# Patient Record
Sex: FEMALE | Race: WHITE | NOT HISPANIC OR LATINO | Employment: FULL TIME | URBAN - METROPOLITAN AREA
[De-identification: names, ages, dates, MRNs, and addresses within clinical notes are randomized per-mention and may not be internally consistent; named-entity substitution may affect disease eponyms.]

---

## 2018-09-12 ENCOUNTER — OFFICE VISIT (OUTPATIENT)
Dept: HEMATOLOGY ONCOLOGY | Facility: MEDICAL CENTER | Age: 53
End: 2018-09-12
Payer: COMMERCIAL

## 2018-09-12 ENCOUNTER — HOSPITAL ENCOUNTER (OUTPATIENT)
Facility: MEDICAL CENTER | Age: 53
End: 2018-09-12
Attending: INTERNAL MEDICINE
Payer: COMMERCIAL

## 2018-09-12 ENCOUNTER — NON-PROVIDER VISIT (OUTPATIENT)
Dept: HEMATOLOGY ONCOLOGY | Facility: MEDICAL CENTER | Age: 53
End: 2018-09-12
Payer: COMMERCIAL

## 2018-09-12 VITALS
TEMPERATURE: 98.1 F | WEIGHT: 238.98 LBS | OXYGEN SATURATION: 94 % | DIASTOLIC BLOOD PRESSURE: 80 MMHG | RESPIRATION RATE: 18 BRPM | HEART RATE: 88 BPM | SYSTOLIC BLOOD PRESSURE: 110 MMHG | BODY MASS INDEX: 46.92 KG/M2 | HEIGHT: 60 IN

## 2018-09-12 VITALS
WEIGHT: 238 LBS | HEIGHT: 60 IN | SYSTOLIC BLOOD PRESSURE: 110 MMHG | TEMPERATURE: 98.1 F | DIASTOLIC BLOOD PRESSURE: 80 MMHG | BODY MASS INDEX: 46.72 KG/M2 | HEART RATE: 88 BPM | RESPIRATION RATE: 18 BRPM | OXYGEN SATURATION: 94 %

## 2018-09-12 DIAGNOSIS — C50.912 LEFT BREAST CANCER WITH T3 TUMOR, >5 CM IN GREATEST DIMENSION (HCC): ICD-10-CM

## 2018-09-12 LAB
ALBUMIN SERPL BCP-MCNC: 4 G/DL (ref 3.2–4.9)
ALBUMIN/GLOB SERPL: 1.4 G/DL
ALP SERPL-CCNC: 74 U/L (ref 30–99)
ALT SERPL-CCNC: 16 U/L (ref 2–50)
ANION GAP SERPL CALC-SCNC: 7 MMOL/L (ref 0–11.9)
AST SERPL-CCNC: 19 U/L (ref 12–45)
BILIRUB SERPL-MCNC: 0.6 MG/DL (ref 0.1–1.5)
BUN SERPL-MCNC: 16 MG/DL (ref 8–22)
CALCIUM SERPL-MCNC: 9.7 MG/DL (ref 8.5–10.5)
CEA SERPL-MCNC: 2.2 NG/ML (ref 0–3)
CHLORIDE SERPL-SCNC: 106 MMOL/L (ref 96–112)
CO2 SERPL-SCNC: 28 MMOL/L (ref 20–33)
CREAT SERPL-MCNC: 0.68 MG/DL (ref 0.5–1.4)
GLOBULIN SER CALC-MCNC: 2.9 G/DL (ref 1.9–3.5)
GLUCOSE SERPL-MCNC: 94 MG/DL (ref 65–99)
POTASSIUM SERPL-SCNC: 4 MMOL/L (ref 3.6–5.5)
PROT SERPL-MCNC: 6.9 G/DL (ref 6–8.2)
SODIUM SERPL-SCNC: 141 MMOL/L (ref 135–145)

## 2018-09-12 PROCEDURE — 86300 IMMUNOASSAY TUMOR CA 15-3: CPT

## 2018-09-12 PROCEDURE — 80053 COMPREHEN METABOLIC PANEL: CPT

## 2018-09-12 PROCEDURE — 82378 CARCINOEMBRYONIC ANTIGEN: CPT

## 2018-09-12 PROCEDURE — 36415 COLL VENOUS BLD VENIPUNCTURE: CPT | Performed by: INTERNAL MEDICINE

## 2018-09-12 PROCEDURE — 99205 OFFICE O/P NEW HI 60 MIN: CPT | Performed by: INTERNAL MEDICINE

## 2018-09-12 ASSESSMENT — PAIN SCALES - GENERAL
PAINLEVEL: NO PAIN
PAINLEVEL: NO PAIN

## 2018-09-12 NOTE — PROGRESS NOTES
Consult Note: Oncology    Date of consultation: 9/12/2018 8:09 AM    Referring provider: Elena Grossman M.D.    Reason for consultation: Locally davanced breast Ca , ER/NM positive Her2 kamala negative.       History of presenting illness:      -Palpable left breast mass since 4/2018. Also noticed left axillary mass  -Outside mammogram/ultrasound-large irregular mass in the 6:00 position of the left breast along with a large left axillary lymph node  -Ultrasound biopsy high-grade invasive ductal carcinoma, poorly differentiated. A year. Positive and HER-2/kamala negative. Ki- 67 -60%.  She is G0, P0. No hormone replacement therapy. No significant family history of breast carcinoma that she knows of.  -She has a PET scan ordered which is pending      Past Medical History: No other documented medical history  No past medical history on file.    Past surgical history: Cholecystectomy, gastric bypass No past surgical history on file.    Allergies:  Patient has no allergy information on record.    Medications:    No current outpatient prescriptions on file.     No current facility-administered medications for this visit.        Social History:   She is a nonsmoker. No significant alcohol use except socially.  Family History: No significant family history of breast carcinoma. Her maternal and paternal grand parents had lung carcinoma, thought to be from smoking  No family history on file.    Review of Systems:  All other review of systems are negative except what was mentioned above in the HPI.    Constitutional: No fever, chills, weight loss ,malaise/fatigue.    HEENT: No new auditory or visual complaints. No sore throat and neck pain.     Respiratory:No new cough, sputum production, shortness of breath and wheezing.    Cardiovascular: No new chest pain, palpitations, orthopnea and leg swelling.    Gastrointestinal: No heartburn, nausea, vomiting ,abdominal pain, hematochezia or melena     Genitourinary: Negative for  dysuria, hematuria    Musculoskeletal: No new arthralgias or myalgias   Skin: Negative for rash and itching.    Neurological: Negative for focal weakness or headaches.    Endo/Heme/Allergies: No abnormal bleed/bruise.    Psychiatric/Behavioral: No new depression/anxiety.    Physical Exam:  Vitals:   /80   Pulse 88   Temp 36.7 °C (98.1 °F)   Resp 18   Ht 1.524 m (5')   Wt 108.4 kg (238 lb 15.7 oz)   SpO2 94%   BMI 46.67 kg/m²     General: Not in acute distress, alert and oriented x 3  HEENT: No pallor, icterus. Oropharynx clear.   Neck: Supple, no palpable masses.  Lymph nodes: No palpable cervical, supraclavicular,  inguinal lymphadenopathy.    CVS: regular rate and rhythm, no rubs or gallops  RESP: Clear to auscultate bilaterally, no wheezing or crackles.   ABD: Soft, non tender, non distended, positive bowel sounds, no palpable organomegaly  EXT: No edema or cyanosis  CNS: Alert and oriented x3, No focal deficits.  Skin- No rash  Breast- large, firm breast mass centered around the 6:00 position of the left breast. She also has bulky left axillary adenopathy  Labs:   No results for input(s): RBC, HEMOGLOBIN, HEMATOCRIT, PLATELETCT, PROTHROMBTM, APTT, INR, IRON, FERRITIN, TOTIRONBC in the last 72 hours.  No results found for: SODIUM, POTASSIUM, CHLORIDE, CO2, GLUCOSE, BUN, CREATININE, BUNCREATRAT, GLOMRATE     Assessment and Plan:    Locally davanced breast Ca , ER/VT positive Her2 kamala negative, poorly differentiated, high-grade with high Ki-67. She appears to have aggressive pathology, probably Luminal B type.. We will obtain the official report of the pathology from outside.    Long discussion today with the patient about various stages of breast carcinoma. Clinically, she appears to stage III disease. Informed her that PET scan is very important to make sure that she does not have stage IV disease in which case the goal of treatment will be more palliative with antiestrogen therapy and CDk4  inhibitor. We will assess her hormone levels to ascertain the menopausal status.    If she does not have distant metastatic disease, will be a candidate for neoadjuvant chemotherapy. Given the bulky primary breast mass and axillary adenopathy for downstaging, given her otherwise intact. Performance status, I would prefer receiving dose dense chemotherapy, we will arrange PICC line/echocardiogram in that situation.    Given her age and aggressive presentation, will also test for germline mutation, which may have bearing on the decision for bilateral mastectomy.    Return to clinic with PET scan results.Complex patient requiring complex decision making. I have extensively reviewed her prior medical records as part of establishing care with me and formulated the plan . 80% of today's 65 minute visit spent today face to face reviewing all the above and answering her questions.    She agreed and verbalized her agreement and understanding with the current plan.  I answered all questions and concerns she has at this time.              Thank you for allowing me to participate in her care.    Please note that this dictation was created using voice recognition software. I have made every reasonable attempt to correct obvious errors, but I expect that there are errors of grammar and possibly content that I did not discover before finalizing the note.      SIGNATURES:  Jitendra Linder    CC:  Noam Caldwell M.D.  Elena Grossman M.D.

## 2018-09-12 NOTE — PROGRESS NOTES
Addie Orellana is a 53 y.o. female here for a non-provider visit for: Lab Draws  on 9/12/2018 at 9:13 AM    Procedure Performed: Venipuncture     Anatomical site: Right Antecubital Area (AC)    Equipment used: 21g Butterfly     Labs drawn: CEA, CMP and ca 27.29, , AMBRY    Ordering Provider: Dr. Jitendra Pittman By: Brisa Salgado, Med Ass't    Without incident, raymundo present in office.

## 2018-09-13 LAB — CANCER AG15-3 SERPL-ACNC: 19 U/ML (ref 0–31)

## 2018-09-14 ENCOUNTER — TELEPHONE (OUTPATIENT)
Dept: HEMATOLOGY ONCOLOGY | Facility: MEDICAL CENTER | Age: 53
End: 2018-09-14

## 2018-09-14 LAB — CANCER AG27-29 SERPL-ACNC: 17.7 U/ML (ref 0–40)

## 2018-09-14 NOTE — TELEPHONE ENCOUNTER
New Oncology Patient 48 hour follow up:    Called to welcome patient to [facility] and to follow up on initial consult with Dr. Linder on 9/12/2018. Reviewed next steps in the patient’s plan of care, patient upcoming appointment on 9/18/2018 for PET Results.Patient confirmed, answered all patients questions and thanked them for their time. Provided our phone number, [phone], for patient should they have any further questions or concerns.

## 2018-09-17 ENCOUNTER — HOSPITAL ENCOUNTER (OUTPATIENT)
Dept: RADIOLOGY | Facility: MEDICAL CENTER | Age: 53
End: 2018-09-17
Attending: INTERNAL MEDICINE
Payer: COMMERCIAL

## 2018-09-17 DIAGNOSIS — C50.912 LEFT BREAST CANCER WITH T3 TUMOR, >5 CM IN GREATEST DIMENSION (HCC): ICD-10-CM

## 2018-09-17 PROCEDURE — A9552 F18 FDG: HCPCS

## 2018-09-18 ENCOUNTER — TELEPHONE (OUTPATIENT)
Dept: HEMATOLOGY ONCOLOGY | Facility: MEDICAL CENTER | Age: 53
End: 2018-09-18

## 2018-09-18 ENCOUNTER — OFFICE VISIT (OUTPATIENT)
Dept: HEMATOLOGY ONCOLOGY | Facility: MEDICAL CENTER | Age: 53
End: 2018-09-18
Payer: COMMERCIAL

## 2018-09-18 VITALS
WEIGHT: 235.56 LBS | DIASTOLIC BLOOD PRESSURE: 80 MMHG | TEMPERATURE: 97.9 F | BODY MASS INDEX: 46.25 KG/M2 | RESPIRATION RATE: 18 BRPM | HEART RATE: 90 BPM | HEIGHT: 60 IN | SYSTOLIC BLOOD PRESSURE: 122 MMHG | OXYGEN SATURATION: 97 %

## 2018-09-18 DIAGNOSIS — I51.7 CARDIOMEGALY: ICD-10-CM

## 2018-09-18 DIAGNOSIS — Z51.81 ENCOUNTER FOR MONITORING CARDIOTOXIC DRUG THERAPY: ICD-10-CM

## 2018-09-18 DIAGNOSIS — J35.8 TONSILLAR MASS: ICD-10-CM

## 2018-09-18 DIAGNOSIS — Z17.0 ER+ PR+ CARCINOMA OF BREAST, LEFT (HCC): ICD-10-CM

## 2018-09-18 DIAGNOSIS — C50.919 HER2 (HUMAN EPIDERMAL GROWTH FACTOR RECEPTOR 2) NEGATIVE CARCINOMA OF BREAST (HCC): ICD-10-CM

## 2018-09-18 DIAGNOSIS — C50.912 LEFT BREAST CANCER WITH T3 TUMOR, >5 CM IN GREATEST DIMENSION (HCC): Primary | ICD-10-CM

## 2018-09-18 DIAGNOSIS — C50.912 ER+ PR+ CARCINOMA OF BREAST, LEFT (HCC): ICD-10-CM

## 2018-09-18 DIAGNOSIS — Z79.899 ENCOUNTER FOR MONITORING CARDIOTOXIC DRUG THERAPY: ICD-10-CM

## 2018-09-18 PROCEDURE — 99215 OFFICE O/P EST HI 40 MIN: CPT | Mod: Q6 | Performed by: INTERNAL MEDICINE

## 2018-09-18 ASSESSMENT — ENCOUNTER SYMPTOMS
HEMOPTYSIS: 0
SORE THROAT: 1
DOUBLE VISION: 0
BACK PAIN: 0
ORTHOPNEA: 0
ABDOMINAL PAIN: 0
WEIGHT LOSS: 0
COUGH: 0
FEVER: 0
DIZZINESS: 0
DEPRESSION: 0
VOMITING: 0
PALPITATIONS: 0
NAUSEA: 0
WHEEZING: 0
CHILLS: 0
BLURRED VISION: 0
NERVOUS/ANXIOUS: 0
MYALGIAS: 0
HEADACHES: 0

## 2018-09-18 NOTE — TELEPHONE ENCOUNTER
New Oncology Patient 48 hour follow up:      Left voicemail for patient requesting a return call to follow up on initial oncology visit with Dr. Linder on 09/12/2018. Patient was also seen today 9/18/2018 at 8:00 for pet results.

## 2018-09-18 NOTE — PATIENT INSTRUCTIONS
1. ENT consult ASAP -- Tonsillar mass   2. ECHO ASAP   3. Blood work today -- estrogen levels   4. See Dr Linder next week with final plans

## 2018-09-18 NOTE — PROGRESS NOTES
Date of visit: 9/18/2018  7:47 AM      Chief Complaint   Patient presents with   • Follow-Up     pet results (raymundo)      HPI :     53 year old female diagnosed with locally advanced ER +, ND + , Her 2 kamala - , invasive ductal carcinoma , poorly differentiated . KI 67  == 60% .     9/14/18 PET scan : VISUALIZED BRAIN: Normal metabolic activity.  HEAD AND NECK: Asymmetric hypermetabolism in the right tongue base/lingual tonsil with a maximum SUV of 12.14. No hypermetabolic cervical nodes.  CHEST: Background lung activity shows average SUV of 1.2.  Lungs show no hypermetabolic pulmonary nodules.  A 4.5 x 4.0 hypermetabolic left breast mass has a maximum SUV of 26.16.  Large, hypermetabolic left axillary node measures 6.6 x 4.4 cm and has an SUV of 27.8. Several small hypermetabolic left retropectoral nodes have a maximum SUV of 5.19. There are no hypermetabolic hilar or mediastinal nodes.  Mild cardiomegaly.  ABDOMEN AND PELVIS: Background liver activity shows average SUV of 3.3.  There is normal, uniform metabolic activity in the liver, spleen, adrenal glands, kidneys.  There is no hypermetabolic mesenteric, retroperitoneal, iliac, or inguinal lymphadenopathy.  Nonspecific physiologic activity in the colon and intestine is noted.  Prior gastric bypass surgery with a small hiatal hernia. Nonobstructing left nephrolithiasis. Cholecystectomy. A few small colonic diverticula.  VISUALIZED MUSCULOSKELETAL SYSTEM: No hypermetabolic osseous lesions. Minimal soft tissue hypermetabolism in the left lateral gluteus musculature, likely infectious/inflammatory or posttraumatic     We reviewed in detail the scan images , and discussed  The presence of abnormality in Lt. Tonsil .            Past Medical History:    No past medical history on file.    Past surgical history:     No past surgical history on file.    Allergies:       Not on File    Medications:         No current outpatient prescriptions on file.     No current  facility-administered medications for this visit.        Social History:     Social History     Social History   • Marital status: Single     Spouse name: N/A   • Number of children: N/A   • Years of education: N/A     Occupational History   • Not on file.     Social History Main Topics   • Smoking status: Not on file   • Smokeless tobacco: Not on file   • Alcohol use Not on file   • Drug use: Unknown   • Sexual activity: Not on file     Other Topics Concern   • Not on file     Social History Narrative   • No narrative on file       Family History:    No family history on file.    Review of Systems   Constitutional: Negative for chills, fever, malaise/fatigue and weight loss.   HENT: Positive for sore throat (some discomfort lt tonsil).    Eyes: Negative for blurred vision and double vision.   Respiratory: Negative for cough, hemoptysis and wheezing.    Cardiovascular: Negative for chest pain, palpitations and orthopnea.   Gastrointestinal: Negative for abdominal pain, nausea and vomiting.   Genitourinary: Negative.    Musculoskeletal: Negative for back pain and myalgias.   Skin: Negative for itching and rash.   Neurological: Negative for dizziness and headaches.   Psychiatric/Behavioral: Negative for depression. The patient is not nervous/anxious.        Physical Exam   Constitutional: She is oriented to person, place, and time and well-developed, well-nourished, and in no distress.   HENT:   Head: Normocephalic and atraumatic.   Mouth/Throat: No oropharyngeal exudate.   Eyes: Pupils are equal, round, and reactive to light. EOM are normal. No scleral icterus.   Neck: Normal range of motion. Neck supple. No JVD present. No thyromegaly present.   Lt submandibular , paratracheal palpable mass   Cardiovascular: Normal rate and regular rhythm.    No murmur heard.  Pulmonary/Chest: Effort normal. No respiratory distress. She has no wheezes. She has no rales. Right breast exhibits no inverted nipple, no mass and no skin  change. Left breast exhibits mass (about 6 cm inferior aspect with skin involvement and mild erythema ) and skin change. Left breast exhibits no inverted nipple, no nipple discharge and no tenderness.   Abdominal: Soft. Bowel sounds are normal. She exhibits no distension. There is no tenderness. There is no rebound.   Musculoskeletal: She exhibits edema (trace LE ).   Lymphadenopathy:     She has no cervical adenopathy.     She has axillary adenopathy.        Right axillary: No lateral adenopathy present.        Left axillary: Lateral (bulky 6-7 cm x3-4 cm ) adenopathy present.        Right: No inguinal and no supraclavicular adenopathy present.        Left: No inguinal and no supraclavicular adenopathy present.   Neurological: She is alert and oriented to person, place, and time. No cranial nerve deficit.   Skin: Skin is warm and dry.   Psychiatric: Mood, memory and affect normal.       Vitals: /80   Pulse 90   Temp 36.6 °C (97.9 °F)   Resp 18   Ht 1.524 m (5')   Wt 106.8 kg (235 lb 9 oz)   SpO2 97%   BMI 46.00 kg/m²     Labs:     Hospital Outpatient Visit on 09/12/2018   Component Date Value Ref Range Status   • Sodium 09/12/2018 141  135 - 145 mmol/L Final   • Potassium 09/12/2018 4.0  3.6 - 5.5 mmol/L Final   • Chloride 09/12/2018 106  96 - 112 mmol/L Final   • Co2 09/12/2018 28  20 - 33 mmol/L Final   • Anion Gap 09/12/2018 7.0  0.0 - 11.9 Final   • Glucose 09/12/2018 94  65 - 99 mg/dL Final   • Bun 09/12/2018 16  8 - 22 mg/dL Final   • Creatinine 09/12/2018 0.68  0.50 - 1.40 mg/dL Final   • Calcium 09/12/2018 9.7  8.5 - 10.5 mg/dL Final   • AST(SGOT) 09/12/2018 19  12 - 45 U/L Final   • ALT(SGPT) 09/12/2018 16  2 - 50 U/L Final   • Alkaline Phosphatase 09/12/2018 74  30 - 99 U/L Final   • Total Bilirubin 09/12/2018 0.6  0.1 - 1.5 mg/dL Final   • Albumin 09/12/2018 4.0  3.2 - 4.9 g/dL Final   • Total Protein 09/12/2018 6.9  6.0 - 8.2 g/dL Final   • Globulin 09/12/2018 2.9  1.9 - 3.5 g/dL Final   •  A-G Ratio 2018 1.4  g/dL Final   • Ca 15-3 - Cis 2018 19  0 - 31 U/mL Final    Comment: INTERPRETIVE INFORMATION: Cancer Antigen-Breast ()  The Roche CA 15-3 electrochemiluminescent immunoassay was used.  Results obtained with different methods or kits cannot be used  interchangeably. The CA 15-3 test is used to aid in the management  of Stage II and III breast cancer patients. Serial testing for  patient CA 15-3 values should be used in conjunction with other  clinical methods for monitoring breast cancer. Patients with  confirmed breast carcinoma frequently have CA 15-3 values in the  same range as healthy individuals. Elevations may be observed in  patients with nonmalignant disease. Therefore, a CA 15-3 value,  regardless of level, should not be interpreted as absolute  evidence of the presence or absence of malignant disease.  Performed by [facility],  [address] [place] [id number] [phone]  [email or web address], Silvino Ballard MD - Lab. Director     • Ca 27.29 2018 17.7  0.0 - 40.0 U/mL Final    Comment: INTERPRETIVE INFORMATION: Cancer Antigen 27.29  The CA 27.29 assay is intended for use in monitorin) disease  progression and/or response to therapy in patients with metastatic  disease, and 2) disease recurrence in patients treated previously  for stages II or III breast carcinoma who are clinically free of  the disease. Serial testing in patients who are clinically free of  disease should be used in conjunction with other clinical methods  for early detection of cancer recurrence.  Limitations: Patients with confirmed breast carcinoma frequently  have CA 27.29 assay values in the same range as healthy  individuals.  Elevations may also be observed in patients with non  malignant disease. Results of this test must always be interpreted  in the context of morphologic and other relevant data, and should  not be used alone for a diagnosis of malignancy.  Methodology: Siemens  Advia Centaur CA 27.29 chemiluminescent  immunoassay was used. Results obtained with different assay  methods                            or kits cannot be used interchangeably.  Performed by [facility],  [address], [facility] [place] [address] [phone]  [email or web address], Silvino Ballard MD - Lab. Director     • Carcinoembryonic Antigen 09/12/2018 2.2  0.0 - 3.0 ng/mL Final    Comment: Effective September 17, 2013 the quantitative determination  of Carcinoembryonic Antigen (CEA) will now be performed at  [facility].  The Access CEA paramagnetic  particle chemiluminescent immunoassay is used.  Results  obtained with different test methods or kits cannot be used interchangeably.  Measurement of CEA has been shown to be  clinically relevant in the management of patients with  colorectal, breast, lung, prostatic, pancreatic, and ovarian  carcinomas.  Smokers may have slightly elevated levels of CEA.     • GFR If  09/12/2018 >60  >60 mL/min/1.73 m 2 Final   • GFR If Non  09/12/2018 >60  >60 mL/min/1.73 m 2 Final         Assessment and Plan:      1. Breast Cancer , invasive ductal carcinoma , poorly differentiated , KI67% --60%. , ER+, PA + ,   HER2 Jane - .  2. We discussed in detail breast cancer presentation , staging , benefit of neoadjuvant chemotherapy and biologic therapy, local treatment .   Staging as reviewed , AJCC 8 th edition -- looks like T4, N2,Mx - as tonsillar mass is under evaluation .      3. Lt tonsillar mass -- second primary versus metastatic disease,  , work up in progress.  ENT evaluation to be obtain ASAP .     4. Cardiomegaly as noted on radiologic studies . ECHO pending .   5. Treatment wise-  Non metatastic disease : Dose dense AC - T if EF OK as well , then surgery .   Then probably RT as well as on PETscan Lt. Chest wall appears involved , tomor is more than 5 cm with large mass /matted axillary nodes,   If tonsillar mass - proven metastatic breast  cancer -- CDK4/6 inhibitor + AI will be initiated .    6. Patient's mother was present at the discussion and all  Questions were answered.   7. Noted LMP about 3-4 years ago.     She agreed and verbalized  agreement and understanding with the current plan.  I answered all questions and concerns at this time   8. F/U with Dr Linder next week with final plans .      All labs and/or imaging studies mentioned in the note above were reviewed with and explained to the patient as a pertain to medical decision making.            SIGNATURES:  Brisa Charlton    CC:  Noam Caldwell M.D.  No ref. provider found

## 2018-09-19 ENCOUNTER — TELEPHONE (OUTPATIENT)
Dept: HEMATOLOGY ONCOLOGY | Facility: MEDICAL CENTER | Age: 53
End: 2018-09-19

## 2018-09-19 DIAGNOSIS — C50.912 ER+ PR+ CARCINOMA OF BREAST, LEFT (HCC): ICD-10-CM

## 2018-09-19 DIAGNOSIS — C50.919 HER2 (HUMAN EPIDERMAL GROWTH FACTOR RECEPTOR 2) NEGATIVE CARCINOMA OF BREAST (HCC): ICD-10-CM

## 2018-09-19 DIAGNOSIS — C50.912 LEFT BREAST CANCER WITH T3 TUMOR, >5 CM IN GREATEST DIMENSION (HCC): ICD-10-CM

## 2018-09-19 DIAGNOSIS — Z51.11 ENCOUNTER FOR ANTINEOPLASTIC CHEMOTHERAPY: ICD-10-CM

## 2018-09-19 DIAGNOSIS — Z17.0 ER+ PR+ CARCINOMA OF BREAST, LEFT (HCC): ICD-10-CM

## 2018-09-19 NOTE — TELEPHONE ENCOUNTER
"Patient contacted our office stating Dr. Serena Aguila's office (ear, nose, throat) specialist informs her they do not accept Medicaid FFS [place]. I contacted the office and am informed to submit a request for \"out of network\" authorization. I contacted three other ENT offices in [place] who do not accept [place] Medicaid either. I messaged the referral specialist in the patient's urgent referral as well so they can submit the auth.   "

## 2018-09-20 ENCOUNTER — OFFICE VISIT (OUTPATIENT)
Dept: HEMATOLOGY ONCOLOGY | Facility: MEDICAL CENTER | Age: 53
End: 2018-09-20
Payer: COMMERCIAL

## 2018-09-20 ENCOUNTER — HOSPITAL ENCOUNTER (OUTPATIENT)
Dept: LAB | Facility: MEDICAL CENTER | Age: 53
End: 2018-09-20
Attending: INTERNAL MEDICINE
Payer: COMMERCIAL

## 2018-09-20 VITALS
SYSTOLIC BLOOD PRESSURE: 142 MMHG | HEART RATE: 84 BPM | RESPIRATION RATE: 18 BRPM | OXYGEN SATURATION: 93 % | BODY MASS INDEX: 46.77 KG/M2 | DIASTOLIC BLOOD PRESSURE: 89 MMHG | WEIGHT: 238.21 LBS | HEIGHT: 60 IN | TEMPERATURE: 97.9 F

## 2018-09-20 DIAGNOSIS — C50.912 LEFT BREAST CANCER WITH T3 TUMOR, >5 CM IN GREATEST DIMENSION (HCC): ICD-10-CM

## 2018-09-20 DIAGNOSIS — Z17.0 ER+ PR+ CARCINOMA OF BREAST, LEFT (HCC): ICD-10-CM

## 2018-09-20 DIAGNOSIS — Z51.11 ENCOUNTER FOR ANTINEOPLASTIC CHEMOTHERAPY: ICD-10-CM

## 2018-09-20 DIAGNOSIS — C50.919 HER2 (HUMAN EPIDERMAL GROWTH FACTOR RECEPTOR 2) NEGATIVE CARCINOMA OF BREAST (HCC): ICD-10-CM

## 2018-09-20 DIAGNOSIS — C50.912 ER+ PR+ CARCINOMA OF BREAST, LEFT (HCC): ICD-10-CM

## 2018-09-20 LAB
FSH SERPL-ACNC: 21.5 MIU/ML
LH SERPL-ACNC: 10 IU/L

## 2018-09-20 PROCEDURE — 36415 COLL VENOUS BLD VENIPUNCTURE: CPT

## 2018-09-20 PROCEDURE — 82671 ASSAY OF ESTROGENS: CPT

## 2018-09-20 PROCEDURE — 83002 ASSAY OF GONADOTROPIN (LH): CPT

## 2018-09-20 PROCEDURE — 83001 ASSAY OF GONADOTROPIN (FSH): CPT

## 2018-09-20 PROCEDURE — 99215 OFFICE O/P EST HI 40 MIN: CPT | Performed by: NURSE PRACTITIONER

## 2018-09-20 RX ORDER — ONDANSETRON 4 MG/1
4 TABLET, FILM COATED ORAL EVERY 4 HOURS PRN
Qty: 30 TAB | Refills: 6 | Status: ON HOLD | OUTPATIENT
Start: 2018-09-20 | End: 2019-03-13

## 2018-09-20 RX ORDER — PROCHLORPERAZINE MALEATE 10 MG
10 TABLET ORAL EVERY 6 HOURS PRN
Qty: 30 TAB | Refills: 6 | Status: ON HOLD | OUTPATIENT
Start: 2018-09-20 | End: 2019-03-13

## 2018-09-20 ASSESSMENT — PAIN SCALES - GENERAL: PAINLEVEL: 4=SLIGHT-MODERATE PAIN

## 2018-09-24 ENCOUNTER — DOCUMENTATION (OUTPATIENT)
Dept: NUTRITION | Facility: MEDICAL CENTER | Age: 53
End: 2018-09-24

## 2018-09-24 PROBLEM — Z51.11 ENCOUNTER FOR ANTINEOPLASTIC CHEMOTHERAPY: Status: ACTIVE | Noted: 2018-09-24

## 2018-09-24 ASSESSMENT — ENCOUNTER SYMPTOMS
NAUSEA: 0
WEIGHT LOSS: 0
FEVER: 0
TINGLING: 1
DIAPHORESIS: 0
CONSTIPATION: 0
DIARRHEA: 0
VOMITING: 0

## 2018-09-24 NOTE — PROGRESS NOTES
Subjective:      Addie Orellana is a 53 y.o. female who presents with Chemo Education (chemo ED dd AC-T (raymundo))      HPI   Patient is established with Dr. 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 Dose dense AC followed by dose dense Taxol on 10/5/2018. She presents today for a pre-chemotherapy teaching appointment. Patient is unaccompanied for today's visit.         No Known Allergies      No current outpatient prescriptions on file prior to visit.     No current facility-administered medications on file prior to visit.          Review of Systems   Constitutional: Negative for diaphoresis, fever and weight loss.   Gastrointestinal: Negative for constipation, diarrhea, nausea and vomiting.   Neurological: Positive for tingling (baseline trace n/t at R hand).          Objective:     /89 (BP Location: Right arm, Patient Position: Sitting, BP Cuff Size: Adult)   Pulse 84   Temp 36.6 °C (97.9 °F) (Temporal)   Resp 18   Ht 1.524 m (5')   Wt 108 kg (238 lb 3.3 oz)   SpO2 93%   BMI 46.52 kg/m²      Physical Exam   Constitutional: She is oriented to person, place, and time. She appears well-developed.   Pulmonary/Chest: Effort normal.   Neurological: She is alert and oriented to person, place, and time.   Skin: Skin is warm and dry.   Psychiatric: She has a normal mood and affect.   Vitals reviewed.      Hospital Outpatient Visit on 09/20/2018   Component Date Value Ref Range Status   • Follicle Stimulating Hormone 09/20/2018 21.5  mIU/mL Final    Comment: FOLLICULAR STIMULATING HORMONE REFERENCE RANGE  Female               Male  >17 yrs  Follicular             3.5 - 12.5  Mid-Cycle              4.7 - 21.5  Luteal                 1.7 - 7.7  Postmenopausal         25.8 - 134.8  ------------------------------------------------------  Monty Stage I:         0.6 - 8.4           0.3 - 2.9  Monty  Stage II:        0.6 - 8.9           0.5 - 4.8  Monty Stage III:       0.5 - 8.9           1.0 - 6.4  Monty Stage IV:        0.7 - 9.3           1.0 - 8.1     • Luteinizing Hormone 09/20/2018 10.0  IU/L Final    Comment: LUTEINIZING HORMONE REFERENCE RANGES:  Female               Male  >17 yrs                                      1.7 - 8.6 IU/L  Follicular             2.4 - 12.6 IU/L  Mid-Cycle             14.0 - 95.6 IU/L  Luteal                 1.0 - 11.4 IU/L  Postmenopausal         7.7 - 58.5 IU/L  ------------------------------------------------------  Monty Stage I:         0.0 - 9.3  IU/L      0.0 - 1.0 IU/L  Monty Stage II:        0.0 - 16.0 IU/L      0.0 - 3.6 IU/L  Monty Stage III:       0.0 - 23.0 IU/L      0.2 - 6.4 IU/L  Monty Stage IV:        0.0 - 19.1 IU/L      0.9 - 8.3 IU/L           Wc-evrvm-otovi Base To Mid-thigh    Result Date: 9/17/2018 9/17/2018 12:24 PM HISTORY/REASON FOR EXAM:  Left breast cancer staging TECHNIQUE/EXAM DESCRIPTION AND NUMBER OF VIEWS: PET body imaging. Initially, 17.9 mCi F-18 FDG was administered intravenously under standardized conditions. Approximately 45 minutes after FDG administration, the patient was placed in the supine position on the PET CT table. Blood glucose level was 109 mg/dL. Low dose spiral CT imaging was performed from the skull base to the mid thighs. PET imaging was then performed from the skull base to the mid thighs. CT images, PET images, and PET/CT fused images were reviewed on a PACS 3D workstation. The limited non-contrast CT data are used primarily for attenuation correction and anatomic correlation.  Evaluation of solid organs and bowel are especially limited utilizing this technique. A low dose CT was obtained of the same area without IV contrast for attenuation correction and coregistration, not for a diagnostic scan. COMPARISON: None. FINDINGS: VISUALIZED BRAIN: Normal metabolic activity. HEAD AND NECK: Asymmetric hypermetabolism in the  right tongue base/lingual tonsil with a maximum SUV of 12.14. No hypermetabolic cervical nodes. CHEST: Background lung activity shows average SUV of 1.2. Lungs show no hypermetabolic pulmonary nodules. A 4.5 x 4.0 hypermetabolic left breast mass has a maximum SUV of 26.16. Large, hypermetabolic left axillary node measures 6.6 x 4.4 cm and has an SUV of 27.8. Several small hypermetabolic left retropectoral nodes have a maximum SUV of 5.19. There are no hypermetabolic hilar or mediastinal nodes. Mild cardiomegaly. ABDOMEN AND PELVIS: Background liver activity shows average SUV of 3.3. There is normal, uniform metabolic activity in the liver, spleen, adrenal glands, kidneys. There is no hypermetabolic mesenteric, retroperitoneal, iliac, or inguinal lymphadenopathy. Nonspecific physiologic activity in the colon and intestine is noted. Prior gastric bypass surgery with a small hiatal hernia. Nonobstructing left nephrolithiasis. Cholecystectomy. A few small colonic diverticula. VISUALIZED MUSCULOSKELETAL SYSTEM: No hypermetabolic osseous lesions. Minimal soft tissue hypermetabolism in the left lateral gluteus musculature, likely infectious/inflammatory or posttraumatic     1. Hypermetabolic left breast mass, consistent with malignancy. 2. Hypermetabolic left axillary and prepectoral lymphadenopathy, consistent with karlie metastases. 3. Asymmetric hypermetabolism in the right tongue base/right lingual tonsil. While it may represent salivary gland secretion pooling, another primary malignancy is also a consideration. Recommend direct visualization. 4. Nonobstructive left nephrolithiasis.       Assessment/Plan:     1. Er+ W[place]+ carcinoma of breast, left (HCC)  ondansetron (ZOFRAN) 4 MG Tab tablet    prochlorperazine (COMPAZINE) 10 MG Tab    REFERRAL TO ONCOLOGY NUTRITION SERVICES    REFERRAL TO ONCOLOGY NURSE NAVIGATOR   2. HER2 (human epidermal growth factor receptor 2) negative carcinoma of breast (HCC)  ondansetron (ZOFRAN)  4 MG Tab tablet    prochlorperazine (COMPAZINE) 10 MG Tab    REFERRAL TO ONCOLOGY NUTRITION SERVICES    REFERRAL TO ONCOLOGY NURSE NAVIGATOR   3. Left breast cancer with T3 tumor, >5 cm in greatest dimension (HCC)  ondansetron (ZOFRAN) 4 MG Tab tablet    prochlorperazine (COMPAZINE) 10 MG Tab    REFERRAL TO ONCOLOGY NUTRITION SERVICES    REFERRAL TO ONCOLOGY NURSE NAVIGATOR   4. 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 She will be initiating dose dense AC followed by dose dense Taxol on 10/5/18.    Patient was educated on chemotherapy including but not limited to: Infusion Policies and procedures, cycling of chemotherapy, length of treatment, alopecia, myelosuppression, infection prevention, fatigue, GI distress, use of specific nausea medications, avoidance of alcoholic beverages and supplement medications, use of sunscreen, chemotherapy precautions at home, and invasive procedures. Patient was provided with drug specific handouts, [facility] chemotherapy and you book, [facility] eating hints book, [facility] side effects sheet. Patient was given tour of Infusion Services and shown where to park and check-in.      Additional teaching includes:  1.  Patient will reduce 5000 mg daily vitamin C to 500 mg.    The following appointments, labs, and medications have been provided to the patient:  Line: Port placement scheduled 10/3  ECHO: Scheduled 9/25  Labs: cbc, cmp  OnPro: n/a  Lab Appointments: port labs, pre-chemo  Follow up appts: tox check 10/12  Chemotherapy class: completed today  Nausea medication: zofran and compazine  Pain medication: n/a  Nurse alexey: will refer to Andrei  Dietician:  Referred per policy  SW: n/a      Spent 60 minutes of continuous, non-interrupted, face-to-face patient contact in which greater than 50% of the visit was spent counseling and coordinating of  care.

## 2018-09-24 NOTE — PROGRESS NOTES
Nutrition Services: [facility] Consult Received    RD to see pt and provided full assessment on chemo start date 10/5. Will follow and make recommendations as indicated. Please Contact Oncology RD should you have further questions or concerns. Thank you. [phone]

## 2018-09-25 ENCOUNTER — HOSPITAL ENCOUNTER (OUTPATIENT)
Dept: CARDIOLOGY | Facility: MEDICAL CENTER | Age: 53
End: 2018-09-25
Attending: INTERNAL MEDICINE
Payer: COMMERCIAL

## 2018-09-25 DIAGNOSIS — Z79.899 ENCOUNTER FOR MONITORING CARDIOTOXIC DRUG THERAPY: ICD-10-CM

## 2018-09-25 DIAGNOSIS — Z51.81 ENCOUNTER FOR MONITORING CARDIOTOXIC DRUG THERAPY: ICD-10-CM

## 2018-09-25 PROCEDURE — 93325 DOPPLER ECHO COLOR FLOW MAPG: CPT

## 2018-09-26 LAB
ESTRADIOL SERPL HS-MCNC: 14.6 PG/ML
ESTROGEN SERPL CALC-MCNC: 47 PG/ML
ESTRONE SERPL-MCNC: 32.4 PG/ML

## 2018-10-03 ENCOUNTER — HOSPITAL ENCOUNTER (OUTPATIENT)
Dept: RADIOLOGY | Facility: MEDICAL CENTER | Age: 53
End: 2018-10-03
Attending: NURSE PRACTITIONER
Payer: COMMERCIAL

## 2018-10-03 DIAGNOSIS — C50.912 ER+ PR+ CARCINOMA OF BREAST, LEFT (HCC): ICD-10-CM

## 2018-10-03 DIAGNOSIS — C50.912 LEFT BREAST CANCER WITH T3 TUMOR, >5 CM IN GREATEST DIMENSION (HCC): ICD-10-CM

## 2018-10-03 DIAGNOSIS — C50.919 HER2 (HUMAN EPIDERMAL GROWTH FACTOR RECEPTOR 2) NEGATIVE CARCINOMA OF BREAST (HCC): ICD-10-CM

## 2018-10-03 DIAGNOSIS — Z17.0 ER+ PR+ CARCINOMA OF BREAST, LEFT (HCC): ICD-10-CM

## 2018-10-03 DIAGNOSIS — Z51.11 ENCOUNTER FOR ANTINEOPLASTIC CHEMOTHERAPY: ICD-10-CM

## 2018-10-03 PROCEDURE — 36569 INSJ PICC 5 YR+ W/O IMAGING: CPT

## 2018-10-03 NOTE — PROCEDURES
PICC Insertion Final 3CG    Consents confirmed, vessel patency confirmed with ultrasound. Risks and benefits of procedure explained to patient/family and education regarding central line associated bloodstream infections provided. Questions answered.     PICC placed in RUE per MD order with ultrasound guidance. 5 Fr, DOUBLE lumen PICC placed in CEPHALIC vein after 1 attempt(s). 1 cc's of 1% lidocaine injected intradermally, 21 gauge microintroducer needle and modified Seldinger technique used. 43 cm catheter inserted with good blood return. Secured at 2cm marker. Each lumen flushed without resistance with 10 mL 0.9% normal saline. PICC line secured with Biopatch and Tegaderm.    PICC placement is confirmed by nurse using 3CG technology. PICC line is appropriate for use at this time. Pt tolerated procedure WELL, NO C/O PAIN.  Patient condition relayed to unit RN or ordering physician via this post procedure note in the EMR.     [employer] PICC ref # [id number], Lot # [id number]    All guidewires removed.

## 2018-10-04 ENCOUNTER — TELEPHONE (OUTPATIENT)
Dept: ONCOLOGY | Facility: MEDICAL CENTER | Age: 53
End: 2018-10-04

## 2018-10-04 RX ORDER — 0.9 % SODIUM CHLORIDE 0.9 %
VIAL (ML) INJECTION PRN
Status: CANCELLED | OUTPATIENT
Start: 2018-10-04

## 2018-10-04 RX ORDER — 0.9 % SODIUM CHLORIDE 0.9 %
5 VIAL (ML) INJECTION PRN
Status: CANCELLED | OUTPATIENT
Start: 2018-10-04

## 2018-10-04 RX ORDER — 0.9 % SODIUM CHLORIDE 0.9 %
VIAL (ML) INJECTION PRN
Status: CANCELLED | OUTPATIENT
Start: 2018-10-05

## 2018-10-04 RX ORDER — SODIUM CHLORIDE 9 MG/ML
INJECTION, SOLUTION INTRAVENOUS CONTINUOUS
Status: CANCELLED | OUTPATIENT
Start: 2018-10-05

## 2018-10-04 RX ORDER — ONDANSETRON 2 MG/ML
4 INJECTION INTRAMUSCULAR; INTRAVENOUS
Status: CANCELLED | OUTPATIENT
Start: 2018-10-05

## 2018-10-04 RX ORDER — PROCHLORPERAZINE MALEATE 10 MG
10 TABLET ORAL EVERY 6 HOURS PRN
Status: CANCELLED | OUTPATIENT
Start: 2018-10-05

## 2018-10-04 RX ORDER — 0.9 % SODIUM CHLORIDE 0.9 %
5 VIAL (ML) INJECTION PRN
Status: CANCELLED | OUTPATIENT
Start: 2018-10-05

## 2018-10-04 RX ORDER — ONDANSETRON 8 MG/1
8 TABLET, ORALLY DISINTEGRATING ORAL
Status: CANCELLED | OUTPATIENT
Start: 2018-10-05

## 2018-10-04 NOTE — PROGRESS NOTES
RN confirmed first chemotherapy appt with pt and expected timeframe for appt. Pt is aware of the location of the infusion center. RN encouraged pt to eat breakfast prior to arrival.

## 2018-10-05 ENCOUNTER — OUTPATIENT INFUSION SERVICES (OUTPATIENT)
Dept: ONCOLOGY | Facility: MEDICAL CENTER | Age: 53
End: 2018-10-05
Attending: INTERNAL MEDICINE
Payer: COMMERCIAL

## 2018-10-05 ENCOUNTER — DOCUMENTATION (OUTPATIENT)
Dept: HEMATOLOGY ONCOLOGY | Facility: MEDICAL CENTER | Age: 53
End: 2018-10-05

## 2018-10-05 VITALS
WEIGHT: 238.32 LBS | SYSTOLIC BLOOD PRESSURE: 147 MMHG | RESPIRATION RATE: 18 BRPM | TEMPERATURE: 97.7 F | HEART RATE: 89 BPM | HEIGHT: 60 IN | BODY MASS INDEX: 46.79 KG/M2 | DIASTOLIC BLOOD PRESSURE: 97 MMHG | OXYGEN SATURATION: 96 %

## 2018-10-05 DIAGNOSIS — C50.912 LEFT BREAST CANCER WITH T3 TUMOR, >5 CM IN GREATEST DIMENSION (HCC): ICD-10-CM

## 2018-10-05 DIAGNOSIS — Z17.0 ER+ PR+ CARCINOMA OF BREAST, LEFT (HCC): ICD-10-CM

## 2018-10-05 DIAGNOSIS — C50.912 ER+ PR+ CARCINOMA OF BREAST, LEFT (HCC): ICD-10-CM

## 2018-10-05 DIAGNOSIS — C50.919 HER2 (HUMAN EPIDERMAL GROWTH FACTOR RECEPTOR 2) NEGATIVE CARCINOMA OF BREAST (HCC): ICD-10-CM

## 2018-10-05 LAB
ALBUMIN SERPL BCP-MCNC: 3.6 G/DL (ref 3.2–4.9)
ALBUMIN/GLOB SERPL: 1.1 G/DL
ALP SERPL-CCNC: 63 U/L (ref 30–99)
ALT SERPL-CCNC: 17 U/L (ref 2–50)
ANION GAP SERPL CALC-SCNC: 8 MMOL/L (ref 0–11.9)
AST SERPL-CCNC: 20 U/L (ref 12–45)
BASOPHILS # BLD AUTO: 0.3 % (ref 0–1.8)
BASOPHILS # BLD: 0.02 K/UL (ref 0–0.12)
BILIRUB SERPL-MCNC: 0.5 MG/DL (ref 0.1–1.5)
BUN SERPL-MCNC: 16 MG/DL (ref 8–22)
CALCIUM SERPL-MCNC: 9.3 MG/DL (ref 8.5–10.5)
CHLORIDE SERPL-SCNC: 108 MMOL/L (ref 96–112)
CO2 SERPL-SCNC: 26 MMOL/L (ref 20–33)
CREAT SERPL-MCNC: 0.63 MG/DL (ref 0.5–1.4)
EOSINOPHIL # BLD AUTO: 0.08 K/UL (ref 0–0.51)
EOSINOPHIL NFR BLD: 1.3 % (ref 0–6.9)
ERYTHROCYTE [DISTWIDTH] IN BLOOD BY AUTOMATED COUNT: 44 FL (ref 35.9–50)
GLOBULIN SER CALC-MCNC: 3.3 G/DL (ref 1.9–3.5)
GLUCOSE SERPL-MCNC: 97 MG/DL (ref 65–99)
HCT VFR BLD AUTO: 44.5 % (ref 37–47)
HGB BLD-MCNC: 14.7 G/DL (ref 12–16)
IMM GRANULOCYTES # BLD AUTO: 0.02 K/UL (ref 0–0.11)
IMM GRANULOCYTES NFR BLD AUTO: 0.3 % (ref 0–0.9)
LYMPHOCYTES # BLD AUTO: 1.07 K/UL (ref 1–4.8)
LYMPHOCYTES NFR BLD: 17.3 % (ref 22–41)
MCH RBC QN AUTO: 29.6 PG (ref 27–33)
MCHC RBC AUTO-ENTMCNC: 33 G/DL (ref 33.6–35)
MCV RBC AUTO: 89.7 FL (ref 81.4–97.8)
MONOCYTES # BLD AUTO: 0.66 K/UL (ref 0–0.85)
MONOCYTES NFR BLD AUTO: 10.7 % (ref 0–13.4)
NEUTROPHILS # BLD AUTO: 4.33 K/UL (ref 2–7.15)
NEUTROPHILS NFR BLD: 70.1 % (ref 44–72)
NRBC # BLD AUTO: 0 K/UL
NRBC BLD-RTO: 0 /100 WBC
PLATELET # BLD AUTO: 125 K/UL (ref 164–446)
PMV BLD AUTO: 9.7 FL (ref 9–12.9)
POTASSIUM SERPL-SCNC: 3.6 MMOL/L (ref 3.6–5.5)
PROT SERPL-MCNC: 6.9 G/DL (ref 6–8.2)
RBC # BLD AUTO: 4.96 M/UL (ref 4.2–5.4)
SODIUM SERPL-SCNC: 142 MMOL/L (ref 135–145)
WBC # BLD AUTO: 6.2 K/UL (ref 4.8–10.8)

## 2018-10-05 PROCEDURE — 96417 CHEMO IV INFUS EACH ADDL SEQ: CPT

## 2018-10-05 PROCEDURE — 96375 TX/PRO/DX INJ NEW DRUG ADDON: CPT

## 2018-10-05 PROCEDURE — 96377 APPLICATON ON-BODY INJECTOR: CPT | Mod: XU

## 2018-10-05 PROCEDURE — 85025 COMPLETE CBC W/AUTO DIFF WBC: CPT

## 2018-10-05 PROCEDURE — 80053 COMPREHEN METABOLIC PANEL: CPT

## 2018-10-05 PROCEDURE — 700105 HCHG RX REV CODE 258: Performed by: NURSE PRACTITIONER

## 2018-10-05 PROCEDURE — 96413 CHEMO IV INFUSION 1 HR: CPT

## 2018-10-05 PROCEDURE — 700111 HCHG RX REV CODE 636 W/ 250 OVERRIDE (IP): Performed by: NURSE PRACTITIONER

## 2018-10-05 PROCEDURE — 700105 HCHG RX REV CODE 258

## 2018-10-05 PROCEDURE — 700111 HCHG RX REV CODE 636 W/ 250 OVERRIDE (IP): Mod: JW

## 2018-10-05 PROCEDURE — 96367 TX/PROPH/DG ADDL SEQ IV INF: CPT

## 2018-10-05 RX ORDER — M-VIT,TX,IRON,MINS/CALC/FOLIC 27MG-0.4MG
1 TABLET ORAL EVERY EVENING
COMMUNITY
End: 2023-04-07

## 2018-10-05 RX ADMIN — SODIUM CHLORIDE 150 MG: 9 INJECTION, SOLUTION INTRAVENOUS at 13:04

## 2018-10-05 RX ADMIN — DOXORUBICIN HYDROCHLORIDE 129 MG: 2 INJECTION, SOLUTION INTRAVENOUS at 13:37

## 2018-10-05 RX ADMIN — CYCLOPHOSPHAMIDE 1290 MG: 2 INJECTION, POWDER, FOR SOLUTION INTRAVENOUS; ORAL at 14:26

## 2018-10-05 RX ADMIN — DEXAMETHASONE SODIUM PHOSPHATE 12 MG: 4 INJECTION, SOLUTION INTRAMUSCULAR; INTRAVENOUS at 12:21

## 2018-10-05 RX ADMIN — ONDANSETRON HYDROCHLORIDE 16 MG: 2 SOLUTION INTRAMUSCULAR; INTRAVENOUS at 12:44

## 2018-10-05 ASSESSMENT — PAIN SCALES - GENERAL: PAINLEVEL_OUTOF10: 0

## 2018-10-05 NOTE — PROGRESS NOTES
"Nutrition Services: RD consultation/new XRT/chemo start  Dx: breast cancer, L breast   PMHx: obesity     RD met with patient today to establish rapport and review estimated nutrient needs for weight maintenance throughout cancer treatment.    · RD reviewed potential side of effects during chemotherapy that could become nutritionally pertinent: N/V, diarrhea, constipation, changes in taste, loss of appetite resulting in weight loss.  · Pt states appetite has declined by ~10% from usual regimen, which has led to a 14 lb weight loss over the last 5 weeks, she states  · Pt denies any change in bowel habits at this time  RD reviewed specific foods and snacks containing high protein, moderate fat and increased fruits and vegetables for weight maintenance and optimal nutrition throughout cancer treatment.    Discussed potential benefits of omega-3 fatty acid from fish vs supplement.   Pt c/o changes in taste and smell - sugary items taste and smell like \"cleaning products\", pt reports.     Assessment:  Ht: 60 Weight: 238 lbs  Recent wt change: 14 lbs (% wt change x5 weeks = 5.8, which is classified as severe loss, per guidelines r/t hypermetabolic disease state.     Labs (10/5): reviewed      Goals/ Recommendations:   1) RD reviewed specific foods and snacks containing high protein for weight maintenance and preservation of LBM, optimal nutrition throughout cancer treatment.  Handouts provided.   2) RD reviewed potential benefits of increasing omega-3 fatty acids from cold-water fish and flaxseed, walnuts in cancer population.  Handouts were also provided to patient for point of reference.  3) RD reviewed Plate Method for adequate portion sizes from fruits, vegetables, lean meats/eggs and whole grain products for best nutrition practice.     4) Reviewed with patient remedies to try for changes in taste, if this becomes an issue throughout treatment.    Female           Nutrition Needs Assessment:           Height (inches) " 60 152       Weight (lbs) 238 108       Age (years) 53         BMI 47         Total Calorie Needs per HBE x.1.0 2063 2063 19 kcal/kg   Total Protein Needs (1.0 - 1.2 g/kg of CBW) 108 130       Daily Fluids (20 ml/kg of CBW) 2164               RD available at [phone] prn.

## 2018-10-05 NOTE — PROGRESS NOTES
Chemotherapy Verification - SECONDARY RN       Height = 153.5 cm  Weight = 108.1 kg  BSA = 2.15 m^2       Medication: Doxorubicin  Dose: 60 mg/m^2  Calculated Dose: 129 mg                             (In mg/m2, AUC, mg/kg)     Medication: Cytoxan  Dose: 600 mg/m^2  Calculated Dose: 1250 mg                             (In mg/m2, AUC, mg/kg)    I confirm that this process was performed independently.

## 2018-10-05 NOTE — PROGRESS NOTES
"Pharmacy Chemotherapy Verification Note:    Patient Name: Addie Orellana      Dx: Locally Advanced Breast CA (ER/PA+, HER2-, LYNSEY-)       Protocol: Dose Dense AC --> Dose Dense Taxol    *Dosing Reference*  DOXOrubicin 60 mg/m² slow IVP on Day 1  Cyclophosphamide 600 mg/m² IV over 30 minutes on Day 1  Every 2 weeks x4 cycles followed by  PACLItaxel 175 mg/m² IV over 3 hours on Day 1  Every 2 weeks x4 cycles    NCCN Guidelines for Breast Cancer. V.1.2018.  Renuka CROUCH, et al. J Clin Oncol. 2003;21(8):1431-9.    Allergies:  Patient has no known allergies.     /97   Pulse 89   Temp 36.5 °C (97.7 °F)   Resp 18   Ht 1.535 m (5' 0.43\")   Wt 108.1 kg (238 lb 5.1 oz)   SpO2 96%   BMI 45.88 kg/m²  Body surface area is 2.15 meters squared.  ANC~ 4330  Plt = 125 k  SCr = 0.63 mg/dL  CrCl = >125 mL/min (using min SCr 0.7 mg/dL and current weight)  LFT = WNL  TBili = 0.5  9/25/18 ECHO: LVEF 53% (paper copy in chart)     Drug Order   (Drug name, dose, route, IV Fluid & volume, frequency, number of doses) Cycle: 1 of 4      Previous treatment: none     Medication = DOXOrubicin (Adriamycin)  Base Dose = 60 mg/m²  Calc Dose: Base Dose x 2.15 m² = 129 mg  Final Dose = 129 mg  Route = IV  Fluid & Volume = 64.5 mL undiluted drug  Conc = 2 mg/mL  Admin Duration = Over 20 minutes          <5% difference, ok to treat with final written dose   Medication = Cyclophosphamide (Cytoxan)  Base Dose = 600 mg/m²  Calc Dose: Base Dose x 2.15 m² = 1290 mg  Final Dose = 1290 mg  Route = IV  Fluid & Volume =  mL  Admin Duration = Over 30-60 minutes          <5% difference, ok to treat with final written dose     By my signature below, I confirm this process was performed independently with the BSA and all final chemotherapy dosing calculations congruent. I have reviewed the above chemotherapy order and that my calculation of the final dose and BSA (when applicable) corroborate those calculations of the  pharmacist.     Randy " JULISA Austin, PharmD, BCPS

## 2018-10-05 NOTE — PROGRESS NOTES
"Pharmacy Chemotherapy Note        Patient Name: SISSY JUNE  MRN: [id number]    Oncologist: Jitendra Linder M.D.    Protocol: Dose Dense AC followed by Dos Dense paclitaxel       Doxorubicin 60 mg/m2 IV push on day 1  Cyclophosphamide 600 mg/m2 IV over 30 min on day 1    14 day cycle for 4 cycles    Followed by    Paclitaxel 175 mg/m2 IV over 3 hours on day 1    14 day cycle for 14 cycles    *Dosing Reference*  NCCN Guidelines for breast cancer V.1.2018  Renuka ML, et al. J Clin Oncol. 2003:21(8):1431-9    PREVIOUS CHEMO:  NONE    SIGNIFICANT EVENTS:  NONE    Dx: Breast Cancer         /97   Pulse 89   Temp 36.5 °C (97.7 °F)   Resp 18   Ht 1.535 m (5' 0.43\")   Wt 108.1 kg (238 lb 5.1 oz)   SpO2 96%   BMI 45.88 kg/m²  Body surface area is 2.15 meters squared.    LABS 10/5/2018:  ANC: 4330      WBC: 6.2     Plt: 125k   Hgb/Hct: 14.7/44.5     SCr: 0.63 mg/dL CrCl: >125 mL/min (min SCr of 0.7)    K: 3.6  Na: 142          Glu: 97    LFT's: 20/17/63 TBili = 0.5    ECHO:  9/25/2018 LVEF: 55% (paper)      Doxorubicin 60 mg/m2 x Body surface area is 2.15 meters squared. m2 = 129 mg                        <5% difference, OK to treat with final dose = 129 mg IV     Cyclophosphamide 600 mg/m2 x Body surface area is 2.15 meters squared. m2 = 1290 mg                        <5% difference, OK to treat with final dose = 1290 mg IV       JERSEY Topobethany, PharmD  "

## 2018-10-05 NOTE — PROGRESS NOTES
Pt scheduled for dose-dense doxorubicin/cyclophosphamide D1 C1; arrived ambulatory, independent; denied pain/discomfort or recent health changes. Pt educated on infusion process, lab draw, possible infusion reaction, etc; questions answered unit satisfactory. Pt encouraged to voice questions/concerns at any time; pt verbalized understanding. Baseline ECHO in paper chart. RUE PICC flushed, brisk blood return in both lumens. Labs drawn per orders; Dr. Linder contacted re: K 3.6, stated she was ok w/ that level as pt denied diarrhea, did not request replacement per electrolyte protocol. Other results w/i treatment parameters. Premeds given per orders; infusions completed w/o adverse events. PICC flushed, brisk blood return noted; claves changed per protocol. Treatment plan discussed; pt verbalized knowledge of next appt; denied any unmet needs. Pt to return 10/6/18 for Neulasta injection. Pt discharged ambulatory, independent, NAD.

## 2018-10-05 NOTE — PROGRESS NOTES
Chemotherapy Verification - PRIMARY RN      Height = 153.5 cm  Weight = 108.1 kg  BSA = 2.15 m2       Medication: doxorubicin  Dose: 60 mg/m2  Calculated Dose: 128.82 mg                             (In mg/m2, AUC, mg/kg)     Medication: cyclophosphamide  Dose: 600 mg/m2  Calculated Dose: 1290 mg                             (In mg/m2, AUC, mg/kg)      I confirm this process was performed independently with the BSA and all final chemotherapy dosing calculations congruent.  Any discrepancies of 5% or greater have been addressed with the chemotherapy pharmacist. The resolution of the discrepancy has been documented in the EPIC progress notes.

## 2018-10-06 ENCOUNTER — OUTPATIENT INFUSION SERVICES (OUTPATIENT)
Dept: ONCOLOGY | Facility: MEDICAL CENTER | Age: 53
End: 2018-10-06
Attending: INTERNAL MEDICINE
Payer: COMMERCIAL

## 2018-10-06 VITALS
OXYGEN SATURATION: 95 % | BODY MASS INDEX: 46.79 KG/M2 | SYSTOLIC BLOOD PRESSURE: 139 MMHG | TEMPERATURE: 98.1 F | RESPIRATION RATE: 18 BRPM | WEIGHT: 238.32 LBS | HEART RATE: 77 BPM | DIASTOLIC BLOOD PRESSURE: 84 MMHG | HEIGHT: 60 IN

## 2018-10-06 DIAGNOSIS — C50.919 HER2 (HUMAN EPIDERMAL GROWTH FACTOR RECEPTOR 2) NEGATIVE CARCINOMA OF BREAST (HCC): ICD-10-CM

## 2018-10-06 DIAGNOSIS — C50.912 ER+ PR+ CARCINOMA OF BREAST, LEFT (HCC): ICD-10-CM

## 2018-10-06 DIAGNOSIS — Z17.0 ER+ PR+ CARCINOMA OF BREAST, LEFT (HCC): ICD-10-CM

## 2018-10-06 DIAGNOSIS — C50.912 LEFT BREAST CANCER WITH T3 TUMOR, >5 CM IN GREATEST DIMENSION (HCC): ICD-10-CM

## 2018-10-06 PROCEDURE — 96372 THER/PROPH/DIAG INJ SC/IM: CPT

## 2018-10-06 PROCEDURE — 700111 HCHG RX REV CODE 636 W/ 250 OVERRIDE (IP): Performed by: NURSE PRACTITIONER

## 2018-10-06 RX ADMIN — PEGFILGRASTIM 6 MG: 6 INJECTION SUBCUTANEOUS at 12:24

## 2018-10-06 ASSESSMENT — PAIN SCALES - GENERAL: PAINLEVEL_OUTOF10: 0

## 2018-10-06 NOTE — PROGRESS NOTES
Pt scheduled for Neulasta SQ; arrived ambulatory, independent; denied pain/discomfort. Injection given to back LUE; pt tolerated well. Treatment plan discussed; pt verbalized knowledge of next appt; pt denied any unmet needs. Pt discharged ambulatory, independent, NAD.

## 2018-10-11 RX ORDER — 0.9 % SODIUM CHLORIDE 0.9 %
VIAL (ML) INJECTION PRN
Status: CANCELLED | OUTPATIENT
Start: 2019-01-23

## 2018-10-11 RX ORDER — 0.9 % SODIUM CHLORIDE 0.9 %
VIAL (ML) INJECTION PRN
Status: CANCELLED | OUTPATIENT
Start: 2018-10-26

## 2018-10-11 RX ORDER — 0.9 % SODIUM CHLORIDE 0.9 %
VIAL (ML) INJECTION PRN
Status: CANCELLED | OUTPATIENT
Start: 2019-02-06

## 2018-10-11 RX ORDER — 0.9 % SODIUM CHLORIDE 0.9 %
VIAL (ML) INJECTION PRN
Status: CANCELLED | OUTPATIENT
Start: 2019-01-16

## 2018-10-11 RX ORDER — 0.9 % SODIUM CHLORIDE 0.9 %
VIAL (ML) INJECTION PRN
Status: CANCELLED | OUTPATIENT
Start: 2018-10-12

## 2018-10-11 RX ORDER — 0.9 % SODIUM CHLORIDE 0.9 %
VIAL (ML) INJECTION PRN
Status: CANCELLED | OUTPATIENT
Start: 2018-12-28

## 2018-10-11 RX ORDER — 0.9 % SODIUM CHLORIDE 0.9 %
VIAL (ML) INJECTION PRN
Status: CANCELLED | OUTPATIENT
Start: 2019-01-30

## 2018-10-11 RX ORDER — 0.9 % SODIUM CHLORIDE 0.9 %
VIAL (ML) INJECTION PRN
Status: CANCELLED | OUTPATIENT
Start: 2019-02-13

## 2018-10-11 RX ORDER — 0.9 % SODIUM CHLORIDE 0.9 %
VIAL (ML) INJECTION PRN
Status: CANCELLED | OUTPATIENT
Start: 2018-11-26

## 2018-10-11 RX ORDER — 0.9 % SODIUM CHLORIDE 0.9 %
VIAL (ML) INJECTION PRN
Status: CANCELLED | OUTPATIENT
Start: 2018-12-21

## 2018-10-11 RX ORDER — 0.9 % SODIUM CHLORIDE 0.9 %
VIAL (ML) INJECTION PRN
Status: CANCELLED | OUTPATIENT
Start: 2018-12-05

## 2018-10-12 ENCOUNTER — OFFICE VISIT (OUTPATIENT)
Dept: HEMATOLOGY ONCOLOGY | Facility: MEDICAL CENTER | Age: 53
End: 2018-10-12
Payer: COMMERCIAL

## 2018-10-12 ENCOUNTER — OUTPATIENT INFUSION SERVICES (OUTPATIENT)
Dept: ONCOLOGY | Facility: MEDICAL CENTER | Age: 53
End: 2018-10-12
Attending: INTERNAL MEDICINE
Payer: COMMERCIAL

## 2018-10-12 ENCOUNTER — HOSPITAL ENCOUNTER (OUTPATIENT)
Dept: LAB | Facility: MEDICAL CENTER | Age: 53
End: 2018-10-12
Attending: NURSE PRACTITIONER
Payer: COMMERCIAL

## 2018-10-12 VITALS
HEIGHT: 60 IN | DIASTOLIC BLOOD PRESSURE: 110 MMHG | BODY MASS INDEX: 45.97 KG/M2 | HEART RATE: 107 BPM | SYSTOLIC BLOOD PRESSURE: 142 MMHG | TEMPERATURE: 100.6 F | OXYGEN SATURATION: 94 % | RESPIRATION RATE: 18 BRPM | WEIGHT: 234.13 LBS

## 2018-10-12 VITALS
HEART RATE: 124 BPM | WEIGHT: 234.9 LBS | OXYGEN SATURATION: 95 % | RESPIRATION RATE: 20 BRPM | SYSTOLIC BLOOD PRESSURE: 142 MMHG | HEIGHT: 60 IN | TEMPERATURE: 100.3 F | BODY MASS INDEX: 46.12 KG/M2 | DIASTOLIC BLOOD PRESSURE: 114 MMHG

## 2018-10-12 DIAGNOSIS — R50.81 FEVER IN OTHER DISEASES: ICD-10-CM

## 2018-10-12 DIAGNOSIS — D70.1 CHEMOTHERAPY INDUCED NEUTROPENIA (HCC): ICD-10-CM

## 2018-10-12 DIAGNOSIS — T45.1X5A CHEMOTHERAPY-INDUCED THROMBOCYTOPENIA: ICD-10-CM

## 2018-10-12 DIAGNOSIS — T45.1X5A CHEMOTHERAPY INDUCED NEUTROPENIA (HCC): ICD-10-CM

## 2018-10-12 DIAGNOSIS — D69.59 CHEMOTHERAPY-INDUCED THROMBOCYTOPENIA: ICD-10-CM

## 2018-10-12 DIAGNOSIS — M54.50 RIGHT-SIDED LOW BACK PAIN WITHOUT SCIATICA, UNSPECIFIED CHRONICITY: ICD-10-CM

## 2018-10-12 DIAGNOSIS — Z51.11 ENCOUNTER FOR ANTINEOPLASTIC CHEMOTHERAPY: ICD-10-CM

## 2018-10-12 DIAGNOSIS — C50.912 LEFT BREAST CANCER WITH T3 TUMOR, >5 CM IN GREATEST DIMENSION (HCC): ICD-10-CM

## 2018-10-12 DIAGNOSIS — R05.9 COUGH: ICD-10-CM

## 2018-10-12 LAB
ALBUMIN SERPL BCP-MCNC: 4 G/DL (ref 3.2–4.9)
ALBUMIN/GLOB SERPL: 1.5 G/DL
ALP SERPL-CCNC: 63 U/L (ref 30–99)
ALT SERPL-CCNC: 14 U/L (ref 2–50)
ANION GAP SERPL CALC-SCNC: 8 MMOL/L (ref 0–11.9)
ANISOCYTOSIS BLD QL SMEAR: ABNORMAL
APPEARANCE UR: CLEAR
AST SERPL-CCNC: 15 U/L (ref 12–45)
BACTERIA #/AREA URNS HPF: NEGATIVE /HPF
BASOPHILS # BLD AUTO: 2.7 % (ref 0–1.8)
BASOPHILS # BLD: 0.02 K/UL (ref 0–0.12)
BILIRUB SERPL-MCNC: 0.9 MG/DL (ref 0.1–1.5)
BILIRUB UR QL STRIP.AUTO: NEGATIVE
BUN SERPL-MCNC: 11 MG/DL (ref 8–22)
CALCIUM SERPL-MCNC: 9.1 MG/DL (ref 8.5–10.5)
CHLORIDE SERPL-SCNC: 103 MMOL/L (ref 96–112)
CO2 SERPL-SCNC: 23 MMOL/L (ref 20–33)
COLOR UR: YELLOW
CREAT SERPL-MCNC: 0.57 MG/DL (ref 0.5–1.4)
DACRYOCYTES BLD QL SMEAR: NORMAL
EOSINOPHIL # BLD AUTO: 0.05 K/UL (ref 0–0.51)
EOSINOPHIL NFR BLD: 8.1 % (ref 0–6.9)
EPI CELLS #/AREA URNS HPF: NEGATIVE /HPF
ERYTHROCYTE [DISTWIDTH] IN BLOOD BY AUTOMATED COUNT: 42.3 FL (ref 35.9–50)
GIANT PLATELETS BLD QL SMEAR: NORMAL
GLOBULIN SER CALC-MCNC: 2.7 G/DL (ref 1.9–3.5)
GLUCOSE SERPL-MCNC: 116 MG/DL (ref 65–99)
GLUCOSE UR STRIP.AUTO-MCNC: NEGATIVE MG/DL
HCT VFR BLD AUTO: 39.8 % (ref 37–47)
HGB BLD-MCNC: 13.3 G/DL (ref 12–16)
HYALINE CASTS #/AREA URNS LPF: ABNORMAL /LPF
KETONES UR STRIP.AUTO-MCNC: NEGATIVE MG/DL
LEUKOCYTE ESTERASE UR QL STRIP.AUTO: NEGATIVE
LYMPHOCYTES # BLD AUTO: 0.44 K/UL (ref 1–4.8)
LYMPHOCYTES NFR BLD: 73 % (ref 22–41)
MANUAL DIFF BLD: NORMAL
MCH RBC QN AUTO: 29.2 PG (ref 27–33)
MCHC RBC AUTO-ENTMCNC: 33.4 G/DL (ref 33.6–35)
MCV RBC AUTO: 87.3 FL (ref 81.4–97.8)
MICRO URNS: ABNORMAL
MICROCYTES BLD QL SMEAR: ABNORMAL
MONOCYTES # BLD AUTO: 0.06 K/UL (ref 0–0.85)
MONOCYTES NFR BLD AUTO: 10.8 % (ref 0–13.4)
MORPHOLOGY BLD-IMP: NORMAL
NEUTROPHILS # BLD AUTO: 0.03 K/UL (ref 2–7.15)
NEUTROPHILS NFR BLD: 5.4 % (ref 44–72)
NITRITE UR QL STRIP.AUTO: NEGATIVE
NRBC # BLD AUTO: 0 K/UL
NRBC BLD-RTO: 0 /100 WBC
PH UR STRIP.AUTO: 6 [PH]
PLATELET # BLD AUTO: 52 K/UL (ref 164–446)
PLATELET BLD QL SMEAR: NORMAL
PLATELETS.RETICULATED NFR BLD AUTO: 8.2 K/UL (ref 0.6–13.1)
PMV BLD AUTO: 9.9 FL (ref 9–12.9)
POTASSIUM SERPL-SCNC: 3.6 MMOL/L (ref 3.6–5.5)
PROT SERPL-MCNC: 6.7 G/DL (ref 6–8.2)
PROT UR QL STRIP: NEGATIVE MG/DL
RBC # BLD AUTO: 4.56 M/UL (ref 4.2–5.4)
RBC # URNS HPF: ABNORMAL /HPF
RBC BLD AUTO: PRESENT
RBC UR QL AUTO: ABNORMAL
SMUDGE CELLS BLD QL SMEAR: NORMAL
SODIUM SERPL-SCNC: 134 MMOL/L (ref 135–145)
SP GR UR STRIP.AUTO: 1.01
UROBILINOGEN UR STRIP.AUTO-MCNC: 0.2 MG/DL
WBC # BLD AUTO: 0.6 K/UL (ref 4.8–10.8)
WBC #/AREA URNS HPF: ABNORMAL /HPF

## 2018-10-12 PROCEDURE — 85055 RETICULATED PLATELET ASSAY: CPT

## 2018-10-12 PROCEDURE — 99215 OFFICE O/P EST HI 40 MIN: CPT | Performed by: NURSE PRACTITIONER

## 2018-10-12 PROCEDURE — 36592 COLLECT BLOOD FROM PICC: CPT

## 2018-10-12 PROCEDURE — 81001 URINALYSIS AUTO W/SCOPE: CPT

## 2018-10-12 PROCEDURE — 87040 BLOOD CULTURE FOR BACTERIA: CPT

## 2018-10-12 PROCEDURE — 85027 COMPLETE CBC AUTOMATED: CPT

## 2018-10-12 PROCEDURE — 36415 COLL VENOUS BLD VENIPUNCTURE: CPT

## 2018-10-12 PROCEDURE — 85007 BL SMEAR W/DIFF WBC COUNT: CPT

## 2018-10-12 PROCEDURE — 80053 COMPREHEN METABOLIC PANEL: CPT

## 2018-10-12 RX ORDER — ALBUTEROL SULFATE 90 UG/1
2 AEROSOL, METERED RESPIRATORY (INHALATION) EVERY 6 HOURS PRN
Qty: 8.5 G | Refills: 0 | Status: ON HOLD | OUTPATIENT
Start: 2018-10-12 | End: 2019-03-13

## 2018-10-12 RX ORDER — LEVOFLOXACIN 750 MG/1
750 TABLET, FILM COATED ORAL DAILY
Qty: 7 TAB | Refills: 0 | Status: SHIPPED | OUTPATIENT
Start: 2018-10-12 | End: 2018-10-26 | Stop reason: SDUPTHER

## 2018-10-12 ASSESSMENT — ENCOUNTER SYMPTOMS
SHORTNESS OF BREATH: 1
BACK PAIN: 1
HEADACHES: 1
VOMITING: 0
WEIGHT LOSS: 1
TINGLING: 0
CONSTIPATION: 1
MYALGIAS: 0
DIARRHEA: 1
SPUTUM PRODUCTION: 1
DIZZINESS: 1
FEVER: 1
WHEEZING: 0
NAUSEA: 0
INSOMNIA: 1
CHILLS: 0
PALPITATIONS: 0
COUGH: 1

## 2018-10-12 ASSESSMENT — PAIN SCALES - GENERAL
PAINLEVEL_OUTOF10: 0
PAINLEVEL: NO PAIN

## 2018-10-12 NOTE — PROGRESS NOTES
Subjective:      Addie Orellana is a 53 y.o. female who presents for Follow-Up (Tox check) evaluation following cycle 1 dose dense AC for breast cancer      HPI  Ms. 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  She is unaccompanied for today's visit.    Patient feels that she tolerated treatment very well.  He did not experience nausea and did not require any of her antinausea medications.  Constipation was self-limiting with increased water.  Her biggest complaint has been over the last 3 days, low-grade fever, myalgia, arthralgia, shortness of breath, worsening cough.  She notes increase in back pain for the past 3 days as well as dizziness and headache.  She has not been sleeping well for that duration but did not call the office to update as to symptoms.  In regards to treatment she is otherwise asymptomatic and tolerating treatment very well.  She reports no bone pain associated with Neulasta.      No Known Allergies    Current Outpatient Prescriptions on File Prior to Visit   Medication Sig Dispense Refill   • therapeutic multivitamin-minerals (THERAGRAN-M) Tab Take 1 Tab by mouth every day.     • ondansetron (ZOFRAN) 4 MG Tab tablet Take 1 Tab by mouth every four hours as needed for Nausea/Vomiting (for nausea, vomiting). 30 Tab 6   • prochlorperazine (COMPAZINE) 10 MG Tab Take 1 Tab by mouth every 6 hours as needed (for nausea, vomiting). 30 Tab 6     No current facility-administered medications on file prior to visit.          Review of Systems   Constitutional: Positive for fever (today 100.3 - and x 3 days T max 100), malaise/fatigue (normal activity - a little slower ) and weight loss (4 lbs. since 9/20; appetite ok). Negative for chills.   Respiratory: Positive for cough (worsening over 3 days), sputum production (green ) and  "shortness of breath. Negative for wheezing.    Cardiovascular: Negative for chest pain, palpitations and leg swelling.   Gastrointestinal: Positive for constipation (x1 resolved with water) and diarrhea (a little yesterday and today - self-limiting). Negative for nausea (not PRN meds needed) and vomiting.   Genitourinary: Negative for dysuria.   Musculoskeletal: Positive for back pain (x 3 days). Negative for myalgias.        No bone pain from Neulasta   Neurological: Positive for dizziness and headaches (x 3 days - not relieved with Tylenol). Negative for tingling.   Psychiatric/Behavioral: The patient has insomnia (past 3 days have been harder).           Objective:     /114 (BP Location: Left arm, Patient Position: Sitting, BP Cuff Size: Large adult)   Pulse (!) 124   Temp 37.9 °C (100.3 °F) (Temporal)   Resp 20   Ht 1.53 m (5' 0.24\")   Wt 106.5 kg (234 lb 14.4 oz)   SpO2 95%   BMI 45.51 kg/m²        Physical Exam   Constitutional: She is oriented to person, place, and time. She appears well-developed and well-nourished. No distress.   Fever   HENT:   Head: Normocephalic and atraumatic.   Mouth/Throat: Oropharynx is clear and moist. No oropharyngeal exudate.   Eyes: Pupils are equal, round, and reactive to light. Conjunctivae and EOM are normal. Right eye exhibits no discharge. Left eye exhibits no discharge. No scleral icterus.   Neck: Normal range of motion. Neck supple. No thyromegaly present.   Cardiovascular: Normal rate, regular rhythm, normal heart sounds and intact distal pulses.  Exam reveals no gallop and no friction rub.    No murmur heard.  Pulmonary/Chest: Effort normal and breath sounds normal. No respiratory distress. She has no wheezes. Mass: ~6cm left breast. Left breast exhibits mass (Left lateral chest 6-8 cm, less prominent, less tender).       Abdominal: Soft. Bowel sounds are normal. She exhibits no distension. There is no tenderness.   Musculoskeletal: Normal range of motion. " She exhibits no edema or tenderness.   Generalized myalgia   Lymphadenopathy:        Head (right side): No submental, no submandibular, no tonsillar, no preauricular, no posterior auricular and no occipital adenopathy present.        Head (left side): No submental, no submandibular, no tonsillar, no preauricular, no posterior auricular and no occipital adenopathy present.     She has no cervical adenopathy.        Right cervical: No superficial cervical and no deep cervical adenopathy present.       Left cervical: No superficial cervical and no deep cervical adenopathy present.        Right: No supraclavicular adenopathy present.        Left: No supraclavicular adenopathy present.   Neurological: She is alert and oriented to person, place, and time.   Skin: Skin is warm. No rash noted. She is diaphoretic (mildly). No erythema. No pallor.   Psychiatric: She has a normal mood and affect. Her behavior is normal.   Vitals reviewed.      Hospital Outpatient Visit on 10/12/2018   Component Date Value Ref Range Status   • Significant Indicator 10/12/2018 NEG   Preliminary   • Source 10/12/2018 BLD   Preliminary   • Site 10/12/2018 PERIPHERAL   Preliminary   • Blood Culture 10/12/2018    Preliminary                    Value:No Growth    Note: Blood cultures are incubated for 5 days and  are monitored continuously.Positive blood cultures  are called to the RN and reported as soon as  they are identified.     • Color 10/12/2018 Yellow   Final   • Character 10/12/2018 Clear   Final   • Specific Gravity 10/12/2018 1.013  <1.035 Final   • Ph 10/12/2018 6.0  5.0 - 8.0 Final   • Glucose 10/12/2018 Negative  Negative mg/dL Final   • Ketones 10/12/2018 Negative  Negative mg/dL Final   • Protein 10/12/2018 Negative  Negative mg/dL Final   • Bilirubin 10/12/2018 Negative  Negative Final   • Urobilinogen, Urine 10/12/2018 0.2  Negative Final   • Nitrite 10/12/2018 Negative  Negative Final   • Leukocyte Esterase 10/12/2018 Negative   Negative Final   • Occult Blood 10/12/2018 Trace* Negative Final   • Micro Urine Req 10/12/2018 Microscopic   Final   • WBC 10/12/2018 0-2  /hpf Final    Comment: Female  <12 Yr 0-2  >12 Yr 0-5  Male   None     • RBC 10/12/2018 5-10* /hpf Final    Comment: Female  >12 Yr 0-2  Male   None     • Bacteria 10/12/2018 Negative  None /hpf Final   • Epithelial Cells 10/12/2018 Negative  /hpf Final   • Hyaline Cast 10/12/2018 0-2  /lpf Final   Outpatient Infusion Services on 10/12/2018   Component Date Value Ref Range Status   • Sodium 10/12/2018 134* 135 - 145 mmol/L Final   • Potassium 10/12/2018 3.6  3.6 - 5.5 mmol/L Final   • Chloride 10/12/2018 103  96 - 112 mmol/L Final   • Co2 10/12/2018 23  20 - 33 mmol/L Final   • Anion Gap 10/12/2018 8.0  0.0 - 11.9 Final   • Glucose 10/12/2018 116* 65 - 99 mg/dL Final   • Bun 10/12/2018 11  8 - 22 mg/dL Final   • Creatinine 10/12/2018 0.57  0.50 - 1.40 mg/dL Final   • Calcium 10/12/2018 9.1  8.5 - 10.5 mg/dL Final   • AST(SGOT) 10/12/2018 15  12 - 45 U/L Final   • ALT(SGPT) 10/12/2018 14  2 - 50 U/L Final   • Alkaline Phosphatase 10/12/2018 63  30 - 99 U/L Final   • Total Bilirubin 10/12/2018 0.9  0.1 - 1.5 mg/dL Final   • Albumin 10/12/2018 4.0  3.2 - 4.9 g/dL Final   • Total Protein 10/12/2018 6.7  6.0 - 8.2 g/dL Final   • Globulin 10/12/2018 2.7  1.9 - 3.5 g/dL Final   • A-G Ratio 10/12/2018 1.5  g/dL Final   • WBC 10/12/2018 0.6* 4.8 - 10.8 K/uL Final    Comment: Results confirmed by repeat testing.. This result has been called to  JAX Gandhi by Joseph Mejias on 10 12 2018 at 1051, and has been read  back. NEUTAB (Absolute Neutrophil Count) given to RN pending  differential  which could change the NEUTAB count slightly.     • RBC 10/12/2018 4.56  4.20 - 5.40 M/uL Final   • Hemoglobin 10/12/2018 13.3  12.0 - 16.0 g/dL Final   • Hematocrit 10/12/2018 39.8  37.0 - 47.0 % Final   • MCV 10/12/2018 87.3  81.4 - 97.8 fL Final   • MCH 10/12/2018 29.2  27.0 - 33.0 pg Final   • MCHC  10/12/2018 33.4* 33.6 - 35.0 g/dL Final   • RDW 10/12/2018 42.3  35.9 - 50.0 fL Final   • Platelet Count 10/12/2018 52* 164 - 446 K/uL Final   • MPV 10/12/2018 9.9  9.0 - 12.9 fL Final   • Neutrophils-Polys 10/12/2018 5.40* 44.00 - 72.00 % Final   • Lymphocytes 10/12/2018 73.00* 22.00 - 41.00 % Final   • Monocytes 10/12/2018 10.80  0.00 - 13.40 % Final   • Eosinophils 10/12/2018 8.10* 0.00 - 6.90 % Final   • Basophils 10/12/2018 2.70* 0.00 - 1.80 % Final   • Nucleated RBC 10/12/2018 0.00  /100 WBC Final   • Neutrophils (Absolute) 10/12/2018 0.03* 2.00 - 7.15 K/uL Final    Includes immature neutrophils, if present.   • Lymphs (Absolute) 10/12/2018 0.44* 1.00 - 4.80 K/uL Final   • Monos (Absolute) 10/12/2018 0.06  0.00 - 0.85 K/uL Final   • Eos (Absolute) 10/12/2018 0.05  0.00 - 0.51 K/uL Final   • Baso (Absolute) 10/12/2018 0.02  0.00 - 0.12 K/uL Final   • NRBC (Absolute) 10/12/2018 0.00  K/uL Final   • Anisocytosis 10/12/2018 1+   Final   • Microcytosis 10/12/2018 2+   Final   • GFR If  10/12/2018 >60  >60 mL/min/1.73 m 2 Final   • GFR If Non  10/12/2018 >60  >60 mL/min/1.73 m 2 Final   • Manual Diff Status 10/12/2018 PERFORMED   Final   • Peripheral Smear Review 10/12/2018 see below   Final    Comment: Due to instrument suspect flags, further review of peripheral smear is  indicated on this patient sample. This review may or may not result in  abnormal findings.     • Plt Estimation 10/12/2018 Decreased   Final   • RBC Morphology 10/12/2018 Present   Final   • Giant Platelets 10/12/2018 2+   Final   • Tear Drop Cells 10/12/2018 1+   Final   • Smudge Cells 10/12/2018 Many   Final   • Imm. Plt Fraction 10/12/2018 8.2  0.6 - 13.1 K/uL Final         Ir-picc Line Placement    Result Date: 10/3/2018  HISTORY/REASON FOR EXAM:   PICC placement. TECHNIQUE/EXAM DESCRIPTION AND NUMBER OF VIEWS:   PICC line insertion with ultrasound guidance.  The procedure was performed using maximal sterile  barrier technique including sterile gown, mask, cap, and donning of sterile gloves following appropriate hand hygiene and/or sterile scrub. Patient skin site was prepped with 2% Chlorhexidine solution. FINDINGS:  PICC line insertion with Ultrasound Guidance was performed by qualified nursing staff without the assistance of a Radiologist. PICC positioning appropriateness confirmed by 3CG technology; chest xray only needed in the instance 3CG unable to confirm placement.              Ultrasound-guided PICC placement performed by qualified nursing staff as above.       [id number] Base To Mid-thigh    Result Date: 9/17/2018 9/17/2018 12:24 PM HISTORY/REASON FOR EXAM:  Left breast cancer staging TECHNIQUE/EXAM DESCRIPTION AND NUMBER OF VIEWS: PET body imaging. Initially, 17.9 mCi F-18 FDG was administered intravenously under standardized conditions. Approximately 45 minutes after FDG administration, the patient was placed in the supine position on the PET CT table. Blood glucose level was 109 mg/dL. Low dose spiral CT imaging was performed from the skull base to the mid thighs. PET imaging was then performed from the skull base to the mid thighs. CT images, PET images, and PET/CT fused images were reviewed on a PACS 3D workstation. The limited non-contrast CT data are used primarily for attenuation correction and anatomic correlation.  Evaluation of solid organs and bowel are especially limited utilizing this technique. A low dose CT was obtained of the same area without IV contrast for attenuation correction and coregistration, not for a diagnostic scan. COMPARISON: None. FINDINGS: VISUALIZED BRAIN: Normal metabolic activity. HEAD AND NECK: Asymmetric hypermetabolism in the right tongue base/lingual tonsil with a maximum SUV of 12.14. No hypermetabolic cervical nodes. CHEST: Background lung activity shows average SUV of 1.2. Lungs show no hypermetabolic pulmonary nodules. A 4.5 x 4.0 hypermetabolic left breast mass  has a maximum SUV of 26.16. Large, hypermetabolic left axillary node measures 6.6 x 4.4 cm and has an SUV of 27.8. Several small hypermetabolic left retropectoral nodes have a maximum SUV of 5.19. There are no hypermetabolic hilar or mediastinal nodes. Mild cardiomegaly. ABDOMEN AND PELVIS: Background liver activity shows average SUV of 3.3. There is normal, uniform metabolic activity in the liver, spleen, adrenal glands, kidneys. There is no hypermetabolic mesenteric, retroperitoneal, iliac, or inguinal lymphadenopathy. Nonspecific physiologic activity in the colon and intestine is noted. Prior gastric bypass surgery with a small hiatal hernia. Nonobstructing left nephrolithiasis. Cholecystectomy. A few small colonic diverticula. VISUALIZED MUSCULOSKELETAL SYSTEM: No hypermetabolic osseous lesions. Minimal soft tissue hypermetabolism in the left lateral gluteus musculature, likely infectious/inflammatory or posttraumatic     1. Hypermetabolic left breast mass, consistent with malignancy. 2. Hypermetabolic left axillary and prepectoral lymphadenopathy, consistent with karlie metastases. 3. Asymmetric hypermetabolism in the right tongue base/right lingual tonsil. While it may represent salivary gland secretion pooling, another primary malignancy is also a consideration. Recommend direct visualization. 4. Nonobstructive left nephrolithiasis.       Assessment/Plan:     1. Left breast cancer with T3 tumor, >5 cm in greatest dimension (HCC)     2. Encounter for antineoplastic chemotherapy     3. Fever in other diseases  DX-CHEST-2 VIEWS    URINALYSIS,CULTURE IF INDICATED    INFLUENZA A/B DFA, REFLEX CULTURE    BLOOD CULTURE    levoFLOXacin (LEVAQUIN) 750 MG tablet    albuterol 108 (90 Base) MCG/ACT Aero Soln inhalation aerosol   4. Cough  albuterol 108 (90 Base) MCG/ACT Aero Soln inhalation aerosol   5. Right-sided low back pain without sciatica, unspecified chronicity  URINALYSIS,CULTURE IF INDICATED         1.   Fever/cough/back pain/neutropenia: Patient received Neulasta following treatment and was asymptomatic in regards to GSF.  ANC is noted to be 30. Patient with onset of fever and worsening cough for 3 days.  We will obtain chest x-ray, urinalysis with culture if indicated, influenza screen (if possible), blood cultures today.  She is issued albuterol for worsening cough and shortness of breath as well as Levaquin.  Should her symptoms worsen she is advised to proceed to ER for further evaluation and treatment.    She is advised to obtain flu vaccine ASAP.    2.  Thrombocytopenia: Stable, no bleeding/bruising, she will continue to monitor.    3.  Breast cancer: Patient is tolerating treatment fairly well.  CBC and CMP have been evaluated and found to be within acceptable limits, except were addressed above.  She will return next week for evaluation prior to cycle 2 of dose dense AC, sooner as needed.            The patient verbalized agreement and understanding of current plan. All questions and concerns were addressed at time of visit.    Please note that this dictation was created using voice recognition software. I have made every reasonable attempt to correct obvious errors, but I expect that there are errors of grammar and possibly content that I did not discover before finalizing the note.

## 2018-10-12 NOTE — PROGRESS NOTES
Patient presents for blood draw and PICC dressing change. PICC dressing soiled with dried blood. PICC dressing changed using sterile technique. New sterile dressing clean, dry and intact. Blood drawn as ordered. Call placed to Aurea PACKER, informing her of patient's temperature and cough. Aurea sees the patient at 1030. Patient released in no acute distress.    1030- critical lab results called to Aurea PACKER. Results read back by Aurea. Aurea seeing patient.

## 2018-10-16 ENCOUNTER — TELEPHONE (OUTPATIENT)
Dept: HEMATOLOGY ONCOLOGY | Facility: MEDICAL CENTER | Age: 53
End: 2018-10-16

## 2018-10-16 DIAGNOSIS — C50.912 LEFT BREAST CANCER WITH T3 TUMOR, >5 CM IN GREATEST DIMENSION (HCC): ICD-10-CM

## 2018-10-16 NOTE — TELEPHONE ENCOUNTER
Left vm for patient to get a blood draw today for cbc per Dr Sanchez. RN also requested call back to f/u on how she was feeling today and to verify no infections or s/s of infection.

## 2018-10-17 ENCOUNTER — TELEPHONE (OUTPATIENT)
Dept: HEMATOLOGY ONCOLOGY | Facility: MEDICAL CENTER | Age: 53
End: 2018-10-17

## 2018-10-17 ENCOUNTER — APPOINTMENT (OUTPATIENT)
Dept: HEMATOLOGY ONCOLOGY | Facility: MEDICAL CENTER | Age: 53
End: 2018-10-17
Payer: COMMERCIAL

## 2018-10-17 ENCOUNTER — HOSPITAL ENCOUNTER (OUTPATIENT)
Facility: MEDICAL CENTER | Age: 53
End: 2018-10-17
Attending: INTERNAL MEDICINE
Payer: COMMERCIAL

## 2018-10-17 DIAGNOSIS — C50.912 LEFT BREAST CANCER WITH T3 TUMOR, >5 CM IN GREATEST DIMENSION (HCC): ICD-10-CM

## 2018-10-17 LAB
BACTERIA BLD CULT: NORMAL
BASOPHILS # BLD AUTO: 0 % (ref 0–1.8)
BASOPHILS # BLD: 0 K/UL (ref 0–0.12)
EOSINOPHIL # BLD AUTO: 0 K/UL (ref 0–0.51)
EOSINOPHIL NFR BLD: 0 % (ref 0–6.9)
ERYTHROCYTE [DISTWIDTH] IN BLOOD BY AUTOMATED COUNT: 42.8 FL (ref 35.9–50)
HCT VFR BLD AUTO: 40.3 % (ref 37–47)
HGB BLD-MCNC: 13.6 G/DL (ref 12–16)
LG PLATELETS BLD QL SMEAR: NORMAL
LYMPHOCYTES # BLD AUTO: 1.77 K/UL (ref 1–4.8)
LYMPHOCYTES NFR BLD: 22.4 % (ref 22–41)
MANUAL DIFF BLD: NORMAL
MCH RBC QN AUTO: 29.4 PG (ref 27–33)
MCHC RBC AUTO-ENTMCNC: 33.7 G/DL (ref 33.6–35)
MCV RBC AUTO: 87 FL (ref 81.4–97.8)
METAMYELOCYTES NFR BLD MANUAL: 2.6 %
MONOCYTES # BLD AUTO: 0.34 K/UL (ref 0–0.85)
MONOCYTES NFR BLD AUTO: 4.3 % (ref 0–13.4)
MORPHOLOGY BLD-IMP: NORMAL
MYELOCYTES NFR BLD MANUAL: 4.3 %
NEUTROPHILS # BLD AUTO: 5.17 K/UL (ref 2–7.15)
NEUTROPHILS NFR BLD: 60.3 % (ref 44–72)
NEUTS BAND NFR BLD MANUAL: 5.2 % (ref 0–10)
NRBC # BLD AUTO: 0.02 K/UL
NRBC BLD-RTO: 0.3 /100 WBC
PLATELET # BLD AUTO: 179 K/UL (ref 164–446)
PLATELET BLD QL SMEAR: NORMAL
PMV BLD AUTO: 10.6 FL (ref 9–12.9)
PROMYELOCYTES NFR BLD MANUAL: 0.9 %
RBC # BLD AUTO: 4.63 M/UL (ref 4.2–5.4)
RBC BLD AUTO: PRESENT
SIGNIFICANT IND 70042: NORMAL
SITE SITE: NORMAL
SOURCE SOURCE: NORMAL
TOXIC GRANULES BLD QL SMEAR: SLIGHT
WBC # BLD AUTO: 7.9 K/UL (ref 4.8–10.8)

## 2018-10-17 PROCEDURE — 85027 COMPLETE CBC AUTOMATED: CPT

## 2018-10-17 PROCEDURE — 85007 BL SMEAR W/DIFF WBC COUNT: CPT

## 2018-10-17 NOTE — TELEPHONE ENCOUNTER
"Patient returned Mehnaz Hassan RN call from 10/16/2018. Scheduled for CBC on 10/17/2018 at 2:30 in clinic per Dr. Linder's request.    Patient states she feels \"the same\".   "

## 2018-10-18 ENCOUNTER — OFFICE VISIT (OUTPATIENT)
Dept: HEMATOLOGY ONCOLOGY | Facility: MEDICAL CENTER | Age: 53
End: 2018-10-18
Payer: COMMERCIAL

## 2018-10-18 VITALS
RESPIRATION RATE: 14 BRPM | DIASTOLIC BLOOD PRESSURE: 70 MMHG | HEART RATE: 91 BPM | BODY MASS INDEX: 44.59 KG/M2 | OXYGEN SATURATION: 97 % | TEMPERATURE: 97.3 F | WEIGHT: 230.16 LBS | SYSTOLIC BLOOD PRESSURE: 110 MMHG

## 2018-10-18 DIAGNOSIS — C50.912 LEFT BREAST CANCER WITH T3 TUMOR, >5 CM IN GREATEST DIMENSION (HCC): ICD-10-CM

## 2018-10-18 DIAGNOSIS — Z17.0 ER+ PR+ CARCINOMA OF BREAST, LEFT (HCC): ICD-10-CM

## 2018-10-18 DIAGNOSIS — C50.912 ER+ PR+ CARCINOMA OF BREAST, LEFT (HCC): ICD-10-CM

## 2018-10-18 DIAGNOSIS — Z51.11 ENCOUNTER FOR ANTINEOPLASTIC CHEMOTHERAPY: ICD-10-CM

## 2018-10-18 PROCEDURE — 99214 OFFICE O/P EST MOD 30 MIN: CPT | Performed by: INTERNAL MEDICINE

## 2018-10-18 RX ORDER — PROCHLORPERAZINE MALEATE 10 MG
10 TABLET ORAL EVERY 6 HOURS PRN
Status: CANCELLED | OUTPATIENT
Start: 2018-10-19

## 2018-10-18 RX ORDER — 0.9 % SODIUM CHLORIDE 0.9 %
VIAL (ML) INJECTION PRN
Status: CANCELLED | OUTPATIENT
Start: 2018-10-19

## 2018-10-18 RX ORDER — SODIUM CHLORIDE 9 MG/ML
INJECTION, SOLUTION INTRAVENOUS CONTINUOUS
Status: CANCELLED | OUTPATIENT
Start: 2018-10-19

## 2018-10-18 RX ORDER — 0.9 % SODIUM CHLORIDE 0.9 %
5 VIAL (ML) INJECTION PRN
Status: CANCELLED | OUTPATIENT
Start: 2018-10-19

## 2018-10-18 RX ORDER — ONDANSETRON 2 MG/ML
4 INJECTION INTRAMUSCULAR; INTRAVENOUS
Status: CANCELLED | OUTPATIENT
Start: 2018-10-19

## 2018-10-18 RX ORDER — 0.9 % SODIUM CHLORIDE 0.9 %
VIAL (ML) INJECTION PRN
Status: CANCELLED | OUTPATIENT
Start: 2018-10-18

## 2018-10-18 RX ORDER — 0.9 % SODIUM CHLORIDE 0.9 %
5 VIAL (ML) INJECTION PRN
Status: CANCELLED | OUTPATIENT
Start: 2018-10-18

## 2018-10-18 RX ORDER — ONDANSETRON 8 MG/1
8 TABLET, ORALLY DISINTEGRATING ORAL
Status: CANCELLED | OUTPATIENT
Start: 2018-10-19

## 2018-10-18 ASSESSMENT — PAIN SCALES - GENERAL: PAINLEVEL: NO PAIN

## 2018-10-18 NOTE — PROGRESS NOTES
Date of visit: 10/18/2018  9:18 AM      Chief Complaint- Locally davanced breast Ca , ER/VA positive Her2 kamala negative.       Identification/Prior relevant history:      -Palpable left breast mass since 4/2018. Also noticed left axillary mass  -Outside mammogram/ultrasound-large irregular mass in the 6:00 position of the left breast along with a large left axillary lymph node  -Ultrasound biopsy high-grade invasive ductal carcinoma, poorly differentiated. A year. Positive and HER-2/kamala negative. Ki- 67 -60%.  She is G0, P0. No hormone replacement therapy. No significant family history of breast carcinoma that she knows of    Interim history-  - 9/17/18-PET scanHypermetabolic left breast mass, consistent with malignancy.  2. Hypermetabolic left axillary and prepectoral lymphadenopathy, consistent with karlie metastases.  3. Asymmetric hypermetabolism in the right tongue base/right lingual tonsil. While it may represent salivary gland secretion pooling, another primary malignancy is also a consideration.   Started dose dense chemotherapy  Tolerated first cycle okay.  Developed a asymptomatic neutropenia on day 8 from which she recovered.  Breast mass smaller.    Past Medical History:    No past medical history on file.    Past surgical history:     No past surgical history on file.    Allergies:       Patient has no known allergies.    Medications:         Current Outpatient Prescriptions   Medication Sig Dispense Refill   • levoFLOXacin (LEVAQUIN) 750 MG tablet Take 1 Tab by mouth every day. 7 Tab 0   • albuterol 108 (90 Base) MCG/ACT Aero Soln inhalation aerosol Inhale 2 Puffs by mouth every 6 hours as needed for Shortness of Breath. 8.5 g 0   • therapeutic multivitamin-minerals (THERAGRAN-M) Tab Take 1 Tab by mouth every day.     • ondansetron (ZOFRAN) 4 MG Tab tablet Take 1 Tab by mouth every four hours as needed for Nausea/Vomiting (for nausea, vomiting). 30 Tab 6   • prochlorperazine (COMPAZINE) 10 MG Tab Take 1  Tab by mouth every 6 hours as needed (for nausea, vomiting). 30 Tab 6     No current facility-administered medications for this visit.          Social History:     Social History     Social History   • Marital status: Single     Spouse name: N/A   • Number of children: N/A   • Years of education: N/A     Occupational History   • Not on file.     Social History Main Topics   • Smoking status: Not on file   • Smokeless tobacco: Not on file   • Alcohol use Not on file   • Drug use: Unknown   • Sexual activity: Not on file     Other Topics Concern   • Not on file     Social History Narrative   • No narrative on file       Family History:    No family history on file.    Review of Systems:  All other review of systems are negative except what was mentioned above in the HPI.    Constitutional: Negative for fever, chills, weight loss and malaise/fatigue.    HEENT: No new auditory or visual complaints. No sore throat and neck pain.     Respiratory: Negative for cough, sputum production, shortness of breath and wheezing.    Cardiovascular: Negative for chest pain, palpitations, orthopnea and leg swelling.    Gastrointestinal: Negative for heartburn, nausea, vomiting and abdominal pain.    Genitourinary: Negative for dysuria, hematuria    Musculoskeletal: No new arthralgias or myalgias   Skin: Negative for rash and itching.    Neurological: Negative for focal weakness and headaches.    Endo/Heme/Allergies: No abnormal bleed/bruise.    Psychiatric/Behavioral: No new depression/anxiety.    Physical Exam:  Vitals: There were no vitals taken for this visit.    General: Not in acute distress, alert and oriented x 3  HEENT: No pallor, icterus. Oropharynx clear.   Neck: Supple, no palpable masses.  Lymph nodes: No palpable cervical, supraclavicular, axillary or inguinal lymphadenopathy.    CVS: regular rate and rhythm, no rubs or gallops  RESP: Clear to auscultate bilaterally, no wheezing or crackles.   ABD: Soft, non tender, non  distended, positive bowel sounds, no palpable organomegaly  EXT: No edema or cyanosis  CNS: Alert and oriented x3, No focal deficits.  Skin- No rash      Labs:   Hospital Outpatient Visit on 10/17/2018   Component Date Value Ref Range Status   • WBC 10/17/2018 7.9  4.8 - 10.8 K/uL Final   • RBC 10/17/2018 4.63  4.20 - 5.40 M/uL Final   • Hemoglobin 10/17/2018 13.6  12.0 - 16.0 g/dL Final   • Hematocrit 10/17/2018 40.3  37.0 - 47.0 % Final   • MCV 10/17/2018 87.0  81.4 - 97.8 fL Final   • MCH 10/17/2018 29.4  27.0 - 33.0 pg Final   • MCHC 10/17/2018 33.7  33.6 - 35.0 g/dL Final   • RDW 10/17/2018 42.8  35.9 - 50.0 fL Final   • Platelet Count 10/17/2018 179  164 - 446 K/uL Final    Results confirmed by repeat testing.   • MPV 10/17/2018 10.6  9.0 - 12.9 fL Final   • Nucleated RBC 10/17/2018 0.30  /100 WBC Final   • NRBC (Absolute) 10/17/2018 0.02  K/uL Final   • Neutrophils-Polys 10/17/2018 60.30  44.00 - 72.00 % Final   • Lymphocytes 10/17/2018 22.40  22.00 - 41.00 % Final   • Monocytes 10/17/2018 4.30  0.00 - 13.40 % Final   • Eosinophils 10/17/2018 0.00  0.00 - 6.90 % Final   • Basophils 10/17/2018 0.00  0.00 - 1.80 % Final   • Neutrophils (Absolute) 10/17/2018 5.17  2.00 - 7.15 K/uL Final    Includes immature neutrophils, if present.   • Lymphs (Absolute) 10/17/2018 1.77  1.00 - 4.80 K/uL Final   • Monos (Absolute) 10/17/2018 0.34  0.00 - 0.85 K/uL Final   • Eos (Absolute) 10/17/2018 0.00  0.00 - 0.51 K/uL Final   • Baso (Absolute) 10/17/2018 0.00  0.00 - 0.12 K/uL Final   • Bands-Stabs 10/17/2018 5.20  0.00 - 10.00 % Final   • Metamyelocytes 10/17/2018 2.60  % Final   • Myelocytes 10/17/2018 4.30  % Final   • Progranulocytes 10/17/2018 0.90  % Final   • Manual Diff Status 10/17/2018 PERFORMED   Final   • Peripheral Smear Review 10/17/2018 see below   Final    Comment: Due to instrument suspect flags, further review of peripheral smear is  indicated on this patient sample. This review may or may not result  in  abnormal findings.     • Plt Estimation 10/17/2018 Normal   Final   • RBC Morphology 10/17/2018 Present   Final   • Large Platelets 10/17/2018 1+   Final   • Toxic Gran 10/17/2018 Slight   Final   Hospital Outpatient Visit on 10/12/2018   Component Date Value Ref Range Status   • Significant Indicator 10/12/2018 NEG   Final   • Source 10/12/2018 BLD   Final   • Site 10/12/2018 PERIPHERAL   Final   • Blood Culture 10/12/2018 No growth after 5 days of incubation.   Final   • Color 10/12/2018 Yellow   Final   • Character 10/12/2018 Clear   Final   • Specific Gravity 10/12/2018 1.013  <1.035 Final   • Ph 10/12/2018 6.0  5.0 - 8.0 Final   • Glucose 10/12/2018 Negative  Negative mg/dL Final   • Ketones 10/12/2018 Negative  Negative mg/dL Final   • Protein 10/12/2018 Negative  Negative mg/dL Final   • Bilirubin 10/12/2018 Negative  Negative Final   • Urobilinogen, Urine 10/12/2018 0.2  Negative Final   • Nitrite 10/12/2018 Negative  Negative Final   • Leukocyte Esterase 10/12/2018 Negative  Negative Final   • Occult Blood 10/12/2018 Trace* Negative Final   • Micro Urine Req 10/12/2018 Microscopic   Final   • WBC 10/12/2018 0-2  /hpf Final    Comment: Female  <12 Yr 0-2  >12 Yr 0-5  Male   None     • RBC 10/12/2018 5-10* /hpf Final    Comment: Female  >12 Yr 0-2  Male   None     • Bacteria 10/12/2018 Negative  None /hpf Final   • Epithelial Cells 10/12/2018 Negative  /hpf Final   • Hyaline Cast 10/12/2018 0-2  /lpf Final   Outpatient Infusion Services on 10/12/2018   Component Date Value Ref Range Status   • Sodium 10/12/2018 134* 135 - 145 mmol/L Final   • Potassium 10/12/2018 3.6  3.6 - 5.5 mmol/L Final   • Chloride 10/12/2018 103  96 - 112 mmol/L Final   • Co2 10/12/2018 23  20 - 33 mmol/L Final   • Anion Gap 10/12/2018 8.0  0.0 - 11.9 Final   • Glucose 10/12/2018 116* 65 - 99 mg/dL Final   • Bun 10/12/2018 11  8 - 22 mg/dL Final   • Creatinine 10/12/2018 0.57  0.50 - 1.40 mg/dL Final   • Calcium 10/12/2018 9.1  8.5  - 10.5 mg/dL Final   • AST(SGOT) 10/12/2018 15  12 - 45 U/L Final   • ALT(SGPT) 10/12/2018 14  2 - 50 U/L Final   • Alkaline Phosphatase 10/12/2018 63  30 - 99 U/L Final   • Total Bilirubin 10/12/2018 0.9  0.1 - 1.5 mg/dL Final   • Albumin 10/12/2018 4.0  3.2 - 4.9 g/dL Final   • Total Protein 10/12/2018 6.7  6.0 - 8.2 g/dL Final   • Globulin 10/12/2018 2.7  1.9 - 3.5 g/dL Final   • A-G Ratio 10/12/2018 1.5  g/dL Final   • WBC 10/12/2018 0.6* 4.8 - 10.8 K/uL Final    Comment: Results confirmed by repeat testing.. This result has been called to  JAX Gandhi by Joseph Mejias on 10 12 2018 at 1051, and has been read  back. NEUTAB (Absolute Neutrophil Count) given to RN pending  differential  which could change the NEUTAB count slightly.     • RBC 10/12/2018 4.56  4.20 - 5.40 M/uL Final   • Hemoglobin 10/12/2018 13.3  12.0 - 16.0 g/dL Final   • Hematocrit 10/12/2018 39.8  37.0 - 47.0 % Final   • MCV 10/12/2018 87.3  81.4 - 97.8 fL Final   • MCH 10/12/2018 29.2  27.0 - 33.0 pg Final   • MCHC 10/12/2018 33.4* 33.6 - 35.0 g/dL Final   • RDW 10/12/2018 42.3  35.9 - 50.0 fL Final   • Platelet Count 10/12/2018 52* 164 - 446 K/uL Final   • MPV 10/12/2018 9.9  9.0 - 12.9 fL Final   • Neutrophils-Polys 10/12/2018 5.40* 44.00 - 72.00 % Final   • Lymphocytes 10/12/2018 73.00* 22.00 - 41.00 % Final   • Monocytes 10/12/2018 10.80  0.00 - 13.40 % Final   • Eosinophils 10/12/2018 8.10* 0.00 - 6.90 % Final   • Basophils 10/12/2018 2.70* 0.00 - 1.80 % Final   • Nucleated RBC 10/12/2018 0.00  /100 WBC Final   • Neutrophils (Absolute) 10/12/2018 0.03* 2.00 - 7.15 K/uL Final    Includes immature neutrophils, if present.   • Lymphs (Absolute) 10/12/2018 0.44* 1.00 - 4.80 K/uL Final   • Monos (Absolute) 10/12/2018 0.06  0.00 - 0.85 K/uL Final   • Eos (Absolute) 10/12/2018 0.05  0.00 - 0.51 K/uL Final   • Baso (Absolute) 10/12/2018 0.02  0.00 - 0.12 K/uL Final   • NRBC (Absolute) 10/12/2018 0.00  K/uL Final   • Anisocytosis 10/12/2018 1+    Final   • Microcytosis 10/12/2018 2+   Final   • GFR If  10/12/2018 >60  >60 mL/min/1.73 m 2 Final   • GFR If Non  10/12/2018 >60  >60 mL/min/1.73 m 2 Final   • Manual Diff Status 10/12/2018 PERFORMED   Final   • Peripheral Smear Review 10/12/2018 see below   Final    Comment: Due to instrument suspect flags, further review of peripheral smear is  indicated on this patient sample. This review may or may not result in  abnormal findings.     • Plt Estimation 10/12/2018 Decreased   Final   • RBC Morphology 10/12/2018 Present   Final   • Giant Platelets 10/12/2018 2+   Final   • Tear Drop Cells 10/12/2018 1+   Final   • Smudge Cells 10/12/2018 Many   Final   • Imm. Plt Fraction 10/12/2018 8.2  0.6 - 13.1 K/uL Final             Assessment and Plan:  Locally davanced breast Ca , ER/SC positive Her2 kamala negative, poorly differentiated, high-grade with high Ki-67. She appears to have aggressive pathology, probably Luminal B type. PET scan did not reveal distant metastatic disease, she did have bulky axillary and prepatellar adenopathy.    , Tolerating dose dense chemotherapy well. Breast masses improved in size. We will continue the regimen.    Neutropenic precaution-she will receive Neulasta to shorten neutropenic laurita. She did have neutropenia with the first dose. We will check CBC on day 8, if ANC less than 100, Levaquin prophylaxis for 5 days and we will dose reduce with cycle #3  She agreed and verbalized  agreement and understanding with the current plan.  I answered all questions and concerns at this time         Please note that this dictation was created using voice recognition software. I have made every reasonable attempt to correct obvious errors, but I expect that there are errors of grammar and possibly content that I did not discover before finalizing the note.      SIGNATURES:  Jitendra Linder    CC:  Noam Caldwell M.D.  No ref. provider found

## 2018-10-19 ENCOUNTER — OUTPATIENT INFUSION SERVICES (OUTPATIENT)
Dept: ONCOLOGY | Facility: MEDICAL CENTER | Age: 53
End: 2018-10-19
Attending: INTERNAL MEDICINE
Payer: COMMERCIAL

## 2018-10-19 ENCOUNTER — DOCUMENTATION (OUTPATIENT)
Dept: HEMATOLOGY ONCOLOGY | Facility: MEDICAL CENTER | Age: 53
End: 2018-10-19

## 2018-10-19 VITALS
HEART RATE: 93 BPM | DIASTOLIC BLOOD PRESSURE: 85 MMHG | BODY MASS INDEX: 45.23 KG/M2 | RESPIRATION RATE: 18 BRPM | WEIGHT: 230.38 LBS | HEIGHT: 60 IN | OXYGEN SATURATION: 93 % | SYSTOLIC BLOOD PRESSURE: 152 MMHG | TEMPERATURE: 98.5 F

## 2018-10-19 DIAGNOSIS — C50.919 HER2 (HUMAN EPIDERMAL GROWTH FACTOR RECEPTOR 2) NEGATIVE CARCINOMA OF BREAST (HCC): ICD-10-CM

## 2018-10-19 DIAGNOSIS — Z17.0 ER+ PR+ CARCINOMA OF BREAST, LEFT (HCC): ICD-10-CM

## 2018-10-19 DIAGNOSIS — C50.912 LEFT BREAST CANCER WITH T3 TUMOR, >5 CM IN GREATEST DIMENSION (HCC): ICD-10-CM

## 2018-10-19 DIAGNOSIS — C50.912 ER+ PR+ CARCINOMA OF BREAST, LEFT (HCC): ICD-10-CM

## 2018-10-19 LAB
ANION GAP SERPL CALC-SCNC: 9 MMOL/L (ref 0–11.9)
ANISOCYTOSIS BLD QL SMEAR: ABNORMAL
BASOPHILS # BLD AUTO: 0.9 % (ref 0–1.8)
BASOPHILS # BLD: 0.06 K/UL (ref 0–0.12)
BUN SERPL-MCNC: 8 MG/DL (ref 8–22)
CALCIUM SERPL-MCNC: 9.6 MG/DL (ref 8.5–10.5)
CHLORIDE SERPL-SCNC: 106 MMOL/L (ref 96–112)
CO2 SERPL-SCNC: 24 MMOL/L (ref 20–33)
CREAT SERPL-MCNC: 0.54 MG/DL (ref 0.5–1.4)
EOSINOPHIL # BLD AUTO: 0 K/UL (ref 0–0.51)
EOSINOPHIL NFR BLD: 0 % (ref 0–6.9)
ERYTHROCYTE [DISTWIDTH] IN BLOOD BY AUTOMATED COUNT: 44.3 FL (ref 35.9–50)
GLUCOSE SERPL-MCNC: 109 MG/DL (ref 65–99)
HCT VFR BLD AUTO: 39.7 % (ref 37–47)
HGB BLD-MCNC: 13.1 G/DL (ref 12–16)
LYMPHOCYTES # BLD AUTO: 1.6 K/UL (ref 1–4.8)
LYMPHOCYTES NFR BLD: 22.2 % (ref 22–41)
MANUAL DIFF BLD: NORMAL
MCH RBC QN AUTO: 29.3 PG (ref 27–33)
MCHC RBC AUTO-ENTMCNC: 33 G/DL (ref 33.6–35)
MCV RBC AUTO: 88.8 FL (ref 81.4–97.8)
METAMYELOCYTES NFR BLD MANUAL: 2.6 %
MICROCYTES BLD QL SMEAR: ABNORMAL
MONOCYTES # BLD AUTO: 0.49 K/UL (ref 0–0.85)
MONOCYTES NFR BLD AUTO: 6.8 % (ref 0–13.4)
MORPHOLOGY BLD-IMP: NORMAL
MYELOCYTES NFR BLD MANUAL: 3.4 %
NEUTROPHILS # BLD AUTO: 4.62 K/UL (ref 2–7.15)
NEUTROPHILS NFR BLD: 62.4 % (ref 44–72)
NEUTS BAND NFR BLD MANUAL: 1.7 % (ref 0–10)
NRBC # BLD AUTO: 0 K/UL
NRBC BLD-RTO: 0 /100 WBC
PLATELET # BLD AUTO: 238 K/UL (ref 164–446)
PLATELET BLD QL SMEAR: NORMAL
PMV BLD AUTO: 9.9 FL (ref 9–12.9)
POLYCHROMASIA BLD QL SMEAR: NORMAL
POTASSIUM SERPL-SCNC: 3.4 MMOL/L (ref 3.6–5.5)
RBC # BLD AUTO: 4.47 M/UL (ref 4.2–5.4)
RBC BLD AUTO: PRESENT
SODIUM SERPL-SCNC: 139 MMOL/L (ref 135–145)
WBC # BLD AUTO: 7.2 K/UL (ref 4.8–10.8)

## 2018-10-19 PROCEDURE — 96377 APPLICATON ON-BODY INJECTOR: CPT | Mod: XU

## 2018-10-19 PROCEDURE — 96375 TX/PRO/DX INJ NEW DRUG ADDON: CPT

## 2018-10-19 PROCEDURE — 700111 HCHG RX REV CODE 636 W/ 250 OVERRIDE (IP)

## 2018-10-19 PROCEDURE — A9270 NON-COVERED ITEM OR SERVICE: HCPCS | Performed by: INTERNAL MEDICINE

## 2018-10-19 PROCEDURE — 80048 BASIC METABOLIC PNL TOTAL CA: CPT

## 2018-10-19 PROCEDURE — 96417 CHEMO IV INFUS EACH ADDL SEQ: CPT

## 2018-10-19 PROCEDURE — 700105 HCHG RX REV CODE 258: Performed by: INTERNAL MEDICINE

## 2018-10-19 PROCEDURE — 700102 HCHG RX REV CODE 250 W/ 637 OVERRIDE(OP): Performed by: INTERNAL MEDICINE

## 2018-10-19 PROCEDURE — 85027 COMPLETE CBC AUTOMATED: CPT

## 2018-10-19 PROCEDURE — 700105 HCHG RX REV CODE 258

## 2018-10-19 PROCEDURE — 96367 TX/PROPH/DG ADDL SEQ IV INF: CPT

## 2018-10-19 PROCEDURE — 85007 BL SMEAR W/DIFF WBC COUNT: CPT

## 2018-10-19 PROCEDURE — 96413 CHEMO IV INFUSION 1 HR: CPT

## 2018-10-19 PROCEDURE — 700111 HCHG RX REV CODE 636 W/ 250 OVERRIDE (IP): Performed by: INTERNAL MEDICINE

## 2018-10-19 RX ORDER — POTASSIUM CHLORIDE 20 MEQ/1
40 TABLET, EXTENDED RELEASE ORAL DAILY
Status: DISCONTINUED | OUTPATIENT
Start: 2018-10-19 | End: 2018-10-19 | Stop reason: HOSPADM

## 2018-10-19 RX ADMIN — SODIUM CHLORIDE 150 MG: 9 INJECTION, SOLUTION INTRAVENOUS at 10:58

## 2018-10-19 RX ADMIN — DEXAMETHASONE SODIUM PHOSPHATE 12 MG: 4 INJECTION, SOLUTION INTRAMUSCULAR; INTRAVENOUS at 10:19

## 2018-10-19 RX ADMIN — CYCLOPHOSPHAMIDE 1266 MG: 2 INJECTION, POWDER, FOR SOLUTION INTRAVENOUS; ORAL at 12:26

## 2018-10-19 RX ADMIN — DOXORUBICIN HYDROCHLORIDE 126.6 MG: 2 INJECTION, SOLUTION INTRAVENOUS at 11:43

## 2018-10-19 RX ADMIN — PEGFILGRASTIM 6 MG: KIT SUBCUTANEOUS at 13:51

## 2018-10-19 RX ADMIN — ONDANSETRON HYDROCHLORIDE 16 MG: 2 SOLUTION INTRAMUSCULAR; INTRAVENOUS at 10:30

## 2018-10-19 RX ADMIN — POTASSIUM CHLORIDE 40 MEQ: 1500 TABLET, EXTENDED RELEASE ORAL at 10:19

## 2018-10-19 ASSESSMENT — PAIN SCALES - GENERAL: PAINLEVEL_OUTOF10: 0

## 2018-10-19 NOTE — PROGRESS NOTES
"Pharmacy Chemotherapy Verification Note:    Patient Name: Addie Orellana      Dx: Locally Advanced Breast CA (ER/MD+, HER2-, LYNSEY-)       Protocol: Dose Dense AC --> Dose Dense Taxol    *Dosing Reference*  DOXOrubicin 60 mg/m² slow IVP on Day 1  Cyclophosphamide 600 mg/m² IV over 30 minutes on Day 1  Every 2 weeks x4 cycles followed by  PACLItaxel 175 mg/m² IV over 3 hours on Day 1  Every 2 weeks x4 cycles    NCCN Guidelines for Breast Cancer. V.1.2018.  Renuka CROUCH, et al. J Clin Oncol. 2003;21(8):1431-9.    Allergies:  Patient has no known allergies.     /85   Pulse 93   Temp 36.9 °C (98.5 °F)   Resp 18   Ht 1.53 m (5' 0.24\")   Wt 104.5 kg (230 lb 6.1 oz)   SpO2 93%   BMI 44.64 kg/m²  Body surface area is 2.11 meters squared.    LABS 10/19/2018  ANC~ 4620  Plt = 238 k    SCr = 0.54 mg/dL  CrCl = >125 mL/min (using min SCr 0.7 mg/dL and current weight)    LABS 10/12/2018  LFT = WNL  TBili = 0.9    9/25/18 ECHO: LVEF 53% (paper copy in chart)     Drug Order   (Drug name, dose, route, IV Fluid & volume, frequency, number of doses) Cycle: 2 of 4      Previous treatment: C1: 10/5/2018     Medication = DOXOrubicin (Adriamycin)  Base Dose = 60 mg/m²  Calc Dose: Base Dose x Body surface area is 2.11 meters squared. m² = 126.6 mg  Final Dose = 126.6 mg  Route = IV  Fluid & Volume = 63.3 mL undiluted drug  Conc = 2 mg/mL  Admin Duration = Over 20 minutes          <5% difference, ok to treat with final written dose   Medication = Cyclophosphamide (Cytoxan)  Base Dose = 600 mg/m²  Calc Dose: Base Dose x Body surface area is 2.11 meters squared. m² = 1266 mg  Final Dose = 1266 mg  Route = IV  Fluid & Volume =  mL  Admin Duration = Over 30-60 minutes          <5% difference, ok to treat with final written dose     By my signature below, I confirm this process was performed independently with the BSA and all final chemotherapy dosing calculations congruent. I have reviewed the above chemotherapy order and that " my calculation of the final dose and BSA (when applicable) corroborate those calculations of the  pharmacist.     JERSEY Chadwick, PharmD

## 2018-10-19 NOTE — PROGRESS NOTES
Pt presents for C2D1 AC with no new complaints.  She states she tolerated cycle one well.  PICC line with brisk blood return; labs drawn as ordered and within parameters for treatment.  Potassium replaced per protocol. PICC line dressing and claves changed per protocol. Premeds and chemotherapy infused as ordered and tolerated well.  PICC line with brisk blood return covered in gauze and protective sleeve.  Neulasta onpro placed on back of left upper arm with green flashing light.  No sign of adverse effect.  Pt stable and ambulatory at discharge.

## 2018-10-19 NOTE — PROGRESS NOTES
"Pharmacy Chemotherapy Note        Patient Name: SISSY JUNE    Cycle 2   Previous treatment = cycle 1 on 10/5/18    Protocol: Dose Dense AC followed by Dos Dense paclitaxel       Doxorubicin 60 mg/m2 IV push on day 1  Cyclophosphamide 600 mg/m2 IV over 30 min on day 1    14 day cycle for 4 cycles    Followed by    Paclitaxel 175 mg/m2 IV over 3 hours on day 1    14 day cycle for 14 cycles    *Dosing Reference*  NCCN Guidelines for breast cancer V.1.2018  Renuka ML, et al. J Clin Oncol. 2003:21(8):1431-9      Dx: Breast Cancer         /85   Pulse 93   Temp 36.9 °C (98.5 °F)   Resp 18   Ht 1.53 m (5' 0.24\")   Wt 104.5 kg (230 lb 6.1 oz)   SpO2 93%   BMI 44.64 kg/m²  Body surface area is 2.11 meters squared.    LABS 10/19/2018:  ANC: 4620         Plt: 238k   Hgb = 13.1    SCr: 0.54 mg/dL CrCl: >125 mL/min (min SCr of 0.7)    K: 3.4 replace per protocol    Labs 10/12/18 LFT's: 15/14/63 TBili = 0.9    ECHO:  9/25/2018 LVEF: 55% (paper)      Doxorubicin 60 mg/m2 x 2.11 m2 = 126.6 mg                        <5% difference, OK to treat with final dose = 126.6 mg IV     Cyclophosphamide 600 mg/m2 x 2.11 m2 = 1266 mg                        <5% difference, OK to treat with final dose = 1266 mg IV   ONPRO    Nadiya Malik, PharmD  "

## 2018-10-19 NOTE — PROGRESS NOTES
Chemotherapy Verification - SECONDARY RN       Height = 153 cm  Weight = 104.5 kg  BSA = 2.11 m2       Medication: Doxorubicin (Adriamycin)  Dose: 60 mg/m2  Calculated Dose: 126.6 mg - EF 55% from 9/25/2018                                 Medication: Cyclophosphamide (Cytoxan)  Dose: 600 mg/m2  Calculated Dose: 1266 mg                              I confirm that this process was performed independently.

## 2018-10-19 NOTE — PROGRESS NOTES
Chemotherapy Verification - PRIMARY RN      Height = 153cm  Weight = 104.5kg  BSA = 2.11       Medication: Doxorubicin/Adriamycin  Dose: 60mg/m2  Calculated Dose: 126.6mg                             (In mg/m2, AUC, mg/kg)     Medication: Cyclophosphamide/Cytoxan  Dose: 600mg/m2  Calculated Dose: 1266mg                             (In mg/m2, AUC, mg/kg)          I confirm this process was performed independently with the BSA and all final chemotherapy dosing calculations congruent.  Any discrepancies of 5% or greater have been addressed with the chemotherapy pharmacist. The resolution of the discrepancy has been documented in the EPIC progress notes.

## 2018-10-19 NOTE — PROGRESS NOTES
Nutrition Progress Note:  Patient's weight per stand up scale in [facility] = 230 lbs (% wt change x2 weeks = 3.3, which is classified as severe loss, per guidelines).    -weight fluctuations could also reflect fluid status, as patient reports healthy appetite  Labs (10/19): 3.4, glucose 109  GI: pt denies N/V, diarrhea, constipation and abdominal pain after eating meals and snacks.     Current Interventions:   Per interview with dietitian, patient states very good appetite at this time.  She does c/o early feelings of satiety, but is combating this by eating frequently throughout the day.  Pt carries around nuts and dried fruit to snack on in between doctor's appointments.    Recommendations:   1) RD reviewed specific foods and snacks containing high protein for weight maintenance and preservation of LBM, optimal nutrition throughout cancer treatment.    -encouraged patient to try pre-made snack packages that include dried fruit, nuts, hard cheese and crackers for healthy calories and increased protein.  She verbalizes very good understanding.     RD available at [phone] prn.

## 2018-10-26 ENCOUNTER — TELEPHONE (OUTPATIENT)
Dept: HEMATOLOGY ONCOLOGY | Facility: MEDICAL CENTER | Age: 53
End: 2018-10-26

## 2018-10-26 ENCOUNTER — OUTPATIENT INFUSION SERVICES (OUTPATIENT)
Dept: ONCOLOGY | Facility: MEDICAL CENTER | Age: 53
End: 2018-10-26
Attending: INTERNAL MEDICINE
Payer: COMMERCIAL

## 2018-10-26 VITALS
OXYGEN SATURATION: 95 % | HEIGHT: 60 IN | SYSTOLIC BLOOD PRESSURE: 141 MMHG | TEMPERATURE: 98.6 F | HEART RATE: 95 BPM | DIASTOLIC BLOOD PRESSURE: 83 MMHG | RESPIRATION RATE: 18 BRPM | WEIGHT: 231.7 LBS | BODY MASS INDEX: 45.49 KG/M2

## 2018-10-26 DIAGNOSIS — C50.912 LEFT BREAST CANCER WITH T3 TUMOR, >5 CM IN GREATEST DIMENSION (HCC): ICD-10-CM

## 2018-10-26 DIAGNOSIS — R50.81 FEVER IN OTHER DISEASES: ICD-10-CM

## 2018-10-26 LAB
ANISOCYTOSIS BLD QL SMEAR: ABNORMAL
BASOPHILS # BLD AUTO: 7.8 % (ref 0–1.8)
BASOPHILS # BLD: 0.05 K/UL (ref 0–0.12)
EOSINOPHIL # BLD AUTO: 0 K/UL (ref 0–0.51)
EOSINOPHIL NFR BLD: 0 % (ref 0–6.9)
ERYTHROCYTE [DISTWIDTH] IN BLOOD BY AUTOMATED COUNT: 44.2 FL (ref 35.9–50)
HCT VFR BLD AUTO: 35.4 % (ref 37–47)
HGB BLD-MCNC: 11.5 G/DL (ref 12–16)
HYPOCHROMIA BLD QL SMEAR: ABNORMAL
LYMPHOCYTES # BLD AUTO: 0.42 K/UL (ref 1–4.8)
LYMPHOCYTES NFR BLD: 69.6 % (ref 22–41)
MANUAL DIFF BLD: NORMAL
MCH RBC QN AUTO: 29.2 PG (ref 27–33)
MCHC RBC AUTO-ENTMCNC: 32.5 G/DL (ref 33.6–35)
MCV RBC AUTO: 89.8 FL (ref 81.4–97.8)
MICROCYTES BLD QL SMEAR: ABNORMAL
MONOCYTES # BLD AUTO: 0.11 K/UL (ref 0–0.85)
MONOCYTES NFR BLD AUTO: 17.7 % (ref 0–13.4)
MORPHOLOGY BLD-IMP: NORMAL
NEUTROPHILS # BLD AUTO: 0.03 K/UL (ref 2–7.15)
NEUTROPHILS NFR BLD: 4.9 % (ref 44–72)
NRBC # BLD AUTO: 0 K/UL
NRBC BLD-RTO: 0 /100 WBC
OVALOCYTES BLD QL SMEAR: NORMAL
PLATELET # BLD AUTO: 130 K/UL (ref 164–446)
PLATELET BLD QL SMEAR: NORMAL
PMV BLD AUTO: 10.1 FL (ref 9–12.9)
POLYCHROMASIA BLD QL SMEAR: NORMAL
RBC # BLD AUTO: 3.94 M/UL (ref 4.2–5.4)
RBC BLD AUTO: PRESENT
WBC # BLD AUTO: 0.6 K/UL (ref 4.8–10.8)

## 2018-10-26 PROCEDURE — 85027 COMPLETE CBC AUTOMATED: CPT

## 2018-10-26 PROCEDURE — 85007 BL SMEAR W/DIFF WBC COUNT: CPT

## 2018-10-26 PROCEDURE — 36592 COLLECT BLOOD FROM PICC: CPT

## 2018-10-26 RX ORDER — LEVOFLOXACIN 750 MG/1
750 TABLET, FILM COATED ORAL DAILY
Qty: 5 TAB | Refills: 0 | Status: SHIPPED | OUTPATIENT
Start: 2018-10-26 | End: 2018-11-02

## 2018-10-26 ASSESSMENT — PAIN SCALES - GENERAL: PAINLEVEL_OUTOF10: 0

## 2018-10-26 NOTE — PROGRESS NOTES
Critical ANC 0.03 reported to Dr. Linder.  Per MD, pt to  Rx for antibiotic and take for 5 days.  Pt will come back to [facility] for repeat labs on Monday, 10/29.  Appointment scheduled.  Neutropenic precautions reviewed with patient and she is aware of plan.  She denies active signs of infections at this time.  Advised to report to ER if fever 100.4 or greater.  Pt reports understanding.

## 2018-10-26 NOTE — TELEPHONE ENCOUNTER
ANC very low.  Left message with the patient to take levofloxacin 500 mg for 5-day as prophylaxis.  Repeat CBC on Monday.

## 2018-10-26 NOTE — PROGRESS NOTES
Pt arrived ambulatory to [facility] for weekly PICC line dressing change and labs.  POC discussed.  LUE double lumen PICC line in place, blood return confirmed from each lumen, and labs drawn as ordered.  Dressing changed in sterile field, claves changed.  Line secured with gauze and arm wrap.  Pt returns next week for chemo, appointment confirmed.  Pt discharged from [facility] in NAD under self care.

## 2018-10-28 NOTE — PROGRESS NOTES
"Pharmacy Chemotherapy Note        Patient Name: SISSY JUNE    Cycle 3  Previous treatment = cycle 2 on 10/19/18    Protocol: Dose Dense AC followed by Dose Dense paclitaxel       Doxorubicin 60 mg/m2 IV on day 1  Cyclophosphamide 600 mg/m2 IV over 30 min on day 1  14 day cycle for 4 cycles    Followed by    Paclitaxel 175 mg/m2 IV over 3 hours on day 1  14 day cycle for 14 cycles    *Dosing Reference*  NCCN Guidelines for breast cancer V.1.2018  Renuka ML, et al. J Clin Oncol. 2003:21(8):1431-9      Dx: Breast Cancer         /70   Pulse 92   Temp 36.6 °C (97.8 °F)   Resp 18   Ht 1.53 m (5' 0.24\")   Wt 105.7 kg (233 lb 0.4 oz)   SpO2 96%   BMI 45.15 kg/m²  Body surface area is 2.12 meters squared.    LABS 11/2/18  ANC: 5620     Plt: 132 k   Hgb = 11.9  SCr: 0.54 mg/dL CrCl: >125 mL/min (min SCr of 0.7)    K: 3.7  LFT's: 15/15/55 TBili = 0.2    ECHO:  9/25/2018 LVEF: 55% (paper)      Doxorubicin 60 mg/m2 x 2.12 m2 = 127.2 mg                        <5% difference, OK to treat with final dose = 127.2 mg IV     Cyclophosphamide 600 mg/m2 x 2.12 m2 = 1272 mg                        <5% difference, OK to treat with final dose = 1272 mg IV   ONPRO    Nadiya Malik, CadenceD  "

## 2018-10-29 ENCOUNTER — OUTPATIENT INFUSION SERVICES (OUTPATIENT)
Dept: ONCOLOGY | Facility: MEDICAL CENTER | Age: 53
End: 2018-10-29
Attending: INTERNAL MEDICINE
Payer: COMMERCIAL

## 2018-10-29 VITALS
HEART RATE: 100 BPM | HEIGHT: 60 IN | TEMPERATURE: 97 F | BODY MASS INDEX: 45.27 KG/M2 | DIASTOLIC BLOOD PRESSURE: 64 MMHG | SYSTOLIC BLOOD PRESSURE: 111 MMHG | RESPIRATION RATE: 18 BRPM | OXYGEN SATURATION: 94 % | WEIGHT: 230.6 LBS

## 2018-10-29 DIAGNOSIS — C50.912 ER+ PR+ CARCINOMA OF BREAST, LEFT (HCC): ICD-10-CM

## 2018-10-29 DIAGNOSIS — Z17.0 ER+ PR+ CARCINOMA OF BREAST, LEFT (HCC): ICD-10-CM

## 2018-10-29 LAB
ANISOCYTOSIS BLD QL SMEAR: ABNORMAL
BASOPHILS # BLD AUTO: 2.7 % (ref 0–1.8)
BASOPHILS # BLD: 0.12 K/UL (ref 0–0.12)
EOSINOPHIL # BLD AUTO: 0 K/UL (ref 0–0.51)
EOSINOPHIL NFR BLD: 0 % (ref 0–6.9)
ERYTHROCYTE [DISTWIDTH] IN BLOOD BY AUTOMATED COUNT: 44.1 FL (ref 35.9–50)
HCT VFR BLD AUTO: 37.8 % (ref 37–47)
HGB BLD-MCNC: 12.3 G/DL (ref 12–16)
LYMPHOCYTES # BLD AUTO: 1.01 K/UL (ref 1–4.8)
LYMPHOCYTES NFR BLD: 23.6 % (ref 22–41)
MANUAL DIFF BLD: NORMAL
MCH RBC QN AUTO: 29.2 PG (ref 27–33)
MCHC RBC AUTO-ENTMCNC: 32.5 G/DL (ref 33.6–35)
MCV RBC AUTO: 89.8 FL (ref 81.4–97.8)
METAMYELOCYTES NFR BLD MANUAL: 8.2 %
MICROCYTES BLD QL SMEAR: ABNORMAL
MONOCYTES # BLD AUTO: 0.51 K/UL (ref 0–0.85)
MONOCYTES NFR BLD AUTO: 11.8 % (ref 0–13.4)
MORPHOLOGY BLD-IMP: NORMAL
MYELOCYTES NFR BLD MANUAL: 7.3 %
NEUTROPHILS # BLD AUTO: 2 K/UL (ref 2–7.15)
NEUTROPHILS NFR BLD: 37.3 % (ref 44–72)
NEUTS BAND NFR BLD MANUAL: 9.1 % (ref 0–10)
NRBC # BLD AUTO: 0.02 K/UL
NRBC BLD-RTO: 0.5 /100 WBC
PLATELET # BLD AUTO: 112 K/UL (ref 164–446)
PLATELET BLD QL SMEAR: NORMAL
PMV BLD AUTO: 10.2 FL (ref 9–12.9)
POLYCHROMASIA BLD QL SMEAR: NORMAL
RBC # BLD AUTO: 4.21 M/UL (ref 4.2–5.4)
RBC BLD AUTO: PRESENT
WBC # BLD AUTO: 4.3 K/UL (ref 4.8–10.8)

## 2018-10-29 PROCEDURE — 36592 COLLECT BLOOD FROM PICC: CPT

## 2018-10-29 PROCEDURE — 85007 BL SMEAR W/DIFF WBC COUNT: CPT

## 2018-10-29 PROCEDURE — 85027 COMPLETE CBC AUTOMATED: CPT

## 2018-10-29 RX ORDER — 0.9 % SODIUM CHLORIDE 0.9 %
20 VIAL (ML) INJECTION ONCE
Status: CANCELLED | OUTPATIENT
Start: 2018-10-29 | End: 2018-10-29

## 2018-10-29 ASSESSMENT — PAIN SCALES - GENERAL: PAINLEVEL_OUTOF10: 0

## 2018-10-29 NOTE — PROGRESS NOTES
Patient presents for lab draw. PICC flushes well with blood drawn as ordered. Patient states okay to call her with her lab results and released in no acute distress.

## 2018-11-01 ENCOUNTER — APPOINTMENT (OUTPATIENT)
Dept: HEMATOLOGY ONCOLOGY | Facility: MEDICAL CENTER | Age: 53
End: 2018-11-01
Payer: COMMERCIAL

## 2018-11-01 ENCOUNTER — TELEPHONE (OUTPATIENT)
Dept: HEMATOLOGY ONCOLOGY | Facility: MEDICAL CENTER | Age: 53
End: 2018-11-01

## 2018-11-01 ENCOUNTER — OFFICE VISIT (OUTPATIENT)
Dept: HEMATOLOGY ONCOLOGY | Facility: MEDICAL CENTER | Age: 53
End: 2018-11-01
Payer: COMMERCIAL

## 2018-11-01 VITALS
SYSTOLIC BLOOD PRESSURE: 149 MMHG | TEMPERATURE: 97.6 F | HEART RATE: 79 BPM | OXYGEN SATURATION: 96 % | RESPIRATION RATE: 16 BRPM | HEIGHT: 60 IN | WEIGHT: 232.37 LBS | DIASTOLIC BLOOD PRESSURE: 77 MMHG | BODY MASS INDEX: 45.62 KG/M2

## 2018-11-01 DIAGNOSIS — Z45.2 PICC (PERIPHERALLY INSERTED CENTRAL CATHETER) IN PLACE: ICD-10-CM

## 2018-11-01 DIAGNOSIS — T45.1X5A CHEMOTHERAPY INDUCED NEUTROPENIA (HCC): ICD-10-CM

## 2018-11-01 DIAGNOSIS — C50.912 LEFT BREAST CANCER WITH T3 TUMOR, >5 CM IN GREATEST DIMENSION (HCC): ICD-10-CM

## 2018-11-01 DIAGNOSIS — Z51.11 ENCOUNTER FOR ANTINEOPLASTIC CHEMOTHERAPY: ICD-10-CM

## 2018-11-01 DIAGNOSIS — D70.1 CHEMOTHERAPY INDUCED NEUTROPENIA (HCC): ICD-10-CM

## 2018-11-01 PROCEDURE — 99214 OFFICE O/P EST MOD 30 MIN: CPT | Performed by: NURSE PRACTITIONER

## 2018-11-01 RX ORDER — 0.9 % SODIUM CHLORIDE 0.9 %
VIAL (ML) INJECTION PRN
Status: CANCELLED | OUTPATIENT
Start: 2018-11-02

## 2018-11-01 RX ORDER — ONDANSETRON 2 MG/ML
4 INJECTION INTRAMUSCULAR; INTRAVENOUS
Status: CANCELLED | OUTPATIENT
Start: 2018-11-02

## 2018-11-01 RX ORDER — 0.9 % SODIUM CHLORIDE 0.9 %
5 VIAL (ML) INJECTION PRN
Status: CANCELLED | OUTPATIENT
Start: 2018-11-02

## 2018-11-01 RX ORDER — SODIUM CHLORIDE 9 MG/ML
INJECTION, SOLUTION INTRAVENOUS CONTINUOUS
Status: CANCELLED | OUTPATIENT
Start: 2018-11-02

## 2018-11-01 RX ORDER — PROCHLORPERAZINE MALEATE 10 MG
10 TABLET ORAL EVERY 6 HOURS PRN
Status: CANCELLED | OUTPATIENT
Start: 2018-11-02

## 2018-11-01 RX ORDER — ONDANSETRON 8 MG/1
8 TABLET, ORALLY DISINTEGRATING ORAL
Status: CANCELLED | OUTPATIENT
Start: 2018-11-02

## 2018-11-01 ASSESSMENT — ENCOUNTER SYMPTOMS
PALPITATIONS: 0
NAUSEA: 0
WEIGHT LOSS: 0
DIARRHEA: 0
INSOMNIA: 0
CONSTIPATION: 0
MYALGIAS: 0
CHILLS: 0
WHEEZING: 0
COUGH: 1
SHORTNESS OF BREATH: 0
FEVER: 0
DIZZINESS: 0
VOMITING: 0
TINGLING: 0
HEADACHES: 1

## 2018-11-01 ASSESSMENT — PAIN SCALES - GENERAL: PAINLEVEL: NO PAIN

## 2018-11-01 NOTE — TELEPHONE ENCOUNTER
Patient moved her prechemo appointment back so she can take her child to a dental appointment. I moved her appointment with Aurea PACKER at 1130 to ensure she can make it here on time. Patient agreed.

## 2018-11-01 NOTE — PROGRESS NOTES
Subjective:      Addie Orellana is a 53 y.o. female who presents for Follow-Up (pre-chemo (raymundo)) evaluation prior to cycle 3 dd AC for breast cancer      HPI   Ms. 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  She is unaccompanied for today's visit.    She is tolerating treatment well. She continues with post nasal drip related cough but it otherwise recovered from URI. She reports intermittent headaches which she attributed to her wig. She is otherwise asymptomatic.      No Known Allergies    Current Outpatient Prescriptions on File Prior to Visit   Medication Sig Dispense Refill   • albuterol 108 (90 Base) MCG/ACT Aero Soln inhalation aerosol Inhale 2 Puffs by mouth every 6 hours as needed for Shortness of Breath. 8.5 g 0   • ondansetron (ZOFRAN) 4 MG Tab tablet Take 1 Tab by mouth every four hours as needed for Nausea/Vomiting (for nausea, vomiting). 30 Tab 6   • prochlorperazine (COMPAZINE) 10 MG Tab Take 1 Tab by mouth every 6 hours as needed (for nausea, vomiting). 30 Tab 6   • therapeutic multivitamin-minerals (THERAGRAN-M) Tab Take 1 Tab by mouth every day.       No current facility-administered medications on file prior to visit.          Review of Systems   Constitutional: Negative for chills, fever, malaise/fatigue and weight loss (stable).   Respiratory: Positive for cough (post nasal drip since pneumonia). Negative for shortness of breath and wheezing.    Cardiovascular: Negative for chest pain, palpitations and leg swelling.   Gastrointestinal: Negative for constipation, diarrhea, nausea and vomiting.   Genitourinary: Negative for dysuria.   Musculoskeletal: Negative for myalgias.   Neurological: Positive for headaches (possibly wig related - Tylenol helps a bit). Negative for dizziness and tingling.   Psychiatric/Behavioral: The  patient does not have insomnia.           Objective:     /77 (BP Location: Left arm, Patient Position: Sitting, BP Cuff Size: Adult)   Pulse 79   Temp 36.4 °C (97.6 °F) (Temporal)   Resp 16   Ht 1.524 m (5')   Wt 105.4 kg (232 lb 5.8 oz)   SpO2 96%   BMI 45.38 kg/m²      Physical Exam   Constitutional: She is oriented to person, place, and time. She appears well-developed and well-nourished. No distress.   HENT:   Head: Normocephalic and atraumatic.   Mouth/Throat: Oropharynx is clear and moist. No oropharyngeal exudate.   Eyes: Pupils are equal, round, and reactive to light. Conjunctivae and EOM are normal. Right eye exhibits no discharge. Left eye exhibits no discharge. No scleral icterus.   Neck: Normal range of motion. Neck supple.   Cardiovascular: Normal rate, regular rhythm and normal heart sounds.  Exam reveals no gallop and no friction rub.    No murmur heard.  Pulmonary/Chest: Effort normal and breath sounds normal. No respiratory distress. She has no wheezes. Left breast exhibits mass (persistent, slightly smaller).   Abdominal: Soft. Bowel sounds are normal. She exhibits no distension. There is no tenderness.   Musculoskeletal: Normal range of motion. She exhibits no edema.   Neurological: She is alert and oriented to person, place, and time.   Skin: Skin is warm and dry. No rash noted. She is not diaphoretic. No erythema. No pallor.   Psychiatric: She has a normal mood and affect. Her behavior is normal.   Vitals reviewed.      No visits with results within 1 Day(s) from this visit.   Latest known visit with results is:   Outpatient [facility]fusion [facility] on 10/29/2018   Component Date Value Ref Range Status   • WBC 10/29/2018 4.3* 4.8 - 10.8 K/uL Final   • RBC 10/29/2018 4.21  4.20 - 5.40 M/uL Final   • Hemoglobin 10/29/2018 12.3  12.0 - 16.0 g/dL Final   • Hematocrit 10/29/2018 37.8  37.0 - 47.0 % Final   • MCV 10/29/2018 89.8  81.4 - 97.8 fL Final   • MCH 10/29/2018 29.2  27.0 - 33.0 pg Final    • MCHC 10/29/2018 32.5* 33.6 - 35.0 g/dL Final   • RDW 10/29/2018 44.1  35.9 - 50.0 fL Final   • Platelet Count 10/29/2018 112* 164 - 446 K/uL Final   • MPV 10/29/2018 10.2  9.0 - 12.9 fL Final   • Nucleated RBC 10/29/2018 0.50  /100 WBC Final   • NRBC (Absolute) 10/29/2018 0.02  K/uL Final   • Neutrophils-Polys 10/29/2018 37.30* 44.00 - 72.00 % Final   • Lymphocytes 10/29/2018 23.60  22.00 - 41.00 % Final   • Monocytes 10/29/2018 11.80  0.00 - 13.40 % Final   • Eosinophils 10/29/2018 0.00  0.00 - 6.90 % Final   • Basophils 10/29/2018 2.70* 0.00 - 1.80 % Final   • Neutrophils (Absolute) 10/29/2018 2.00  2.00 - 7.15 K/uL Final    Includes immature neutrophils, if present.   • Lymphs (Absolute) 10/29/2018 1.01  1.00 - 4.80 K/uL Final   • Monos (Absolute) 10/29/2018 0.51  0.00 - 0.85 K/uL Final   • Eos (Absolute) 10/29/2018 0.00  0.00 - 0.51 K/uL Final   • Baso (Absolute) 10/29/2018 0.12  0.00 - 0.12 K/uL Final   • Anisocytosis 10/29/2018 1+   Final   • Microcytosis 10/29/2018 1+   Final   • Bands-Stabs 10/29/2018 9.10  0.00 - 10.00 % Final   • Metamyelocytes 10/29/2018 8.20  % Final   • Myelocytes 10/29/2018 7.30  % Final   • Manual Diff Status 10/29/2018 PERFORMED   Final   • Peripheral Smear Review 10/29/2018 see below   Final    Comment: Due to instrument suspect flags, further review of peripheral smear is  indicated on this patient sample. This review may or may not result in  abnormal findings.     • Plt Estimation 10/29/2018 Decreased   Final   • RBC Morphology 10/29/2018 Present   Final   • Polychromia 10/29/2018 1+   Final          Assessment/Plan:     1. Left breast cancer with T3 tumor, >5 cm in greatest dimension (HCC)  CBC WITH DIFFERENTIAL    COMP METABOLIC PANEL    DISCONTINUED: dexamethasone (DECADRON) IVPB premix 12 mg    DISCONTINUED: ondansetron (ZOFRAN) in 50 mL NS IVPB 16 mg    DISCONTINUED: fosaprepitant (EMEND) 150 mg in  mL ivpb    DISCONTINUED: pegfilgrastim (NEULASTA ONPRO) kit 6 mg    2. Encounter for antineoplastic chemotherapy     3. PICC (peripherally inserted central catheter) in place     4. Chemotherapy induced neutropenia (HCC)           1.  Neutropenia: Stable and asymptomatic with On Pro following each dose. Patient tolerates On Pro very well and requires no Claritin for dosing.    2.  PICC: She desires to obtain outpatient labs every other week, prior to dosing, to facilitate visit (save time), orders in place.    3.  Breast cancer: Patient is tolerating treatment very well. CBC and CMP have been evaluated and found to be within acceptable limits. She will proceed with cycle 3 of treatment and return in 2 weeks for evaluation prior to cycle 4, sooner as needed.             The patient verbalized agreement and understanding of current plan. All questions and concerns were addressed at time of visit.    Please note that this dictation was created using voice recognition software. I have made every reasonable attempt to correct obvious errors, but I expect that there are errors of grammar and possibly content that I did not discover before finalizing the note.

## 2018-11-02 ENCOUNTER — DOCUMENTATION (OUTPATIENT)
Dept: HEMATOLOGY ONCOLOGY | Facility: MEDICAL CENTER | Age: 53
End: 2018-11-02

## 2018-11-02 ENCOUNTER — OUTPATIENT INFUSION SERVICES (OUTPATIENT)
Dept: ONCOLOGY | Facility: MEDICAL CENTER | Age: 53
End: 2018-11-02
Attending: INTERNAL MEDICINE
Payer: COMMERCIAL

## 2018-11-02 VITALS
WEIGHT: 233.03 LBS | HEIGHT: 60 IN | OXYGEN SATURATION: 96 % | HEART RATE: 92 BPM | BODY MASS INDEX: 45.75 KG/M2 | SYSTOLIC BLOOD PRESSURE: 135 MMHG | RESPIRATION RATE: 18 BRPM | DIASTOLIC BLOOD PRESSURE: 70 MMHG | TEMPERATURE: 97.8 F

## 2018-11-02 DIAGNOSIS — C50.912 LEFT BREAST CANCER WITH T3 TUMOR, >5 CM IN GREATEST DIMENSION (HCC): ICD-10-CM

## 2018-11-02 DIAGNOSIS — Z17.0 ER+ PR+ CARCINOMA OF BREAST, LEFT (HCC): ICD-10-CM

## 2018-11-02 DIAGNOSIS — C50.912 ER+ PR+ CARCINOMA OF BREAST, LEFT (HCC): ICD-10-CM

## 2018-11-02 DIAGNOSIS — C50.919 HER2 (HUMAN EPIDERMAL GROWTH FACTOR RECEPTOR 2) NEGATIVE CARCINOMA OF BREAST (HCC): ICD-10-CM

## 2018-11-02 LAB
ALBUMIN SERPL BCP-MCNC: 3.4 G/DL (ref 3.2–4.9)
ALBUMIN/GLOB SERPL: 1.5 G/DL
ALP SERPL-CCNC: 55 U/L (ref 30–99)
ALT SERPL-CCNC: 15 U/L (ref 2–50)
ANION GAP SERPL CALC-SCNC: 7 MMOL/L (ref 0–11.9)
AST SERPL-CCNC: 15 U/L (ref 12–45)
BASOPHILS # BLD AUTO: 0 % (ref 0–1.8)
BASOPHILS # BLD: 0 K/UL (ref 0–0.12)
BILIRUB SERPL-MCNC: 0.2 MG/DL (ref 0.1–1.5)
BUN SERPL-MCNC: 9 MG/DL (ref 8–22)
CALCIUM SERPL-MCNC: 8.6 MG/DL (ref 8.5–10.5)
CHLORIDE SERPL-SCNC: 107 MMOL/L (ref 96–112)
CO2 SERPL-SCNC: 26 MMOL/L (ref 20–33)
CREAT SERPL-MCNC: 0.54 MG/DL (ref 0.5–1.4)
EOSINOPHIL # BLD AUTO: 0 K/UL (ref 0–0.51)
EOSINOPHIL NFR BLD: 0 % (ref 0–6.9)
ERYTHROCYTE [DISTWIDTH] IN BLOOD BY AUTOMATED COUNT: 44.4 FL (ref 35.9–50)
GLOBULIN SER CALC-MCNC: 2.2 G/DL (ref 1.9–3.5)
GLUCOSE SERPL-MCNC: 105 MG/DL (ref 65–99)
HCT VFR BLD AUTO: 36.5 % (ref 37–47)
HGB BLD-MCNC: 11.9 G/DL (ref 12–16)
LYMPHOCYTES # BLD AUTO: 0.5 K/UL (ref 1–4.8)
LYMPHOCYTES NFR BLD: 7 % (ref 22–41)
MANUAL DIFF BLD: NORMAL
MCH RBC QN AUTO: 29.2 PG (ref 27–33)
MCHC RBC AUTO-ENTMCNC: 32.6 G/DL (ref 33.6–35)
MCV RBC AUTO: 89.5 FL (ref 81.4–97.8)
METAMYELOCYTES NFR BLD MANUAL: 5 %
MONOCYTES # BLD AUTO: 0.72 K/UL (ref 0–0.85)
MONOCYTES NFR BLD AUTO: 10 % (ref 0–13.4)
MORPHOLOGY BLD-IMP: NORMAL
NEUTROPHILS # BLD AUTO: 5.62 K/UL (ref 2–7.15)
NEUTROPHILS NFR BLD: 69 % (ref 44–72)
NEUTS BAND NFR BLD MANUAL: 9 % (ref 0–10)
NRBC # BLD AUTO: 0.03 K/UL
NRBC BLD-RTO: 0.4 /100 WBC
PLATELET # BLD AUTO: 132 K/UL (ref 164–446)
PLATELET BLD QL SMEAR: NORMAL
PMV BLD AUTO: 10.2 FL (ref 9–12.9)
POTASSIUM SERPL-SCNC: 3.7 MMOL/L (ref 3.6–5.5)
PROT SERPL-MCNC: 5.6 G/DL (ref 6–8.2)
RBC # BLD AUTO: 4.08 M/UL (ref 4.2–5.4)
RBC BLD AUTO: NORMAL
SODIUM SERPL-SCNC: 140 MMOL/L (ref 135–145)
WBC # BLD AUTO: 7.2 K/UL (ref 4.8–10.8)

## 2018-11-02 PROCEDURE — 700105 HCHG RX REV CODE 258: Performed by: NURSE PRACTITIONER

## 2018-11-02 PROCEDURE — 96367 TX/PROPH/DG ADDL SEQ IV INF: CPT

## 2018-11-02 PROCEDURE — 85007 BL SMEAR W/DIFF WBC COUNT: CPT

## 2018-11-02 PROCEDURE — 700105 HCHG RX REV CODE 258

## 2018-11-02 PROCEDURE — 700111 HCHG RX REV CODE 636 W/ 250 OVERRIDE (IP): Mod: JW

## 2018-11-02 PROCEDURE — 96377 APPLICATON ON-BODY INJECTOR: CPT | Mod: XU

## 2018-11-02 PROCEDURE — 85027 COMPLETE CBC AUTOMATED: CPT

## 2018-11-02 PROCEDURE — 96375 TX/PRO/DX INJ NEW DRUG ADDON: CPT

## 2018-11-02 PROCEDURE — 96413 CHEMO IV INFUSION 1 HR: CPT

## 2018-11-02 PROCEDURE — 700111 HCHG RX REV CODE 636 W/ 250 OVERRIDE (IP): Performed by: NURSE PRACTITIONER

## 2018-11-02 PROCEDURE — 96417 CHEMO IV INFUS EACH ADDL SEQ: CPT

## 2018-11-02 PROCEDURE — 80053 COMPREHEN METABOLIC PANEL: CPT

## 2018-11-02 RX ADMIN — DOXORUBICIN HYDROCHLORIDE 127.2 MG: 2 INJECTION, SOLUTION INTRAVENOUS at 13:10

## 2018-11-02 RX ADMIN — CYCLOPHOSPHAMIDE 1272 MG: 2 INJECTION, POWDER, FOR SOLUTION INTRAVENOUS; ORAL at 14:20

## 2018-11-02 RX ADMIN — SODIUM CHLORIDE 150 MG: 9 INJECTION, SOLUTION INTRAVENOUS at 12:25

## 2018-11-02 RX ADMIN — DEXAMETHASONE SODIUM PHOSPHATE 12 MG: 4 INJECTION, SOLUTION INTRAMUSCULAR; INTRAVENOUS at 11:50

## 2018-11-02 RX ADMIN — PEGFILGRASTIM 6 MG: KIT SUBCUTANEOUS at 15:30

## 2018-11-02 RX ADMIN — ONDANSETRON HYDROCHLORIDE 16 MG: 2 SOLUTION INTRAMUSCULAR; INTRAVENOUS at 12:05

## 2018-11-02 ASSESSMENT — PAIN SCALES - GENERAL: PAINLEVEL_OUTOF10: 0

## 2018-11-02 NOTE — PROGRESS NOTES
Chemotherapy Verification - SECONDARY RN       Height = 60.24in  Weight = 105.7kg  BSA = 2.12m2       Medication: Adriamycin  Dose: 60mg/m2  Calculated Dose: 127.2mg                             (In mg/m2, AUC, mg/kg)     Medication: Cytoxan  Dose: 600mg/m2  Calculated Dose: 1272mg                             (In mg/m2, AUC, mg/kg)      I confirm that this process was performed independently.

## 2018-11-02 NOTE — PROGRESS NOTES
"Pharmacy Chemotherapy Verification Note:    Patient Name: Addie Orellana      Dx: Locally Advanced Breast CA (ER/AK+, HER2-)         Protocol: Dose Dense AC --> Dose Dense Taxol  DOXOrubicin 60 mg/m² slow IVP on Day 1  Cyclophosphamide 600 mg/m² IV over 30 minutes on Day 1  Every 2 weeks x4 cycles followed by  PACLItaxel 175 mg/m² IV over 3 hours on Day 1  Every 2 weeks x4 cycles    NCCN Guidelines for Breast Cancer. V.1.2018.  Renuka ML, et al. J Clin Oncol. 2003;21(8):1431-9.    Allergies:  Patient has no known allergies.     /70   Pulse 92   Temp 36.6 °C (97.8 °F)   Resp 18   Ht 1.53 m (5' 0.24\")   Wt 105.7 kg (233 lb 0.4 oz)   SpO2 96%   BMI 45.15 kg/m²  Body surface area is 2.12 meters squared.    ANC~ 5620 Plt = 132k   Hgb = 11.9   SCr = 0.54mg/dL CrCl >125 mL/min   LFT's = WNL TBili = 0.2   9/25/18 ECHO: LVEF 53% (paper copy in chart)     Drug Order   (Drug name, dose, route, IV Fluid & volume, frequency, number of doses) Cycle: 3 of 4      Previous treatment: 10/19/18     Medication = DOXOrubicin (Adriamycin)  Base Dose = 60 mg/m²  Calc Dose: Base Dose x 2.12 m² = 127.2 mg  Final Dose = 127.2 mg  Route = IV  Fluid & Volume = 63.6 mL undiluted drug  Conc = 2 mg/mL  Admin Duration = Over 20 minutes          <5% difference, ok to treat with final written dose   Medication = Cyclophosphamide (Cytoxan)  Base Dose = 600 mg/m²  Calc Dose: Base Dose x 2.12 m² = 1272 mg  Final Dose = 1272 mg  Route = IV  Fluid & Volume =  mL  Admin Duration = Over 30-60 minutes          <5% difference, ok to treat with final written dose     By my signature below, I confirm this process was performed independently with the BSA and all final chemotherapy dosing calculations congruent. I have reviewed the above chemotherapy order and that my calculation of the final dose and BSA (when applicable) corroborate those calculations of the  pharmacist.     Lisa Elmore, PharmD, BCOP      "

## 2018-11-02 NOTE — PROGRESS NOTES
Here for chemotherapy cycle # 3 A/C and Labs, Via PICC . In good spirits, voices no new complaints. Teaching and education reinforcement provided, also emotional support. Today's treatment well tolerated, without complications. PICC flushed and dressing change per protocol. Neulasta ONPRO applied to L back arm at 15:30. Discharge home to self care in [facility].  Appointment  confirm for next treatment.

## 2018-11-02 NOTE — PROGRESS NOTES
Nutrition Progress Note:  Patient's weight per stand up scale in [facility] = 233 lbs (wt is stable at this time).    Labs (11/2): glucose 105, potassium WNL  GI: pt denies any changes in bowel habits, N/V, abdominal pain after eating.      Current Interventions:   Per interview with dietitian, patient states very good appetite.  She is not experiencing any signs/symptoms of nutrition malabsorption at this time.  Pt inquired about low potassium on 10/19 - potassium was 3.4, now WNL.  RD provided handout of specific foods with corresponding potassium amounts/serving and encouraged daily goal of 3500 mg.  Pt verbalizes very good understanding.     RD available at [phone].

## 2018-11-02 NOTE — PROGRESS NOTES
Chemotherapy Verification - PRIMARY RN      Height = 60.24 in  Weight = 233 lb  BSA = 2.12 m2       Medication: Adriamycin  Dose: 60 mg/m2  Calculated Dose: 127.2 mg / LVEF = 55% on 9/25/2018                            (In mg/m2, AUC, mg/kg)     Medication: Cytoxan  Dose: 600 mg/m2  Calculated Dose: 1272 mg                             (In mg/m2, AUC, mg/kg)      I confirm this process was performed independently with the BSA and all final chemotherapy dosing calculations congruent.  Any discrepancies of 5% or greater have been addressed with the chemotherapy pharmacist. The resolution of the discrepancy has been documented in the EPIC progress notes.

## 2018-11-09 ENCOUNTER — OUTPATIENT INFUSION SERVICES (OUTPATIENT)
Dept: ONCOLOGY | Facility: MEDICAL CENTER | Age: 53
End: 2018-11-09
Attending: INTERNAL MEDICINE
Payer: COMMERCIAL

## 2018-11-09 VITALS
OXYGEN SATURATION: 94 % | HEIGHT: 60 IN | HEART RATE: 105 BPM | RESPIRATION RATE: 18 BRPM | WEIGHT: 228.4 LBS | SYSTOLIC BLOOD PRESSURE: 144 MMHG | TEMPERATURE: 98.2 F | BODY MASS INDEX: 44.84 KG/M2 | DIASTOLIC BLOOD PRESSURE: 88 MMHG

## 2018-11-09 PROCEDURE — 99211 OFF/OP EST MAY X REQ PHY/QHP: CPT

## 2018-11-09 ASSESSMENT — PAIN SCALES - GENERAL: PAINLEVEL_OUTOF10: 0

## 2018-11-09 NOTE — PROGRESS NOTES
Patient presents for PICC dressing change. PICC dressing changed using sterile technique, new dressing clean, dry and intact. PICC flushes well with blood return from both lumens, site secured. Patient scheduled for next appointment and released in no acute distress.

## 2018-11-14 ENCOUNTER — HOSPITAL ENCOUNTER (OUTPATIENT)
Dept: LAB | Facility: MEDICAL CENTER | Age: 53
End: 2018-11-14
Attending: NURSE PRACTITIONER
Payer: COMMERCIAL

## 2018-11-14 DIAGNOSIS — C50.912 LEFT BREAST CANCER WITH T3 TUMOR, >5 CM IN GREATEST DIMENSION (HCC): ICD-10-CM

## 2018-11-14 LAB
ALBUMIN SERPL BCP-MCNC: 3.9 G/DL (ref 3.2–4.9)
ALBUMIN/GLOB SERPL: 1.7 G/DL
ALP SERPL-CCNC: 71 U/L (ref 30–99)
ALT SERPL-CCNC: 27 U/L (ref 2–50)
ANION GAP SERPL CALC-SCNC: 8 MMOL/L (ref 0–11.9)
ANISOCYTOSIS BLD QL SMEAR: ABNORMAL
AST SERPL-CCNC: 22 U/L (ref 12–45)
BASOPHILS # BLD AUTO: 0.9 % (ref 0–1.8)
BASOPHILS # BLD: 0.07 K/UL (ref 0–0.12)
BILIRUB SERPL-MCNC: 0.3 MG/DL (ref 0.1–1.5)
BUN SERPL-MCNC: 11 MG/DL (ref 8–22)
CALCIUM SERPL-MCNC: 9.5 MG/DL (ref 8.5–10.5)
CHLORIDE SERPL-SCNC: 106 MMOL/L (ref 96–112)
CO2 SERPL-SCNC: 27 MMOL/L (ref 20–33)
CREAT SERPL-MCNC: 0.6 MG/DL (ref 0.5–1.4)
EOSINOPHIL # BLD AUTO: 0 K/UL (ref 0–0.51)
EOSINOPHIL NFR BLD: 0 % (ref 0–6.9)
ERYTHROCYTE [DISTWIDTH] IN BLOOD BY AUTOMATED COUNT: 49.1 FL (ref 35.9–50)
GLOBULIN SER CALC-MCNC: 2.3 G/DL (ref 1.9–3.5)
GLUCOSE SERPL-MCNC: 81 MG/DL (ref 65–99)
HCT VFR BLD AUTO: 36.6 % (ref 37–47)
HGB BLD-MCNC: 11.3 G/DL (ref 12–16)
LYMPHOCYTES # BLD AUTO: 0.79 K/UL (ref 1–4.8)
LYMPHOCYTES NFR BLD: 10.4 % (ref 22–41)
MANUAL DIFF BLD: NORMAL
MCH RBC QN AUTO: 28.9 PG (ref 27–33)
MCHC RBC AUTO-ENTMCNC: 30.9 G/DL (ref 33.6–35)
MCV RBC AUTO: 93.6 FL (ref 81.4–97.8)
METAMYELOCYTES NFR BLD MANUAL: 7 %
MONOCYTES # BLD AUTO: 0.52 K/UL (ref 0–0.85)
MONOCYTES NFR BLD AUTO: 6.9 % (ref 0–13.4)
MORPHOLOGY BLD-IMP: NORMAL
MYELOCYTES NFR BLD MANUAL: 4.3 %
NEUTROPHILS # BLD AUTO: 5.36 K/UL (ref 2–7.15)
NEUTROPHILS NFR BLD: 67 % (ref 44–72)
NEUTS BAND NFR BLD MANUAL: 3.5 % (ref 0–10)
NRBC # BLD AUTO: 0.07 K/UL
NRBC BLD-RTO: 0.9 /100 WBC
PLATELET # BLD AUTO: 102 K/UL (ref 164–446)
PLATELET BLD QL SMEAR: NORMAL
PMV BLD AUTO: 10.9 FL (ref 9–12.9)
POIKILOCYTOSIS BLD QL SMEAR: NORMAL
POTASSIUM SERPL-SCNC: 3.7 MMOL/L (ref 3.6–5.5)
PROT SERPL-MCNC: 6.2 G/DL (ref 6–8.2)
RBC # BLD AUTO: 3.91 M/UL (ref 4.2–5.4)
RBC BLD AUTO: PRESENT
SODIUM SERPL-SCNC: 141 MMOL/L (ref 135–145)
WBC # BLD AUTO: 7.6 K/UL (ref 4.8–10.8)

## 2018-11-14 PROCEDURE — 85027 COMPLETE CBC AUTOMATED: CPT

## 2018-11-14 PROCEDURE — 80053 COMPREHEN METABOLIC PANEL: CPT

## 2018-11-14 PROCEDURE — 85007 BL SMEAR W/DIFF WBC COUNT: CPT

## 2018-11-14 PROCEDURE — 36415 COLL VENOUS BLD VENIPUNCTURE: CPT

## 2018-11-15 ENCOUNTER — TELEPHONE (OUTPATIENT)
Dept: HEMATOLOGY ONCOLOGY | Facility: MEDICAL CENTER | Age: 53
End: 2018-11-15

## 2018-11-15 ENCOUNTER — OFFICE VISIT (OUTPATIENT)
Dept: HEMATOLOGY ONCOLOGY | Facility: MEDICAL CENTER | Age: 53
End: 2018-11-15
Payer: COMMERCIAL

## 2018-11-15 VITALS
WEIGHT: 230.16 LBS | TEMPERATURE: 97.6 F | BODY MASS INDEX: 45.19 KG/M2 | OXYGEN SATURATION: 94 % | DIASTOLIC BLOOD PRESSURE: 70 MMHG | SYSTOLIC BLOOD PRESSURE: 112 MMHG | HEIGHT: 60 IN | RESPIRATION RATE: 18 BRPM | HEART RATE: 71 BPM

## 2018-11-15 DIAGNOSIS — K13.79 MOUTH SORES: ICD-10-CM

## 2018-11-15 DIAGNOSIS — Z17.0 ER+ PR+ CARCINOMA OF BREAST, LEFT (HCC): ICD-10-CM

## 2018-11-15 DIAGNOSIS — C50.912 ER+ PR+ CARCINOMA OF BREAST, LEFT (HCC): ICD-10-CM

## 2018-11-15 DIAGNOSIS — C50.512 MALIGNANT NEOPLASM OF LOWER-OUTER QUADRANT OF LEFT BREAST OF FEMALE, ESTROGEN RECEPTOR POSITIVE (HCC): ICD-10-CM

## 2018-11-15 DIAGNOSIS — Z17.0 MALIGNANT NEOPLASM OF LOWER-OUTER QUADRANT OF LEFT BREAST OF FEMALE, ESTROGEN RECEPTOR POSITIVE (HCC): ICD-10-CM

## 2018-11-15 DIAGNOSIS — T45.1X5A PERIPHERAL NEUROPATHY DUE TO CHEMOTHERAPY (HCC): ICD-10-CM

## 2018-11-15 DIAGNOSIS — Z51.11 ENCOUNTER FOR ANTINEOPLASTIC CHEMOTHERAPY: ICD-10-CM

## 2018-11-15 DIAGNOSIS — G62.0 PERIPHERAL NEUROPATHY DUE TO CHEMOTHERAPY (HCC): ICD-10-CM

## 2018-11-15 PROCEDURE — 99214 OFFICE O/P EST MOD 30 MIN: CPT | Performed by: NURSE PRACTITIONER

## 2018-11-15 RX ORDER — ONDANSETRON 8 MG/1
8 TABLET, ORALLY DISINTEGRATING ORAL
Status: CANCELLED | OUTPATIENT
Start: 2018-11-16

## 2018-11-15 RX ORDER — PROCHLORPERAZINE MALEATE 10 MG
10 TABLET ORAL EVERY 6 HOURS PRN
Status: CANCELLED | OUTPATIENT
Start: 2018-11-16

## 2018-11-15 RX ORDER — 0.9 % SODIUM CHLORIDE 0.9 %
5 VIAL (ML) INJECTION PRN
Status: CANCELLED | OUTPATIENT
Start: 2018-11-15

## 2018-11-15 RX ORDER — SODIUM CHLORIDE 9 MG/ML
INJECTION, SOLUTION INTRAVENOUS CONTINUOUS
Status: CANCELLED | OUTPATIENT
Start: 2018-11-16

## 2018-11-15 RX ORDER — 0.9 % SODIUM CHLORIDE 0.9 %
VIAL (ML) INJECTION PRN
Status: CANCELLED | OUTPATIENT
Start: 2018-11-15

## 2018-11-15 RX ORDER — 0.9 % SODIUM CHLORIDE 0.9 %
VIAL (ML) INJECTION PRN
Status: CANCELLED | OUTPATIENT
Start: 2018-11-16

## 2018-11-15 RX ORDER — ONDANSETRON 2 MG/ML
4 INJECTION INTRAMUSCULAR; INTRAVENOUS
Status: CANCELLED | OUTPATIENT
Start: 2018-11-16

## 2018-11-15 RX ORDER — 0.9 % SODIUM CHLORIDE 0.9 %
5 VIAL (ML) INJECTION PRN
Status: CANCELLED | OUTPATIENT
Start: 2018-11-16

## 2018-11-15 ASSESSMENT — ENCOUNTER SYMPTOMS
DIZZINESS: 0
TINGLING: 1
FEVER: 0
CONSTIPATION: 0
MYALGIAS: 0
DIARRHEA: 0
WEIGHT LOSS: 1
INSOMNIA: 0
CHILLS: 0
NAUSEA: 0
COUGH: 1
HEADACHES: 1
PALPITATIONS: 0
VOMITING: 0
SHORTNESS OF BREATH: 0

## 2018-11-15 ASSESSMENT — PAIN SCALES - GENERAL: PAINLEVEL: NO PAIN

## 2018-11-15 NOTE — PROGRESS NOTES
"Pharmacy Chemotherapy Note    Patient Name: SISSY JUNE    Cycle 4  Previous treatment = cycle 3 on 11/2/18    Protocol: Dose Dense AC followed by Dose Dense paclitaxel       Doxorubicin 60 mg/m2 IV on day 1   11/16/18 Dose reduced by 10% to 54 mg/m2 per Dr. Linder d/t fatigue and peripheral neuropathy  Cyclophosphamide 600 mg/m2 IV over 30 min on day 1   11/16/18 Dose reduced by 10% to 540 mg/m2 per Dr. Linder d/nelson fatigue and peripheral neuropathy  14 day cycle for 4 cycles    Followed by    Paclitaxel 175 mg/m2 IV over 3 hours on day 1  14 day cycle for 14 cycles    *Dosing Reference*  NCCN Guidelines for breast cancer V.1.2018  Renuka ML, et al. J Clin Oncol. 2003:21(8):1431-9    Dx: Breast Cancer         /71   Pulse 78   Temp 36.9 °C (98.4 °F) (Temporal)   Resp 18   Ht 1.53 m (5' 0.24\")   Wt 104 kg (229 lb 4.5 oz)   SpO2 94%   BMI 44.43 kg/m²  Body surface area is 2.1 meters squared.    LABS 11/14/18  ANC~ 5360 Plt = 102k   Hgb = 11.3     SCr = 0.6 mg/dL CrCl ~ >125 mL/min (minimum SCr of 0.7)  LFT's = WNLs  TBili = 0.3    ECHO:  9/25/2018 LVEF: 55% (paper)      Doxorubicin 54 mg/m2 x 2.1 m2 = 113.4 mg   <5% difference, OK to treat with final dose = 113.4 mg IV     Cyclophosphamide 540 mg/m2 x 2.1 m2 = 1134 mg   <5% difference, OK to treat with final dose = 1134 mg IV   ONPRO    Lucio Stone, PharmD  "

## 2018-11-15 NOTE — PROGRESS NOTES
Subjective:      Addie Orellana is a 53 y.o. female who presents with Follow-Up (PreChemo) for breast cancer.         HPI    Patient seen today in follow up for locally advanced ER/AL positive, HER2-/kamala negative, KI-67 60% poorly differentiated invasive ductal carcinoma of the left breast. Patient is unaccompanied for today's visit.  Patient to schedule receive cycle #4 of dose dense AC with Neulasta support.    Patient is tolerating treatment fairly well.  She does have significant fatigue.  She has children that she is also caring for at home.  She has had approximately 2-3 pound weight loss since last seen, however she said her appetite is off and on.  She denies any fevers or chills.  She does still have a dry cough present but it is not worsening.  She denies any shortness of breath.  She denies any chest pain, heart palpitations or swelling in her legs.  She denies any nausea, vomiting, diarrhea or constipation.  Her last bowel movement was this morning.  She is voiding without difficulty and denies any generalized pain.    Patient noted approximately 3-4 days ago she has a small sore on the tip of her tongue.  She is rinsing with salt water approximately 2-3 times per day.  She stated is not too bothersome.  On examination today she does have a sore also noted the back of her throat.    Patient's main complaint right now is some neuropathy in the first 3 fingers bilaterally.  She stated that her hands feel raw and some tenderness to the tips of her fingers.  She is still able to  things at home and is not dropping anything.  She stated she is just having more difficulty with opening up sandwich baggies due to the rawness feeling in her fingers.  She is still able to button her shirts and pants without difficulty.  This is most recent within the last 5 days or so noted per patient.    No Known Allergies  Current Outpatient Prescriptions on File Prior to Visit   Medication Sig Dispense Refill   •  "albuterol 108 (90 Base) MCG/ACT Aero Soln inhalation aerosol Inhale 2 Puffs by mouth every 6 hours as needed for Shortness of Breath. 8.5 g 0   • therapeutic multivitamin-minerals (THERAGRAN-M) Tab Take 1 Tab by mouth every day.     • ondansetron (ZOFRAN) 4 MG Tab tablet Take 1 Tab by mouth every four hours as needed for Nausea/Vomiting (for nausea, vomiting). 30 Tab 6   • prochlorperazine (COMPAZINE) 10 MG Tab Take 1 Tab by mouth every 6 hours as needed (for nausea, vomiting). 30 Tab 6     No current facility-administered medications on file prior to visit.          Review of Systems   Constitutional: Positive for malaise/fatigue and weight loss. Negative for chills and fever.   Respiratory: Positive for cough (dry cough still present and not worsening). Negative for shortness of breath.    Cardiovascular: Negative for chest pain, palpitations and leg swelling.   Gastrointestinal: Negative for constipation, diarrhea, nausea and vomiting.   Genitourinary: Negative for dysuria.   Musculoskeletal: Negative for myalgias.   Neurological: Positive for tingling (first 3 finger noted to have some numbness - feels fingers and hands feel raw) and headaches (chronic per patient). Negative for dizziness.   Psychiatric/Behavioral: The patient does not have insomnia.           Objective:     /70 (BP Location: Left arm, Patient Position: Sitting, BP Cuff Size: Large adult)   Pulse 71   Temp 36.4 °C (97.6 °F) (Temporal)   Resp 18   Ht 1.53 m (5' 0.24\")   Wt 104.4 kg (230 lb 2.6 oz)   SpO2 94%   BMI 44.59 kg/m²      Physical Exam   Constitutional: She is oriented to person, place, and time. She appears well-developed and well-nourished. No distress.   HENT:   Head: Normocephalic and atraumatic.   Mouth/Throat: Oropharynx is clear and moist.   Alopecia - sore on the tongue and back of throat noted   Cardiovascular: Normal rate, regular rhythm and normal heart sounds.  Exam reveals no gallop and no friction rub.    No " murmur heard.  Pulmonary/Chest: Effort normal. No respiratory distress. She has no wheezes.   Clear but diminished throughout   Abdominal: Soft. Bowel sounds are normal. She exhibits no distension. There is no tenderness.   Musculoskeletal: Normal range of motion. She exhibits no edema or tenderness.   Neurological: She is alert and oriented to person, place, and time.   Skin: Skin is warm and dry. No rash noted. She is not diaphoretic. No erythema. No pallor.   Psychiatric: She has a normal mood and affect. Her behavior is normal.   Vitals reviewed.         No visits with results within 1 Day(s) from this visit.   Latest known visit with results is:   Hospital Outpatient Visit on 11/14/2018   Component Date Value Ref Range Status   • WBC 11/14/2018 7.6  4.8 - 10.8 K/uL Final   • RBC 11/14/2018 3.91* 4.20 - 5.40 M/uL Final   • Hemoglobin 11/14/2018 11.3* 12.0 - 16.0 g/dL Final   • Hematocrit 11/14/2018 36.6* 37.0 - 47.0 % Final   • MCV 11/14/2018 93.6  81.4 - 97.8 fL Final   • MCH 11/14/2018 28.9  27.0 - 33.0 pg Final   • MCHC 11/14/2018 30.9* 33.6 - 35.0 g/dL Final   • RDW 11/14/2018 49.1  35.9 - 50.0 fL Final   • Platelet Count 11/14/2018 102* 164 - 446 K/uL Final   • MPV 11/14/2018 10.9  9.0 - 12.9 fL Final   • Neutrophils-Polys 11/14/2018 67.00  44.00 - 72.00 % Final   • Lymphocytes 11/14/2018 10.40* 22.00 - 41.00 % Final   • Monocytes 11/14/2018 6.90  0.00 - 13.40 % Final   • Eosinophils 11/14/2018 0.00  0.00 - 6.90 % Final   • Basophils 11/14/2018 0.90  0.00 - 1.80 % Final   • Nucleated RBC 11/14/2018 0.90  /100 WBC Final   • Neutrophils (Absolute) 11/14/2018 5.36  2.00 - 7.15 K/uL Final    Includes immature neutrophils, if present.   • Lymphs (Absolute) 11/14/2018 0.79* 1.00 - 4.80 K/uL Final   • Monos (Absolute) 11/14/2018 0.52  0.00 - 0.85 K/uL Final   • Eos (Absolute) 11/14/2018 0.00  0.00 - 0.51 K/uL Final   • Baso (Absolute) 11/14/2018 0.07  0.00 - 0.12 K/uL Final   • NRBC (Absolute) 11/14/2018 0.07   K/uL Final   • Anisocytosis 11/14/2018 1+   Final   • Sodium 11/14/2018 141  135 - 145 mmol/L Final   • Potassium 11/14/2018 3.7  3.6 - 5.5 mmol/L Final   • Chloride 11/14/2018 106  96 - 112 mmol/L Final   • Co2 11/14/2018 27  20 - 33 mmol/L Final   • Anion Gap 11/14/2018 8.0  0.0 - 11.9 Final   • Glucose 11/14/2018 81  65 - 99 mg/dL Final   • Bun 11/14/2018 11  8 - 22 mg/dL Final   • Creatinine 11/14/2018 0.60  0.50 - 1.40 mg/dL Final   • Calcium 11/14/2018 9.5  8.5 - 10.5 mg/dL Final   • AST(SGOT) 11/14/2018 22  12 - 45 U/L Final   • ALT(SGPT) 11/14/2018 27  2 - 50 U/L Final   • Alkaline Phosphatase 11/14/2018 71  30 - 99 U/L Final   • Total Bilirubin 11/14/2018 0.3  0.1 - 1.5 mg/dL Final   • Albumin 11/14/2018 3.9  3.2 - 4.9 g/dL Final   • Total Protein 11/14/2018 6.2  6.0 - 8.2 g/dL Final   • Globulin 11/14/2018 2.3  1.9 - 3.5 g/dL Final   • A-G Ratio 11/14/2018 1.7  g/dL Final   • GFR If  11/14/2018 >60  >60 mL/min/1.73 m 2 Final   • GFR If Non  11/14/2018 >60  >60 mL/min/1.73 m 2 Final   • Bands-Stabs 11/14/2018 3.50  0.00 - 10.00 % Final   • Metamyelocytes 11/14/2018 7.00  % Final   • Myelocytes 11/14/2018 4.30  % Final   • Manual Diff Status 11/14/2018 PERFORMED   Final   • Peripheral Smear Review 11/14/2018 see below   Final    Comment: Due to instrument suspect flags, further review of peripheral smear is  indicated on this patient sample. This review may or may not result in  abnormal findings.     • Plt Estimation 11/14/2018 Decreased   Final   • RBC Morphology 11/14/2018 Present   Final   • Poikilocytosis 11/14/2018 1+   Final          Assessment/Plan:     1. Malignant neoplasm of lower-outer quadrant of left breast of female, estrogen receptor positive (HCC)     2. Er+ MS+ carcinoma of breast, left (HCC)  maalox plus-benadryl-visc lidocaine (MAGIC MOUTHWASH)   3. Mouth sores  maalox plus-benadryl-visc lidocaine (MAGIC MOUTHWASH)   4. Peripheral neuropathy due to  chemotherapy (HCC)     5. Encounter for antineoplastic chemotherapy  maalox plus-benadryl-visc lidocaine (MAGIC MOUTHWASH)     Plan  1.  Patient with locally advanced ER/MS positive, HER2-/kamala negative, KI-67 60% poorly differentiated invasive ductal carcinoma of the left breast currently on dose dense AC.  She is scheduled to receive cycle #4 tomorrow.  Clinically she is fairly stable.  She does have continued fatigue but it is tolerable.  She has been experiencing increase in peripheral neuropathy and further discussion with Dr. Linder I will dose reduce patient's chemotherapy by 10%.  I did review her CBC and CMP and labs are appropriate to proceed with treatment tomorrow as planned with a 10% dose reduction of both Adriamycin and Cytoxan.    2.  I have asked patient to continue to monitor the peripheral neuropathy.  We will closely monitor this as well as she will be starting on dose dense Taxol in 2 weeks.  Dr. Linder is aware of current clinical symptoms.    3.  Patient noted to have 2 mouth sores, one on the tip of her tongue and the back of the throat.  I will start patient on Magic mouthwash have her take 4 times per day for the next 5-7 days.  She is also to continue to rinse her mouth out with salt water and baking soda.    4.  Patient will follow-up in the clinic in 2 weeks or sooner if needed with labs prior to her next dose of chemotherapy.

## 2018-11-16 ENCOUNTER — OUTPATIENT INFUSION SERVICES (OUTPATIENT)
Dept: ONCOLOGY | Facility: MEDICAL CENTER | Age: 53
End: 2018-11-16
Attending: INTERNAL MEDICINE
Payer: COMMERCIAL

## 2018-11-16 VITALS
HEIGHT: 60 IN | TEMPERATURE: 98.4 F | WEIGHT: 229.28 LBS | DIASTOLIC BLOOD PRESSURE: 71 MMHG | OXYGEN SATURATION: 94 % | RESPIRATION RATE: 18 BRPM | SYSTOLIC BLOOD PRESSURE: 122 MMHG | HEART RATE: 78 BPM | BODY MASS INDEX: 45.01 KG/M2

## 2018-11-16 DIAGNOSIS — C50.912 ER+ PR+ CARCINOMA OF BREAST, LEFT (HCC): ICD-10-CM

## 2018-11-16 DIAGNOSIS — Z17.0 ER+ PR+ CARCINOMA OF BREAST, LEFT (HCC): ICD-10-CM

## 2018-11-16 DIAGNOSIS — C50.919 HER2 (HUMAN EPIDERMAL GROWTH FACTOR RECEPTOR 2) NEGATIVE CARCINOMA OF BREAST (HCC): ICD-10-CM

## 2018-11-16 DIAGNOSIS — C50.912 LEFT BREAST CANCER WITH T3 TUMOR, >5 CM IN GREATEST DIMENSION (HCC): ICD-10-CM

## 2018-11-16 PROCEDURE — 96377 APPLICATON ON-BODY INJECTOR: CPT | Mod: XU

## 2018-11-16 PROCEDURE — 700105 HCHG RX REV CODE 258

## 2018-11-16 PROCEDURE — 96375 TX/PRO/DX INJ NEW DRUG ADDON: CPT

## 2018-11-16 PROCEDURE — 96417 CHEMO IV INFUS EACH ADDL SEQ: CPT

## 2018-11-16 PROCEDURE — 700105 HCHG RX REV CODE 258: Performed by: NURSE PRACTITIONER

## 2018-11-16 PROCEDURE — 96367 TX/PROPH/DG ADDL SEQ IV INF: CPT

## 2018-11-16 PROCEDURE — 96413 CHEMO IV INFUSION 1 HR: CPT

## 2018-11-16 PROCEDURE — 700111 HCHG RX REV CODE 636 W/ 250 OVERRIDE (IP): Mod: JW

## 2018-11-16 PROCEDURE — 700111 HCHG RX REV CODE 636 W/ 250 OVERRIDE (IP): Performed by: NURSE PRACTITIONER

## 2018-11-16 RX ADMIN — CYCLOPHOSPHAMIDE 1134 MG: 2 INJECTION, POWDER, FOR SOLUTION INTRAVENOUS; ORAL at 12:48

## 2018-11-16 RX ADMIN — DEXAMETHASONE SODIUM PHOSPHATE 12 MG: 4 INJECTION, SOLUTION INTRAMUSCULAR; INTRAVENOUS at 10:26

## 2018-11-16 RX ADMIN — SODIUM CHLORIDE 150 MG: 9 INJECTION, SOLUTION INTRAVENOUS at 11:27

## 2018-11-16 RX ADMIN — DOXORUBICIN HYDROCHLORIDE 113.4 MG: 2 INJECTION, SOLUTION INTRAVENOUS at 12:09

## 2018-11-16 RX ADMIN — PEGFILGRASTIM 6 MG: KIT SUBCUTANEOUS at 14:04

## 2018-11-16 RX ADMIN — ONDANSETRON HYDROCHLORIDE 16 MG: 2 SOLUTION INTRAMUSCULAR; INTRAVENOUS at 11:06

## 2018-11-16 ASSESSMENT — PAIN SCALES - GENERAL
PAINLEVEL_OUTOF10: 0
PAINLEVEL: NO PAIN

## 2018-11-16 NOTE — PROGRESS NOTES
"Pharmacy Chemotherapy Verification Note:    Patient Name: Addie Orellana      Dx: Locally Advanced Breast CA (ER/AR+, HER2-)         Protocol: Dose Dense AC --> Dose Dense Taxol  DOXOrubicin 60 mg/m² slow IVP on Day 1  --11/16/18 C4 Dose reduced 10% for neuropathy per MD  Cyclophosphamide 600 mg/m² IV over 30 minutes on Day 1  --11/16/18 C4 Dose reduced 10% for neuropathy per MD  Every 2 weeks x4 cycles followed by  PACLItaxel 175 mg/m² IV over 3 hours on Day 1  Every 2 weeks x4 cycles    NCCN Guidelines for Breast Cancer. V.1.2018.  Renuka CROUCH, et al. J Clin Oncol. 2003;21(8):1431-9.    Allergies:  Patient has no known allergies.     /71   Pulse 78   Temp 36.9 °C (98.4 °F) (Temporal)   Resp 18   Ht 1.53 m (5' 0.24\")   Wt 104 kg (229 lb 4.5 oz)   SpO2 94%   BMI 44.43 kg/m²  Body surface area is 2.1 meters squared.    Labs from 11/14/18:  ANC~ 5360 Plt = 102k   Hgb = 11.3   SCr = 0.6mg/dL CrCl >125 mL/min   LFT's = WNL TBili = 0.3    9/25/18 ECHO: LVEF 53% (paper copy in chart)     Drug Order   (Drug name, dose, route, IV Fluid & volume, frequency, number of doses) Cycle: 4 of 4      Previous treatment: 11/2/18     Medication = DOXOrubicin (Adriamycin)  Base Dose = 54 mg/m²  Calc Dose: Base Dose x 2.1 m² = 113.4 mg  Final Dose = 113.4 mg  Route = IV  Fluid & Volume = 56.7 mL undiluted drug  Conc = 2 mg/mL  Admin Duration = Over 20 minutes          <5% difference, ok to treat with final written dose   Medication = Cyclophosphamide (Cytoxan)  Base Dose = 540 mg/m²  Calc Dose: Base Dose x 2.1 m² = 1134 mg  Final Dose = 1134 mg  Route = IV  Fluid & Volume =  mL  Admin Duration = Over 30-60 minutes          <5% difference, ok to treat with final written dose     By my signature below, I confirm this process was performed independently with the BSA and all final chemotherapy dosing calculations congruent. I have reviewed the above chemotherapy order and that my calculation of the final dose and BSA " (when applicable) corroborate those calculations of the  pharmacist.     Lisa Elmore, PharmD, BCOP

## 2018-11-16 NOTE — PROGRESS NOTES
Chemotherapy Verification - PRIMARY RN      Height = 153 cm  Weight = 104 kg  BSA = 2.1 m^2       Medication: Doxorubicin  Dose: 54 mg/m^2  Calculated Dose: 113.4 mg                             (In mg/m2, AUC, mg/kg)     Medication: Cytoxan  Dose: 540 mg/m^2  Calculated Dose: 1134 mg                             (In mg/m2, AUC, mg/kg)      I confirm this process was performed independently with the BSA and all final chemotherapy dosing calculations congruent.  Any discrepancies of 5% or greater have been addressed with the chemotherapy pharmacist. The resolution of the discrepancy has been documented in the EPIC progress notes.

## 2018-11-16 NOTE — PROGRESS NOTES
Chemotherapy Verification - SECONDARY RN       Height = 60.24in  Weight = 104kg  BSA = 2.1m2       Medication: Adriamycin  Dose: 54mg/m2 (90% of 60mg/m2)  Calculated Dose:  113.4mg                            (In mg/m2, AUC, mg/kg)      Medication: Cytoxan  Dose: 540mg/m2 (90% of 600mg/m2)  Calculated Dose:  1134mg                            (In mg/m2, AUC, mg/kg)    LVEF 55% (paper results in chart)      I confirm that this process was performed independently.

## 2018-11-20 ENCOUNTER — PATIENT OUTREACH (OUTPATIENT)
Dept: OTHER | Facility: MEDICAL CENTER | Age: 53
End: 2018-11-20

## 2018-11-20 NOTE — LETTER
[facility]   [address]  [place] [address]  Phone: [phone] - Fax: [phone]              Address:  [place] [address]     Date: 11/20/18  Medical Record Number: [id number]    Dear Addie,      I am your Cancer Nurse Navigator, a certified oncology nurse. My role is to assess any needs you may have with education, guidance and support. I am available to you and your family from diagnosis through your survivorship.       I am available to address your needs during your journey with the following services:     Care Coordination  I can assist you in facilitating communication between your cancer care treatment team to ensure timely treatment and follow-up.  I can also assist with transition of care back to your primary care provider, or other specialist, as needed.  My goal is to bridge gaps for you throughout the course of your active treatment.       Education Services  Understanding the recommended treatment course by your physician is key. I can provide educational resources personalized to your cancer diagnosis to help you understand your diagnosis and treatment. Please let me know if you would like to receive information about your diagnosis and treatment plan.  I am here to help.     Support Services/Resource Information  [facility] we offer a full scope of support services.  I can assist you with referral information to:  · Cancer Clinical Trials & Research  · Nutrition counseling  · Support groups  · Complementary Therapies such as Healing Touch and Mind-Body Techniques Meditation  · Patient Financial Advocates  ·   · [facility], an [employer] affiliate office, our volunteers can assist you with accessing our latakooing library, support services information, head coverings and comfort items  · Community and national resources, included eligibility based nic assistance and pharmaceutical access  programs, if you are in need of additional information.     [facility] offers services that include:  · Behavioral Health  · Genetic counseling & testing  · Acupuncture  · Lymphedema prevention/treatment program  · Palliative care services.       Our care team includes a Survivorship Nurse Navigator, who meets with you after your treatment is completed to review your survivorship care plan and treatment summary.      I hope you have an excellent patient experience.  Please feel free to share with me your comments regarding the care you have received- we value your feedback.    Sincerely,     Alexandr Abrams, MPH, RN, OCN  Cancer Nurse Navigator    Office: [phone]  Direct line: [phone]   Email:  [email or web address]

## 2018-11-20 NOTE — PROGRESS NOTES
ONN telephone outreach attempt. No answer, left message to call back. Mailed introduction letter, pt resource guide and information on programs and support services.

## 2018-11-27 ENCOUNTER — OUTPATIENT INFUSION SERVICES (OUTPATIENT)
Dept: ONCOLOGY | Facility: MEDICAL CENTER | Age: 53
End: 2018-11-27
Attending: INTERNAL MEDICINE
Payer: COMMERCIAL

## 2018-11-27 ENCOUNTER — HOSPITAL ENCOUNTER (OUTPATIENT)
Dept: LAB | Facility: MEDICAL CENTER | Age: 53
End: 2018-11-27
Attending: NURSE PRACTITIONER
Payer: COMMERCIAL

## 2018-11-27 VITALS
DIASTOLIC BLOOD PRESSURE: 87 MMHG | OXYGEN SATURATION: 95 % | WEIGHT: 224.87 LBS | TEMPERATURE: 98.7 F | BODY MASS INDEX: 44.15 KG/M2 | HEART RATE: 84 BPM | RESPIRATION RATE: 18 BRPM | SYSTOLIC BLOOD PRESSURE: 146 MMHG | HEIGHT: 60 IN

## 2018-11-27 DIAGNOSIS — C50.912 LEFT BREAST CANCER WITH T3 TUMOR, >5 CM IN GREATEST DIMENSION (HCC): ICD-10-CM

## 2018-11-27 LAB
ALBUMIN SERPL BCP-MCNC: 3.7 G/DL (ref 3.2–4.9)
ALBUMIN/GLOB SERPL: 1.7 G/DL
ALP SERPL-CCNC: 66 U/L (ref 30–99)
ALT SERPL-CCNC: 22 U/L (ref 2–50)
ANION GAP SERPL CALC-SCNC: 10 MMOL/L (ref 0–11.9)
ANISOCYTOSIS BLD QL SMEAR: ABNORMAL
AST SERPL-CCNC: 19 U/L (ref 12–45)
BASOPHILS # BLD AUTO: 0 % (ref 0–1.8)
BASOPHILS # BLD: 0 K/UL (ref 0–0.12)
BILIRUB SERPL-MCNC: 0.3 MG/DL (ref 0.1–1.5)
BUN SERPL-MCNC: 12 MG/DL (ref 8–22)
CALCIUM SERPL-MCNC: 8.9 MG/DL (ref 8.5–10.5)
CHLORIDE SERPL-SCNC: 109 MMOL/L (ref 96–112)
CO2 SERPL-SCNC: 26 MMOL/L (ref 20–33)
CREAT SERPL-MCNC: 0.77 MG/DL (ref 0.5–1.4)
EOSINOPHIL # BLD AUTO: 0 K/UL (ref 0–0.51)
EOSINOPHIL NFR BLD: 0 % (ref 0–6.9)
ERYTHROCYTE [DISTWIDTH] IN BLOOD BY AUTOMATED COUNT: 56.7 FL (ref 35.9–50)
GLOBULIN SER CALC-MCNC: 2.2 G/DL (ref 1.9–3.5)
GLUCOSE SERPL-MCNC: 93 MG/DL (ref 65–99)
HCT VFR BLD AUTO: 31.5 % (ref 37–47)
HGB BLD-MCNC: 9.9 G/DL (ref 12–16)
LYMPHOCYTES # BLD AUTO: 0.26 K/UL (ref 1–4.8)
LYMPHOCYTES NFR BLD: 6 % (ref 22–41)
MANUAL DIFF BLD: NORMAL
MCH RBC QN AUTO: 29.3 PG (ref 27–33)
MCHC RBC AUTO-ENTMCNC: 31.4 G/DL (ref 33.6–35)
MCV RBC AUTO: 93.2 FL (ref 81.4–97.8)
METAMYELOCYTES NFR BLD MANUAL: 2 %
MONOCYTES # BLD AUTO: 0.26 K/UL (ref 0–0.85)
MONOCYTES NFR BLD AUTO: 6 % (ref 0–13.4)
MORPHOLOGY BLD-IMP: NORMAL
NEUTROPHILS # BLD AUTO: 3.78 K/UL (ref 2–7.15)
NEUTROPHILS NFR BLD: 83 % (ref 44–72)
NEUTS BAND NFR BLD MANUAL: 3 % (ref 0–10)
NRBC # BLD AUTO: 0.03 K/UL
NRBC BLD-RTO: 0.7 /100 WBC
PLATELET # BLD AUTO: 79 K/UL (ref 164–446)
PLATELET BLD QL SMEAR: NORMAL
PMV BLD AUTO: 10.8 FL (ref 9–12.9)
POLYCHROMASIA BLD QL SMEAR: NORMAL
POTASSIUM SERPL-SCNC: 3.4 MMOL/L (ref 3.6–5.5)
PROT SERPL-MCNC: 5.9 G/DL (ref 6–8.2)
RBC # BLD AUTO: 3.38 M/UL (ref 4.2–5.4)
RBC BLD AUTO: PRESENT
SODIUM SERPL-SCNC: 145 MMOL/L (ref 135–145)
WBC # BLD AUTO: 4.4 K/UL (ref 4.8–10.8)

## 2018-11-27 PROCEDURE — 36415 COLL VENOUS BLD VENIPUNCTURE: CPT

## 2018-11-27 PROCEDURE — 85007 BL SMEAR W/DIFF WBC COUNT: CPT

## 2018-11-27 PROCEDURE — 85027 COMPLETE CBC AUTOMATED: CPT

## 2018-11-27 PROCEDURE — 80053 COMPREHEN METABOLIC PANEL: CPT

## 2018-11-27 PROCEDURE — 99211 OFF/OP EST MAY X REQ PHY/QHP: CPT

## 2018-11-27 ASSESSMENT — PAIN SCALES - GENERAL: PAINLEVEL_OUTOF10: 0

## 2018-11-28 NOTE — PROGRESS NOTES
Patient arrived for PICC dressing change; reports no changes or concerns.  PICC line flushed and patent, claves changed.  Dressing removed and changed in sterile field.  Confirmed Friday's appt time; pt reports going to get labs drawn today.  Discharged home in good spirits under no apparent distress.

## 2018-11-29 ENCOUNTER — OFFICE VISIT (OUTPATIENT)
Dept: HEMATOLOGY ONCOLOGY | Facility: MEDICAL CENTER | Age: 53
End: 2018-11-29
Payer: COMMERCIAL

## 2018-11-29 VITALS
BODY MASS INDEX: 44.34 KG/M2 | OXYGEN SATURATION: 95 % | RESPIRATION RATE: 16 BRPM | HEIGHT: 60 IN | WEIGHT: 225.86 LBS | HEART RATE: 86 BPM | SYSTOLIC BLOOD PRESSURE: 102 MMHG | DIASTOLIC BLOOD PRESSURE: 80 MMHG | TEMPERATURE: 97.5 F

## 2018-11-29 DIAGNOSIS — Z51.11 ENCOUNTER FOR ANTINEOPLASTIC CHEMOTHERAPY: ICD-10-CM

## 2018-11-29 DIAGNOSIS — C50.912 LEFT BREAST CANCER WITH T3 TUMOR, >5 CM IN GREATEST DIMENSION (HCC): ICD-10-CM

## 2018-11-29 PROCEDURE — 99214 OFFICE O/P EST MOD 30 MIN: CPT | Performed by: INTERNAL MEDICINE

## 2018-11-29 RX ORDER — 0.9 % SODIUM CHLORIDE 0.9 %
5 VIAL (ML) INJECTION PRN
Status: CANCELLED | OUTPATIENT
Start: 2018-11-30

## 2018-11-29 RX ORDER — 0.9 % SODIUM CHLORIDE 0.9 %
VIAL (ML) INJECTION PRN
Status: CANCELLED | OUTPATIENT
Start: 2018-11-29

## 2018-11-29 RX ORDER — ONDANSETRON 2 MG/ML
4 INJECTION INTRAMUSCULAR; INTRAVENOUS
Status: CANCELLED | OUTPATIENT
Start: 2018-11-30

## 2018-11-29 RX ORDER — SODIUM CHLORIDE 9 MG/ML
INJECTION, SOLUTION INTRAVENOUS CONTINUOUS
Status: CANCELLED | OUTPATIENT
Start: 2018-11-30

## 2018-11-29 RX ORDER — 0.9 % SODIUM CHLORIDE 0.9 %
VIAL (ML) INJECTION PRN
Status: CANCELLED | OUTPATIENT
Start: 2018-11-30

## 2018-11-29 RX ORDER — 0.9 % SODIUM CHLORIDE 0.9 %
5 VIAL (ML) INJECTION PRN
Status: CANCELLED | OUTPATIENT
Start: 2018-11-29

## 2018-11-29 RX ORDER — ONDANSETRON 8 MG/1
8 TABLET, ORALLY DISINTEGRATING ORAL
Status: CANCELLED | OUTPATIENT
Start: 2018-11-30

## 2018-11-29 RX ORDER — PROCHLORPERAZINE MALEATE 10 MG
10 TABLET ORAL EVERY 6 HOURS PRN
Status: CANCELLED | OUTPATIENT
Start: 2018-11-30

## 2018-11-29 ASSESSMENT — PAIN SCALES - GENERAL: PAINLEVEL: NO PAIN

## 2018-11-29 NOTE — PROGRESS NOTES
Date of visit: 11/29/2018  11:25 AM  Chief Complaint- Locally advanced breast Ca , ER/AR positive Her2 kamala negative.       Identification/Prior relevant history:      -Palpable left breast mass since 4/2018. Also noticed left axillary mass  -Outside mammogram/ultrasound - large irregular mass in the 6:00 position of the left breast along with a large left axillary lymph node  -Ultrasound biopsy high-grade invasive ductal carcinoma, poorly differentiated. A year. Positive and HER-2/kamala negative. Ki- 67 -60%.  She is G0, P0. No hormone replacement therapy. No significant family history of breast carcinoma that she knows of.   - 9/17/18 - PET scan  1. Hypermetabolic left breast mass, consistent with malignancy.  2. Hypermetabolic left axillary and prepectoral lymphadenopathy, consistent with karlie metastases.  3. Asymmetric hypermetabolism in the right tongue base/right lingual tonsil. While it may represent salivary gland secretion pooling, another primary malignancy is also a consideration.   - 10/18/2018 - Genetic Testing       - No clinically significant variants for BRCA1/2 Detected    -She is tolerating the treatment reasonably well except for anticipated fatigue transient mucositis.  She has some discomfort involving her hands which has improved.  Her labs from yesterday showed thrombocytopenia and anemia, she is going to get those redrawn tomorrow and we will continue to follow up. Her anemia could be contributing to her worsening fatigue, so that needs to be checked regularly.  Breast and axillary masses have decreased in size.       Past Medical History:      Past Medical History   No past medical history on file.      Past surgical history:       Past Surgical History   No past surgical history on file.      Allergies:       Patient has no known allergies.     Medications:         Current Medications          Current Outpatient Prescriptions   Medication Sig Dispense Refill   • maalox plus-benadryl-visc  lidocaine (MAGIC MOUTHWASH) Take 5 mL by mouth every 6 hours as needed. 1:1:1 ratio 250 mL 3   • albuterol 108 (90 Base) MCG/ACT Aero Soln inhalation aerosol Inhale 2 Puffs by mouth every 6 hours as needed for Shortness of Breath. 8.5 g 0   • therapeutic multivitamin-minerals (THERAGRAN-M) Tab Take 1 Tab by mouth every day.       • ondansetron (ZOFRAN) 4 MG Tab tablet Take 1 Tab by mouth every four hours as needed for Nausea/Vomiting (for nausea, vomiting). 30 Tab 6   • prochlorperazine (COMPAZINE) 10 MG Tab Take 1 Tab by mouth every 6 hours as needed (for nausea, vomiting). 30 Tab 6      No current facility-administered medications for this visit.                Social History:     Social History   Social History            Social History   • Marital status: Single       Spouse name: N/A   • Number of children: N/A   • Years of education: N/A          Occupational History   • Not on file.           Social History Main Topics   • Smoking status: Never Smoker   • Smokeless tobacco: Never Used   • Alcohol use Not on file   • Drug use: Unknown   • Sexual activity: Not on file           Other Topics Concern   • Not on file          Social History Narrative   • No narrative on file            Family History:      Family History   No family history on file.      Review of Systems:  All other review of systems are negative except what was mentioned above in the HPI.     Constitutional: Negative for fever, chills, weight loss. Progressively worsening fatigue.    HEENT: No new auditory or visual complaints. No sore throat and neck pain. Constantly has a tickle in her throat, but no pain.  Respiratory: Negative for sputum production, shortness of breath and wheezing. Dry cough is present.  Cardiovascular: Negative for chest pain, palpitations, orthopnea and leg swelling.    Gastrointestinal: Negative for heartburn, nausea, vomiting and abdominal pain.    Genitourinary: Negative for dysuria, hematuria    Musculoskeletal: No  "new arthralgias or myalgias   Skin: Negative for rash and itching. Hands are dry and cracking.  Neurological: Negative for focal weakness and headaches.    Endo/Heme/Allergies: No abnormal bleed/bruise.    Psychiatric/Behavioral: No new depression/anxiety.     Physical Exam:  Vitals: /80 (BP Location: Right arm, Patient Position: Sitting, BP Cuff Size: Adult)   Pulse 86   Temp 36.4 °C (97.5 °F) (Temporal)   Resp 16   Ht 1.53 m (5' 0.24\")   Wt 102.4 kg (225 lb 13.8 oz)   SpO2 95%   BMI 43.76 kg/m²      General: Not in acute distress, alert and oriented x 3  HEENT: No conjunctival pallor, icterus. Oropharynx clear.   Neck: Supple, no palpable masses.  Lymph nodes: No palpable cervical or supraclavicular lymphadenopathy.    CVS: Regular rate and rhythm, no rubs or gallops  RESP: Clear to auscultate bilaterally, no wheezing or crackles. Diminished breath sounds throughout all lung fields b/l.  ABD: Soft, non tender, non distended, positive bowel sounds, no palpable organomegaly  EXT: No edema or cyanosis  CNS: Alert and oriented x3, No focal deficits.  Skin- No rash   Breast-significant improvement in size of the large breast/axillary mass which is much more indistinct     Labs:           Hospital Outpatient Visit on 11/27/2018   Component Date Value Ref Range Status   • WBC 11/27/2018 4.4* 4.8 - 10.8 K/uL Final   • RBC 11/27/2018 3.38* 4.20 - 5.40 M/uL Final   • Hemoglobin 11/27/2018 9.9* 12.0 - 16.0 g/dL Final   • Hematocrit 11/27/2018 31.5* 37.0 - 47.0 % Final   • MCV 11/27/2018 93.2  81.4 - 97.8 fL Final   • MCH 11/27/2018 29.3  27.0 - 33.0 pg Final   • MCHC 11/27/2018 31.4* 33.6 - 35.0 g/dL Final   • RDW 11/27/2018 56.7* 35.9 - 50.0 fL Final   • Platelet Count 11/27/2018 79* 164 - 446 K/uL Final   • MPV 11/27/2018 10.8  9.0 - 12.9 fL Final   • Neutrophils-Polys 11/27/2018 83.00* 44.00 - 72.00 % Final   • Lymphocytes 11/27/2018 6.00* 22.00 - 41.00 % Final   • Monocytes 11/27/2018 6.00  0.00 - 13.40 % " Final   • Eosinophils 11/27/2018 0.00  0.00 - 6.90 % Final   • Basophils 11/27/2018 0.00  0.00 - 1.80 % Final   • Nucleated RBC 11/27/2018 0.70  /100 WBC Final   • Neutrophils (Absolute) 11/27/2018 3.78  2.00 - 7.15 K/uL Final     Includes immature neutrophils, if present.   • Lymphs (Absolute) 11/27/2018 0.26* 1.00 - 4.80 K/uL Final   • Monos (Absolute) 11/27/2018 0.26  0.00 - 0.85 K/uL Final   • Eos (Absolute) 11/27/2018 0.00  0.00 - 0.51 K/uL Final   • Baso (Absolute) 11/27/2018 0.00  0.00 - 0.12 K/uL Final   • NRBC (Absolute) 11/27/2018 0.03  K/uL Final   • Anisocytosis 11/27/2018 1+    Final   • Sodium 11/27/2018 145  135 - 145 mmol/L Final   • Potassium 11/27/2018 3.4* 3.6 - 5.5 mmol/L Final   • Chloride 11/27/2018 109  96 - 112 mmol/L Final   • Co2 11/27/2018 26  20 - 33 mmol/L Final   • Anion Gap 11/27/2018 10.0  0.0 - 11.9 Final   • Glucose 11/27/2018 93  65 - 99 mg/dL Final   • Bun 11/27/2018 12  8 - 22 mg/dL Final   • Creatinine 11/27/2018 0.77  0.50 - 1.40 mg/dL Final   • Calcium 11/27/2018 8.9  8.5 - 10.5 mg/dL Final   • AST(SGOT) 11/27/2018 19  12 - 45 U/L Final   • ALT(SGPT) 11/27/2018 22  2 - 50 U/L Final   • Alkaline Phosphatase 11/27/2018 66  30 - 99 U/L Final   • Total Bilirubin 11/27/2018 0.3  0.1 - 1.5 mg/dL Final   • Albumin 11/27/2018 3.7  3.2 - 4.9 g/dL Final   • Total Protein 11/27/2018 5.9* 6.0 - 8.2 g/dL Final   • Globulin 11/27/2018 2.2  1.9 - 3.5 g/dL Final   • A-G Ratio 11/27/2018 1.7  g/dL Final   • Bands-Stabs 11/27/2018 3.00  0.00 - 10.00 % Final   • Metamyelocytes 11/27/2018 2.00  % Final   • Manual Diff Status 11/27/2018 PERFORMED    Final   • Peripheral Smear Review 11/27/2018 see below    Final     Comment: Due to instrument suspect flags, further review of peripheral smear is  indicated on this patient sample. This review may or may not result in  abnormal findings.      • Plt Estimation 11/27/2018 Decreased    Final   • RBC Morphology 11/27/2018 Present    Final   • Polychromia  11/27/2018 1+    Final   • GFR If  11/27/2018 >60  >60 mL/min/1.73 m 2 Final   • GFR If Non  11/27/2018 >60  >60 mL/min/1.73 m 2 Final                  Assessment and Plan:  Locally davanced breast Ca , ER/[place] positive Her2 kamala negative, poorly differentiated, high-grade with high Ki-67. She appears to have aggressive pathology, probably Luminal B type. PET scan did not reveal distant metastatic disease, she did have bulky axillary and prepatellar adenopathy.     , Tolerating dose dense chemotherapy well. Breast masses improved in size. We will continue the regimen and she will start dose dense Taxol.  Cautioned her about the potential for worsening of myalgias with combination of paclitaxel and Neulasta.  She did complain of discomfort involving her hands.  Does not appear to be typical peripheral neuropathy.  Will monitor this for now.    Return to clinic in 2 weeks       She agreed and verbalized  agreement and understanding with the current plan. I answered all questions and concerns at this time       Please note that this dictation was created using voice recognition software. I have made every reasonable attempt to correct obvious errors, but I expect that there are errors of grammar and possibly content that I did not discover before finalizing the note.        SIGNATURES:  Jitendra Linder           SIGNATURES:  Jitendra Linder    CC:  Noam Caldwell M.D.  No ref. provider found

## 2018-11-30 ENCOUNTER — PATIENT OUTREACH (OUTPATIENT)
Dept: OTHER | Facility: MEDICAL CENTER | Age: 53
End: 2018-11-30

## 2018-11-30 ENCOUNTER — OUTPATIENT INFUSION SERVICES (OUTPATIENT)
Dept: ONCOLOGY | Facility: MEDICAL CENTER | Age: 53
End: 2018-11-30
Attending: INTERNAL MEDICINE
Payer: COMMERCIAL

## 2018-11-30 VITALS
DIASTOLIC BLOOD PRESSURE: 80 MMHG | HEIGHT: 60 IN | TEMPERATURE: 97.5 F | OXYGEN SATURATION: 99 % | RESPIRATION RATE: 18 BRPM | HEART RATE: 90 BPM | WEIGHT: 228.4 LBS | BODY MASS INDEX: 44.84 KG/M2 | SYSTOLIC BLOOD PRESSURE: 134 MMHG

## 2018-11-30 DIAGNOSIS — C50.912 LEFT BREAST CANCER WITH T3 TUMOR, >5 CM IN GREATEST DIMENSION (HCC): ICD-10-CM

## 2018-11-30 DIAGNOSIS — C50.912 ER+ PR+ CARCINOMA OF BREAST, LEFT (HCC): ICD-10-CM

## 2018-11-30 DIAGNOSIS — Z17.0 ER+ PR+ CARCINOMA OF BREAST, LEFT (HCC): ICD-10-CM

## 2018-11-30 DIAGNOSIS — C50.919 HER2 (HUMAN EPIDERMAL GROWTH FACTOR RECEPTOR 2) NEGATIVE CARCINOMA OF BREAST (HCC): ICD-10-CM

## 2018-11-30 LAB
ANISOCYTOSIS BLD QL SMEAR: ABNORMAL
BASOPHILS # BLD AUTO: 0 % (ref 0–1.8)
BASOPHILS # BLD: 0 K/UL (ref 0–0.12)
EOSINOPHIL # BLD AUTO: 0 K/UL (ref 0–0.51)
EOSINOPHIL NFR BLD: 0 % (ref 0–6.9)
ERYTHROCYTE [DISTWIDTH] IN BLOOD BY AUTOMATED COUNT: 59.7 FL (ref 35.9–50)
HCT VFR BLD AUTO: 32.2 % (ref 37–47)
HGB BLD-MCNC: 10.4 G/DL (ref 12–16)
LYMPHOCYTES # BLD AUTO: 0.51 K/UL (ref 1–4.8)
LYMPHOCYTES NFR BLD: 9.6 % (ref 22–41)
MANUAL DIFF BLD: NORMAL
MCH RBC QN AUTO: 30 PG (ref 27–33)
MCHC RBC AUTO-ENTMCNC: 32.3 G/DL (ref 33.6–35)
MCV RBC AUTO: 92.8 FL (ref 81.4–97.8)
MICROCYTES BLD QL SMEAR: ABNORMAL
MONOCYTES # BLD AUTO: 0.51 K/UL (ref 0–0.85)
MONOCYTES NFR BLD AUTO: 9.6 % (ref 0–13.4)
MORPHOLOGY BLD-IMP: NORMAL
MYELOCYTES NFR BLD MANUAL: 2.6 %
NEUTROPHILS # BLD AUTO: 4.14 K/UL (ref 2–7.15)
NEUTROPHILS NFR BLD: 76.5 % (ref 44–72)
NEUTS BAND NFR BLD MANUAL: 1.7 % (ref 0–10)
NRBC # BLD AUTO: 0 K/UL
NRBC BLD-RTO: 0 /100 WBC
PLATELET # BLD AUTO: 116 K/UL (ref 164–446)
PLATELET BLD QL SMEAR: NORMAL
PMV BLD AUTO: 10.4 FL (ref 9–12.9)
RBC # BLD AUTO: 3.47 M/UL (ref 4.2–5.4)
RBC BLD AUTO: PRESENT
WBC # BLD AUTO: 5.3 K/UL (ref 4.8–10.8)

## 2018-11-30 PROCEDURE — 96377 APPLICATON ON-BODY INJECTOR: CPT | Mod: XU

## 2018-11-30 PROCEDURE — 96413 CHEMO IV INFUSION 1 HR: CPT

## 2018-11-30 PROCEDURE — 85027 COMPLETE CBC AUTOMATED: CPT

## 2018-11-30 PROCEDURE — 96415 CHEMO IV INFUSION ADDL HR: CPT

## 2018-11-30 PROCEDURE — 700105 HCHG RX REV CODE 258: Performed by: INTERNAL MEDICINE

## 2018-11-30 PROCEDURE — 304540 HCHG NITRO SET VENT 2ND TUB

## 2018-11-30 PROCEDURE — 85007 BL SMEAR W/DIFF WBC COUNT: CPT

## 2018-11-30 PROCEDURE — 700111 HCHG RX REV CODE 636 W/ 250 OVERRIDE (IP): Performed by: INTERNAL MEDICINE

## 2018-11-30 PROCEDURE — 700102 HCHG RX REV CODE 250 W/ 637 OVERRIDE(OP): Performed by: INTERNAL MEDICINE

## 2018-11-30 PROCEDURE — 96375 TX/PRO/DX INJ NEW DRUG ADDON: CPT

## 2018-11-30 PROCEDURE — A9270 NON-COVERED ITEM OR SERVICE: HCPCS | Performed by: INTERNAL MEDICINE

## 2018-11-30 RX ORDER — SODIUM CHLORIDE 9 MG/ML
INJECTION, SOLUTION INTRAVENOUS CONTINUOUS
Status: DISCONTINUED | OUTPATIENT
Start: 2018-11-30 | End: 2018-11-30 | Stop reason: HOSPADM

## 2018-11-30 RX ORDER — POTASSIUM CHLORIDE 20 MEQ/1
40 TABLET, EXTENDED RELEASE ORAL ONCE
Status: COMPLETED | OUTPATIENT
Start: 2018-11-30 | End: 2018-11-30

## 2018-11-30 RX ADMIN — SODIUM CHLORIDE: 9 INJECTION, SOLUTION INTRAVENOUS at 10:56

## 2018-11-30 RX ADMIN — DEXAMETHASONE SODIUM PHOSPHATE 12 MG: 4 INJECTION, SOLUTION INTRAMUSCULAR; INTRAVENOUS at 11:20

## 2018-11-30 RX ADMIN — PACLITAXEL 367.5 MG: 300 INJECTION, SOLUTION INTRAVENOUS at 11:48

## 2018-11-30 RX ADMIN — DIPHENHYDRAMINE HYDROCHLORIDE 25 MG: 50 INJECTION INTRAMUSCULAR; INTRAVENOUS at 11:02

## 2018-11-30 RX ADMIN — FAMOTIDINE 20 MG: 10 INJECTION INTRAVENOUS at 10:56

## 2018-11-30 RX ADMIN — PEGFILGRASTIM 6 MG: KIT SUBCUTANEOUS at 16:10

## 2018-11-30 RX ADMIN — POTASSIUM CHLORIDE 40 MEQ: 1500 TABLET, EXTENDED RELEASE ORAL at 13:35

## 2018-11-30 ASSESSMENT — PAIN SCALES - GENERAL: PAINLEVEL_OUTOF10: 0

## 2018-11-30 NOTE — PROGRESS NOTES
Chemotherapy Verification - SECONDARY RN       Height = 153 cm  Weight = 103.6  BSA = 2.09 m2       Medication: Taxol  Dose: 175 mg/m2  Calculated Dose: 365.75 mg                             (In mg/m2, AUC, mg/kg)       I confirm that this process was performed independently.

## 2018-11-30 NOTE — PROGRESS NOTES
"Pharmacy Chemotherapy Verification Note:    Patient Name: Addie Orellana      Dx: Locally Advanced Breast CA (ER/ME+, HER2-)         Protocol: Dose Dense AC --> Dose Dense Taxol  DOXOrubicin 60 mg/m² slow IVP on Day 1  --11/16/18 C4 Dose reduced 10% for neuropathy per MD  Cyclophosphamide 600 mg/m² IV over 30 minutes on Day 1  --11/16/18 C4 Dose reduced 10% for neuropathy per MD  Every 2 weeks x4 cycles followed by (Completed 11/16/18)  PACLItaxel 175 mg/m² IV over 3 hours on Day 1  Every 2 weeks x4 cycles    NCCN Guidelines for Breast Cancer. V.1.2018.  Renuka ML, et al. J Clin Oncol. 2003;21(8):1431-9.    Allergies:  Patient has no known allergies.     /80   Pulse 90   Temp 36.4 °C (97.5 °F) (Temporal)   Resp 18   Ht 1.53 m (5' 0.24\")   Wt 103.6 kg (228 lb 6.3 oz)   SpO2 99%   BMI 44.26 kg/m²  Body surface area is 2.1 meters squared.    Labs 11/30/18  ANC~ 4140 Plt = 116k   Hgb = 10.4       Labs 11/27/18    SCr = 0.77 mg/dL CrCl ~ >125 mL/min   LFT's = WNLs  TBili = 0.3     9/25/18 ECHO: LVEF 53% (paper copy in chart)     Drug Order   (Drug name, dose, route, IV Fluid & volume, frequency, number of doses) Cycle: 1 (DD Taxol cycle)      Previous treatment: 4 cycles of DDAC last treatment 11/16/18     Medication = Paclitaxel  Base Dose = 175 mg/m2  Calc Dose: Base Dose x 2.1 m² = 367.5 mg  Final Dose = 367.5 mg  Route = IV  Fluid & Volume =  mL  Admin Duration = Over 3 hours          <5% difference, ok to treat with final written dose     By my signature below, I confirm this process was performed independently with the BSA and all final chemotherapy dosing calculations congruent. I have reviewed the above chemotherapy order and that my calculation of the final dose and BSA (when applicable) corroborate those calculations of the  pharmacist.     Lucio Stone, PharmD      "

## 2018-11-30 NOTE — PROGRESS NOTES
"DX: Left breast invasive ductal carcinoma   POC: pt currently receiving dose dense AC+T   BARRIERS ASSESSMENT:   TRANSPORTATION: pt denied barrier   FINANCIAL: pt not currently employed outside the home, had question r/t bill received.   INSURANCE: [employer]    SUPPORT SYSTEM: Her mother who lives with her is her biggest support person. Her oldest son, age 19. No close friends per pt. Per pt: \"I have lots of support\".   PSYCHOLOGICAL: pt stated her biggest worry/source of distress is the stability of her foster family. Her biggest source of distress this week has been a foster care home audit. She stated one child, her 12 year old daughter, was the only child she noted to be anxious regarding her diagnosis and treatment because \" she thought cancer means death\". Pt states the daughter \"was worried but she is ok now.\"  COMMUNICATION: no barrier noted    FAMILY CARE: Pt cares for a total of 9 children aged from infant to 19 years old. Her mother, who lives with her, does much of the care. She and her mother share food preparation duties.   Food insecurity assessment:   Within the past 12 months were you ever worried that your food would run out before you got money to buy more? no  Within the past 12 months have you ever run out of food and not had money to buy more?no  INTERVENTION:  Visit with pt while in infusion today. Introduced self and role of oncology nurse navigator. Support & services offered.  Discussed nutrition, pt states she has lost a little bit of weight. Her main concern is fatigue. She states she gets rest time at home and that her older children and other relatives help with outside chores. She states the foster care program provides house cleaning services one weekly.  Provided NCI booklet \"Taking Time\". Pt states she received the mailed [employer] patient resource guide, healing touch flyer, Mind-body techniques flyer, Music Therapy program, support group calendar. Pt states she has started to read " through it and will review the information and let me know if interested in any of the programs. Encouraged pt to visit cancer resource [place] for information and support.      Provided with ONN contact information and encouraged to call with needs or questions.  Will continue to follow as needed.

## 2018-11-30 NOTE — PROGRESS NOTES
Chemotherapy Verification - PRIMARY RN      Height = 60.24 in  Weight = 103.6 kg  BSA = 2.1 m2       Medication: Paclitaxel  Dose: 175 mg/m2  Calculated Dose: 367.5 mg                             (In mg/m2, AUC, mg/kg)     I confirm this process was performed independently with the BSA and all final chemotherapy dosing calculations congruent.  Any discrepancies of 5% or greater have been addressed with the chemotherapy pharmacist. The resolution of the discrepancy has been documented in the EPIC progress notes.

## 2018-11-30 NOTE — PROGRESS NOTES
"Pharmacy Chemotherapy Note    Patient Name: SISSY JUNE    Cycle 1 (DDPaclitaxel)  Previous treatment = 4 cycles DDAC, last 11/16/2018    Protocol: Dose Dense AC followed by Dose Dense paclitaxel       Doxorubicin 60 mg/m2 IV on day 1   11/16/18 Dose reduced by 10% to 54 mg/m2 per Dr. Linder d/t fatigue and peripheral neuropathy  Cyclophosphamide 600 mg/m2 IV over 30 min on day 1   11/16/18 Dose reduced by 10% to 540 mg/m2 per Dr. Linder dWilliamt fatigue and peripheral neuropathy  14 day cycle for 4 cycles    Followed by    Paclitaxel 175 mg/m2 IV over 3 hours on day 1    14 day cycle for 14 cycles    *Dosing Reference*  NCCN Guidelines for breast cancer V.1.2018  Renuka ML, et al. J Clin Oncol. 2003:21(8):1431-9    Dx: Breast Cancer         /80   Pulse 90   Temp 36.4 °C (97.5 °F) (Temporal)   Resp 18   Ht 1.53 m (5' 0.24\")   Wt 103.6 kg (228 lb 6.3 oz)   SpO2 99%   BMI 44.26 kg/m²  Body surface area is 2.1 meters squared.    LABS 11/30/18  ANC~ 4140 Plt = 116k   Hgb = 10.4       LABS 11/27/18  SCr = 0.77 mg/dL CrCl ~ >125 mL/min (minimum SCr of 0.7)  LFT's = WNLs  TBili = 0.3    ECHO:  9/25/2018 LVEF: 55% (paper)      Paclitaxel 175 mg/m2 x Body surface area is 2.1 meters squared. m2 = 367.5 mg   <5% difference, OK to treat with final dose = 367.5 mg IV     ONPRO    JERSEY Topol, PharmD  "

## 2018-12-01 NOTE — PROGRESS NOTES
Returns for chemotherapy.  Reports primary sx is fatigue.  Has 6 foster children and 3 of her own.  Nurse navigator here as well as dietician to see. Started Taxol today and reviewed differences and possible side effects.  Premeds and Taxol infused via PICC ramped without issue or rx.  OnPro placed as well at dose end without problems.  PICC saline flushed post.  DC to self care.

## 2018-12-03 ENCOUNTER — DOCUMENTATION (OUTPATIENT)
Dept: HEMATOLOGY ONCOLOGY | Facility: MEDICAL CENTER | Age: 53
End: 2018-12-03

## 2018-12-03 ENCOUNTER — TELEPHONE (OUTPATIENT)
Dept: HEMATOLOGY ONCOLOGY | Facility: MEDICAL CENTER | Age: 53
End: 2018-12-03

## 2018-12-03 NOTE — TELEPHONE ENCOUNTER
"Patient is experience throbbing in her hands, knees and feet. Pain level is approximately an 8 out of 10. She received chemotherapy on Friday. She states Tylenol is \"not cutting it.\" She is requesting a prescription for pain medication if possible. I informed her I will relay the information to Zenia PACKER as Dr. Linder is out of the office. Patient agreed.   "

## 2018-12-03 NOTE — TELEPHONE ENCOUNTER
"Patient returned phone call regarding message from earlier today, patient stated \"I'm still in a lot of pain and would really like a call back or a prescription for my pain.\" I let patient know I will relay this message to BIRDIE Casiano. Patient agreed.   "

## 2018-12-03 NOTE — PROGRESS NOTES
Late Entry from 11/30 encounter with patient in [facility]:     Nutrition Services: Update  Patient's weight per stand up scale in [facility] = 228 lbs (% wt change x1 month = 2.1; insignificant loss, per guidelines).  Pt's weight is subject to fluctuation likely r/t fluid status.  -pt states healthy appetite: she consumes small, frequent meals (nuts, dried fruit, cheese, fresh fruit with skins, HB eggs, chicken, fish)  -pt denies N/V, constipation, diarrhea and abdominal pain after eating meals and snacks, she reports this date.   -pt appears pale and fatigued likely r/t chemotherapy treatment vs malnutrition status.   -last labs reviewed 11/27     RD will continue to follow patient during chemotherapy treatment.

## 2018-12-04 NOTE — TELEPHONE ENCOUNTER
RN called patient to follow up on her c/o  throbbing in her hands, knees and feet. Patient states since she started the ibuprofen it has made huge difference. States the pain is gone and only has tingling left at times in her hands. Patient thanked RN for f/u call at this time and denies any other issues

## 2018-12-04 NOTE — TELEPHONE ENCOUNTER
RN called patient in regards to symptoms. Patient did not answer phone and voicemail left with request to call back

## 2018-12-05 ENCOUNTER — OUTPATIENT INFUSION SERVICES (OUTPATIENT)
Dept: ONCOLOGY | Facility: MEDICAL CENTER | Age: 53
End: 2018-12-05
Attending: INTERNAL MEDICINE
Payer: MEDICAID

## 2018-12-05 VITALS
WEIGHT: 225.31 LBS | HEART RATE: 100 BPM | SYSTOLIC BLOOD PRESSURE: 133 MMHG | TEMPERATURE: 98.9 F | HEIGHT: 60 IN | DIASTOLIC BLOOD PRESSURE: 85 MMHG | BODY MASS INDEX: 44.23 KG/M2 | OXYGEN SATURATION: 96 % | RESPIRATION RATE: 18 BRPM

## 2018-12-05 DIAGNOSIS — C50.912 LEFT BREAST CANCER WITH T3 TUMOR, >5 CM IN GREATEST DIMENSION (HCC): ICD-10-CM

## 2018-12-05 PROCEDURE — 99211 OFF/OP EST MAY X REQ PHY/QHP: CPT

## 2018-12-05 ASSESSMENT — PAIN SCALES - GENERAL: PAINLEVEL_OUTOF10: 2

## 2018-12-05 NOTE — PROGRESS NOTES
Pt scheduled for PICC care. Arrived ambulatory, independent; endorsed minor pain to hands & feet secondary neuropathy following Taxol; denied s/sx infection or other health changes. Small areas of erythema & irritation noted along border of LC1920 hypoallergenic dressing; pt stated those areas were caused by regular tegaderm dressing. RUE PICC dressing & claves changed per protocol. PICC flushed w/ some resistance at first, brisk blood return in both lumens. Treatment plan reviewed; pt verbalized knowledge of next appt; denied any unmet needs. Pt discharged ambulatory, independent, NAD.

## 2018-12-05 NOTE — TELEPHONE ENCOUNTER
RN called and left message with request to call back to f/u on symptoms and proper administration of the Ibuprofen medication

## 2018-12-08 ENCOUNTER — HOSPITAL ENCOUNTER (EMERGENCY)
Facility: MEDICAL CENTER | Age: 53
End: 2018-12-08
Attending: EMERGENCY MEDICINE
Payer: MEDICAID

## 2018-12-08 VITALS
TEMPERATURE: 97.7 F | DIASTOLIC BLOOD PRESSURE: 91 MMHG | SYSTOLIC BLOOD PRESSURE: 149 MMHG | WEIGHT: 226.85 LBS | HEIGHT: 60 IN | OXYGEN SATURATION: 94 % | RESPIRATION RATE: 17 BRPM | BODY MASS INDEX: 44.54 KG/M2 | HEART RATE: 100 BPM

## 2018-12-08 DIAGNOSIS — Z48.01 DRESSING CHANGE OR REMOVAL, SURGICAL WOUND: ICD-10-CM

## 2018-12-08 PROCEDURE — 99282 EMERGENCY DEPT VISIT SF MDM: CPT

## 2018-12-08 ASSESSMENT — LIFESTYLE VARIABLES: DO YOU DRINK ALCOHOL: NO

## 2018-12-08 NOTE — ED NOTES
Pt given discharge instructions/ home care instructions explained, pt verbalized understanding of instructions given, pt ambulatory to ABDULAZIZ bose.

## 2018-12-08 NOTE — ED TRIAGE NOTES
52 y/o female ambulatory to triage requesting a dressing change. Pt states she is receiving chemo through a picc line in her right upper arm. She was told to come in for a dressing change if the site ever became wet, pt states she took a shower this morning and the cover she used failed to keep the area dry. Pt has no complaints.

## 2018-12-08 NOTE — ED PROVIDER NOTES
ED Provider Note    Scribed for Fredis Prater M.D. by Ayesha Lainez. 12/8/2018  1:00 PM    Primary Care Provider: Noam Caldwell M.D.  Means of arrival: walk-in  History limited by: none    CHIEF COMPLAINT  Chief Complaint   Patient presents with   • Dressing Change       HPI  Addie Orellana is a 53 y.o. female who presents to the ED for a dressing change after getting her PICC line dressing wet at home. Patient reports being 2 months into chemotherapy and has a PICC line for her chemotherapy treatment. She reports seeing Dr. Linder for her treatment (oncologist). She denies any other pain.    REVIEW OF SYSTEMS    CONSTITUTIONAL:  Patient is here for dressing change with no other complaints.  RESPIRATORY:  Denies difficulty breathing.  MUSCULOSKELETAL:  PICC line in place in right arm. Reports no other pain.  SKIN:  PICC line in place in right arm.    PAST MEDICAL HISTORY  Past Medical History:   Diagnosis Date   • Breast cancer, stage 3 (HCC)      Receiving chemotherapy through a PICC line right arm      SOCIAL HISTORY   reports that she has never smoked. She has never used smokeless tobacco. She reports that she does not drink alcohol or use drugs.    SURGICAL HISTORY  Past Surgical History:   Procedure Laterality Date   • ANKLE FUSION     • GASTRIC BYPASS LAPAROSCOPIC         CURRENT MEDICATIONS  Current Outpatient Prescriptions on File Prior to Encounter   Medication Sig Dispense Refill   • maalox plus-benadryl-visc lidocaine (MAGIC MOUTHWASH) Take 5 mL by mouth every 6 hours as needed. 1:1:1 ratio 250 mL 3   • albuterol 108 (90 Base) MCG/ACT Aero Soln inhalation aerosol Inhale 2 Puffs by mouth every 6 hours as needed for Shortness of Breath. 8.5 g 0   • therapeutic multivitamin-minerals (THERAGRAN-M) Tab Take 1 Tab by mouth every day.     • ondansetron (ZOFRAN) 4 MG Tab tablet Take 1 Tab by mouth every four hours as needed for Nausea/Vomiting (for nausea, vomiting). 30 Tab 6   • prochlorperazine (COMPAZINE)  10 MG Tab Take 1 Tab by mouth every 6 hours as needed (for nausea, vomiting). 30 Tab 6       ALLERGIES  No Known Allergies    PHYSICAL EXAM  VITAL SIGNS: /91   Pulse 100   Temp 36.5 °C (97.7 °F) (Temporal)   Resp 17   Ht 1.524 m (5')   Wt 102.9 kg (226 lb 13.7 oz)   SpO2 94%   BMI 44.30 kg/m²    Constitutional: Patient is awake and alert. No acute respiratory distress. Well developed, Well nourished, Non-toxic appearance.  HENT: Normocephalic, Atraumatic  Cardiovascular: Heart is regular rate and rhythm no murmur, rub or thrill.   Thorax & Lungs: Chest is symmetrical, with good breath sounds. No wheezing or crackles. No respiratory distress, No chest tenderness.   Skin: Skin at the site of the PICC line shows no redness no inflammation.  It does not appear to be infected.  Musculoskeletal: Right arm has PICC line in place, no redness, waterproof dressing peeling off. Ambulatory. Good range of motion to upper and lower extremities.    Neurologic: Alert & oriented to person, time, and place.      COURSE & MEDICAL DECISION MAKING  Pertinent Labs & Imaging studies reviewed. (See chart for details)    1:00 PM - Patient seen and examined at bedside. New dressing will be placed.    Decision Making  Patient has a PICC line in place for chemotherapy for breast cancer.  She states she is in the shower today and got wet in the dressing started to fail.  She is coming now to have this replaced.  There is no signs of infection or displacement of the decline.  I will have the nurses place a new sterile dressing similar to what she has now.       The patient will return for new or worsening symptoms and is stable at the time of discharge.      DISPOSITION:  Patient will be discharged home in stable condition.    FOLLOW UP:  Noam Caldwell M.D.  [address] [id number] [phone]    In 1 week        FINAL IMPRESSION  1. Dressing change or removal, surgical wound        PLAN  1.  Follow-up with  her primary care doctors within a week.  2.  Continue her dressing management as ordered previously by the physician who placed the PICC line  3. Return to the emergency department for increased pains, fevers, vomiting or change in condition.     IAyesha (Pretty), am scribing for, and in the presence of, Fredis Prater M.D..    Electronically signed by: Ayesha Jamison), 12/8/2018    IFredis M.D. personally performed the services described in this documentation, as scribed by Ayesha Lainez in my presence, and it is both accurate and complete. E.    The note accurately reflects work and decisions made by me.  Fredis Prater  12/8/2018  1:13 PM

## 2018-12-13 ENCOUNTER — OFFICE VISIT (OUTPATIENT)
Dept: HEMATOLOGY ONCOLOGY | Facility: MEDICAL CENTER | Age: 53
End: 2018-12-13
Payer: MEDICAID

## 2018-12-13 VITALS
HEART RATE: 66 BPM | TEMPERATURE: 98.3 F | OXYGEN SATURATION: 94 % | WEIGHT: 229.72 LBS | BODY MASS INDEX: 45.1 KG/M2 | HEIGHT: 60 IN | SYSTOLIC BLOOD PRESSURE: 112 MMHG | RESPIRATION RATE: 16 BRPM | DIASTOLIC BLOOD PRESSURE: 80 MMHG

## 2018-12-13 DIAGNOSIS — C50.912 ER+ PR+ CARCINOMA OF BREAST, LEFT (HCC): ICD-10-CM

## 2018-12-13 DIAGNOSIS — Z17.0 ER+ PR+ CARCINOMA OF BREAST, LEFT (HCC): ICD-10-CM

## 2018-12-13 DIAGNOSIS — Z51.11 ENCOUNTER FOR ANTINEOPLASTIC CHEMOTHERAPY: ICD-10-CM

## 2018-12-13 PROCEDURE — 99214 OFFICE O/P EST MOD 30 MIN: CPT | Performed by: NURSE PRACTITIONER

## 2018-12-13 RX ORDER — 0.9 % SODIUM CHLORIDE 0.9 %
5 VIAL (ML) INJECTION PRN
Status: CANCELLED | OUTPATIENT
Start: 2018-12-13

## 2018-12-13 RX ORDER — ONDANSETRON 2 MG/ML
4 INJECTION INTRAMUSCULAR; INTRAVENOUS
Status: CANCELLED | OUTPATIENT
Start: 2018-12-15

## 2018-12-13 RX ORDER — ONDANSETRON 8 MG/1
8 TABLET, ORALLY DISINTEGRATING ORAL
Status: CANCELLED | OUTPATIENT
Start: 2018-12-15

## 2018-12-13 RX ORDER — PROCHLORPERAZINE MALEATE 10 MG
10 TABLET ORAL EVERY 6 HOURS PRN
Status: CANCELLED | OUTPATIENT
Start: 2018-12-15

## 2018-12-13 RX ORDER — 0.9 % SODIUM CHLORIDE 0.9 %
VIAL (ML) INJECTION PRN
Status: CANCELLED | OUTPATIENT
Start: 2018-12-13

## 2018-12-13 RX ORDER — SODIUM CHLORIDE 9 MG/ML
INJECTION, SOLUTION INTRAVENOUS CONTINUOUS
Status: CANCELLED | OUTPATIENT
Start: 2018-12-15

## 2018-12-13 RX ORDER — 0.9 % SODIUM CHLORIDE 0.9 %
VIAL (ML) INJECTION PRN
Status: CANCELLED | OUTPATIENT
Start: 2018-12-15

## 2018-12-13 RX ORDER — 0.9 % SODIUM CHLORIDE 0.9 %
5 VIAL (ML) INJECTION PRN
Status: CANCELLED | OUTPATIENT
Start: 2018-12-15

## 2018-12-13 ASSESSMENT — ENCOUNTER SYMPTOMS
DIZZINESS: 0
COUGH: 1
INSOMNIA: 0
FEVER: 0
CONSTIPATION: 0
TINGLING: 1
MYALGIAS: 0
CHILLS: 0
WEIGHT LOSS: 0
PALPITATIONS: 0
WHEEZING: 0
VOMITING: 0
DIARRHEA: 0
NAUSEA: 0
SHORTNESS OF BREATH: 0
HEADACHES: 0

## 2018-12-13 ASSESSMENT — PAIN SCALES - GENERAL: PAINLEVEL: NO PAIN

## 2018-12-13 NOTE — PROGRESS NOTES
Subjective:      Addie Orellana is a 53 y.o. female who presents for Follow-Up (PreChemo) evaluation prior to cycle 2 dose dense Taxol for breast cancer.     HPI  Ms. 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  She is unaccompanied for today's visit.    Patient continues to tolerate treatment well.  She experiences mild persistent fatigue.  She did experience mild arthralgia at hands and knees that was self-limiting.  Previously noted tenderness at fingertips and darkening/thickening of fingernails and toenails is slowly improving.  She is otherwise asymptomatic and tolerating dose dense Taxol very well.      No Known Allergies      Current Outpatient Prescriptions on File Prior to Visit   Medication Sig Dispense Refill   • maalox plus-benadryl-visc lidocaine (MAGIC MOUTHWASH) Take 5 mL by mouth every 6 hours as needed. 1:1:1 ratio 250 mL 3   • therapeutic multivitamin-minerals (THERAGRAN-M) Tab Take 1 Tab by mouth every day.     • albuterol 108 (90 Base) MCG/ACT Aero Soln inhalation aerosol Inhale 2 Puffs by mouth every 6 hours as needed for Shortness of Breath. 8.5 g 0   • ondansetron (ZOFRAN) 4 MG Tab tablet Take 1 Tab by mouth every four hours as needed for Nausea/Vomiting (for nausea, vomiting). 30 Tab 6   • prochlorperazine (COMPAZINE) 10 MG Tab Take 1 Tab by mouth every 6 hours as needed (for nausea, vomiting). 30 Tab 6     No current facility-administered medications on file prior to visit.          Review of Systems   Constitutional: Positive for malaise/fatigue (stable fatigue - always present). Negative for chills, fever and weight loss (stable).   Respiratory: Positive for cough (post nasal drip). Negative for shortness of breath and wheezing.    Cardiovascular: Negative for chest pain,  "palpitations and leg swelling.   Gastrointestinal: Negative for constipation, diarrhea, nausea and vomiting.   Genitourinary: Negative for dysuria.   Musculoskeletal: Positive for joint pain (bilateral knees and both hands over the weeks ). Negative for myalgias.   Skin:        Little peeling and red at fingers tips, \"feel like leather\"   Neurological: Positive for tingling (both hands for 2 days over the weekend). Negative for dizziness and headaches.   Psychiatric/Behavioral: The patient does not have insomnia.           Objective:     /80 (BP Location: Left arm)   Pulse 66   Temp 36.8 °C (98.3 °F) (Temporal)   Resp 16   Ht 1.524 m (5')   Wt 104.2 kg (229 lb 11.5 oz)   SpO2 94%   BMI 44.86 kg/m²      Physical Exam   Constitutional: She is oriented to person, place, and time. She appears well-developed and well-nourished. No distress.   fatigue   HENT:   Head: Normocephalic and atraumatic.   Mouth/Throat: Oropharynx is clear and moist. No oropharyngeal exudate.   Eyes: Pupils are equal, round, and reactive to light. Conjunctivae and EOM are normal. Right eye exhibits no discharge. Left eye exhibits no discharge. No scleral icterus.   Neck: Normal range of motion. Neck supple.   Cardiovascular: Normal rate, regular rhythm and normal heart sounds.  Exam reveals no gallop and no friction rub.    No murmur heard.  Pulmonary/Chest: Effort normal and breath sounds normal. No respiratory distress. She has no wheezes.   Abdominal: Soft. Bowel sounds are normal. She exhibits no distension. There is no tenderness.   Musculoskeletal: Normal range of motion. She exhibits no edema.   Neurological: She is alert and oriented to person, place, and time.   Skin: Skin is warm and dry. No rash noted. She is not diaphoretic. No erythema. No pallor.   Psychiatric: She has a normal mood and affect. Her behavior is normal.   Vitals reviewed.      No visits with results within 1 Day(s) from this visit.   Latest known visit " with results is:   [facility] on 11/30/2018   Component Date Value Ref Range Status   • WBC 11/30/2018 5.3  4.8 - 10.8 K/uL Final   • RBC 11/30/2018 3.47* 4.20 - 5.40 M/uL Final   • Hemoglobin 11/30/2018 10.4* 12.0 - 16.0 g/dL Final   • Hematocrit 11/30/2018 32.2* 37.0 - 47.0 % Final   • MCV 11/30/2018 92.8  81.4 - 97.8 fL Final   • MCH 11/30/2018 30.0  27.0 - 33.0 pg Final   • MCHC 11/30/2018 32.3* 33.6 - 35.0 g/dL Final   • RDW 11/30/2018 59.7* 35.9 - 50.0 fL Final   • Platelet Count 11/30/2018 116* 164 - 446 K/uL Final   • MPV 11/30/2018 10.4  9.0 - 12.9 fL Final   • Nucleated RBC 11/30/2018 0.00  /100 WBC Final   • NRBC (Absolute) 11/30/2018 0.00  K/uL Final   • Neutrophils-Polys 11/30/2018 76.50* 44.00 - 72.00 % Final   • Lymphocytes 11/30/2018 9.60* 22.00 - 41.00 % Final   • Monocytes 11/30/2018 9.60  0.00 - 13.40 % Final   • Eosinophils 11/30/2018 0.00  0.00 - 6.90 % Final   • Basophils 11/30/2018 0.00  0.00 - 1.80 % Final   • Neutrophils (Absolute) 11/30/2018 4.14  2.00 - 7.15 K/uL Final    Includes immature neutrophils, if present.   • Lymphs (Absolute) 11/30/2018 0.51* 1.00 - 4.80 K/uL Final   • Monos (Absolute) 11/30/2018 0.51  0.00 - 0.85 K/uL Final   • Eos (Absolute) 11/30/2018 0.00  0.00 - 0.51 K/uL Final   • Baso (Absolute) 11/30/2018 0.00  0.00 - 0.12 K/uL Final   • Anisocytosis 11/30/2018 1+   Final   • Microcytosis 11/30/2018 1+   Final   • Bands-Stabs 11/30/2018 1.70  0.00 - 10.00 % Final   • Myelocytes 11/30/2018 2.60  % Final   • Manual Diff Status 11/30/2018 PERFORMED   Final   • Peripheral Smear Review 11/30/2018 see below   Final    Comment: Due to instrument suspect flags, further review of peripheral smear is  indicated on this patient sample. This review may or may not result in  abnormal findings.     • Plt Estimation 11/30/2018 Decreased   Final   • RBC Morphology 11/30/2018 Present   Final          Assessment/Plan:     1. Er+ WA+ carcinoma of breast, left (HCC)     2.  Encounter for antineoplastic chemotherapy           1.  Breast cancer: Patient continues to tolerate treatment very well.  CBC and CMP have been evaluated and found to be within acceptable limits.  She will proceed with cycle 2 of dose dense Taxol and return in 2 weeks for evaluation prior to cycle 3, sooner as needed.            The patient verbalized agreement and understanding of current plan. All questions and concerns were addressed at time of visit.    Please note that this dictation was created using voice recognition software. I have made every reasonable attempt to correct obvious errors, but I expect that there are errors of grammar and possibly content that I did not discover before finalizing the note.

## 2018-12-14 ENCOUNTER — OUTPATIENT INFUSION SERVICES (OUTPATIENT)
Dept: ONCOLOGY | Facility: MEDICAL CENTER | Age: 53
End: 2018-12-14
Attending: INTERNAL MEDICINE
Payer: MEDICAID

## 2018-12-14 VITALS
BODY MASS INDEX: 45.01 KG/M2 | OXYGEN SATURATION: 98 % | RESPIRATION RATE: 18 BRPM | SYSTOLIC BLOOD PRESSURE: 145 MMHG | HEIGHT: 60 IN | HEART RATE: 85 BPM | DIASTOLIC BLOOD PRESSURE: 77 MMHG | WEIGHT: 229.28 LBS | TEMPERATURE: 98.4 F

## 2018-12-14 DIAGNOSIS — C50.912 LEFT BREAST CANCER WITH T3 TUMOR, >5 CM IN GREATEST DIMENSION (HCC): ICD-10-CM

## 2018-12-14 DIAGNOSIS — C50.912 ER+ PR+ CARCINOMA OF BREAST, LEFT (HCC): ICD-10-CM

## 2018-12-14 DIAGNOSIS — Z17.0 ER+ PR+ CARCINOMA OF BREAST, LEFT (HCC): ICD-10-CM

## 2018-12-14 DIAGNOSIS — C50.919 HER2 (HUMAN EPIDERMAL GROWTH FACTOR RECEPTOR 2) NEGATIVE CARCINOMA OF BREAST (HCC): ICD-10-CM

## 2018-12-14 LAB
ANION GAP SERPL CALC-SCNC: 7 MMOL/L (ref 0–11.9)
ANISOCYTOSIS BLD QL SMEAR: ABNORMAL
BASOPHILS # BLD AUTO: 0 % (ref 0–1.8)
BASOPHILS # BLD: 0 K/UL (ref 0–0.12)
BUN SERPL-MCNC: 18 MG/DL (ref 8–22)
CALCIUM SERPL-MCNC: 9 MG/DL (ref 8.5–10.5)
CHLORIDE SERPL-SCNC: 107 MMOL/L (ref 96–112)
CO2 SERPL-SCNC: 25 MMOL/L (ref 20–33)
CREAT SERPL-MCNC: 0.56 MG/DL (ref 0.5–1.4)
DACRYOCYTES BLD QL SMEAR: NORMAL
EOSINOPHIL # BLD AUTO: 0 K/UL (ref 0–0.51)
EOSINOPHIL NFR BLD: 0 % (ref 0–6.9)
ERYTHROCYTE [DISTWIDTH] IN BLOOD BY AUTOMATED COUNT: 69.8 FL (ref 35.9–50)
GLUCOSE SERPL-MCNC: 94 MG/DL (ref 65–99)
HCT VFR BLD AUTO: 31.8 % (ref 37–47)
HGB BLD-MCNC: 10.2 G/DL (ref 12–16)
LYMPHOCYTES # BLD AUTO: 0.69 K/UL (ref 1–4.8)
LYMPHOCYTES NFR BLD: 11.2 % (ref 22–41)
MACROCYTES BLD QL SMEAR: ABNORMAL
MANUAL DIFF BLD: NORMAL
MCH RBC QN AUTO: 30.8 PG (ref 27–33)
MCHC RBC AUTO-ENTMCNC: 32.1 G/DL (ref 33.6–35)
MCV RBC AUTO: 96.1 FL (ref 81.4–97.8)
METAMYELOCYTES NFR BLD MANUAL: 0.9 %
MICROCYTES BLD QL SMEAR: ABNORMAL
MONOCYTES # BLD AUTO: 0.37 K/UL (ref 0–0.85)
MONOCYTES NFR BLD AUTO: 6 % (ref 0–13.4)
MORPHOLOGY BLD-IMP: NORMAL
NEUTROPHILS # BLD AUTO: 5.08 K/UL (ref 2–7.15)
NEUTROPHILS NFR BLD: 81 % (ref 44–72)
NEUTS BAND NFR BLD MANUAL: 0.9 % (ref 0–10)
NRBC # BLD AUTO: 0.03 K/UL
NRBC BLD-RTO: 0.5 /100 WBC
PLATELET # BLD AUTO: 130 K/UL (ref 164–446)
PLATELET BLD QL SMEAR: NORMAL
PMV BLD AUTO: 10.3 FL (ref 9–12.9)
POIKILOCYTOSIS BLD QL SMEAR: NORMAL
POLYCHROMASIA BLD QL SMEAR: NORMAL
POTASSIUM SERPL-SCNC: 4.1 MMOL/L (ref 3.6–5.5)
RBC # BLD AUTO: 3.31 M/UL (ref 4.2–5.4)
RBC BLD AUTO: PRESENT
SMUDGE CELLS BLD QL SMEAR: NORMAL
SODIUM SERPL-SCNC: 139 MMOL/L (ref 135–145)
WBC # BLD AUTO: 6.2 K/UL (ref 4.8–10.8)

## 2018-12-14 PROCEDURE — 96377 APPLICATON ON-BODY INJECTOR: CPT | Mod: XU

## 2018-12-14 PROCEDURE — 96375 TX/PRO/DX INJ NEW DRUG ADDON: CPT

## 2018-12-14 PROCEDURE — 700105 HCHG RX REV CODE 258: Performed by: NURSE PRACTITIONER

## 2018-12-14 PROCEDURE — 700111 HCHG RX REV CODE 636 W/ 250 OVERRIDE (IP): Performed by: NURSE PRACTITIONER

## 2018-12-14 PROCEDURE — 85007 BL SMEAR W/DIFF WBC COUNT: CPT

## 2018-12-14 PROCEDURE — 85027 COMPLETE CBC AUTOMATED: CPT

## 2018-12-14 PROCEDURE — 96413 CHEMO IV INFUSION 1 HR: CPT

## 2018-12-14 PROCEDURE — 80048 BASIC METABOLIC PNL TOTAL CA: CPT

## 2018-12-14 PROCEDURE — 304540 HCHG NITRO SET VENT 2ND TUB

## 2018-12-14 PROCEDURE — 96415 CHEMO IV INFUSION ADDL HR: CPT

## 2018-12-14 RX ORDER — SODIUM CHLORIDE 9 MG/ML
INJECTION, SOLUTION INTRAVENOUS CONTINUOUS
Status: DISCONTINUED | OUTPATIENT
Start: 2018-12-14 | End: 2018-12-14 | Stop reason: HOSPADM

## 2018-12-14 RX ADMIN — PACLITAXEL 367.5 MG: 300 INJECTION, SOLUTION INTRAVENOUS at 15:10

## 2018-12-14 RX ADMIN — PEGFILGRASTIM 6 MG: KIT SUBCUTANEOUS at 16:47

## 2018-12-14 RX ADMIN — DEXAMETHASONE SODIUM PHOSPHATE 12 MG: 4 INJECTION, SOLUTION INTRAMUSCULAR; INTRAVENOUS at 14:55

## 2018-12-14 RX ADMIN — DIPHENHYDRAMINE HYDROCHLORIDE 25 MG: 50 INJECTION INTRAMUSCULAR; INTRAVENOUS at 14:43

## 2018-12-14 RX ADMIN — SODIUM CHLORIDE: 9 INJECTION, SOLUTION INTRAVENOUS at 14:43

## 2018-12-14 RX ADMIN — FAMOTIDINE 20 MG: 10 INJECTION INTRAVENOUS at 14:38

## 2018-12-14 ASSESSMENT — PAIN SCALES - GENERAL: PAINLEVEL_OUTOF10: 2

## 2018-12-14 NOTE — PROGRESS NOTES
Chemotherapy Verification - PRIMARY RN      Height = 153cm  Weight = 104kg  BSA = 2.1m2    Medication: PACLitaxel/ Taxol  Dose: 175mg/m2 x 2.1m2  Calculated Dose:  367.5mg                            (In mg/m2, AUC, mg/kg)       I confirm this process was performed independently with the BSA and all final chemotherapy dosing calculations congruent.  Any discrepancies of 5% or greater have been addressed with the chemotherapy pharmacist. The resolution of the discrepancy has been documented in the EPIC progress notes.

## 2018-12-14 NOTE — PROGRESS NOTES
Patient ambulatory to [facility] for Taxol and Neulasta today. Patient c/o fatigue, finger peeling, and finger numbness and tingling. MD notified and is aware. No other complaints. PICC line dressing and caps changed today since patient was in ED on 12/8 and had it changed there. Patient will come back in a week for another dressing and cap change. PICC flushed with positive blood return in both lumens. PICC is clean, dry, and has no evidence of infection. Labs drawn and sent, all are WNL for treatment today. Given premedications per orders, see MAR. Taxol infused over 3 hours as ordered, tolerated well with no reactions. Neulasta applied as well. Next appointments confirmed with patient. Report to Wendy CAMARA whom will assume care of patient until discharge.

## 2018-12-14 NOTE — PROGRESS NOTES
Chemotherapy Verification - SECONDARY RN       Height = 153cm  Weight = 104kg  BSA = 2.1m2       Medication: Paclitaxel (Taxol)  Dose: 175mg/m2  Calculated Dose: 367.5mg                             (In mg/m2, AUC, mg/kg)         I confirm that this process was performed independently.

## 2018-12-14 NOTE — PROGRESS NOTES
"Pharmacy Chemotherapy Verification Note:    Patient Name: Addie Orellana      Dx: Locally Advanced Breast CA (ER/MI+, HER2-)         Protocol: Dose Dense AC --> Dose Dense Taxol  DOXOrubicin 60 mg/m² slow IVP on Day 1  --11/16/18 C4 Dose reduced 10% for neuropathy per MD  Cyclophosphamide 600 mg/m² IV over 30 minutes on Day 1  --11/16/18 C4 Dose reduced 10% for neuropathy per MD  Every 2 weeks x4 cycles followed by (Completed 11/16/18)  PACLItaxel 175 mg/m² IV over 3 hours on Day 1  Every 2 weeks x 4 cycles  NCCN Guidelines for Breast Cancer. V.1.2018.  Renuka ML, et al. J Clin Oncol. 2003;21(8):1431-9.    Allergies:  Patient has no known allergies.     /77   Pulse 85   Temp 36.9 °C (98.4 °F) (Temporal)   Resp 18   Ht 1.53 m (5' 0.24\")   Wt 104 kg (229 lb 4.5 oz)   SpO2 98%   BMI 44.43 kg/m²  Body surface area is 2.1 meters squared.    Labs 12/14/18  ANC~ 5080 Plt = 130k   Hgb = 10.2     SCr = 0.56 mg/dL CrCl ~ >125 mL/min (minimum SCr of 0.7)     Labs 11/27/18  LFT's = WNLs  TBili = 0.3     9/25/18 ECHO: LVEF 53% (paper copy in chart)     Drug Order   (Drug name, dose, route, IV Fluid & volume, frequency, number of doses) Cycle: 2 (DD Taxol cycle)      Previous treatment: C1 of DD Taxol on 11/30/18; s/p 4 cycles of DDAC last treatment 11/16/18     Medication = Paclitaxel  Base Dose = 175 mg/m2  Calc Dose: Base Dose x 2.1 m² = 367.5 mg  Final Dose = 367.5 mg  Route = IV  Fluid & Volume =  mL  Admin Duration = Over 3 hours          <5% difference, ok to treat with final written dose     By my signature below, I confirm this process was performed independently with the BSA and all final chemotherapy dosing calculations congruent. I have reviewed the above chemotherapy order and that my calculation of the final dose and BSA (when applicable) corroborate those calculations of the  pharmacist.     Lucio Stone, PharmD      "

## 2018-12-14 NOTE — PROGRESS NOTES
Chemotherapy Verification - SECONDARY RN     D1C2    Height = 153 cm  Weight = 104 kg  BSA = 2.1 m2       Medication: Paclitaxel (Taxol)  Dose: 175 mg/m2  Calculated Dose: 367.5 mg                                I confirm that this process was performed independently.

## 2018-12-14 NOTE — PROGRESS NOTES
"Pharmacy Chemotherapy Note    Patient Name: SISSY JUNE    Cycle 2 (DDPaclitaxel)  Previous treatment = 11/30/2018 (4 cycles DDAC, last 11/16/2018)    Protocol: Dose Dense AC followed by Dose Dense paclitaxel       Doxorubicin 60 mg/m2 IV on day 1   11/16/18 Dose reduced by 10% to 54 mg/m2 per Dr. Linder d/t fatigue and peripheral neuropathy  Cyclophosphamide 600 mg/m2 IV over 30 min on day 1   11/16/18 Dose reduced by 10% to 540 mg/m2 per Dr. Linder d/t fatigue and peripheral neuropathy  14 day cycle for 4 cycles    Followed by    Paclitaxel 175 mg/m2 IV over 3 hours on day 1    14 day cycle for 14 cycles    *Dosing Reference*  NCCN Guidelines for breast cancer V.1.2018  Renuka ML, et al. J Clin Oncol. 2003:21(8):1431-9    Dx: Breast Cancer         /77   Pulse 85   Temp 36.9 °C (98.4 °F) (Temporal)   Resp 18   Ht 1.53 m (5' 0.24\")   Wt 104 kg (229 lb 4.5 oz)   SpO2 98%   BMI 44.43 kg/m²  Body surface area is 2.1 meters squared.    LABS 12/14/2018:  ANC: 5080      WBC: 6.2     Plt: 130k   Hgb/Hct: 10.2/31.8     SCr: 0.56mg/dL CrCl: >125 mL/min    K: 4.1  Na: 139          Glu: 94    LABS 11/27/2018:  LFT's: wnl TBili = 0.3     ECHO:  9/25/2018 LVEF: 55% (paper)      Paclitaxel 175 mg/m2 x Body surface area is 2.1 meters squared. m2 = 367.5 mg   <5% difference, OK to treat with final dose = 367.5 mg IV     ONPRO    JERSEY Topol, PharmD  "

## 2018-12-15 NOTE — PROGRESS NOTES
Report received from Loraine CAMARA and Lisbet CAMARA, Pt tolerating Taxol infusion. Loraine CAMARA applied neulasta ONPRO to left upper arm. Taxol infusion complete, both lumens flushed per protocol and positive blood return noted. Pt discharged home stable ambulatory. Will return to clinic 12/21

## 2018-12-19 ENCOUNTER — APPOINTMENT (OUTPATIENT)
Dept: ONCOLOGY | Facility: MEDICAL CENTER | Age: 53
End: 2018-12-19
Attending: INTERNAL MEDICINE
Payer: MEDICAID

## 2018-12-21 ENCOUNTER — OUTPATIENT INFUSION SERVICES (OUTPATIENT)
Dept: ONCOLOGY | Facility: MEDICAL CENTER | Age: 53
End: 2018-12-21
Attending: INTERNAL MEDICINE
Payer: MEDICAID

## 2018-12-21 VITALS
RESPIRATION RATE: 18 BRPM | OXYGEN SATURATION: 96 % | DIASTOLIC BLOOD PRESSURE: 99 MMHG | HEART RATE: 100 BPM | BODY MASS INDEX: 44.36 KG/M2 | SYSTOLIC BLOOD PRESSURE: 142 MMHG | TEMPERATURE: 98 F | HEIGHT: 60 IN | WEIGHT: 225.97 LBS

## 2018-12-21 DIAGNOSIS — C50.912 LEFT BREAST CANCER WITH T3 TUMOR, >5 CM IN GREATEST DIMENSION (HCC): ICD-10-CM

## 2018-12-21 PROCEDURE — 99211 OFF/OP EST MAY X REQ PHY/QHP: CPT

## 2018-12-21 ASSESSMENT — PAIN SCALES - GENERAL: PAINLEVEL_OUTOF10: 0

## 2018-12-22 NOTE — PROGRESS NOTES
Pt arrived ambulatory to [facility] for PICC line dressing change.  POC discussed.  RUE double lumen PICC line in place, blood return confirmed from each lumen.  Dressing changed in sterile field, claves changed.  Pt tolerated well.  Next appointment confirmed.  Pt discharged from [facility] in NAD under self care.

## 2018-12-27 ENCOUNTER — TELEPHONE (OUTPATIENT)
Dept: HEMATOLOGY ONCOLOGY | Facility: MEDICAL CENTER | Age: 53
End: 2018-12-27

## 2018-12-27 ENCOUNTER — HOSPITAL ENCOUNTER (OUTPATIENT)
Dept: LAB | Facility: MEDICAL CENTER | Age: 53
End: 2018-12-27
Attending: NURSE PRACTITIONER
Payer: MEDICAID

## 2018-12-27 ENCOUNTER — APPOINTMENT (OUTPATIENT)
Dept: HEMATOLOGY ONCOLOGY | Facility: MEDICAL CENTER | Age: 53
End: 2018-12-27
Payer: MEDICAID

## 2018-12-27 DIAGNOSIS — C50.912 LEFT BREAST CANCER WITH T3 TUMOR, >5 CM IN GREATEST DIMENSION (HCC): ICD-10-CM

## 2018-12-27 LAB
ALBUMIN SERPL BCP-MCNC: 4 G/DL (ref 3.2–4.9)
ALBUMIN/GLOB SERPL: 1.5 G/DL
ALP SERPL-CCNC: 83 U/L (ref 30–99)
ALT SERPL-CCNC: 25 U/L (ref 2–50)
ANION GAP SERPL CALC-SCNC: 9 MMOL/L (ref 0–11.9)
ANISOCYTOSIS BLD QL SMEAR: ABNORMAL
AST SERPL-CCNC: 23 U/L (ref 12–45)
BASOPHILS # BLD AUTO: 0.6 % (ref 0–1.8)
BASOPHILS # BLD: 0.04 K/UL (ref 0–0.12)
BILIRUB SERPL-MCNC: 0.5 MG/DL (ref 0.1–1.5)
BUN SERPL-MCNC: 11 MG/DL (ref 8–22)
CALCIUM SERPL-MCNC: 9.4 MG/DL (ref 8.5–10.5)
CHLORIDE SERPL-SCNC: 107 MMOL/L (ref 96–112)
CO2 SERPL-SCNC: 25 MMOL/L (ref 20–33)
COMMENT 1642: NORMAL
CREAT SERPL-MCNC: 0.67 MG/DL (ref 0.5–1.4)
EOSINOPHIL # BLD AUTO: 0.13 K/UL (ref 0–0.51)
EOSINOPHIL NFR BLD: 1.8 % (ref 0–6.9)
ERYTHROCYTE [DISTWIDTH] IN BLOOD BY AUTOMATED COUNT: 69.2 FL (ref 35.9–50)
GLOBULIN SER CALC-MCNC: 2.7 G/DL (ref 1.9–3.5)
GLUCOSE SERPL-MCNC: 144 MG/DL (ref 65–99)
HCT VFR BLD AUTO: 36.1 % (ref 37–47)
HGB BLD-MCNC: 11.5 G/DL (ref 12–16)
IMM GRANULOCYTES # BLD AUTO: 0.12 K/UL (ref 0–0.11)
IMM GRANULOCYTES NFR BLD AUTO: 1.7 % (ref 0–0.9)
LYMPHOCYTES # BLD AUTO: 0.74 K/UL (ref 1–4.8)
LYMPHOCYTES NFR BLD: 10.2 % (ref 22–41)
MACROCYTES BLD QL SMEAR: ABNORMAL
MCH RBC QN AUTO: 31.2 PG (ref 27–33)
MCHC RBC AUTO-ENTMCNC: 31.9 G/DL (ref 33.6–35)
MCV RBC AUTO: 97.8 FL (ref 81.4–97.8)
MICROCYTES BLD QL SMEAR: ABNORMAL
MONOCYTES # BLD AUTO: 0.45 K/UL (ref 0–0.85)
MONOCYTES NFR BLD AUTO: 6.2 % (ref 0–13.4)
MORPHOLOGY BLD-IMP: NORMAL
NEUTROPHILS # BLD AUTO: 5.78 K/UL (ref 2–7.15)
NEUTROPHILS NFR BLD: 79.5 % (ref 44–72)
NRBC # BLD AUTO: 0 K/UL
NRBC BLD-RTO: 0 /100 WBC
PLATELET # BLD AUTO: 169 K/UL (ref 164–446)
PLATELET BLD QL SMEAR: NORMAL
PMV BLD AUTO: 10.4 FL (ref 9–12.9)
POLYCHROMASIA BLD QL SMEAR: NORMAL
POTASSIUM SERPL-SCNC: 4 MMOL/L (ref 3.6–5.5)
PROT SERPL-MCNC: 6.7 G/DL (ref 6–8.2)
RBC # BLD AUTO: 3.69 M/UL (ref 4.2–5.4)
RBC BLD AUTO: PRESENT
SODIUM SERPL-SCNC: 141 MMOL/L (ref 135–145)
WBC # BLD AUTO: 7.3 K/UL (ref 4.8–10.8)

## 2018-12-27 PROCEDURE — 36415 COLL VENOUS BLD VENIPUNCTURE: CPT

## 2018-12-27 PROCEDURE — 85025 COMPLETE CBC W/AUTO DIFF WBC: CPT

## 2018-12-27 PROCEDURE — 80053 COMPREHEN METABOLIC PANEL: CPT

## 2018-12-27 NOTE — TELEPHONE ENCOUNTER
Patient called back to reschedule her missed appointment with BIRDIE Mai.  Patient is rescheduled to see Dr. Jovel tomorrow 12/28/18 1040 for prechemo.

## 2018-12-27 NOTE — TELEPHONE ENCOUNTER
Left message for patient to return call. Patient no showed her prechemo appointment on 12/27/18 at 11:30 am.

## 2018-12-28 ENCOUNTER — DOCUMENTATION (OUTPATIENT)
Dept: HEMATOLOGY ONCOLOGY | Facility: MEDICAL CENTER | Age: 53
End: 2018-12-28

## 2018-12-28 ENCOUNTER — OUTPATIENT INFUSION SERVICES (OUTPATIENT)
Dept: ONCOLOGY | Facility: MEDICAL CENTER | Age: 53
End: 2018-12-28
Attending: INTERNAL MEDICINE
Payer: MEDICAID

## 2018-12-28 ENCOUNTER — OFFICE VISIT (OUTPATIENT)
Dept: HEMATOLOGY ONCOLOGY | Facility: MEDICAL CENTER | Age: 53
End: 2018-12-28
Payer: MEDICAID

## 2018-12-28 VITALS
DIASTOLIC BLOOD PRESSURE: 74 MMHG | OXYGEN SATURATION: 97 % | SYSTOLIC BLOOD PRESSURE: 149 MMHG | WEIGHT: 228.62 LBS | HEIGHT: 60 IN | BODY MASS INDEX: 44.88 KG/M2 | HEART RATE: 90 BPM | RESPIRATION RATE: 18 BRPM | TEMPERATURE: 97.2 F

## 2018-12-28 VITALS
SYSTOLIC BLOOD PRESSURE: 116 MMHG | TEMPERATURE: 97.3 F | WEIGHT: 228.07 LBS | HEART RATE: 74 BPM | OXYGEN SATURATION: 95 % | HEIGHT: 60 IN | DIASTOLIC BLOOD PRESSURE: 70 MMHG | RESPIRATION RATE: 16 BRPM | BODY MASS INDEX: 44.78 KG/M2

## 2018-12-28 DIAGNOSIS — Z17.0 ER+ PR+ CARCINOMA OF BREAST, LEFT (HCC): ICD-10-CM

## 2018-12-28 DIAGNOSIS — C50.919 HER2 (HUMAN EPIDERMAL GROWTH FACTOR RECEPTOR 2) NEGATIVE CARCINOMA OF BREAST (HCC): ICD-10-CM

## 2018-12-28 DIAGNOSIS — C50.912 ER+ PR+ CARCINOMA OF BREAST, LEFT (HCC): ICD-10-CM

## 2018-12-28 DIAGNOSIS — C50.912 LEFT BREAST CANCER WITH T3 TUMOR, >5 CM IN GREATEST DIMENSION (HCC): ICD-10-CM

## 2018-12-28 PROCEDURE — 96377 APPLICATON ON-BODY INJECTOR: CPT | Mod: XU

## 2018-12-28 PROCEDURE — 700111 HCHG RX REV CODE 636 W/ 250 OVERRIDE (IP): Performed by: INTERNAL MEDICINE

## 2018-12-28 PROCEDURE — 304540 HCHG NITRO SET VENT 2ND TUB

## 2018-12-28 PROCEDURE — 96375 TX/PRO/DX INJ NEW DRUG ADDON: CPT

## 2018-12-28 PROCEDURE — 99214 OFFICE O/P EST MOD 30 MIN: CPT | Performed by: INTERNAL MEDICINE

## 2018-12-28 PROCEDURE — 96413 CHEMO IV INFUSION 1 HR: CPT

## 2018-12-28 PROCEDURE — 700105 HCHG RX REV CODE 258: Performed by: INTERNAL MEDICINE

## 2018-12-28 PROCEDURE — 36593 DECLOT VASCULAR DEVICE: CPT

## 2018-12-28 PROCEDURE — 96415 CHEMO IV INFUSION ADDL HR: CPT

## 2018-12-28 RX ORDER — SODIUM CHLORIDE 9 MG/ML
INJECTION, SOLUTION INTRAVENOUS CONTINUOUS
Status: DISCONTINUED | OUTPATIENT
Start: 2018-12-28 | End: 2018-12-28 | Stop reason: HOSPADM

## 2018-12-28 RX ORDER — CHLORAL HYDRATE 500 MG
1000 CAPSULE ORAL EVERY EVENING
COMMUNITY
End: 2022-01-27

## 2018-12-28 RX ORDER — 0.9 % SODIUM CHLORIDE 0.9 %
VIAL (ML) INJECTION PRN
Status: CANCELLED | OUTPATIENT
Start: 2018-12-28

## 2018-12-28 RX ORDER — ONDANSETRON 8 MG/1
8 TABLET, ORALLY DISINTEGRATING ORAL
Status: CANCELLED | OUTPATIENT
Start: 2018-12-28

## 2018-12-28 RX ORDER — ONDANSETRON 2 MG/ML
4 INJECTION INTRAMUSCULAR; INTRAVENOUS
Status: CANCELLED | OUTPATIENT
Start: 2018-12-28

## 2018-12-28 RX ORDER — 0.9 % SODIUM CHLORIDE 0.9 %
5 VIAL (ML) INJECTION PRN
Status: CANCELLED | OUTPATIENT
Start: 2018-12-28

## 2018-12-28 RX ORDER — SODIUM CHLORIDE 9 MG/ML
INJECTION, SOLUTION INTRAVENOUS CONTINUOUS
Status: CANCELLED | OUTPATIENT
Start: 2018-12-28

## 2018-12-28 RX ORDER — PROCHLORPERAZINE MALEATE 10 MG
10 TABLET ORAL EVERY 6 HOURS PRN
Status: CANCELLED | OUTPATIENT
Start: 2018-12-28

## 2018-12-28 RX ADMIN — FAMOTIDINE 20 MG: 10 INJECTION INTRAVENOUS at 14:00

## 2018-12-28 RX ADMIN — DIPHENHYDRAMINE HYDROCHLORIDE 25 MG: 50 INJECTION INTRAMUSCULAR; INTRAVENOUS at 14:12

## 2018-12-28 RX ADMIN — PACLITAXEL 367.5 MG: 300 INJECTION, SOLUTION INTRAVENOUS at 14:43

## 2018-12-28 RX ADMIN — SODIUM CHLORIDE: 9 INJECTION, SOLUTION INTRAVENOUS at 14:00

## 2018-12-28 RX ADMIN — DEXAMETHASONE SODIUM PHOSPHATE 12 MG: 4 INJECTION, SOLUTION INTRAMUSCULAR; INTRAVENOUS at 14:05

## 2018-12-28 RX ADMIN — ALTEPLASE 2 MG: 2.2 INJECTION, POWDER, LYOPHILIZED, FOR SOLUTION INTRAVENOUS at 17:51

## 2018-12-28 RX ADMIN — PEGFILGRASTIM 6 MG: KIT SUBCUTANEOUS at 17:52

## 2018-12-28 ASSESSMENT — PAIN SCALES - GENERAL
PAINLEVEL_OUTOF10: 0
PAINLEVEL: NO PAIN

## 2018-12-28 NOTE — PROGRESS NOTES
Chemotherapy Verification - SECONDARY RN       Height = 153 cm  Weight = 103.7  BSA = 2.1 m2        Medication: Taxol  Dose: 175 mg/m2  Calculated Dose: 367.5 mg                             (In mg/m2, AUC, mg/kg)         I confirm that this process was performed independently.

## 2018-12-28 NOTE — PROGRESS NOTES
Chemotherapy Verification - PRIMARY RN    D1C3     Height = 153 cm  Weight = 103.7 kg  BSA = 2.1 m2       Medication: Paclitaxel (Taxol)  Dose: 175 mg/m2  Calculated Dose: 367.5 mg                                 I confirm this process was performed independently with the BSA and all final chemotherapy dosing calculations congruent.  Any discrepancies of 5% or greater have been addressed with the chemotherapy pharmacist. The resolution of the discrepancy has been documented in the EPIC progress notes.

## 2018-12-28 NOTE — PROGRESS NOTES
"Pharmacy Chemotherapy Note    Dx: Breast Cancer         Cycle 3 (DDPaclitaxel)  Previous treatment = 12/14/18 (s/p 4 cycles DDAC)    Protocol: Dose Dense AC followed by Dose Dense paclitaxel     Doxorubicin 60 mg/m2 IV on day 1   11/16/18 Dose reduced by 10% to 54 mg/m2 per Dr. Linder d/t fatigue and peripheral neuropathy  Cyclophosphamide 600 mg/m2 IV over 30 min on day 1   11/16/18 Dose reduced by 10% to 540 mg/m2 per Dr. Linder d/t fatigue and peripheral neuropathy  14 day cycle for 4 cycles (completed)  ~Followed by~  Paclitaxel 175 mg/m2 IV over 3 hours on day 1  14 day cycle for 14 cycles  NCCN Guidelines for breast cancer V.1.2018  Renuka ML, et al. J Clin Oncol. 2003:21(8):1431-9    /74   Pulse 90   Temp 36.2 °C (97.2 °F) (Temporal)   Resp 18   Ht 1.53 m (5' 0.24\")   Wt 103.7 kg (228 lb 9.9 oz)   SpO2 97%   BMI 44.30 kg/m²  Body surface area is 2.1 meters squared.    LABS 12/27/18:  ANC~ 5780 Plt = 169k   Hgb = 11.5     SCr = 0.67mg/dL CrCl >125mL/min   LFT's = WNL TBili = 0.5     ECHO:  9/25/18 LVEF: 55% (paper)      Paclitaxel 175 mg/m2 x 2.1 m2 = 367.5 mg   <5% difference, OK to treat with final dose = 367.5 mg IV       Lisa Elmore, PharmD, BCOP  "

## 2018-12-28 NOTE — PROGRESS NOTES
Date of visit: 12/28/2018  9:04 AM    Chief Complaint:   1. Locally advanced invasive ductal carcinoma of the left breast, ER/IA positive, Her2 kamala negative.   2. Treatment related fatigue.  3. Anemia due to chemotherapy.  4. Mild chemotherapy induced peripheral neuropathy.    Identification/Prior relevant history:   - 9/2018 presented with a palpable left breast and an axillary mass since 4/2018.   - baseline outside mammogram/ultrasound showed a large irregular mass in the 6:00 position of the left breast along with large left axillary lymph node.  - biopsy showed a highgrade invasive ductal carcinoma, poorly differentiated. ER/IA positive and HER-2/kamala negative. Ki- 67 -60%.   - 9/17/18 - PET showed a hypermetabolic left breast mass, consistent with malignancy. Hypermetabolic left axillary and prepectoral lymphadenopathy, consistent with karlie metastases. Asymmetric hypermetabolism in the right tongue base/right lingual tonsil. While it may represent salivary gland secretion pooling, another primary malignancy is also a consideration.   - 10/18/2018 genetic testing  w/o clinically significant variants for BRCA1/2 Detected  -  10/5/18 started neoadjuvant dose dense AC   - 11/30/18 started neoadjuvant dd Taxol, currently s/p 2 cycles     Interim history  Ms. Orellana has completed 4 cycles of dose dense AC and presents for evaluation prior to cycle 3 of dose dense Taxol for treatment of locally advanced ER/IA positive, HER2 negative poorly differentiated invasive ductal carcinoma of the left breast.    Patient continues to tolerate treatment well. She denies fevers, chills, significant nausea or emesis. She reports mild fingertip peripheral neuropathy which is not bothersome. Reports fatigue which is not worse from baseline.     Past Medical History:      Past Medical History:   Diagnosis Date   • Breast cancer, stage 3 (HCC)        Past surgical history:       Past Surgical History:   Procedure Laterality Date   •  ANKLE FUSION     • GASTRIC BYPASS LAPAROSCOPIC         Allergies:         Patient has no known allergies.    Medications:         Current Outpatient Prescriptions   Medication Sig Dispense Refill   • maalox plus-benadryl-visc lidocaine (MAGIC MOUTHWASH) Take 5 mL by mouth every 6 hours as needed. 1:1:1 ratio 250 mL 3   • albuterol 108 (90 Base) MCG/ACT Aero Soln inhalation aerosol Inhale 2 Puffs by mouth every 6 hours as needed for Shortness of Breath. 8.5 g 0   • therapeutic multivitamin-minerals (THERAGRAN-M) Tab Take 1 Tab by mouth every day.     • ondansetron (ZOFRAN) 4 MG Tab tablet Take 1 Tab by mouth every four hours as needed for Nausea/Vomiting (for nausea, vomiting). 30 Tab 6   • prochlorperazine (COMPAZINE) 10 MG Tab Take 1 Tab by mouth every 6 hours as needed (for nausea, vomiting). 30 Tab 6     No current facility-administered medications for this visit.        Social History:     Social History     Social History   • Marital status: Single     Spouse name: N/A   • Number of children: N/A   • Years of education: N/A     Occupational History   • Not on file.     Social History Main Topics   • Smoking status: Never Smoker   • Smokeless tobacco: Never Used   • Alcohol use No   • Drug use: No   • Sexual activity: Not on file     Other Topics Concern   • Not on file     Social History Narrative   • No narrative on file       Family History:      No family history on file.    Review of Systems:  Constitutional: Negative for fever, chills, weight loss and malaise/fatigue.    HEENT: No new auditory or visual complaints. No sore throat and neck pain.     Respiratory: Negative for cough, sputum production, shortness of breath and wheezing.    Cardiovascular: Negative for chest pain, palpitations, orthopnea and leg swelling.    Gastrointestinal: Negative for heartburn, nausea, vomiting and abdominal pain.    Genitourinary: Negative for dysuria, hematuria    Musculoskeletal: No new arthralgias or myalgias   Skin:  "Negative for rash and itching.    Neurological: Fingertip numbness.     Endo/Heme/Allergies: No abnormal bleed/bruise.    Psychiatric/Behavioral: No new depression/anxiety.    Physical Exam:  /70 (BP Location: Left arm, Patient Position: Sitting, BP Cuff Size: Adult)   Pulse 74   Temp 36.3 °C (97.3 °F) (Temporal)   Resp 16   Ht 1.53 m (5' 0.24\")   Wt 103.4 kg (228 lb 1.1 oz)   SpO2 95%   BMI 44.19 kg/m²   General: No acute distress.  Eyes: Normal conjuctiva and lids. No icterus.  HEENT: Oropharynx clear.   Neck: Supple with no palpable masses.  Lymph nodes: No palpable cervical, supraclavicular or axillary lymphadenopathy.    CVS: regular rate and rhythm, no rubs or gallops.   RESP: Clear to auscultation bilaterally with normal respiratory effort.   ABD: Soft, non tender, non distended, normal bowel sounds, no palpable organomegaly  EXT: No edema or cyanosis.  CNS: Alert and oriented x3, No focal deficits.  Skin: Alopecia.      Labs:   Hospital Outpatient Visit on 12/27/2018   Component Date Value Ref Range Status   • WBC 12/27/2018 7.3  4.8 - 10.8 K/uL Final   • RBC 12/27/2018 3.69* 4.20 - 5.40 M/uL Final   • Hemoglobin 12/27/2018 11.5* 12.0 - 16.0 g/dL Final   • Hematocrit 12/27/2018 36.1* 37.0 - 47.0 % Final   • MCV 12/27/2018 97.8  81.4 - 97.8 fL Final   • MCH 12/27/2018 31.2  27.0 - 33.0 pg Final   • MCHC 12/27/2018 31.9* 33.6 - 35.0 g/dL Final   • RDW 12/27/2018 69.2* 35.9 - 50.0 fL Final   • Platelet Count 12/27/2018 169  164 - 446 K/uL Final   • MPV 12/27/2018 10.4  9.0 - 12.9 fL Final   • Nucleated RBC 12/27/2018 0.00  /100 WBC Final   • NRBC (Absolute) 12/27/2018 0.00  K/uL Final   • Neutrophils-Polys 12/27/2018 79.50* 44.00 - 72.00 % Final   • Lymphocytes 12/27/2018 10.20* 22.00 - 41.00 % Final   • Monocytes 12/27/2018 6.20  0.00 - 13.40 % Final   • Eosinophils 12/27/2018 1.80  0.00 - 6.90 % Final   • Basophils 12/27/2018 0.60  0.00 - 1.80 % Final   • Immature Granulocytes 12/27/2018 1.70* " 0.00 - 0.90 % Final   • Lymphs (Absolute) 12/27/2018 0.74* 1.00 - 4.80 K/uL Final   • Monos (Absolute) 12/27/2018 0.45  0.00 - 0.85 K/uL Final   • Eos (Absolute) 12/27/2018 0.13  0.00 - 0.51 K/uL Final   • Baso (Absolute) 12/27/2018 0.04  0.00 - 0.12 K/uL Final   • Immature Granulocytes (abs) 12/27/2018 0.12* 0.00 - 0.11 K/uL Final   • Neutrophils (Absolute) 12/27/2018 5.78  2.00 - 7.15 K/uL Final    Includes immature neutrophils, if present.   • Anisocytosis 12/27/2018 1+   Final   • Macrocytosis 12/27/2018 1+   Final   • Microcytosis 12/27/2018 1+   Final   • Sodium 12/27/2018 141  135 - 145 mmol/L Final   • Potassium 12/27/2018 4.0  3.6 - 5.5 mmol/L Final   • Chloride 12/27/2018 107  96 - 112 mmol/L Final   • Co2 12/27/2018 25  20 - 33 mmol/L Final   • Anion Gap 12/27/2018 9.0  0.0 - 11.9 Final   • Glucose 12/27/2018 144* 65 - 99 mg/dL Final   • Bun 12/27/2018 11  8 - 22 mg/dL Final   • Creatinine 12/27/2018 0.67  0.50 - 1.40 mg/dL Final   • Calcium 12/27/2018 9.4  8.5 - 10.5 mg/dL Final   • AST(SGOT) 12/27/2018 23  12 - 45 U/L Final   • ALT(SGPT) 12/27/2018 25  2 - 50 U/L Final   • Alkaline Phosphatase 12/27/2018 83  30 - 99 U/L Final   • Total Bilirubin 12/27/2018 0.5  0.1 - 1.5 mg/dL Final   • Albumin 12/27/2018 4.0  3.2 - 4.9 g/dL Final   • Total Protein 12/27/2018 6.7  6.0 - 8.2 g/dL Final   • Globulin 12/27/2018 2.7  1.9 - 3.5 g/dL Final   • A-G Ratio 12/27/2018 1.5  g/dL Final   • GFR If  12/27/2018 >60  >60 mL/min/1.73 m 2 Final   • GFR If Non  12/27/2018 >60  >60 mL/min/1.73 m 2 Final   • Peripheral Smear Review 12/27/2018 see below   Final    Comment: Due to instrument suspect flags, further review of peripheral smear is  indicated on this patient sample. This review may or may not result in  abnormal findings.     • Plt Estimation 12/27/2018 Normal   Final   • RBC Morphology 12/27/2018 Present   Final   • Polychromia 12/27/2018 1+   Final   • Comments-Diff 12/27/2018 see  below   Final    Results have been verified by peripheral blood smear review.       Assessment and Plan:  Addie Orellana  is a 53 year old woman with locally advanced left breast invasive ductal carcinoma, ER/OH positive, HER2 negative, with high Ki67, presenting with a palpable mass in September of 2018. Baseline PET scan did not reveal distant metastatic disease; she did have bulky axillary and prepatellar adenopathy. She receioved neoadjuvant dd AC, currently anticipating cycle #3 neoadjuvant dose dense Taxol today. Her breast mass and axillary mass are much improved in size. Minimal peripheral neuropathy in her fingertips is not worsening. Expected fatigue. Otherwise tolerating treatment well. CBC and CMP reviewed showing mild most likely treatment related anemia. Plan is to receive #4 Taxol on 1/10/18. She will return to clinic in 2 weeks.     SIGNATURES:  Raoul Jovel    CC:  Noam Caldwell M.D.  No ref. provider found

## 2018-12-28 NOTE — PROGRESS NOTES
"Pharmacy Chemotherapy Verification Note:    Patient Name: Addie Orellana      Dx: Locally Advanced Breast CA (ER/ME+, HER2-)         Protocol: Dose Dense AC --> Dose Dense Taxol  DOXOrubicin 60 mg/m² slow IVP on Day 1  --11/16/18 C4 Dose reduced 10% for neuropathy per MD  Cyclophosphamide 600 mg/m² IV over 30 minutes on Day 1  --11/16/18 C4 Dose reduced 10% for neuropathy per MD  Every 2 weeks x4 cycles followed by (Completed 11/16/18)  PACLItaxel 175 mg/m² IV over 3 hours on Day 1  Every 2 weeks x 4 cycles  NCCN Guidelines for Breast Cancer. V.1.2018.  Renuka ML, et al. J Clin Oncol. 2003;21(8):1431-9.    Allergies:  Patient has no known allergies.     /74   Pulse 90   Temp 36.2 °C (97.2 °F) (Temporal)   Resp 18   Ht 1.53 m (5' 0.24\")   Wt 103.7 kg (228 lb 9.9 oz)   SpO2 97%   BMI 44.30 kg/m²  Body surface area is 2.1 meters squared.    LABS 12/28/2018:  ANC: 5780      WBC: 7.3     Plt: 169k   Hgb/Hct: 11.5/36.1     SCr: 0.67 mg/dL CrCl: >125 mL/min    K: 4.0  Na: 141          Glu: 144  LFT's: WNL TBili = 0.5       9/25/18 ECHO: LVEF 53% (paper copy in chart)     Drug Order   (Drug name, dose, route, IV Fluid & volume, frequency, number of doses) Cycle: 3 (DD Taxol cycle)      Previous treatment: C2 of DD Taxol on 12/14/18; s/p 4 cycles of DDAC last treatment 11/16/18     Medication = Paclitaxel  Base Dose = 175 mg/m2  Calc Dose: Base Dose x Body surface area is 2.1 meters squared. m² = 367.5 mg  Final Dose = 367.5 mg  Route = IV  Fluid & Volume =  mL  Admin Duration = Over 3 hours          <5% difference, ok to treat with final written dose     By my signature below, I confirm this process was performed independently with the BSA and all final chemotherapy dosing calculations congruent. I have reviewed the above chemotherapy order and that my calculation of the final dose and BSA (when applicable) corroborate those calculations of the  pharmacist.     JERSEY Chadwick, PharmD      "

## 2018-12-28 NOTE — PROGRESS NOTES
"Nutrition Services: Progress Update    Patient's weight per stand up scale in [facility] = 228 lbs; stable over the course of chemotherapy treatment.  Labs per 12/27 reviewed  GI: pt denies change in appetite, N/V, Diarrhea, constipation and abdominal pain after eating.    Current Interventions:   Per interview with dietitian, patient states moderate-good appetite most days of the week - she includes items like eggs, cheese, hamburger patties, chili from [employer], salmon, tilapia, shrimp, steamed vegetables, beets.  She avoids added sugars, soda (regular and diet), as well as avoids juices d/t \"it just tastes like dirt to me\".  RD encouraged pt's efforts to avoid added sugars for healthy weight promotion, as well as d/t little nutritional value/need in the diet.  She verbalizes excellent understanding.  RD encouraged omega-3 fatty acid in the amount of 2-3 g/day from fish oil, tuna or salmon (these are the fish selections patient typically has funds to buy, she states) to promote cancer survivorship.  Pt verbalizes very good understanding.     RD available at [phone] prn.     "

## 2018-12-29 NOTE — PROGRESS NOTES
Pt arrived to [facility] ambulatory, here for C3 Dose Dense Taxol for breast cancer. Pt with R DL PICC in place. Line flushed with difficulty, able to observe blood return but sluggish. Pt had labs done in MD office. Results reviewed and WNL for chemo to proceed. Call placed to MD office and orders received to alteplase both lumens of PICC. Premeds given as ordered. Chemo infused. Dressing to PICC changed as well as claves. Pt lizzie well. Line flushed clear. Both lumens of PICC flushed and alteplase instilled. OnPro placed to LUE, green light verified. Pt knows to return in one week for PICC care. Discharged home under self care in no apparent distress.

## 2019-01-02 ENCOUNTER — HOSPITAL ENCOUNTER (OUTPATIENT)
Dept: RADIOLOGY | Facility: MEDICAL CENTER | Age: 54
End: 2019-01-02
Attending: NURSE PRACTITIONER
Payer: MEDICAID

## 2019-01-02 ENCOUNTER — OUTPATIENT INFUSION SERVICES (OUTPATIENT)
Dept: ONCOLOGY | Facility: MEDICAL CENTER | Age: 54
End: 2019-01-02
Attending: INTERNAL MEDICINE
Payer: MEDICAID

## 2019-01-02 VITALS
HEART RATE: 133 BPM | RESPIRATION RATE: 20 BRPM | BODY MASS INDEX: 42.94 KG/M2 | HEIGHT: 60 IN | WEIGHT: 218.7 LBS | SYSTOLIC BLOOD PRESSURE: 139 MMHG | TEMPERATURE: 100.6 F | DIASTOLIC BLOOD PRESSURE: 47 MMHG

## 2019-01-02 DIAGNOSIS — R05.9 COUGH: ICD-10-CM

## 2019-01-02 DIAGNOSIS — C50.912 LEFT BREAST CANCER WITH T3 TUMOR, >5 CM IN GREATEST DIMENSION (HCC): ICD-10-CM

## 2019-01-02 DIAGNOSIS — R50.9 FEVER, UNSPECIFIED FEVER CAUSE: ICD-10-CM

## 2019-01-02 LAB
APPEARANCE UR: ABNORMAL
BACTERIA #/AREA URNS HPF: ABNORMAL /HPF
BASOPHILS # BLD AUTO: 0 % (ref 0–1.8)
BASOPHILS # BLD: 0 K/UL (ref 0–0.12)
BILIRUB UR QL STRIP.AUTO: NEGATIVE
COLOR UR: YELLOW
EOSINOPHIL # BLD AUTO: 0 K/UL (ref 0–0.51)
EOSINOPHIL NFR BLD: 0 % (ref 0–6.9)
EPI CELLS #/AREA URNS HPF: NEGATIVE /HPF
ERYTHROCYTE [DISTWIDTH] IN BLOOD BY AUTOMATED COUNT: 66 FL (ref 35.9–50)
GLUCOSE UR STRIP.AUTO-MCNC: NEGATIVE MG/DL
HCT VFR BLD AUTO: 33.8 % (ref 37–47)
HGB BLD-MCNC: 10.9 G/DL (ref 12–16)
HYALINE CASTS #/AREA URNS LPF: ABNORMAL /LPF
KETONES UR STRIP.AUTO-MCNC: 15 MG/DL
LEUKOCYTE ESTERASE UR QL STRIP.AUTO: ABNORMAL
LYMPHOCYTES # BLD AUTO: 0.09 K/UL (ref 1–4.8)
LYMPHOCYTES NFR BLD: 0.9 % (ref 22–41)
MANUAL DIFF BLD: ABNORMAL
MCH RBC QN AUTO: 31.8 PG (ref 27–33)
MCHC RBC AUTO-ENTMCNC: 32.2 G/DL (ref 33.6–35)
MCV RBC AUTO: 98.5 FL (ref 81.4–97.8)
METAMYELOCYTES NFR BLD MANUAL: 1.7 %
MICRO URNS: ABNORMAL
MONOCYTES # BLD AUTO: 0.42 K/UL (ref 0–0.85)
MONOCYTES NFR BLD AUTO: 4.4 % (ref 0–13.4)
MORPHOLOGY BLD-IMP: NORMAL
MYELOCYTES NFR BLD MANUAL: 0.9 %
NEUTROPHILS # BLD AUTO: 8.75 K/UL (ref 2–7.15)
NEUTROPHILS NFR BLD: 81.6 % (ref 44–72)
NEUTS BAND NFR BLD MANUAL: 10.5 % (ref 0–10)
NITRITE UR QL STRIP.AUTO: NEGATIVE
NRBC # BLD AUTO: 0 K/UL
NRBC BLD-RTO: 0 /100 WBC
PH UR STRIP.AUTO: 5.5 [PH]
PLATELET # BLD AUTO: 91 K/UL (ref 164–446)
PLATELET BLD QL SMEAR: NORMAL
PMV BLD AUTO: 10.4 FL (ref 9–12.9)
PROT UR QL STRIP: NEGATIVE MG/DL
RBC # BLD AUTO: 3.43 M/UL (ref 4.2–5.4)
RBC # URNS HPF: ABNORMAL /HPF
RBC BLD AUTO: NORMAL
RBC UR QL AUTO: ABNORMAL
SP GR UR STRIP.AUTO: 1.02
UROBILINOGEN UR STRIP.AUTO-MCNC: 1 MG/DL
WBC # BLD AUTO: 9.5 K/UL (ref 4.8–10.8)
WBC #/AREA URNS HPF: ABNORMAL /HPF

## 2019-01-02 PROCEDURE — 85027 COMPLETE CBC AUTOMATED: CPT

## 2019-01-02 PROCEDURE — 85007 BL SMEAR W/DIFF WBC COUNT: CPT

## 2019-01-02 PROCEDURE — 36415 COLL VENOUS BLD VENIPUNCTURE: CPT

## 2019-01-02 PROCEDURE — 36592 COLLECT BLOOD FROM PICC: CPT

## 2019-01-02 PROCEDURE — 81001 URINALYSIS AUTO W/SCOPE: CPT

## 2019-01-02 PROCEDURE — 71046 X-RAY EXAM CHEST 2 VIEWS: CPT

## 2019-01-02 PROCEDURE — 87040 BLOOD CULTURE FOR BACTERIA: CPT

## 2019-01-02 PROCEDURE — 87086 URINE CULTURE/COLONY COUNT: CPT

## 2019-01-02 RX ORDER — LEVOFLOXACIN 500 MG/1
500 TABLET, FILM COATED ORAL DAILY
Qty: 5 TAB | Refills: 0 | Status: SHIPPED | OUTPATIENT
Start: 2019-01-02 | End: 2019-03-05

## 2019-01-02 ASSESSMENT — PAIN SCALES - GENERAL: PAINLEVEL_OUTOF10: 8

## 2019-01-03 ENCOUNTER — TELEPHONE (OUTPATIENT)
Dept: HEMATOLOGY ONCOLOGY | Facility: MEDICAL CENTER | Age: 54
End: 2019-01-03

## 2019-01-03 NOTE — TELEPHONE ENCOUNTER
RN called patient to f/u on status after she was febrile in the infusion center yesterday evening.  Patient states she is feeling much better. Patient reports she started her abx that Zenia PACKER had prescribed. RN asked about recent fevers and patient states she has had none since the episode yesterday. Patient states she checked her temp last night before bed and it was 97.9. Patient denies any coughing or sob at this time. RN instructed patient to call if any symptoms arise. Patient instructed to make sure she is getting appropriate fluids and oral intake. Patient thanked RN and again stated she was feeling much better now.

## 2019-01-03 NOTE — PROGRESS NOTES
Received message from infusion nurse stating patient febrile today with chills and tachycardic.  Orders for CBC, blood cultures via peripheral and PICC line, UA, chest x-ray have been given to RN.  Also sent prescription to pharmacy for Levaquin 500 mg p.o. daily times 5 days.    CBC to be drawn in infusion center and if patient is neutropenic she is to go to the emergency department.

## 2019-01-03 NOTE — TELEPHONE ENCOUNTER
RN called patient to f/u on status after she was febrile in the infusion center yesterday evening.  Patient did not answer and vm left with request to call back.

## 2019-01-03 NOTE — PROGRESS NOTES
"Pt arrived ambulatory to [facility] for weekly PICC line dressing change.  Upon arrival, pt febrile and tachycardic (see flowsheet).  She states she feels \"awful\".  Pt with severe bone pain and she has lost ten lbs since last visit.  She reports drinking \"plenty of water\" but she has not been able to eat since Sunday.  She also reports clear, productive cough since Monday.  S/sx reported to [place], APRN.  Received telephone orders for CBC, BC x 2, UA/cx if indicated, and CXR.  Per APRN, pt to report to ER if neutropenic.  All orders carried out appropriately.  RUE PICC line dressing changed in sterile field, claves changed.  CBC results reviewed, pt does not appear to be neutropenic.  RN escorted pt to imaging for CXR to be completed.  RN informed pt to  Levaquin Rx from her pharmacy to ideally start tonight, she will take daily for 5 days.  Pt reports understanding of instructions and education provided.  Pt knows when to return to [facility] for next appointment.   "

## 2019-01-04 LAB
BACTERIA UR CULT: NORMAL
SIGNIFICANT IND 70042: NORMAL
SITE SITE: NORMAL
SOURCE SOURCE: NORMAL

## 2019-01-07 LAB
BACTERIA BLD CULT: NORMAL
BACTERIA BLD CULT: NORMAL
SIGNIFICANT IND 70042: NORMAL
SIGNIFICANT IND 70042: NORMAL
SITE SITE: NORMAL
SITE SITE: NORMAL
SOURCE SOURCE: NORMAL
SOURCE SOURCE: NORMAL

## 2019-01-07 NOTE — TELEPHONE ENCOUNTER
Rn called patient to f/u current status. Patient states she is doing well no fevers, chills, productive cough, sob or pain. Rn reminded patient of upcomming appt on 1/10/2019. Patient verbalized understanding at this time.

## 2019-01-10 ENCOUNTER — OFFICE VISIT (OUTPATIENT)
Dept: HEMATOLOGY ONCOLOGY | Facility: MEDICAL CENTER | Age: 54
End: 2019-01-10
Payer: MEDICAID

## 2019-01-10 VITALS
BODY MASS INDEX: 43.11 KG/M2 | HEART RATE: 80 BPM | DIASTOLIC BLOOD PRESSURE: 80 MMHG | TEMPERATURE: 98 F | SYSTOLIC BLOOD PRESSURE: 138 MMHG | HEIGHT: 60 IN | WEIGHT: 219.58 LBS | OXYGEN SATURATION: 94 % | RESPIRATION RATE: 14 BRPM

## 2019-01-10 DIAGNOSIS — C50.912 LEFT BREAST CANCER WITH T3 TUMOR, >5 CM IN GREATEST DIMENSION (HCC): ICD-10-CM

## 2019-01-10 DIAGNOSIS — C50.919 HER2 (HUMAN EPIDERMAL GROWTH FACTOR RECEPTOR 2) NEGATIVE CARCINOMA OF BREAST (HCC): ICD-10-CM

## 2019-01-10 DIAGNOSIS — Z51.11 ENCOUNTER FOR ANTINEOPLASTIC CHEMOTHERAPY: ICD-10-CM

## 2019-01-10 PROCEDURE — 99214 OFFICE O/P EST MOD 30 MIN: CPT | Performed by: INTERNAL MEDICINE

## 2019-01-10 ASSESSMENT — PAIN SCALES - GENERAL: PAINLEVEL: NO PAIN

## 2019-01-10 NOTE — PROGRESS NOTES
Date of visit: 1/10/2019  11:41 AM      Chief Complaint- Chief Complaint- Locally advanced breast Ca , ER/MN positive Her2 kamala negative.      Identification/Prior relevant history:      -Palpable left breast mass since 4/2018. Also noticed left axillary mass  -Outside mammogram/ultrasound - large irregular mass in the 6:00 position of the left breast along with a large left axillary lymph node  -Ultrasound biopsy high-grade invasive ductal carcinoma, poorly differentiated. A year. Positive and HER-2/kamala negative. Ki- 67 -60%.  She is G0, P0. No hormone replacement therapy. No significant family history of breast carcinoma that she knows of.   - 9/17/18 - PET scan  1. Hypermetabolic left breast mass, consistent with malignancy.  2. Hypermetabolic left axillary and prepectoral lymphadenopathy, consistent with karlie metastases.  3. Asymmetric hypermetabolism in the right tongue base/right lingual tonsil. While it may represent salivary gland secretion pooling, another primary malignancy is also a consideration.   - 10/18/2018 - Genetic Testing       - No clinically significant variants for BRCA1/2 Detected.    Interim history    Past Medical History:      Past Medical History:   Diagnosis Date   • Breast cancer, stage 3 (HCC)        Past surgical history:       Past Surgical History:   Procedure Laterality Date   • ANKLE FUSION     • GASTRIC BYPASS LAPAROSCOPIC         Allergies:       Patient has no known allergies.    Medications:         Current Outpatient Prescriptions   Medication Sig Dispense Refill   • Omega-3 Fatty Acids (FISH OIL) 1000 MG Cap capsule Take 1,000 mg by mouth 3 times a day, with meals.     • maalox plus-benadryl-visc lidocaine (MAGIC MOUTHWASH) Take 5 mL by mouth every 6 hours as needed. 1:1:1 ratio 250 mL 3   • albuterol 108 (90 Base) MCG/ACT Aero Soln inhalation aerosol Inhale 2 Puffs by mouth every 6 hours as needed for Shortness of Breath. 8.5 g 0   • therapeutic multivitamin-minerals  (THERAGRAN-M) Tab Take 1 Tab by mouth every day.     • levoFLOXacin (LEVAQUIN) 500 MG tablet Take 1 Tab by mouth every day. (Patient not taking: Reported on 1/10/2019) 5 Tab 0   • ondansetron (ZOFRAN) 4 MG Tab tablet Take 1 Tab by mouth every four hours as needed for Nausea/Vomiting (for nausea, vomiting). (Patient not taking: Reported on 12/28/2018) 30 Tab 6   • prochlorperazine (COMPAZINE) 10 MG Tab Take 1 Tab by mouth every 6 hours as needed (for nausea, vomiting). (Patient not taking: Reported on 12/28/2018) 30 Tab 6     No current facility-administered medications for this visit.          Social History:     Social History     Social History   • Marital status: Single     Spouse name: N/A   • Number of children: N/A   • Years of education: N/A     Occupational History   • Not on file.     Social History Main Topics   • Smoking status: Never Smoker   • Smokeless tobacco: Never Used   • Alcohol use No   • Drug use: No   • Sexual activity: Not on file     Other Topics Concern   • Not on file     Social History Narrative   • No narrative on file       Family History:    No family history on file.    Review of Systems:  All other review of systems are negative except what was mentioned above in the HPI.    Constitutional: Negative for fever, chills, weight loss and malaise/fatigue.    HEENT: No new auditory or visual complaints. No sore throat and neck pain.     Respiratory: Negative for cough, sputum production, shortness of breath and wheezing.    Cardiovascular: Negative for chest pain, palpitations, orthopnea and leg swelling.    Gastrointestinal: Negative for heartburn, nausea, vomiting and abdominal pain.    Genitourinary: Negative for dysuria, hematuria    Musculoskeletal: No new arthralgias or myalgias   Skin: Negative for rash and itching.    Neurological: Negative for focal weakness and headaches.    Endo/Heme/Allergies: No abnormal bleed/bruise.    Psychiatric/Behavioral: No new  "depression/anxiety.    Physical Exam:  Vitals: /80   Pulse 80   Temp 36.7 °C (98 °F)   Resp 14   Ht 1.53 m (5' 0.24\")   Wt 99.6 kg (219 lb 9.3 oz)   SpO2 94%   BMI 42.54 kg/m²     General: Not in acute distress, alert and oriented x 3  HEENT: No pallor, icterus. Oropharynx clear.   Neck: Supple, no palpable masses.  Lymph nodes: No palpable cervical, supraclavicular, axillary or inguinal lymphadenopathy.    CVS: regular rate and rhythm, no rubs or gallops  RESP: Clear to auscultate bilaterally, no wheezing or crackles.   ABD: Soft, non tender, non distended, positive bowel sounds, no palpable organomegaly  EXT: No edema or cyanosis  CNS: Alert and oriented x3, No focal deficits.  Skin- No rash  Breast-significant improvement in size of the large breast/axillary mass which is much more indistinct and barely palpable    Labs:   No visits with results within 1 Week(s) from this visit.   Latest known visit with results is:   Outpatient Infusion Services on 01/02/2019   Component Date Value Ref Range Status   • WBC 01/02/2019 9.5  4.8 - 10.8 K/uL Final   • RBC 01/02/2019 3.43* 4.20 - 5.40 M/uL Final   • Hemoglobin 01/02/2019 10.9* 12.0 - 16.0 g/dL Final   • Hematocrit 01/02/2019 33.8* 37.0 - 47.0 % Final   • MCV 01/02/2019 98.5* 81.4 - 97.8 fL Final   • MCH 01/02/2019 31.8  27.0 - 33.0 pg Final   • MCHC 01/02/2019 32.2* 33.6 - 35.0 g/dL Final   • RDW 01/02/2019 66.0* 35.9 - 50.0 fL Final   • Platelet Count 01/02/2019 91* 164 - 446 K/uL Final   • MPV 01/02/2019 10.4  9.0 - 12.9 fL Final   • Nucleated RBC 01/02/2019 0.00  /100 WBC Final   • NRBC (Absolute) 01/02/2019 0.00  K/uL Final   • Neutrophils-Polys 01/02/2019 81.60* 44.00 - 72.00 % Final   • Lymphocytes 01/02/2019 0.90* 22.00 - 41.00 % Final   • Monocytes 01/02/2019 4.40  0.00 - 13.40 % Final   • Eosinophils 01/02/2019 0.00  0.00 - 6.90 % Final   • Basophils 01/02/2019 0.00  0.00 - 1.80 % Final   • Neutrophils (Absolute) 01/02/2019 8.75* 2.00 - 7.15 " K/uL Final    Includes immature neutrophils, if present.   • Lymphs (Absolute) 01/02/2019 0.09* 1.00 - 4.80 K/uL Final   • Monos (Absolute) 01/02/2019 0.42  0.00 - 0.85 K/uL Final   • Eos (Absolute) 01/02/2019 0.00  0.00 - 0.51 K/uL Final   • Baso (Absolute) 01/02/2019 0.00  0.00 - 0.12 K/uL Final   • Significant Indicator 01/02/2019 NEG   Final   • Source 01/02/2019 BLD   Final   • Site 01/02/2019 PICC Line   Final   • Blood Culture 01/02/2019 No growth after 5 days of incubation.   Final   • Significant Indicator 01/02/2019 NEG   Final   • Source 01/02/2019 BLD   Final   • Site 01/02/2019 Peripheral   Final   • Blood Culture 01/02/2019 No growth after 5 days of incubation.   Final   • Color 01/02/2019 Yellow   Final   • Character 01/02/2019 Cloudy*  Final   • Specific Gravity 01/02/2019 1.016  <1.035 Final   • Ph 01/02/2019 5.5  5.0 - 8.0 Final   • Glucose 01/02/2019 Negative  Negative mg/dL Final   • Ketones 01/02/2019 15* Negative mg/dL Final   • Protein 01/02/2019 Negative  Negative mg/dL Final   • Bilirubin 01/02/2019 Negative  Negative Final   • Urobilinogen, Urine 01/02/2019 1.0  Negative Final   • Nitrite 01/02/2019 Negative  Negative Final   • Leukocyte Esterase 01/02/2019 Small* Negative Final   • Occult Blood 01/02/2019 Moderate* Negative Final   • Micro Urine Req 01/02/2019 Microscopic   Final   • WBC 01/02/2019 10-20* /hpf Final    Comment: Female  <12 Yr 0-2  >12 Yr 0-5  Male   None     • RBC 01/02/2019 20-50* /hpf Final    Comment: Female  >12 Yr 0-2  Male   None     • Bacteria 01/02/2019 Few* None /hpf Final   • Epithelial Cells 01/02/2019 Negative  /hpf Final   • Hyaline Cast 01/02/2019 0-2  /lpf Final   • Bands-Stabs 01/02/2019 10.50* 0.00 - 10.00 % Final   • Metamyelocytes 01/02/2019 1.70  % Final   • Myelocytes 01/02/2019 0.90  % Final   • Manual Diff Status 01/02/2019 PERFORMED   Final   • Peripheral Smear Review 01/02/2019 see below   Final    Comment: Due to instrument suspect flags, further  review of peripheral smear is  indicated on this patient sample. This review may or may not result in  abnormal findings.     • Plt Estimation 01/02/2019 Decreased   Final   • RBC Morphology 01/02/2019 Normal   Final   • Significant Indicator 01/02/2019 NEG   Final   • Source 01/02/2019 UR   Final   • Site 01/02/2019 -   Final   • Urine Culture 01/02/2019 Mixed skin shawn 10-50,000 cfu/mL   Final             Assessment and Plan:  Locally adanced breast Ca , ER/CT positive Her2 kamala negative, poorly differentiated, high-grade with high Ki-67. She appears to have aggressive pathology, probably Luminal B type. PET scan did not reveal distant metastatic disease, she did have bulky axillary and prepectoral adenopathy.  She has very good treatment response with a significant improvement in size of the breast mass in the adenopathy.  Hopefully, she will achieve complete response, however in view of the ER CT positivity informed her that partial response would not be surprising.  I will refer her back to Dr. Grossman for surgery.  She will need axillary dissection followed by adjuvant radiation.    She tolerated dose dense chemotherapy reasonably well.  She is due for her last dose of paclitaxel.  She does develop some myalgias.  She has mild peripheral neuropathy can be monitored.  Labs are otherwise adequate.    We will tentatively see her back in 3 4 weeks    She agreed and verbalized  agreement and understanding with the current plan.  I answered all questions and concerns at this time         Please note that this dictation was created using voice recognition software. I have made every reasonable attempt to correct obvious errors, but I expect that there are errors of grammar and possibly content that I did not discover before finalizing the note.      SIGNATURES:  Jitendra Linder    CC:  Noam Caldwell M.D.  No ref. provider found

## 2019-01-11 ENCOUNTER — DOCUMENTATION (OUTPATIENT)
Dept: HEMATOLOGY ONCOLOGY | Facility: MEDICAL CENTER | Age: 54
End: 2019-01-11

## 2019-01-11 ENCOUNTER — PATIENT OUTREACH (OUTPATIENT)
Dept: OTHER | Facility: MEDICAL CENTER | Age: 54
End: 2019-01-11

## 2019-01-11 ENCOUNTER — TELEPHONE (OUTPATIENT)
Dept: HEMATOLOGY ONCOLOGY | Facility: MEDICAL CENTER | Age: 54
End: 2019-01-11

## 2019-01-11 ENCOUNTER — OUTPATIENT INFUSION SERVICES (OUTPATIENT)
Dept: ONCOLOGY | Facility: MEDICAL CENTER | Age: 54
End: 2019-01-11
Attending: INTERNAL MEDICINE
Payer: MEDICAID

## 2019-01-11 VITALS
SYSTOLIC BLOOD PRESSURE: 119 MMHG | HEIGHT: 60 IN | WEIGHT: 219.14 LBS | OXYGEN SATURATION: 98 % | TEMPERATURE: 98.5 F | BODY MASS INDEX: 43.02 KG/M2 | DIASTOLIC BLOOD PRESSURE: 82 MMHG | HEART RATE: 115 BPM | RESPIRATION RATE: 18 BRPM

## 2019-01-11 DIAGNOSIS — Z17.0 ER+ PR+ CARCINOMA OF BREAST, LEFT (HCC): ICD-10-CM

## 2019-01-11 DIAGNOSIS — C50.912 ER+ PR+ CARCINOMA OF BREAST, LEFT (HCC): ICD-10-CM

## 2019-01-11 DIAGNOSIS — C50.919 HER2 (HUMAN EPIDERMAL GROWTH FACTOR RECEPTOR 2) NEGATIVE CARCINOMA OF BREAST (HCC): ICD-10-CM

## 2019-01-11 DIAGNOSIS — C50.912 LEFT BREAST CANCER WITH T3 TUMOR, >5 CM IN GREATEST DIMENSION (HCC): ICD-10-CM

## 2019-01-11 LAB
ANION GAP SERPL CALC-SCNC: 10 MMOL/L (ref 0–11.9)
BASOPHILS # BLD AUTO: 0.4 % (ref 0–1.8)
BASOPHILS # BLD: 0.02 K/UL (ref 0–0.12)
BUN SERPL-MCNC: 10 MG/DL (ref 8–22)
CALCIUM SERPL-MCNC: 9 MG/DL (ref 8.5–10.5)
CHLORIDE SERPL-SCNC: 111 MMOL/L (ref 96–112)
CO2 SERPL-SCNC: 24 MMOL/L (ref 20–33)
CREAT SERPL-MCNC: 0.64 MG/DL (ref 0.5–1.4)
EOSINOPHIL # BLD AUTO: 0.05 K/UL (ref 0–0.51)
EOSINOPHIL NFR BLD: 0.9 % (ref 0–6.9)
ERYTHROCYTE [DISTWIDTH] IN BLOOD BY AUTOMATED COUNT: 60.5 FL (ref 35.9–50)
GLUCOSE SERPL-MCNC: 144 MG/DL (ref 65–99)
HCT VFR BLD AUTO: 36 % (ref 37–47)
HGB BLD-MCNC: 11.5 G/DL (ref 12–16)
IMM GRANULOCYTES # BLD AUTO: 0.05 K/UL (ref 0–0.11)
IMM GRANULOCYTES NFR BLD AUTO: 0.9 % (ref 0–0.9)
LYMPHOCYTES # BLD AUTO: 0.71 K/UL (ref 1–4.8)
LYMPHOCYTES NFR BLD: 13 % (ref 22–41)
MCH RBC QN AUTO: 31.3 PG (ref 27–33)
MCHC RBC AUTO-ENTMCNC: 31.9 G/DL (ref 33.6–35)
MCV RBC AUTO: 98.1 FL (ref 81.4–97.8)
MONOCYTES # BLD AUTO: 0.4 K/UL (ref 0–0.85)
MONOCYTES NFR BLD AUTO: 7.3 % (ref 0–13.4)
NEUTROPHILS # BLD AUTO: 4.24 K/UL (ref 2–7.15)
NEUTROPHILS NFR BLD: 77.5 % (ref 44–72)
NRBC # BLD AUTO: 0 K/UL
NRBC BLD-RTO: 0 /100 WBC
PLATELET # BLD AUTO: 172 K/UL (ref 164–446)
PMV BLD AUTO: 10.6 FL (ref 9–12.9)
POTASSIUM SERPL-SCNC: 3.7 MMOL/L (ref 3.6–5.5)
RBC # BLD AUTO: 3.67 M/UL (ref 4.2–5.4)
SODIUM SERPL-SCNC: 145 MMOL/L (ref 135–145)
WBC # BLD AUTO: 5.5 K/UL (ref 4.8–10.8)

## 2019-01-11 PROCEDURE — 80048 BASIC METABOLIC PNL TOTAL CA: CPT

## 2019-01-11 PROCEDURE — 85025 COMPLETE CBC W/AUTO DIFF WBC: CPT

## 2019-01-11 PROCEDURE — 96415 CHEMO IV INFUSION ADDL HR: CPT

## 2019-01-11 PROCEDURE — 96413 CHEMO IV INFUSION 1 HR: CPT

## 2019-01-11 PROCEDURE — 700105 HCHG RX REV CODE 258: Performed by: INTERNAL MEDICINE

## 2019-01-11 PROCEDURE — 96377 APPLICATON ON-BODY INJECTOR: CPT

## 2019-01-11 PROCEDURE — 304540 HCHG NITRO SET VENT 2ND TUB

## 2019-01-11 PROCEDURE — 700111 HCHG RX REV CODE 636 W/ 250 OVERRIDE (IP): Performed by: INTERNAL MEDICINE

## 2019-01-11 PROCEDURE — 96375 TX/PRO/DX INJ NEW DRUG ADDON: CPT

## 2019-01-11 RX ORDER — 0.9 % SODIUM CHLORIDE 0.9 %
VIAL (ML) INJECTION PRN
Status: CANCELLED | OUTPATIENT
Start: 2019-01-11

## 2019-01-11 RX ORDER — ONDANSETRON 8 MG/1
8 TABLET, ORALLY DISINTEGRATING ORAL
Status: CANCELLED | OUTPATIENT
Start: 2019-01-11

## 2019-01-11 RX ORDER — ONDANSETRON 2 MG/ML
4 INJECTION INTRAMUSCULAR; INTRAVENOUS
Status: CANCELLED | OUTPATIENT
Start: 2019-01-11

## 2019-01-11 RX ORDER — 0.9 % SODIUM CHLORIDE 0.9 %
5 VIAL (ML) INJECTION PRN
Status: CANCELLED | OUTPATIENT
Start: 2019-01-11

## 2019-01-11 RX ORDER — SODIUM CHLORIDE 9 MG/ML
INJECTION, SOLUTION INTRAVENOUS CONTINUOUS
Status: CANCELLED | OUTPATIENT
Start: 2019-01-11

## 2019-01-11 RX ORDER — PROCHLORPERAZINE MALEATE 10 MG
10 TABLET ORAL EVERY 6 HOURS PRN
Status: CANCELLED | OUTPATIENT
Start: 2019-01-11

## 2019-01-11 RX ADMIN — DEXAMETHASONE SODIUM PHOSPHATE 12 MG: 4 INJECTION, SOLUTION INTRAMUSCULAR; INTRAVENOUS at 14:50

## 2019-01-11 RX ADMIN — FAMOTIDINE 20 MG: 10 INJECTION INTRAVENOUS at 14:48

## 2019-01-11 RX ADMIN — PEGFILGRASTIM 6 MG: KIT SUBCUTANEOUS at 18:30

## 2019-01-11 RX ADMIN — PACLITAXEL 360.5 MG: 300 INJECTION, SOLUTION INTRAVENOUS at 15:22

## 2019-01-11 RX ADMIN — DIPHENHYDRAMINE HYDROCHLORIDE 25 MG: 50 INJECTION INTRAMUSCULAR; INTRAVENOUS at 15:00

## 2019-01-11 ASSESSMENT — PAIN SCALES - GENERAL: PAINLEVEL_OUTOF10: 0

## 2019-01-11 NOTE — PROGRESS NOTES
Oncology nurse navigator in person outreach.     Visit with pt while in [facility] today. Today is last neoadjuvant chemo. Pt will be seeing Dr. Grossman to discuss surgery. Pt had financial question, MONALISA Quintanilla, discussed with pt. Pt stated all is well at home, she has informed the kids' schools what is going on in case the kids are having difficulty coping with her being sick, but she states right now they seem to be coping very well. Invited pt to [employer] program. Pt states her transportation remains reliable for the winter and she denied other concerns or needs at this time.

## 2019-01-11 NOTE — PROGRESS NOTES
"Pharmacy Chemotherapy Note    Dx: Breast Cancer         Cycle 4 (DDPaclitaxel)  Previous treatment = 12/28/18 (s/p 4 cycles DDAC)    Protocol: Dose Dense AC followed by Dose Dense paclitaxel     Doxorubicin 60 mg/m2 IV on day 1   11/16/18 Dose reduced by 10% to 54 mg/m2 per Dr. Linder d/t fatigue and peripheral neuropathy  Cyclophosphamide 600 mg/m2 IV over 30 min on day 1   11/16/18 Dose reduced by 10% to 540 mg/m2 per Dr. Linder d/t fatigue and peripheral neuropathy  14 day cycle for 4 cycles (completed)  ~Followed by~  Paclitaxel 175 mg/m2 IV over 3 hours on day 1  14 day cycle for 14 cycles  NCCN Guidelines for breast cancer V.1.2018  Renuka ML, et al. J Clin Oncol. 2003:21(8):1431-9    /82   Pulse (!) 115   Temp 36.9 °C (98.5 °F) (Temporal)   Resp 18   Ht 1.53 m (5' 0.24\")   Wt 99.4 kg (219 lb 2.2 oz)   SpO2 98%   BMI 42.46 kg/m²  Body surface area is 2.06 meters squared.    LABS 1/11/2019:  ANC~ 4240 Plt = 172 k   Hgb = 11.5     SCr = 0.64 mg/dL CrCl >125mL/min       LABS 12/27/2018  LFT's = WNL TBili = 0.5     ECHO:  9/25/18 LVEF: 55% (paper)      Paclitaxel 175 mg/m2 x Body surface area is 2.06 meters squared. m2 = 360.5 mg   <5% difference, OK to treat with final dose = 360.5 mg IV       JERSEY Chadwick, PharmD  "

## 2019-01-11 NOTE — PROGRESS NOTES
Nutrition Services: Follow-up  I visited with patient today in infusion during her last scheduled chemo appointment.  Patients weight is down today to 219 pounds.  She reports that her appetite is decent but that she has not been able to eat due to pain.  She reports the pain usually happens 1 week after chemo and then she is back to normal and can eat. We discussed the importance of adequate nutrition as well as weight stability during treatment and the benefit of preventing unintentional weight loss.  Discussed high protein food items and suggested patient try protein shakes or supplements when she is unable to eat or has decreased appetite.  She is agreeable to this suggestion.  Encouraged frequent small meals and including 1-2 supplements or shakes daily.  Provided ensure and boost samples for patient to try.     Assessment  Ricky today: 219 pounds  Height: 5'0''  BMI: 43   Unintentional decrease of 9 pounds/4% in the last 2 weeks which is significant.     Plan/Recommendations:  1. Discuss pain management with MD.   2. Incorporate boost/ensure or other comparable protein shake 1-2 times daily when appetite and intake decreased.  Samples provided as well as handout on smoothie/shake recipes.  3. Increase meal/snack frequency to every 2-3 hours, especially when appetite and intake are decreased.     RD will follow-up with patient.  Encouraged her to contact us should questions or concerns arise.

## 2019-01-11 NOTE — PROGRESS NOTES
Chemotherapy Verification - PRIMARY RN      Height = 153 cm  Weight = 99.4 kg  BSA = 2.06 m2       Medication: Taxol  Dose: 175 mg/m2  Calculated Dose: 360.5 mg                             (In mg/m2, AUC, mg/kg)       I confirm this process was performed independently with the BSA and all final chemotherapy dosing calculations congruent.  Any discrepancies of 5% or greater have been addressed with the chemotherapy pharmacist. The resolution of the discrepancy has been documented in the EPIC progress notes.

## 2019-01-11 NOTE — PROGRESS NOTES
Chemotherapy Verification - SECONDARY RN     D1C4    Height = 153 cm  Weight = 99.4 kg  BSA = 2.06 m2       Medication: Paclitaxel (Taxol)  Dose: 175 mg/m2  Calculated Dose: 360.5 mg                                I confirm that this process was performed independently.

## 2019-01-11 NOTE — PROGRESS NOTES
"Pharmacy Chemotherapy Verification Note:    Patient Name: Addie Orellana      Dx: Locally Advanced Breast CA (ER/KS+, HER2-)         Protocol: Dose Dense AC --> Dose Dense Taxol  DOXOrubicin 60 mg/m² slow IVP on Day 1  --11/16/18 C4 Dose reduced 10% for neuropathy per MD  Cyclophosphamide 600 mg/m² IV over 30 minutes on Day 1  --11/16/18 C4 Dose reduced 10% for neuropathy per MD  Every 2 weeks x4 cycles followed by (Completed 11/16/18)  PACLItaxel 175 mg/m² IV over 3 hours on Day 1  Every 2 weeks x 4 cycles  NCCN Guidelines for Breast Cancer. V.1.2018.  Renuka ML, et al. J Clin Oncol. 2003;21(8):1431-9.    Allergies:  Patient has no known allergies.     /82   Pulse (!) 115   Temp 36.9 °C (98.5 °F) (Temporal)   Resp 18   Ht 1.53 m (5' 0.24\")   Wt 99.4 kg (219 lb 2.2 oz)   SpO2 98%   BMI 42.46 kg/m²  Body surface area is 2.06 meters squared.    Labs 1/11/19  ANC~ 4240 Plt = 172k   Hgb = 11.5     SCr = 0.64 mg/dL CrCl ~ >125 mL/min (minimum SCr of 0.7)    Labs 12/27/18  LFT's = WNLs  TBili = 0.5      9/25/18 ECHO: LVEF 53% (paper copy in chart)     Drug Order   (Drug name, dose, route, IV Fluid & volume, frequency, number of doses) Cycle: 4 (DD Taxol cycle)      Previous treatment: C3 of DD Taxol on 12/28/18; s/p 4 cycles of DDAC last treatment 11/16/18     Medication = Paclitaxel  Base Dose = 175 mg/m2  Calc Dose: Base Dose x 2.06 m² = 360.5 mg  Final Dose = 360.5 mg  Route = IV  Fluid & Volume =  mL  Admin Duration = Over 3 hours          <5% difference, ok to treat with final written dose     By my signature below, I confirm this process was performed independently with the BSA and all final chemotherapy dosing calculations congruent. I have reviewed the above chemotherapy order and that my calculation of the final dose and BSA (when applicable) corroborate those calculations of the  pharmacist.     Lucio H. Bertha, PharmD      "

## 2019-01-11 NOTE — TELEPHONE ENCOUNTER
Brisa RN, from [facility] called requesting to speak to Dr. Linder regarding patient's chemotherapy orders needing signature. Warm transferred call to Dr. Linder.

## 2019-01-12 NOTE — PROGRESS NOTES
Reports rec'd from Sonia CAMARA. Taxol finished without incident. On-Pro placed to left arm per pt preference, confirmed to be flashing green light. PICC flushed, brisk blood return again observed, wrapped in gauze and protective sleeve. Pt will return for dressing changes, left on foot in great spirits.

## 2019-01-12 NOTE — PROGRESS NOTES
Pt arrived to [facility], ambulatory, for dose dense Taxol. Pt voices no complaints. PICC line flushed per policy, positive blood return noted. Labs drawn and reviewed, pt within parameters to treat today. Pre-medications infused with no complications. Taxol infusion started. PICC line dressing changed with sterile field. Report given to JAX Alicea.

## 2019-01-16 ENCOUNTER — APPOINTMENT (OUTPATIENT)
Dept: ONCOLOGY | Facility: MEDICAL CENTER | Age: 54
End: 2019-01-16
Attending: DERMATOLOGY
Payer: MEDICAID

## 2019-01-21 ENCOUNTER — OUTPATIENT INFUSION SERVICES (OUTPATIENT)
Dept: ONCOLOGY | Facility: MEDICAL CENTER | Age: 54
End: 2019-01-21
Attending: INTERNAL MEDICINE
Payer: MEDICAID

## 2019-01-21 VITALS
HEART RATE: 98 BPM | BODY MASS INDEX: 43.93 KG/M2 | TEMPERATURE: 97.1 F | SYSTOLIC BLOOD PRESSURE: 146 MMHG | HEIGHT: 60 IN | WEIGHT: 223.77 LBS | DIASTOLIC BLOOD PRESSURE: 86 MMHG | RESPIRATION RATE: 18 BRPM | OXYGEN SATURATION: 97 %

## 2019-01-21 DIAGNOSIS — C50.912 LEFT BREAST CANCER WITH T3 TUMOR, >5 CM IN GREATEST DIMENSION (HCC): ICD-10-CM

## 2019-01-21 PROCEDURE — 306780 HCHG STAT FOR TRANSFUSION PER CASE

## 2019-01-21 PROCEDURE — 99211 OFF/OP EST MAY X REQ PHY/QHP: CPT

## 2019-01-21 ASSESSMENT — PAIN SCALES - GENERAL: PAINLEVEL_OUTOF10: 0

## 2019-01-21 NOTE — PROGRESS NOTES
Pt scheduled for PICC line removal. Arrived ambulatory, independent; denied pain/discomfort, s/sx infection, or recent health changes. Pt educated on line removal, site monitoring, when to call MD, etc. Pt verbalized understanding. Line removed w/o adverse events; length 43 cm. 30-mins post-obs completed w/o adverse events. Pt denied need for further appts as treatment plan has been completed. Pt denied any unmet needs; discharged ambulatory, independent, NAD.

## 2019-01-25 ENCOUNTER — TELEPHONE (OUTPATIENT)
Dept: HEMATOLOGY ONCOLOGY | Facility: MEDICAL CENTER | Age: 54
End: 2019-01-25

## 2019-01-25 NOTE — TELEPHONE ENCOUNTER
"Nutrition Services: RD follow-up.  Per telephone discussion with patient, she reports that appetite has now returned and pain has subsided, which is helping her to eat more.  She is currently unsure of body weight and declined interview with dietitian, stating \"I have so many doctor's appointments, I just don't have the time to meet with you\".  RD phone number was provided to patient for f/u prn.  -weight per Epic on 1/21: 223 lbs - four lb gain since last RD assessment done on 1/11.  -pt also reports that she tries not to \"overindulge\" on PO diet and does not keep track of her scale weight, so as not to \"get depressed\"  -pt declines interview with dietitian at this time     Please call dietitian at [phone] prn.   "

## 2019-01-31 ENCOUNTER — OFFICE VISIT (OUTPATIENT)
Dept: HEMATOLOGY ONCOLOGY | Facility: MEDICAL CENTER | Age: 54
End: 2019-01-31
Payer: MEDICAID

## 2019-01-31 VITALS
OXYGEN SATURATION: 98 % | TEMPERATURE: 97.2 F | WEIGHT: 221.56 LBS | DIASTOLIC BLOOD PRESSURE: 58 MMHG | BODY MASS INDEX: 43.5 KG/M2 | HEIGHT: 60 IN | SYSTOLIC BLOOD PRESSURE: 118 MMHG | HEART RATE: 68 BPM

## 2019-01-31 DIAGNOSIS — C50.912 LEFT BREAST CANCER WITH T3 TUMOR, >5 CM IN GREATEST DIMENSION (HCC): ICD-10-CM

## 2019-01-31 PROCEDURE — 99214 OFFICE O/P EST MOD 30 MIN: CPT | Performed by: INTERNAL MEDICINE

## 2019-01-31 NOTE — PROGRESS NOTES
Date of visit: 1/31/2019  10:14 AM      Chief Complaint- Locally advanced breast Ca , ER/FL positive Her2 kamala negative.      Identification/Prior relevant history:      -Palpable left breast mass since 4/2018. Also noticed left axillary mass  -Outside mammogram/ultrasound - large irregular mass in the 6:00 position of the left breast along with a large left axillary lymph node  -Ultrasound biopsy high-grade invasive ductal carcinoma, poorly differentiated. A year. Positive and HER-2/kamala negative. Ki- 67 -60%.  She is G0, P0. No hormone replacement therapy. No significant family history of breast carcinoma that she knows of.   - 9/17/18 - PET scan  1. Hypermetabolic left breast mass, consistent with malignancy.  2. Hypermetabolic left axillary and prepectoral lymphadenopathy, consistent with karlie metastases.  3. Asymmetric hypermetabolism in the right tongue base/right lingual tonsil. While it may represent salivary gland secretion pooling, another primary malignancy is also a consideration.   - 10/18/2018 - Genetic Testing       - No clinically significant variants for BRCA1/2 Detected.    Interim history    She finished a course of dose dense AC-T , better than expected.  She did have some cytopenias and mucositis which subsequently resolved.  No significant peripheral neuropathy.  Her lymphadenopathy and breast mass have clinically resolved.  She is awaiting appointment with Dr. Grossman  Past Medical History:      Past Medical History:   Diagnosis Date   • Breast cancer, stage 3 (HCC)        Past surgical history:       Past Surgical History:   Procedure Laterality Date   • ANKLE FUSION     • GASTRIC BYPASS LAPAROSCOPIC         Allergies:       Patient has no known allergies.    Medications:         Current Outpatient Prescriptions   Medication Sig Dispense Refill   • levoFLOXacin (LEVAQUIN) 500 MG tablet Take 1 Tab by mouth every day. 5 Tab 0   • Omega-3 Fatty Acids (FISH OIL) 1000 MG Cap capsule Take 1,000 mg by  mouth 3 times a day, with meals.     • maalox plus-benadryl-visc lidocaine (MAGIC MOUTHWASH) Take 5 mL by mouth every 6 hours as needed. 1:1:1 ratio 250 mL 3   • therapeutic multivitamin-minerals (THERAGRAN-M) Tab Take 1 Tab by mouth every day.     • albuterol 108 (90 Base) MCG/ACT Aero Soln inhalation aerosol Inhale 2 Puffs by mouth every 6 hours as needed for Shortness of Breath. 8.5 g 0   • ondansetron (ZOFRAN) 4 MG Tab tablet Take 1 Tab by mouth every four hours as needed for Nausea/Vomiting (for nausea, vomiting). (Patient not taking: Reported on 12/28/2018) 30 Tab 6   • prochlorperazine (COMPAZINE) 10 MG Tab Take 1 Tab by mouth every 6 hours as needed (for nausea, vomiting). (Patient not taking: Reported on 12/28/2018) 30 Tab 6     No current facility-administered medications for this visit.          Social History:     Social History     Social History   • Marital status: Single     Spouse name: N/A   • Number of children: N/A   • Years of education: N/A     Occupational History   • Not on file.     Social History Main Topics   • Smoking status: Never Smoker   • Smokeless tobacco: Never Used   • Alcohol use No   • Drug use: No   • Sexual activity: Not on file     Other Topics Concern   • Not on file     Social History Narrative   • No narrative on file       Family History:    No family history on file.    Review of Systems:  All other review of systems are negative except what was mentioned above in the HPI.    Constitutional: Negative for fever, chills, weight loss and malaise/fatigue.    HEENT: No new auditory or visual complaints. No sore throat and neck pain.     Respiratory: Negative for cough, sputum production, shortness of breath and wheezing.    Cardiovascular: Negative for chest pain, palpitations, orthopnea and leg swelling.    Gastrointestinal: Negative for heartburn, nausea, vomiting and abdominal pain.    Genitourinary: Negative for dysuria, hematuria    Musculoskeletal: No new arthralgias or  "myalgias   Skin: Negative for rash and itching.    Neurological: Negative for focal weakness and headaches.    Endo/Heme/Allergies: No abnormal bleed/bruise.    Psychiatric/Behavioral: No new depression/anxiety.    Physical Exam:  Vitals: /58   Pulse 68   Temp 36.2 °C (97.2 °F)   Ht 1.53 m (5' 0.24\")   Wt 100.5 kg (221 lb 9 oz)   SpO2 98%   BMI 42.93 kg/m²     General: Not in acute distress, alert and oriented x 3  HEENT: No pallor, icterus. Oropharynx clear.   Neck: Supple, no palpable masses.  Lymph nodes: No palpable cervical, supraclavicular, axillary or inguinal lymphadenopathy.    CVS: regular rate and rhythm, no rubs or gallops  RESP: Clear to auscultate bilaterally, no wheezing or crackles.   ABD: Soft, non tender, non distended, positive bowel sounds, no palpable organomegaly  EXT: No edema or cyanosis  CNS: Alert and oriented x3, No focal deficits.  Skin- No rash  Breast-significant improvement in size of the large breast/axillary mass which is much more indistinct and barely palpable    Labs:   No visits with results within 1 Week(s) from this visit.   Latest known visit with results is:   [facility] on 01/11/2019   Component Date Value Ref Range Status   • WBC 01/11/2019 5.5  4.8 - 10.8 K/uL Final   • RBC 01/11/2019 3.67* 4.20 - 5.40 M/uL Final   • Hemoglobin 01/11/2019 11.5* 12.0 - 16.0 g/dL Final   • Hematocrit 01/11/2019 36.0* 37.0 - 47.0 % Final   • MCV 01/11/2019 98.1* 81.4 - 97.8 fL Final   • MCH 01/11/2019 31.3  27.0 - 33.0 pg Final   • MCHC 01/11/2019 31.9* 33.6 - 35.0 g/dL Final   • RDW 01/11/2019 60.5* 35.9 - 50.0 fL Final   • Platelet Count 01/11/2019 172  164 - 446 K/uL Final   • MPV 01/11/2019 10.6  9.0 - 12.9 fL Final   • Neutrophils-Polys 01/11/2019 77.50* 44.00 - 72.00 % Final   • Lymphocytes 01/11/2019 13.00* 22.00 - 41.00 % Final   • Monocytes 01/11/2019 7.30  0.00 - 13.40 % Final   • Eosinophils 01/11/2019 0.90  0.00 - 6.90 % Final   • Basophils 01/11/2019 " 0.40  0.00 - 1.80 % Final   • Immature Granulocytes 01/11/2019 0.90  0.00 - 0.90 % Final   • Nucleated RBC 01/11/2019 0.00  /100 WBC Final   • Neutrophils (Absolute) 01/11/2019 4.24  2.00 - 7.15 K/uL Final    Includes immature neutrophils, if present.   • Lymphs (Absolute) 01/11/2019 0.71* 1.00 - 4.80 K/uL Final   • Monos (Absolute) 01/11/2019 0.40  0.00 - 0.85 K/uL Final   • Eos (Absolute) 01/11/2019 0.05  0.00 - 0.51 K/uL Final   • Baso (Absolute) 01/11/2019 0.02  0.00 - 0.12 K/uL Final   • Immature Granulocytes (abs) 01/11/2019 0.05  0.00 - 0.11 K/uL Final   • NRBC (Absolute) 01/11/2019 0.00  K/uL Final   • Sodium 01/11/2019 145  135 - 145 mmol/L Final   • Potassium 01/11/2019 3.7  3.6 - 5.5 mmol/L Final   • Chloride 01/11/2019 111  96 - 112 mmol/L Final   • Co2 01/11/2019 24  20 - 33 mmol/L Final   • Glucose 01/11/2019 144* 65 - 99 mg/dL Final   • Bun 01/11/2019 10  8 - 22 mg/dL Final   • Creatinine 01/11/2019 0.64  0.50 - 1.40 mg/dL Final   • Calcium 01/11/2019 9.0  8.5 - 10.5 mg/dL Final   • Anion Gap 01/11/2019 10.0  0.0 - 11.9 Final   • GFR If  01/11/2019 >60  >60 mL/min/1.73 m 2 Final   • GFR If Non  01/11/2019 >60  >60 mL/min/1.73 m 2 Final             Assessment and Plan:  Locally adanced breast Ca , ER/RI positive Her2 kamala negative, poorly differentiated, high-grade with high Ki-67. She appears to have aggressive pathology, probably Luminal B type. PET scan did not reveal distant metastatic disease, she did have bulky axillary and prepectoral adenopathy.  She has very good treatment response with a significant improvement in size of the breast mass in the adenopathy.  Hopefully, she will achieve complete response, however in view of the ER RI positivity informed her that partial response would not be surprising.  I will refer her back to Dr. Grossman for surgery.  She will need axillary dissection followed by adjuvant radiation.  I have made referral to radiation  oncology.    We will see her back in around 4-5 weeks from now with a postoperative pathology results    She agreed and verbalized  agreement and understanding with the current plan.  I answered all questions and concerns at this time         Please note that this dictation was created using voice recognition software. I have made every reasonable attempt to correct obvious errors, but I expect that there are errors of grammar and possibly content that I did not discover before finalizing the note.      SIGNATURES:  Jitendra Linder    CC:  Noam Caldwell M.D.  No ref. provider found

## 2019-02-18 LAB
LV EJECT FRACT  99904: 55
LV EJECT FRACT MOD 2C 99903: 41.67
LV EJECT FRACT MOD 4C 99902: 55.44
LV EJECT FRACT MOD BP 99901: 53.14

## 2019-02-18 PROCEDURE — 93306 TTE W/DOPPLER COMPLETE: CPT | Mod: 26 | Performed by: INTERNAL MEDICINE

## 2019-02-19 ENCOUNTER — TELEPHONE (OUTPATIENT)
Dept: HEMATOLOGY ONCOLOGY | Facility: MEDICAL CENTER | Age: 54
End: 2019-02-19

## 2019-02-19 NOTE — TELEPHONE ENCOUNTER
I called and left a voice msg for pt to call back regarding her toenails.  I spoke with BIRDIE Knutson, who said any issues with her finger/toenails would not be related to having her port removed.  She needs to follow up with her PCP about that.  Dr. Linder said the surgeon's office (Dr. Grossman ?) has been trying to contact her.  Please have her call him.  When pt calls, please relay this information to her.

## 2019-02-19 NOTE — TELEPHONE ENCOUNTER
"Patient was instructed to contact our office if she \"saw anything weird\" with her fingernails after having her port removed. She informs me nothing is weird with her fingernails but her left big toe nail appears to be \"coming off.\" Patient denies pain. Patient denies fever. The nail is \"pretty white.\" She noticed this last night. I informed her I will relay the message to one of our nurses.     Also: per Dr. Linder - inform the patient the surgeons office has been trying to contact her.   "

## 2019-02-20 NOTE — TELEPHONE ENCOUNTER
Patient stopped by our office. I relayed the information to her to follow up with her primary care physician regarding her toenail. Patient agreed. She also informs me she saw Dr. Grossman this morning.

## 2019-03-05 DIAGNOSIS — Z01.812 PRE-PROCEDURAL LABORATORY EXAMINATION: ICD-10-CM

## 2019-03-05 LAB
ANION GAP SERPL CALC-SCNC: 9 MMOL/L (ref 0–11.9)
BUN SERPL-MCNC: 15 MG/DL (ref 8–22)
CALCIUM SERPL-MCNC: 9.9 MG/DL (ref 8.5–10.5)
CHLORIDE SERPL-SCNC: 108 MMOL/L (ref 96–112)
CO2 SERPL-SCNC: 27 MMOL/L (ref 20–33)
CREAT SERPL-MCNC: 0.61 MG/DL (ref 0.5–1.4)
ERYTHROCYTE [DISTWIDTH] IN BLOOD BY AUTOMATED COUNT: 44.1 FL (ref 35.9–50)
GLUCOSE SERPL-MCNC: 94 MG/DL (ref 65–99)
HCT VFR BLD AUTO: 42.7 % (ref 37–47)
HGB BLD-MCNC: 13.1 G/DL (ref 12–16)
MCH RBC QN AUTO: 28.8 PG (ref 27–33)
MCHC RBC AUTO-ENTMCNC: 30.7 G/DL (ref 33.6–35)
MCV RBC AUTO: 93.8 FL (ref 81.4–97.8)
PLATELET # BLD AUTO: 158 K/UL (ref 164–446)
PMV BLD AUTO: 9.3 FL (ref 9–12.9)
POTASSIUM SERPL-SCNC: 4 MMOL/L (ref 3.6–5.5)
RBC # BLD AUTO: 4.55 M/UL (ref 4.2–5.4)
SODIUM SERPL-SCNC: 144 MMOL/L (ref 135–145)
WBC # BLD AUTO: 5.6 K/UL (ref 4.8–10.8)

## 2019-03-05 PROCEDURE — 80048 BASIC METABOLIC PNL TOTAL CA: CPT

## 2019-03-05 PROCEDURE — 85027 COMPLETE CBC AUTOMATED: CPT

## 2019-03-07 ENCOUNTER — OFFICE VISIT (OUTPATIENT)
Dept: HEMATOLOGY ONCOLOGY | Facility: MEDICAL CENTER | Age: 54
End: 2019-03-07
Payer: MEDICAID

## 2019-03-07 VITALS
TEMPERATURE: 98.3 F | DIASTOLIC BLOOD PRESSURE: 66 MMHG | BODY MASS INDEX: 41.83 KG/M2 | HEIGHT: 60 IN | OXYGEN SATURATION: 97 % | SYSTOLIC BLOOD PRESSURE: 126 MMHG | HEART RATE: 76 BPM | WEIGHT: 213.07 LBS | RESPIRATION RATE: 16 BRPM

## 2019-03-07 DIAGNOSIS — C50.912 LEFT BREAST CANCER WITH T3 TUMOR, >5 CM IN GREATEST DIMENSION (HCC): ICD-10-CM

## 2019-03-07 DIAGNOSIS — Z17.0 ER+ PR+ CARCINOMA OF BREAST, LEFT (HCC): ICD-10-CM

## 2019-03-07 DIAGNOSIS — C50.912 ER+ PR+ CARCINOMA OF BREAST, LEFT (HCC): ICD-10-CM

## 2019-03-07 DIAGNOSIS — Z51.11 ENCOUNTER FOR ANTINEOPLASTIC CHEMOTHERAPY: ICD-10-CM

## 2019-03-07 PROCEDURE — 99213 OFFICE O/P EST LOW 20 MIN: CPT | Performed by: INTERNAL MEDICINE

## 2019-03-07 ASSESSMENT — PAIN SCALES - GENERAL: PAINLEVEL: NO PAIN

## 2019-03-07 NOTE — PROGRESS NOTES
Date of visit: 3/7/2019  9:42 AM      Chief Complaint- Locally advanced breast Ca , ER/CA positive Her2 kamala negative.       Identification/Prior relevant history: Per my note dated 1/31/19    Interim history-her breast surgery has been delayed as she had some family issues.  I was supposed to see her with a postoperative pathology results.  She is currently feeling better otherwise with the most of the chemotherapy side effects resolved.    Past Medical History:      Past Medical History:   Diagnosis Date   • Breast cancer, stage 3 (HCC)    • Cancer (HCC) 2018    stage 3 breast, chemo 2019   • Dental disorder     Partial upper and lower       Past surgical history:       Past Surgical History:   Procedure Laterality Date   • ANKLE FUSION     • GASTRIC BYPASS LAPAROSCOPIC     • OTHER ABDOMINAL SURGERY      luanne   • OTHER ORTHOPEDIC SURGERY Bilateral     foot surgery to repair arches       Allergies:       Patient has no known allergies.    Medications:         Current Outpatient Prescriptions   Medication Sig Dispense Refill   • Cholecalciferol (VITAMIN D PO) Take  by mouth every day.     • Biotin w/ Vitamins C & E (HAIR/SKIN/NAILS PO) Take  by mouth every day.     • Omega-3 Fatty Acids (FISH OIL) 1000 MG Cap capsule Take 1,000 mg by mouth 3 times a day, with meals.     • therapeutic multivitamin-minerals (THERAGRAN-M) Tab Take 1 Tab by mouth every day.     • maalox plus-benadryl-visc lidocaine (MAGIC MOUTHWASH) Take 5 mL by mouth every 6 hours as needed. 1:1:1 ratio 250 mL 3   • albuterol 108 (90 Base) MCG/ACT Aero Soln inhalation aerosol Inhale 2 Puffs by mouth every 6 hours as needed for Shortness of Breath. 8.5 g 0   • ondansetron (ZOFRAN) 4 MG Tab tablet Take 1 Tab by mouth every four hours as needed for Nausea/Vomiting (for nausea, vomiting). (Patient not taking: Reported on 12/28/2018) 30 Tab 6   • prochlorperazine (COMPAZINE) 10 MG Tab Take 1 Tab by mouth every 6 hours as needed (for nausea, vomiting).  (Patient not taking: Reported on 12/28/2018) 30 Tab 6     No current facility-administered medications for this visit.          Social History:     Social History     Social History   • Marital status: Single     Spouse name: N/A   • Number of children: N/A   • Years of education: N/A     Occupational History   • Not on file.     Social History Main Topics   • Smoking status: Never Smoker   • Smokeless tobacco: Never Used   • Alcohol use Yes      Comment: rarely   • Drug use: No   • Sexual activity: Not on file     Other Topics Concern   • Not on file     Social History Narrative   • No narrative on file       Family History:    No family history on file.    Review of Systems:  All other review of systems are negative except what was mentioned above in the HPI.    Constitutional: Negative for fever, chills, weight loss and malaise/fatigue.    HEENT: No new auditory or visual complaints. No sore throat and neck pain.     Respiratory: Negative for cough, sputum production, shortness of breath and wheezing.    Cardiovascular: Negative for chest pain, palpitations, orthopnea and leg swelling.    Gastrointestinal: Negative for heartburn, nausea, vomiting and abdominal pain.    Genitourinary: Negative for dysuria, hematuria    Musculoskeletal: No new arthralgias or myalgias   Skin: Negative for rash and itching.    Neurological: Negative for focal weakness and headaches.    Endo/Heme/Allergies: No abnormal bleed/bruise.    Psychiatric/Behavioral: No new depression/anxiety.    Physical Exam:  Vitals: /66 (BP Location: Right arm, Patient Position: Sitting, BP Cuff Size: Large adult)   Pulse 76   Temp 36.8 °C (98.3 °F) (Temporal)   Resp 16   Ht 1.524 m (5')   Wt 96.6 kg (213 lb 1.2 oz)   SpO2 97%   BMI 41.61 kg/m²     General: Not in acute distress, alert and oriented x 3  HEENT: No pallor, icterus. Oropharynx clear.   Neck: Supple, no palpable masses.  Lymph nodes: No palpable cervical, supraclavicular,  axillary or inguinal lymphadenopathy.    CVS: regular rate and rhythm, no rubs or gallops  RESP: Clear to auscultate bilaterally, no wheezing or crackles.   ABD: Soft, non tender, non distended, positive bowel sounds, no palpable organomegaly  EXT: No edema or cyanosis  CNS: Alert and oriented x3, No focal deficits.  Skin- No rash      Labs:   Orders Only on 03/05/2019   Component Date Value Ref Range Status   • Sodium 03/05/2019 144  135 - 145 mmol/L Final   • Potassium 03/05/2019 4.0  3.6 - 5.5 mmol/L Final   • Chloride 03/05/2019 108  96 - 112 mmol/L Final   • Co2 03/05/2019 27  20 - 33 mmol/L Final   • Glucose 03/05/2019 94  65 - 99 mg/dL Final   • Bun 03/05/2019 15  8 - 22 mg/dL Final   • Creatinine 03/05/2019 0.61  0.50 - 1.40 mg/dL Final   • Calcium 03/05/2019 9.9  8.5 - 10.5 mg/dL Final   • Anion Gap 03/05/2019 9.0  0.0 - 11.9 Final   • WBC 03/05/2019 5.6  4.8 - 10.8 K/uL Final   • RBC 03/05/2019 4.55  4.20 - 5.40 M/uL Final   • Hemoglobin 03/05/2019 13.1  12.0 - 16.0 g/dL Final   • Hematocrit 03/05/2019 42.7  37.0 - 47.0 % Final   • MCV 03/05/2019 93.8  81.4 - 97.8 fL Final   • MCH 03/05/2019 28.8  27.0 - 33.0 pg Final   • MCHC 03/05/2019 30.7* 33.6 - 35.0 g/dL Final   • RDW 03/05/2019 44.1  35.9 - 50.0 fL Final   • Platelet Count 03/05/2019 158* 164 - 446 K/uL Final   • MPV 03/05/2019 9.3  9.0 - 12.9 fL Final   • GFR If  03/05/2019 >60  >60 mL/min/1.73 m 2 Final   • GFR If Non  03/05/2019 >60  >60 mL/min/1.73 m 2 Final             Assessment and Plan:  Locally adanced breast Ca , ER/MO positive Her2 kamala negative, poorly differentiated, high-grade with high Ki-67. She appears to have aggressive pathology, probably Luminal B type. PET scan did not reveal distant metastatic disease, she did have bulky axillary and prepectoral adenopathy.  She has very good treatment response with a significant improvement in size of the breast mass in the adenopathy.  Hopefully, she will  achieve complete response, however in view of the ER AK positivity informed her that partial response would not be surprising.  I will refer her back to Dr. Grossman for surgery.  She will need axillary dissection followed by adjuvant radiation.  I have made referral to radiation oncology.     We will see her back in around 4-5 weeks from now with a postoperative pathology results    She agreed and verbalized  agreement and understanding with the current plan.  I answered all questions and concerns at this time         Please note that this dictation was created using voice recognition software. I have made every reasonable attempt to correct obvious errors, but I expect that there are errors of grammar and possibly content that I did not discover before finalizing the note.      SIGNATURES:  Jitendra Linder    CC:  Noam Caldwell M.D.  No ref. provider found

## 2019-03-13 ENCOUNTER — HOSPITAL ENCOUNTER (OUTPATIENT)
Facility: MEDICAL CENTER | Age: 54
End: 2019-03-14
Attending: SURGERY | Admitting: SURGERY
Payer: MEDICAID

## 2019-03-13 ENCOUNTER — ANESTHESIA EVENT (OUTPATIENT)
Dept: SURGERY | Facility: MEDICAL CENTER | Age: 54
End: 2019-03-13
Payer: MEDICAID

## 2019-03-13 ENCOUNTER — APPOINTMENT (OUTPATIENT)
Dept: RADIOLOGY | Facility: MEDICAL CENTER | Age: 54
End: 2019-03-13
Attending: SURGERY
Payer: MEDICAID

## 2019-03-13 ENCOUNTER — ANESTHESIA (OUTPATIENT)
Dept: SURGERY | Facility: MEDICAL CENTER | Age: 54
End: 2019-03-13
Payer: MEDICAID

## 2019-03-13 DIAGNOSIS — G89.18 POST-OPERATIVE PAIN: ICD-10-CM

## 2019-03-13 DIAGNOSIS — C50.112 MALIGNANT NEOPLASM OF CENTRAL PORTION OF LEFT FEMALE BREAST, UNSPECIFIED ESTROGEN RECEPTOR STATUS (HCC): ICD-10-CM

## 2019-03-13 LAB — PATHOLOGY CONSULT NOTE: NORMAL

## 2019-03-13 PROCEDURE — 88307 TISSUE EXAM BY PATHOLOGIST: CPT | Mod: 59

## 2019-03-13 PROCEDURE — 160009 HCHG ANES TIME/MIN: Performed by: SURGERY

## 2019-03-13 PROCEDURE — 500054 HCHG BANDAGE, ELASTIC 6: Performed by: SURGERY

## 2019-03-13 PROCEDURE — 160036 HCHG PACU - EA ADDL 30 MINS PHASE I: Performed by: SURGERY

## 2019-03-13 PROCEDURE — 88360 TUMOR IMMUNOHISTOCHEM/MANUAL: CPT

## 2019-03-13 PROCEDURE — 700102 HCHG RX REV CODE 250 W/ 637 OVERRIDE(OP): Performed by: SURGERY

## 2019-03-13 PROCEDURE — 700111 HCHG RX REV CODE 636 W/ 250 OVERRIDE (IP): Performed by: ANESTHESIOLOGY

## 2019-03-13 PROCEDURE — 160041 HCHG SURGERY MINUTES - EA ADDL 1 MIN LEVEL 4: Performed by: SURGERY

## 2019-03-13 PROCEDURE — 700102 HCHG RX REV CODE 250 W/ 637 OVERRIDE(OP): Performed by: ANESTHESIOLOGY

## 2019-03-13 PROCEDURE — 500423 HCHG DRESSING, ABD COMBINE: Performed by: SURGERY

## 2019-03-13 PROCEDURE — 501445 HCHG STAPLER, SKIN DISP: Performed by: SURGERY

## 2019-03-13 PROCEDURE — 160035 HCHG PACU - 1ST 60 MINS PHASE I: Performed by: SURGERY

## 2019-03-13 PROCEDURE — 501838 HCHG SUTURE GENERAL: Performed by: SURGERY

## 2019-03-13 PROCEDURE — 160002 HCHG RECOVERY MINUTES (STAT): Performed by: SURGERY

## 2019-03-13 PROCEDURE — A6402 STERILE GAUZE <= 16 SQ IN: HCPCS | Performed by: SURGERY

## 2019-03-13 PROCEDURE — A9270 NON-COVERED ITEM OR SERVICE: HCPCS | Performed by: ANESTHESIOLOGY

## 2019-03-13 PROCEDURE — 160029 HCHG SURGERY MINUTES - 1ST 30 MINS LEVEL 4: Performed by: SURGERY

## 2019-03-13 PROCEDURE — 700101 HCHG RX REV CODE 250: Performed by: SURGERY

## 2019-03-13 PROCEDURE — 88342 IMHCHEM/IMCYTCHM 1ST ANTB: CPT

## 2019-03-13 PROCEDURE — 88309 TISSUE EXAM BY PATHOLOGIST: CPT

## 2019-03-13 PROCEDURE — 88341 IMHCHEM/IMCYTCHM EA ADD ANTB: CPT

## 2019-03-13 PROCEDURE — 88331 PATH CONSLTJ SURG 1 BLK 1SPC: CPT

## 2019-03-13 PROCEDURE — A9270 NON-COVERED ITEM OR SERVICE: HCPCS | Performed by: SURGERY

## 2019-03-13 PROCEDURE — 500389 HCHG DRAIN, RESERVOIR SUCT JP 100CC: Performed by: SURGERY

## 2019-03-13 PROCEDURE — G0378 HOSPITAL OBSERVATION PER HR: HCPCS

## 2019-03-13 PROCEDURE — 88332 PATH CONSLTJ SURG EA ADD BLK: CPT

## 2019-03-13 PROCEDURE — 700101 HCHG RX REV CODE 250

## 2019-03-13 PROCEDURE — 160048 HCHG OR STATISTICAL LEVEL 1-5: Performed by: SURGERY

## 2019-03-13 PROCEDURE — A9541 TC99M SULFUR COLLOID: HCPCS | Mod: LT

## 2019-03-13 PROCEDURE — 700111 HCHG RX REV CODE 636 W/ 250 OVERRIDE (IP)

## 2019-03-13 PROCEDURE — 700101 HCHG RX REV CODE 250: Performed by: ANESTHESIOLOGY

## 2019-03-13 RX ORDER — ONDANSETRON 2 MG/ML
INJECTION INTRAMUSCULAR; INTRAVENOUS PRN
Status: DISCONTINUED | OUTPATIENT
Start: 2019-03-13 | End: 2019-03-13 | Stop reason: SURG

## 2019-03-13 RX ORDER — OXYCODONE HCL 5 MG/5 ML
5 SOLUTION, ORAL ORAL
Status: COMPLETED | OUTPATIENT
Start: 2019-03-13 | End: 2019-03-13

## 2019-03-13 RX ORDER — SODIUM CHLORIDE, SODIUM LACTATE, POTASSIUM CHLORIDE, CALCIUM CHLORIDE 600; 310; 30; 20 MG/100ML; MG/100ML; MG/100ML; MG/100ML
INJECTION, SOLUTION INTRAVENOUS CONTINUOUS
Status: DISCONTINUED | OUTPATIENT
Start: 2019-03-13 | End: 2019-03-14 | Stop reason: HOSPADM

## 2019-03-13 RX ORDER — SODIUM CHLORIDE, SODIUM LACTATE, POTASSIUM CHLORIDE, CALCIUM CHLORIDE 600; 310; 30; 20 MG/100ML; MG/100ML; MG/100ML; MG/100ML
INJECTION, SOLUTION INTRAVENOUS CONTINUOUS
Status: DISCONTINUED | OUTPATIENT
Start: 2019-03-13 | End: 2019-03-13 | Stop reason: HOSPADM

## 2019-03-13 RX ORDER — SCOLOPAMINE TRANSDERMAL SYSTEM 1 MG/1
1 PATCH, EXTENDED RELEASE TRANSDERMAL
Status: DISCONTINUED | OUTPATIENT
Start: 2019-03-13 | End: 2019-03-14 | Stop reason: HOSPADM

## 2019-03-13 RX ORDER — OXYCODONE HCL 5 MG/5 ML
10 SOLUTION, ORAL ORAL
Status: COMPLETED | OUTPATIENT
Start: 2019-03-13 | End: 2019-03-13

## 2019-03-13 RX ORDER — DIPHENHYDRAMINE HYDROCHLORIDE 50 MG/ML
25 INJECTION INTRAMUSCULAR; INTRAVENOUS EVERY 6 HOURS PRN
Status: DISCONTINUED | OUTPATIENT
Start: 2019-03-13 | End: 2019-03-14 | Stop reason: HOSPADM

## 2019-03-13 RX ORDER — ALPRAZOLAM 0.25 MG/1
0.25 TABLET ORAL
Status: COMPLETED | OUTPATIENT
Start: 2019-03-13 | End: 2019-03-13

## 2019-03-13 RX ORDER — HYDROMORPHONE HYDROCHLORIDE 1 MG/ML
0.1 INJECTION, SOLUTION INTRAMUSCULAR; INTRAVENOUS; SUBCUTANEOUS
Status: DISCONTINUED | OUTPATIENT
Start: 2019-03-13 | End: 2019-03-13 | Stop reason: HOSPADM

## 2019-03-13 RX ORDER — CEFAZOLIN SODIUM 1 G/3ML
INJECTION, POWDER, FOR SOLUTION INTRAMUSCULAR; INTRAVENOUS PRN
Status: DISCONTINUED | OUTPATIENT
Start: 2019-03-13 | End: 2019-03-13 | Stop reason: SURG

## 2019-03-13 RX ORDER — ONDANSETRON 2 MG/ML
4 INJECTION INTRAMUSCULAR; INTRAVENOUS EVERY 4 HOURS PRN
Status: DISCONTINUED | OUTPATIENT
Start: 2019-03-13 | End: 2019-03-14 | Stop reason: HOSPADM

## 2019-03-13 RX ORDER — SODIUM CHLORIDE, SODIUM LACTATE, POTASSIUM CHLORIDE, CALCIUM CHLORIDE 600; 310; 30; 20 MG/100ML; MG/100ML; MG/100ML; MG/100ML
INJECTION, SOLUTION INTRAVENOUS ONCE
Status: COMPLETED | OUTPATIENT
Start: 2019-03-13 | End: 2019-03-13

## 2019-03-13 RX ORDER — HYDROCODONE BITARTRATE AND ACETAMINOPHEN 5; 325 MG/1; MG/1
1-2 TABLET ORAL EVERY 6 HOURS PRN
Status: DISCONTINUED | OUTPATIENT
Start: 2019-03-13 | End: 2019-03-14 | Stop reason: HOSPADM

## 2019-03-13 RX ORDER — HALOPERIDOL 5 MG/ML
1 INJECTION INTRAMUSCULAR
Status: DISCONTINUED | OUTPATIENT
Start: 2019-03-13 | End: 2019-03-13 | Stop reason: HOSPADM

## 2019-03-13 RX ORDER — KETOROLAC TROMETHAMINE 30 MG/ML
INJECTION, SOLUTION INTRAMUSCULAR; INTRAVENOUS PRN
Status: DISCONTINUED | OUTPATIENT
Start: 2019-03-13 | End: 2019-03-13 | Stop reason: SURG

## 2019-03-13 RX ORDER — DIPHENHYDRAMINE HYDROCHLORIDE 50 MG/ML
12.5 INJECTION INTRAMUSCULAR; INTRAVENOUS
Status: DISCONTINUED | OUTPATIENT
Start: 2019-03-13 | End: 2019-03-13 | Stop reason: HOSPADM

## 2019-03-13 RX ORDER — ONDANSETRON 2 MG/ML
4 INJECTION INTRAMUSCULAR; INTRAVENOUS
Status: DISCONTINUED | OUTPATIENT
Start: 2019-03-13 | End: 2019-03-13 | Stop reason: HOSPADM

## 2019-03-13 RX ORDER — DEXAMETHASONE SODIUM PHOSPHATE 4 MG/ML
INJECTION, SOLUTION INTRA-ARTICULAR; INTRALESIONAL; INTRAMUSCULAR; INTRAVENOUS; SOFT TISSUE PRN
Status: DISCONTINUED | OUTPATIENT
Start: 2019-03-13 | End: 2019-03-13 | Stop reason: SURG

## 2019-03-13 RX ORDER — LIDOCAINE AND PRILOCAINE 25; 25 MG/G; MG/G
CREAM TOPICAL ONCE
Status: COMPLETED | OUTPATIENT
Start: 2019-03-13 | End: 2019-03-13

## 2019-03-13 RX ORDER — MEPERIDINE HYDROCHLORIDE 25 MG/ML
12.5 INJECTION INTRAMUSCULAR; INTRAVENOUS; SUBCUTANEOUS
Status: DISCONTINUED | OUTPATIENT
Start: 2019-03-13 | End: 2019-03-13 | Stop reason: HOSPADM

## 2019-03-13 RX ORDER — HYDROMORPHONE HYDROCHLORIDE 1 MG/ML
0.4 INJECTION, SOLUTION INTRAMUSCULAR; INTRAVENOUS; SUBCUTANEOUS
Status: DISCONTINUED | OUTPATIENT
Start: 2019-03-13 | End: 2019-03-13 | Stop reason: HOSPADM

## 2019-03-13 RX ORDER — HYDROMORPHONE HYDROCHLORIDE 1 MG/ML
0.2 INJECTION, SOLUTION INTRAMUSCULAR; INTRAVENOUS; SUBCUTANEOUS
Status: DISCONTINUED | OUTPATIENT
Start: 2019-03-13 | End: 2019-03-13 | Stop reason: HOSPADM

## 2019-03-13 RX ADMIN — FENTANYL CITRATE 50 MCG: 50 INJECTION, SOLUTION INTRAMUSCULAR; INTRAVENOUS at 13:51

## 2019-03-13 RX ADMIN — OXYCODONE HYDROCHLORIDE 5 MG: 5 SOLUTION ORAL at 15:15

## 2019-03-13 RX ADMIN — HYDROCODONE BITARTRATE AND ACETAMINOPHEN 1 TABLET: 5; 325 TABLET ORAL at 22:43

## 2019-03-13 RX ADMIN — ALPRAZOLAM 0.25 MG: 0.25 TABLET ORAL at 11:14

## 2019-03-13 RX ADMIN — PROPOFOL 100 MG: 10 INJECTION, EMULSION INTRAVENOUS at 12:52

## 2019-03-13 RX ADMIN — ROCURONIUM BROMIDE 5 MG: 10 INJECTION, SOLUTION INTRAVENOUS at 12:41

## 2019-03-13 RX ADMIN — FENTANYL CITRATE 50 MCG: 50 INJECTION, SOLUTION INTRAMUSCULAR; INTRAVENOUS at 12:56

## 2019-03-13 RX ADMIN — PROPOFOL 200 MG: 10 INJECTION, EMULSION INTRAVENOUS at 12:42

## 2019-03-13 RX ADMIN — FENTANYL CITRATE 25 MCG: 50 INJECTION, SOLUTION INTRAMUSCULAR; INTRAVENOUS at 15:20

## 2019-03-13 RX ADMIN — HYDROMORPHONE HYDROCHLORIDE 0.2 MG: 1 INJECTION, SOLUTION INTRAMUSCULAR; INTRAVENOUS; SUBCUTANEOUS at 18:08

## 2019-03-13 RX ADMIN — FENTANYL CITRATE 50 MCG: 50 INJECTION, SOLUTION INTRAMUSCULAR; INTRAVENOUS at 12:41

## 2019-03-13 RX ADMIN — DEXAMETHASONE SODIUM PHOSPHATE 4 MG: 4 INJECTION, SOLUTION INTRAMUSCULAR; INTRAVENOUS at 12:53

## 2019-03-13 RX ADMIN — SODIUM CHLORIDE, SODIUM LACTATE, POTASSIUM CHLORIDE, CALCIUM CHLORIDE: 600; 310; 30; 20 INJECTION, SOLUTION INTRAVENOUS at 13:29

## 2019-03-13 RX ADMIN — LIDOCAINE AND PRILOCAINE 1 EACH: 25; 25 CREAM TOPICAL at 11:14

## 2019-03-13 RX ADMIN — SODIUM CHLORIDE, SODIUM LACTATE, POTASSIUM CHLORIDE, CALCIUM CHLORIDE: 600; 310; 30; 20 INJECTION, SOLUTION INTRAVENOUS at 15:16

## 2019-03-13 RX ADMIN — KETOROLAC TROMETHAMINE 30 MG: 30 INJECTION, SOLUTION INTRAMUSCULAR at 14:08

## 2019-03-13 RX ADMIN — FENTANYL CITRATE 100 MCG: 50 INJECTION, SOLUTION INTRAMUSCULAR; INTRAVENOUS at 12:51

## 2019-03-13 RX ADMIN — SODIUM CHLORIDE, SODIUM LACTATE, POTASSIUM CHLORIDE, CALCIUM CHLORIDE: 600; 310; 30; 20 INJECTION, SOLUTION INTRAVENOUS at 12:40

## 2019-03-13 RX ADMIN — CEFAZOLIN 2 G: 330 INJECTION, POWDER, FOR SOLUTION INTRAMUSCULAR; INTRAVENOUS at 12:40

## 2019-03-13 RX ADMIN — ONDANSETRON 4 MG: 2 INJECTION INTRAMUSCULAR; INTRAVENOUS at 13:29

## 2019-03-13 RX ADMIN — SUCCINYLCHOLINE CHLORIDE 100 MG: 20 INJECTION, SOLUTION INTRAMUSCULAR; INTRAVENOUS at 12:43

## 2019-03-13 ASSESSMENT — PAIN SCALES - GENERAL: PAIN_LEVEL: 3

## 2019-03-13 ASSESSMENT — COGNITIVE AND FUNCTIONAL STATUS - GENERAL
MOBILITY SCORE: 24
SUGGESTED CMS G CODE MODIFIER DAILY ACTIVITY: CH
SUGGESTED CMS G CODE MODIFIER MOBILITY: CH
DAILY ACTIVITIY SCORE: 24

## 2019-03-13 ASSESSMENT — PATIENT HEALTH QUESTIONNAIRE - PHQ9
SUM OF ALL RESPONSES TO PHQ9 QUESTIONS 1 AND 2: 0
2. FEELING DOWN, DEPRESSED, IRRITABLE, OR HOPELESS: NOT AT ALL
1. LITTLE INTEREST OR PLEASURE IN DOING THINGS: NOT AT ALL

## 2019-03-13 ASSESSMENT — LIFESTYLE VARIABLES: EVER_SMOKED: NEVER

## 2019-03-13 NOTE — PROCEDURES
EXAMINATION:  3/13/2019 11:16 AM    HISTORY/REASON FOR EXAM:  Central left breast cancer         TECHNIQUE/EXAM DESCRIPTION AND NUMBER OF VIEWS:  [place] node injection.    COMPARISON: None    TECHNIQUE:    Time out was performed. Four locations around the areola were anesthetized with lidocaine.  0.565 mCi technetium 99m sulphur colloid was given intradermally, at four locations surrounding the areola of the LEFT breast.    FINDINGS:    Procedure was performed under aseptic conditions with local anesthesia.  Radionuclide was injected intradermally at four locations surrounding the areola of the breast.  No complications were encountered.    IMPRESSION:  Successful injection of radionuclide at four locations surrounding the LEFT breast areola.

## 2019-03-13 NOTE — ANESTHESIA POSTPROCEDURE EVALUATION
Patient: Addie Orellana    Procedure Summary     Date:  03/13/19 Room / Location:  [facility] OR 08 / SURGERY [facility]    Anesthesia Start:  1240 Anesthesia Stop:      Procedures:       MASTECTOMY- SIMPLE   (Bilateral Breast)      NODE BIOPSY SENTINEL (Left Breast)      AXILLARY NODE DISSECTION- POSS   (Left Axilla) Diagnosis:  (BREAST CANCER)    Surgeon:  Elena Grossman M.D. Responsible Provider:  Kev Cruz M.D.    Anesthesia Type:  general ASA Status:  3          Final Anesthesia Type: general  Last vitals  90/58       97.2   78    15    91      Anesthesia Post Evaluation    Patient participation: complete - patient participated  Level of consciousness: awake and alert  Pain score: 3    Airway patency: patent  Anesthetic complications: no  Cardiovascular status: adequate and hemodynamically stable  Respiratory status: acceptable  Hydration status: acceptable    PONV: none

## 2019-03-13 NOTE — ANESTHESIA QCDR
2019 [facility] Clinical Data Registry (for Quality Improvement)     Postoperative nausea/vomiting risk protocol (Adult = 18 yrs and Pediatric 3-17 yrs)- (430 and 463)  General inhalation anesthetic (NOT TIVA) with PONV risk factors: Yes  Provision of anti-emetic therapy with at least 2 different classes of agents: Yes   Patient DID NOT receive anti-emetic therapy and reason is documented in Medical Record:  N/A    Multimodal Pain Management- (AQI59)  Patient undergoing Elective Surgery (i.e. Outpatient, or ASC, or Prescheduled Surgery prior to Hospital Admission): Yes  Use of Multimodal Pain Management, two or more drugs and/or interventions, NOT including systemic opioids: Yes   Exception: Documented allergy to multiple classes of analgesics:  N/A    PACU assessment of acute postoperative pain prior to Anesthesia Care End- Applies to Patients Age = 18- (ABG7)  Initial PACU pain score is which of the following: < 7/10  Patient unable to report pain score: N/A    Post-anesthetic transfer of care checklist/protocol to PACU/ICU- (426 and 427)  Upon conclusion of case, patient transferred to which of the following locations: PACU/Non-ICU  Use of transfer checklist/protocol: Yes  Exclusion: Service Performed in Patient Hospital Room (and thus did not require transfer): N/A    PACU Reintubation- (AQI31)  General anesthesia requiring endotracheal intubation (ETT) along with subsequent extubation in OR or PACU: No  Required reintubation in the PACU: N/A  Extubation was a planned trial documented in the medical record prior to removal of the original airway device: N/A    Unplanned admission to ICU related to anesthesia service up through end of PACU care- (MD51)  Unplanned admission to ICU (not initially anticipated at anesthesia start time): No

## 2019-03-13 NOTE — ANESTHESIA TIME REPORT
Times      Start Time                 End Time                     #                          Name                                            Premium Reason  Non-Premium     Anesthesia Start and Stop Event Times     Date Time Event    3/13/2019 1240 Anesthesia Start     1428 Anesthesia Stop

## 2019-03-13 NOTE — ANESTHESIA PROCEDURE NOTES
Airway  Date/Time: 3/13/2019 12:45 PM  Performed by: JUANA FRIAS  Authorized by: JUANA FRIAS     Location:  OR  Urgency:  Elective  Difficult Airway: No    Indications for Airway Management:  Anesthesia  Spontaneous Ventilation: absent    Sedation Level:  Deep  Preoxygenated: Yes    Patient Position:  Sniffing  MILS Maintained Throughout: Yes    Final Airway Type:  Endotracheal airway  Final Endotracheal Airway:  ETT  Cuffed: Yes    Technique Used for Successful ETT Placement:  Direct laryngoscopy  Insertion Site:  Oral  Measured from:  Teeth  Placement Verified by: auscultation and capnometry    Number of Attempts at Approach:  1

## 2019-03-13 NOTE — OP REPORT
Operative Report    Date: 3/13/2019    Surgeon: Elena Grossman M.D.    Assistant: BIRDIE Castellon    Anesthesiologist: Anthony ESCOBAR    Pre-operative Diagnosis: C50.112 Malignant neoplasm of central portion of left female breast    Post-operative Diagnosis: same     Procedure:     1. left mastectomy (19303       Mastectomy, simple, complete)  2. left axillary sentinel lymph node biopsy (93281 Biopsy or excision of lymph node(s); open, deep axillary node(s))  3. 09635 Intraoperative identification (eg, mapping) of sentinel lymph node(s) includes injection of non-radioactive dye  4. right mastectomy (19303       Mastectomy, simple, complete)  5. Left axillary dissection (30512 Axillary lymphadenectomy; complete)    An extensive PARQ conference was held with the patient in regard to bilateral mastectomy and sentinel lymph node biopsy. The patient was counseled regarding the benefits of the operation, namely, removal of her cancer. The patient was made aware of the alternatives, including operative and non-operative, The risks of bleeding, infection, damage to surrounding structures, lymphedema, cancer recurrence need for reoperation, nerve damage. stroke, MI, and death were discussed with the patient. The patient was given a chance to ask questions, and all her questions were answered. The patient demonstrates adequate understanding, seems pleased with the plan, and wishes to proceed.    Findings: firm lymphadenopathy in left axilla, frozen sectioning revealed ductal carcinoma.    Procedure in detail: The patient was identified in the pre-operative holding area and brought to the operating room. Correct side and site were identified. Prior to the procedure, the patient underwent radionucleotide injection by nuclear medicine. . I injected 5 cc of methylene blue under the areola of the left breast, and the breast was then massaged briefly. Pre-operative antibiotics of  were administered prior to the procedure. GETA was  smoothly induced.    The patient's bilateral anterior chest wall, neck, axilla, and arms were prepped and draped in the usual sterile manner.     We began with the right side, the non-cancerous side. The incision was marked along the skin with a skin marker. A transverse elliptical incision was made in the breast of the skin to include nipple areolar complex. The flaps were raised superiorly to just below the clavicle, medially to the sternum, laterally towards the latissimus dorsi, and inferiorly to the rectus abdominus fascia. Following this, the breast tissue along with the pectoralis major fascia were dissected off the pectoralis major muscle. The dissection was started medially and extended laterally towards the left axilla. The breast was then removed, oriented with suture, and passed off the table. After the tissues were irrigated, and hemostasis was assured, and a drain was brought in and placed in the superior and inferior breast flaps. The subcutaneus tissues were then approximated with interrupted 4-0 Vicryl sutures and the skin was closed with running monocryl. The drain was sutured to the chest wall with 3-0 nylon suture.     We then proceeded with the left side, the cancerous side. A transverse elliptical incision was made in the breast of the skin to include nipple areolar complex. I was able to palpate a firm mass at 6'o clock. The flaps were raised superiorly to just below the clavicle, medially to the sternum, laterally towards the latissimus dorsi, and inferiorly to the rectus abdominus fascia. Following this, the breast tissue along with the pectoralis major fascia were dissected off the pectoralis major muscle. The dissection was started medially and extended laterally towards the left axilla. The breast was then removed, oriented with suture, and passed off the table.    We then proceeded with the sentinel lymph node biopsy.The axillary incision was carried deeper into the axillary tissue and I  palpated a large 3 cm firm lymph node. There was no blue nor radioactivity in the axilla. Deep to this node was a second smaller but firm lymph node. These were dissected free from the surrounding tissue. This was sent to pathology for evaluation. This revealed metastatic invasive ductal cancer.    I then proceeded with the left axillary dissection.  The subcutaneous tissues were opened with cautery until the lateral border of the pectoralis major was identified. This was elevated, and the clavipectoral fascia was opened with cautery. We identified the axillary vein at the superior border of the incision. The thoracodorsal bundle was identified and protected. The fat lateral to the thoracodorsal bundle and inferior to the axillary vein was dissected. The axillary fat pad was raised medially and dissected off the skin until the lateral border of the latissimus dorsi was encountered. The dissection was carried lateral to medial, protecting the thoracodorsal bundle. The superficial brances of the axillary vein were divided. I did palpate several smaller firm lymph nodes within the specimen. The long thoracic nerve was identified and protected. The pectoralis minor muscle was elevated and the fat pad at the axillary apex was carefully dissected. Dissection was carried along the chest wall and the fat pad was detached and excised from the body.      After the tissues were irrigated, and hemostasis was assured, and a drain was brought in and placed in the superior and inferior breast flaps, as well as the axilla. The subcutaneus tissues were then approximated with interrupted 4-0 Vicryl sutures and the skin was closed with running monocryl. The drains were sutured to the chest wall with 3-0 nylon suture. Dressing was applied.    The patient was awakened from anesthetic, and was taken to the recovery room in stable condition.    Sponge and needle counts were correct at the end of the case.      Specimen:    1.  left  mastectomy  2.  left axillary sentinel lymph node tissue  3. right mastectomy  4. Left axillary tissue    EBL: 200 mL    Drains: 10 mm drain bilateral mastectomy sites x3    Dispo: stable, extubated, to PACU    Elena Grossman M.D.  [facility]  [phone]

## 2019-03-14 VITALS
HEIGHT: 60 IN | HEART RATE: 78 BPM | OXYGEN SATURATION: 94 % | SYSTOLIC BLOOD PRESSURE: 122 MMHG | BODY MASS INDEX: 43.24 KG/M2 | DIASTOLIC BLOOD PRESSURE: 65 MMHG | RESPIRATION RATE: 17 BRPM | TEMPERATURE: 97.7 F | WEIGHT: 220.24 LBS

## 2019-03-14 PROCEDURE — G0378 HOSPITAL OBSERVATION PER HR: HCPCS

## 2019-03-14 PROCEDURE — A9270 NON-COVERED ITEM OR SERVICE: HCPCS | Performed by: SURGERY

## 2019-03-14 PROCEDURE — 700102 HCHG RX REV CODE 250 W/ 637 OVERRIDE(OP): Performed by: SURGERY

## 2019-03-14 RX ORDER — HYDROCODONE BITARTRATE AND ACETAMINOPHEN 5; 325 MG/1; MG/1
1-2 TABLET ORAL EVERY 6 HOURS PRN
Qty: 30 TAB | Refills: 0 | Status: SHIPPED | OUTPATIENT
Start: 2019-03-14 | End: 2019-03-21

## 2019-03-14 RX ADMIN — HYDROCODONE BITARTRATE AND ACETAMINOPHEN 1 TABLET: 5; 325 TABLET ORAL at 06:34

## 2019-03-14 ASSESSMENT — PATIENT HEALTH QUESTIONNAIRE - PHQ9
1. LITTLE INTEREST OR PLEASURE IN DOING THINGS: NOT AT ALL
SUM OF ALL RESPONSES TO PHQ9 QUESTIONS 1 AND 2: 0

## 2019-03-14 NOTE — DIETARY
NUTRITION SERVICES: BMI - Pt with BMI >40 (=Body mass index is 43.01 kg/m².). Weight loss counseling not appropriate in acute care setting.     RECOMMEND - Referral to outpatient nutrition services for weight management after D/C.

## 2019-03-14 NOTE — DISCHARGE SUMMARY
Discharge Summary      DATE OF ADMISSION: 3/13/2019    DATE OF DISCHARGE: 3/14/2019    DISCHARGE DIAGNOSIS (ES):  L breast cancer    DISCHARGE CONDITION:  ? good    CONSULTATIONS:  ? none    PROCEDURES:  ? 3/14/19: L MRM, L simple mastectomy    BRIEF HPI:  ? Admitted with above, see admission history and physical.     HOSPITAL COURSE:  ? Underwent procedure as above. On day of discharge, the patient has stable vital signs, tolerating a regular diet, and pain is well controlled with PO meds. The patient is stable for discharge to home.    MEDS:   Current Outpatient Prescriptions   Medication Sig Dispense Refill   • HYDROcodone-acetaminophen (NORCO) 5-325 MG Tab per tablet Take 1-2 Tabs by mouth every 6 hours as needed for up to 7 days. 30 Tab 0       FOLLOW-UP:  ? Please call my office at [phone] to make an appointment in 1 week(s)    DISCHARGE INSTRUCTIONS:      Mastectomy: Instructions After Surgery    Pain Management  People experience different types and amount of pain or discomfort after surgery. The goal of pain management is to assess your own level of discomfort and to take medication as needed. You will have better results controlling your pain if you take pain medication before your pain is severe.   You will be given a prescription for a narcotic for the management of moderate pain. It is recommended to take medication for pain when pain is experienced on a regular schedule. Ibuprofen (Advil or Motrin) or Tylenol can be added to or replace the narcotic medication.     Everyone is different and if one plan to decrease your pain is not working, it will be changed. Healing and recovery improve with good pain control.   Please notify us of any drug allergies, reactions or medical problems that would prevent you from taking these drugs. Narcotics should not be taken with alcoholic drinks. Do not use narcotics while driving.   Narcotics also can cause or worsen constipation, so increase your fluid intake,  eat high fiber foods -- such as prunes and bran -- and make sure that you get up and out of bed to take small walks.   An icepack may be helpful to decrease discomfort and swelling, particularly to the armpit after a lymph node dissection. A small pillow positioned in the armpit also may decrease discomfort.   Although you will not have felt it at the time, nor remember it afterwards, you will have had a tube down your throat during the surgery. This can often cause a sore throat for a few days following your surgery.    Incision and Dressing Care  Your incision, or scar, has both stitches and steri-strips, which are small white strips of tape, and is covered by a gauze dressing and tape or a plastic dressing.  Do not remove the dressing, steri-strips or stitches. We will remove the dressing in seven to 10 days. We also will remove the sutures in one to two weeks unless they absorb on their own. If the dressing or steri-strips fall off, do not attempt to replace them.   You may shower one day after the drain(s) is out and if you have a plastic dressing.   If you have gauze and paper tape, you may remove it two days after surgery and shower after that. Use a towel to dry your incision thoroughly after showering. Be careful not to touch or remove the steri-strips or sutures.   Bruising and some swelling are common in women after surgery.   A low-grade fever that is under 100 degrees Fahrenheit is normal the day after surgery.   You will have a Vikas-Meyers (SANTY) drain after your surgery. This drain is a plastic tube from under the skin to outside your body with a bulb attached to it. Empty the drain two to three times per day or when the bulb is full. Write down the amount drained on a sheet of paper. Your nurse will teach you how to empty your drain. An information sheet on SANTY drains is included in your binder.   A home care nurse may be assigned to check your progress at home.    Activity  Avoid strenuous activity,  "heavy lifting and vigorous exercise until the stitches are removed. Tell your caregiver what you do and he or she will help you make a personal plan for \"what you can do when\" after surgery.   Walking is a normal activity that can be restarted right away.   You cannot do housework or driving until the drain is out. You may restart driving when you are no longer on narcotics and you feel safe turning the wheel and stopping quickly.   Following a lymph node dissection, don't avoid using your arm, but don't exercise it until your first post-operative visit.   You will be given exercises to regain movement and flexibility. You may be referred to physical therapy for additional rehabilitation if it is needed.   Most people return to work within three to six weeks. Return to work varies with your type of work, your overall health and personal preferences. Discuss returning to work with us.    Diet  You may resume your regular diet as soon as you can take fluids after recovering from anesthesia.   We encourage eight to 10 glasses of water and non-caffeinated beverages per day, plenty of fruits and vegetables as well as lower fat foods. Talk with us about recommendations for healthy eating.     Follow-Up Care  The pathology results from your surgery should be available within one week after your surgery.   We will contact you by telephone with the results or will inform you at your post-operative visit. Please let us know the telephone number where you may be reached with the results.   Your dressing will be changed or removed at your post-operative visit.      When to Contact Us  Contact us with any questions. Call [phone] and ask to speak with a nurse during the day, or the answering service in the evening to reach your doctor or the doctor on call.  Pain that is not relieved by medication   Fever more than 100 degrees Fahrenheit or chills   Excessive bleeding, such as a bloody dressing   Excessive swelling   Redness " outside the dressing   Discharge or bad odor from the wound   Allergic or other reactions to medication(s)   Constipation   Anxiety, depression, trouble sleeping, need more support    Office address:  [address]  [place] [address]    Elena Grossman M.D.  [facility]  [phone]

## 2019-03-14 NOTE — CARE PLAN
Problem: Pain Management  Goal: Pain level will decrease to patient's comfort goal    Intervention: Educate and implement non-pharmacologic comfort measures. Examples: relaxation, distration, play therapy, activity therapy, massage, etc.   03/13/19 1950   OTHER   Intervention Cold Pack;Repositioned;Rest  (declines PRN pain medication)

## 2019-03-14 NOTE — OR NURSING
Report called to JAX Goldsmith. Pt up to EOB with standby assist. Up to wheelchair. Tolerated well. 3/10 bilateral breast pain. No complaints of nausea or dizziness.

## 2019-03-14 NOTE — PROGRESS NOTES
Surgical Progress Note:    POD# 1  S/P L MRM, R simple mastectomy     No acute o/n events. Minimal pain. Learning drain care.    PE:  /61   Pulse 63   Temp 36.7 °C (98.1 °F) (Temporal)   Resp 16   Ht 1.524 m (5')   Wt 99.9 kg (220 lb 3.8 oz)   SpO2 94%   Breastfeeding? No   BMI 43.01 kg/m²     I/O:   Intake/Output Summary (Last 24 hours) at 03/14/19 0818  Last data filed at 03/14/19 0535   Gross per 24 hour   Intake             2330 ml   Output              385 ml   Net             1945 ml     UOP: not recorded  PO: 500  IV: 1830  SANTY: 50/ 85/ 50    NAD  JPs s/s dressings dry      A/P: POD# 1  S/P L MRM, R simple mastectomy  - d/c home  - f/u one week (scheduled)    Elena Grossman M.D.  [facility]  [phone]

## 2019-03-14 NOTE — OR NURSING
Pt A&OX4. VSS. Pt on 10 L oxymask per ERAS order. Incisions to bilateral breasts, surgical dressing CDI. SANTY drains compressed to suction and labeled. Pt states worst pain 6/10 to bilateral breast, worse to L side. Post pain medication administration, pt states pain tolerable 3/10. No complaints of nausea, tolerated sips of water and apple juice. BP and IV to RUE only, red sleeve placed on LUE.

## 2019-03-14 NOTE — PROGRESS NOTES
Bedside report received.  Assessment complete.  A&O x 4. Patient calls appropriately.  Patient up with standby assist.   Patient has 3/10 pain. Denies need for medication at this time.  Denies N&V. Tolerating regular diet.  Surgical site noted to bilateral chest, surgical dressing in place.  positive void, positive flatus, denies BM.  Patient denies SOB.  SCD's on when in bed.  Pt sitting up in chair.  Review plan with of care with patient. Call light and personal belongings with in reach. Hourly rounding in place. All needs met at this time.

## 2019-03-14 NOTE — PROGRESS NOTES
Went over d/c instructions with pt and family at bedside. Follow up appt already made.  Medications reviewed with patient. Prescriptions and copy of d/c instructions given to pt. Pt had no further questions.  Pt declined escort and discharged off unit at 12/25.

## 2019-03-14 NOTE — PROGRESS NOTES
Assumed care of patient who is AOx4. Pt stated 4 out of 10 pain level at this time, but declined addition interventions.  Pt denies SOB, nausea, and dizziness.   Pt had bilateral mastectomy and due to this is experiencing chest pain that is related to the surgery.  Pt is tolerating a regular diet.  Pt has surgical incision of the breasts bilaterally. Dressing is CDI. Pt chest region is wrapped with ACE. Pt has 3 SANTY drains to bulb suction. Pt has moderate bloody output.   Pt is voiding adequately in the restroom and last BM was PTA.   Pt ambulates with standby assist and has a steady gait.  Pt has red sleeve on the LUE. BP and IV is only to the RUE.  CNA has been notified.  Bed in lowest position and locked.  RN Reviewed plan of care with patient, bed in lowest position and locked, pt resting comfortably now, call light within reach, all needs met at this time

## 2019-03-14 NOTE — DISCHARGE INSTRUCTIONS
Discharge Instructions    Discharged to home by car with relative. Discharged via wheelchair, hospital escort: Yes.  Special equipment needed: Not Applicable    Be sure to schedule a follow-up appointment with your primary care doctor or any specialists as instructed.     Discharge Plan:   Diet Plan: Discussed  Activity Level: Discussed  Confirmed Follow up Appointment: Appointment Scheduled  Confirmed Symptoms Management: Discussed  Medication Reconciliation Updated: Yes  Influenza Vaccine Indication: Not indicated: Previously immunized this influenza season and > 8 years of age    I understand that a diet low in cholesterol, fat, and sodium is recommended for good health. Unless I have been given specific instructions below for another diet, I accept this instruction as my diet prescription.   Other diet: Regular diet as tolerated    Special Instructions:    DISCHARGE INSTRUCTIONS:        Mastectomy: Instructions After Surgery     Pain Management  · People experience different types and amount of pain or discomfort after surgery. The goal of pain management is to assess your own level of discomfort and to take medication as needed. You will have better results controlling your pain if you take pain medication before your pain is severe.   · You will be given a prescription for a narcotic for the management of moderate pain. It is recommended to take medication for pain when pain is experienced on a regular schedule. Ibuprofen (Advil or Motrin) or Tylenol can be added to or replace the narcotic medication.     Everyone is different and if one plan to decrease your pain is not working, it will be changed. Healing and recovery improve with good pain control.   · Please notify us of any drug allergies, reactions or medical problems that would prevent you from taking these drugs. Narcotics should not be taken with alcoholic drinks. Do not use narcotics while driving.   · Narcotics also can cause or worsen  constipation, so increase your fluid intake, eat high fiber foods -- such as prunes and bran -- and make sure that you get up and out of bed to take small walks.   · An icepack may be helpful to decrease discomfort and swelling, particularly to the armpit after a lymph node dissection. A small pillow positioned in the armpit also may decrease discomfort.   · Although you will not have felt it at the time, nor remember it afterwards, you will have had a tube down your throat during the surgery. This can often cause a sore throat for a few days following your surgery.     Incision and Dressing Care  Your incision, or scar, has both stitches and steri-strips, which are small white strips of tape, and is covered by a gauze dressing and tape or a plastic dressing.  · Do not remove the dressing, steri-strips or stitches. We will remove the dressing in seven to 10 days. We also will remove the sutures in one to two weeks unless they absorb on their own. If the dressing or steri-strips fall off, do not attempt to replace them.   · You may shower one day after the drain(s) is out and if you have a plastic dressing.   · If you have gauze and paper tape, you may remove it two days after surgery and shower after that. Use a towel to dry your incision thoroughly after showering. Be careful not to touch or remove the steri-strips or sutures.   · Bruising and some swelling are common in women after surgery.   · A low-grade fever that is under 100 degrees Fahrenheit is normal the day after surgery.   · You will have a Vikas-Meyers (SANTY) drain after your surgery. This drain is a plastic tube from under the skin to outside your body with a bulb attached to it. Empty the drain two to three times per day or when the bulb is full. Write down the amount drained on a sheet of paper. Your nurse will teach you how to empty your drain. An information sheet on SANTY drains is included in your binder.   · A home care nurse may be assigned to  "check your progress at home.     Activity  · Avoid strenuous activity, heavy lifting and vigorous exercise until the stitches are removed. Tell your caregiver what you do and he or she will help you make a personal plan for \"what you can do when\" after surgery.   · Walking is a normal activity that can be restarted right away.   · You cannot do housework or driving until the drain is out. You may restart driving when you are no longer on narcotics and you feel safe turning the wheel and stopping quickly.   · Following a lymph node dissection, don't avoid using your arm, but don't exercise it until your first post-operative visit.   · You will be given exercises to regain movement and flexibility. You may be referred to physical therapy for additional rehabilitation if it is needed.   · Most people return to work within three to six weeks. Return to work varies with your type of work, your overall health and personal preferences. Discuss returning to work with us.     Diet  · You may resume your regular diet as soon as you can take fluids after recovering from anesthesia.   · We encourage eight to 10 glasses of water and non-caffeinated beverages per day, plenty of fruits and vegetables as well as lower fat foods. Talk with us about recommendations for healthy eating.      Follow-Up Care  · The pathology results from your surgery should be available within one week after your surgery.   · We will contact you by telephone with the results or will inform you at your post-operative visit. Please let us know the telephone number where you may be reached with the results.   · Your dressing will be changed or removed at your post-operative visit.        When to Contact Us  Contact us with any questions. Call [phone] and ask to speak with a nurse during the day, or the answering service in the evening to reach your doctor or the doctor on call.  · Pain that is not relieved by medication   · Fever more than 100 degrees " Fahrenheit or chills   · Excessive bleeding, such as a bloody dressing   · Excessive swelling   · Redness outside the dressing   · Discharge or bad odor from the wound   · Allergic or other reactions to medication(s)   · Constipation   · Anxiety, depression, trouble sleeping, need more support     Office address:  [address] [place] [address] [id number]  [place] [address]     Elena Grossman M.D.  [facility]  [phone]             · Is patient discharged on Warfarin / Coumadin?   No     Depression / Suicide Risk    As you are discharged from this [facility] facility, it is important to learn how to keep safe from harming yourself.    Recognize the warning signs:  · Abrupt changes in personality, positive or negative- including increase in energy   · Giving away possessions  · Change in eating patterns- significant weight changes-  positive or negative  · Change in sleeping patterns- unable to sleep or sleeping all the time   · Unwillingness or inability to communicate  · Depression  · Unusual sadness, discouragement and loneliness  · Talk of wanting to die  · Neglect of personal appearance   · Rebelliousness- reckless behavior  · Withdrawal from people/activities they love  · Confusion- inability to concentrate     If you or a loved one observes any of these behaviors or has concerns about self-harm, here's what you can do:  · Talk about it- your feelings and reasons for harming yourself  · Remove any means that you might use to hurt yourself (examples: pills, rope, extension cords, firearm)  · Get professional help from the community (Mental Health, Substance Abuse, psychological counseling)  · Do not be alone:Call your Safe Contact- someone whom you trust who will be there for you.  · Call your local CRISIS HOTLINE 069-9262 or 844-323-9761  · Call your local Children's Mobile Crisis Response Team [place] (944) 062-5816 or www.[employer]  · Call the toll free National Suicide Prevention Hotlines  "  · National Suicide Prevention Lifeline 358-273-GQKJ (2199)  · [facility] 800-SUICIDE (040-5203)    Mastectomy With or Without Reconstruction  Care After  Please read the instructions outlined below and refer to this sheet in the next few weeks. These discharge instructions provide you with general information on caring for yourself after you leave the hospital. Your caregiver may also give you specific instructions. While your treatment has been planned according to the most current medical practices available, unavoidable complications occasionally occur. If you have any problems or questions after discharge, please call your caregiver.  POST-OPERATIVE EXERCISES  · Lie in bed with your arm at your side. Raise your arm straight up and back, as if reaching for the headboard.  · Lying in bed, clasp your hands behind your head and push your elbows into the mattress.  · Raise your shoulders and rotate them forward, down, and back in a circular motion to loosen your chest, shoulder, and upper back muscles.  · Lying with your elbow bent and your arm on the bed at a 90 degree angle to your body, rotate your shoulder forward and bring your forearm down toward your feet. Then bring your forearm back up.  · With your arm raised parallel to the floor, clench and unclench your fist.  · Standing with your palm flat against a wall, \"walk\" your fingers up the wall.  SEEK MEDICAL CARE IF:   · There is redness, swelling, or increasing pain in the wound.  · There is pus coming from the wound.  · There is drainage from a wound lasting longer than one day.  · An unexplained oral temperature above 102° F (38.9° C) develops.  · You notice a foul smell coming from the wound or dressing.  · There is a breaking open of a wound (edges not staying together) after the sutures have been removed.  · You develop dizzy episodes or fainting while standing.  · You develop persistent nausea or vomiting.  SEEK IMMEDIATE MEDICAL CARE " IF:   · You develop a rash.  · You have difficulty breathing, or develop any reaction or side effects to medications given.  Document Released: 07/07/2006 Document Revised: 03/11/2013 Document Reviewed: 11/19/2008  ExitCare® Patient Information ©2014 [employer].      Preventing Constipation After Surgery  Constipation is when a person has fewer than 3 bowel movements a week; has difficulty having a bowel movement; or has stools that are dry, hard, or larger than normal. Many things can make constipation likely after surgery. They include:  · Medicines, especially numbing medicines (anesthetics) and very strong pain medicines called narcotics.  · Feeling stressed because of the surgery.  · Eating different foods than normal.  · Being less active.  Symptoms of constipation include:  · Having fewer than 3 bowel movements a week.  · Straining to have a bowel movement.  · Having hard, dry, or larger-than-normal stools.  · Feeling full or bloated.  · Having pain in the lower abdomen.  · Not feeling relief after having a bowel movement.  Follow these instructions at home:  Diet  · Eat foods that have a lot of fiber. These include fruits, vegetables, whole grains, and beans. Limit foods high in fat and processed sugars. These include french fries, hamburgers, cookies, and candy.  · Take a fiber supplement as directed. If you are not taking a fiber supplement and think that you are not getting enough fiber from foods, talk to your health care provider about adding a fiber supplement to your diet.  · Drink clear fluids, especially water. Avoid drinking alcohol, caffeine, and soda. These can make constipation worse.  · Drink enough fluids to keep your urine clear or pale yellow.  Activity  · After surgery, return to your normal activities slowly or when your health care provider says it is okay.  · Start walking as soon as you can. Try to go a little farther each day.  · Once your health care provider approves, do some sort  of regular exercise. This helps prevent constipation.  Bowel Movements  · Go to the restroom when you have the urge to go. Do not hold it in.  · Try drinking something hot to get a bowel movement started.  · Keep track of how often you use the restroom. If you miss 2-3 bowel movements, talk to your health care provider about medicines that prevent constipation. Your health care provider may suggest a stool softener, laxative, or fiber supplement.  · Only take over-the-counter or prescription medicines as directed by your health care provider.  · Do not take other medicines without talking to your health care provider first. If you become constipated and take a medicine to make you have a bowel movement, the problem may get worse. Other kinds of medicine can also make the problem worse.  Contact a health care provider if:  · You used stool softeners or laxatives and still have not had a bowel movement within 24-48 hours after using them.  · You have not had a bowel movement in 3 days.  Get help right away if:  · Your constipation lasts for more than 4 days or gets worse.  · You have bright red blood in your stool.  · You have abdominal or rectal pain.  · You have very bad cramping.  · You have thin, pencil-like stools.  · You have unexplained weight loss.  · You have a fever or persistent symptoms for more than 2-3 days.  · You have a fever and your symptoms suddenly get worse.  This information is not intended to replace advice given to you by your health care provider. Make sure you discuss any questions you have with your health care provider.  Document Released: 04/14/2014 Document Revised: 05/25/2017 Document Reviewed: 01/31/2014  [employer] Interactive Patient Education © 2017 [employer]    Bulb Drain Home Care  A bulb drain consists of a thin rubber tube and a soft, round bulb that creates a gentle suction. The rubber tube is placed in the area where you had surgery. A bulb is attached to the end of the tube  that is outside the body. The bulb drain removes excess fluid that normally builds up in a surgical wound after surgery. The color and amount of fluid will vary. Immediately after surgery, the fluid is bright red and is a little thicker than water. It may gradually change to a yellow or pink color and become more thin and water-like. When the amount decreases to about 1 or 2 tbsp in 24 hours, your health care provider will usually remove it.  DAILY CARE  · Keep the bulb flat (compressed) at all times, except while emptying it. The flatness creates suction. You can flatten the bulb by squeezing it firmly in the middle and then closing the cap.  · Keep sites where the tube enters the skin dry and covered with a bandage (dressing).  · Secure the tube 1-2 in (2.5-5.1 cm) below the insertion sites to keep it from pulling on your stitches. The tube is stitched in place and will not slip out.  · Secure the bulb as directed by your health care provider.  · For the first 3 days after surgery, there usually is more fluid in the bulb. Empty the bulb whenever it becomes half full because the bulb does not create enough suction if it is too full. The bulb could also overflow. Write down how much fluid you remove each time you empty your drain. Add up the amount removed in 24 hours.  · Empty the bulb at the same time every day once the amount of fluid decreases and you only need to empty it once a day. Write down the amounts and the 24-hour totals to give to your health care provider. This helps your health care provider know when the tubes can be removed.  EMPTYING THE BULB DRAIN  Before emptying the bulb, get a measuring cup, a piece of paper and a pen, and wash your hands.  · Gently run your fingers down the tube (stripping) to empty any drainage from the tubing into the bulb. This may need to be done several times a day to clear the tubing of clots and tissue.  · Open the bulb cap to release suction, which causes it to  inflate. Do not touch the inside of the cap.  · Gently run your fingers down the tube (stripping) to empty any drainage from the tubing into the bulb.  · Hold the cap out of the way, and pour fluid into the measuring cup.    · Squeeze the bulb to provide suction.   · Replace the cap.    · Check the tape that holds the tube to your skin. If it is becoming loose, you can remove the loose piece of tape and apply a new one. Then, pin the bulb to your shirt.    · Write down the amount of fluid you emptied out. Write down the date and each time you emptied your bulb drain. (If there are 2 bulbs, note the amount of drainage from each bulb and keep the totals separate. Your health care provider will want to know the total amounts for each drain and which tube is draining more.)    · Flush the fluid down the toilet and wash your hands.    · Call your health care provider once you have less than 2 tbsp of fluid collecting in the bulb drain every 24 hours.  If there is drainage around the tube site, change dressings and keep the area dry. Cleanse around tube with sterile saline and place dry gauze around site. This gauze should be changed when it is soiled. If it stays clean and unsoiled, it should still be changed daily.   SEEK MEDICAL CARE IF:  · Your drainage has a bad smell or is cloudy.    · You have a fever.    · Your drainage is increasing instead of decreasing.    · Your tube fell out.    · You have redness or swelling around the tube site.    · You have drainage from a surgical wound.    · Your bulb drain will not stay flat after you empty it.    MAKE SURE YOU:   · Understand these instructions.  · Will watch your condition.  · Will get help right away if you are not doing well or get worse.     This information is not intended to replace advice given to you by your health care provider. Make sure you discuss any questions you have with your health care provider.     Document Released: 12/15/2001 Document Revised:  01/08/2016 Document Reviewed: 05/22/2013  [employer] Interactive Patient Education ©2016 [employer]

## 2019-03-20 ENCOUNTER — PATIENT OUTREACH (OUTPATIENT)
Dept: OTHER | Facility: MEDICAL CENTER | Age: 54
End: 2019-03-20

## 2019-03-20 NOTE — PROGRESS NOTES
Oncology Nurse Navigator telephone outreach. Pt states she is feeling well after surgery, denied fevers, swelling or redness. She stated the drainage on the side with LN removed is still reddish in color but the others are clear. She states she has a surgical follow up tomorrow. Discussed no housework, no driving, rest arms on pillows. Pt states her mom is helping around the house and with the kids. Pt states she is walking and doing the stretching exercises she was advised to do by provider.  She stated she has been having stress r/t insurance/financial concerns. LM with Jermaine Pascal (MONALISA).

## 2019-04-04 ENCOUNTER — HOSPITAL ENCOUNTER (OUTPATIENT)
Dept: RADIOLOGY | Facility: MEDICAL CENTER | Age: 54
End: 2019-04-04

## 2019-04-04 ENCOUNTER — OFFICE VISIT (OUTPATIENT)
Dept: HEMATOLOGY ONCOLOGY | Facility: MEDICAL CENTER | Age: 54
End: 2019-04-04
Payer: MEDICAID

## 2019-04-04 VITALS
DIASTOLIC BLOOD PRESSURE: 72 MMHG | HEART RATE: 74 BPM | WEIGHT: 210.65 LBS | HEIGHT: 60 IN | RESPIRATION RATE: 16 BRPM | OXYGEN SATURATION: 96 % | SYSTOLIC BLOOD PRESSURE: 130 MMHG | TEMPERATURE: 97.8 F | BODY MASS INDEX: 41.36 KG/M2

## 2019-04-04 DIAGNOSIS — N64.89 SEROMA OF BREAST: ICD-10-CM

## 2019-04-04 DIAGNOSIS — C50.912 LEFT BREAST CANCER WITH T3 TUMOR, >5 CM IN GREATEST DIMENSION (HCC): ICD-10-CM

## 2019-04-04 DIAGNOSIS — C50.912 ER+ PR+ CARCINOMA OF BREAST, LEFT (HCC): ICD-10-CM

## 2019-04-04 DIAGNOSIS — Z17.0 ER+ PR+ CARCINOMA OF BREAST, LEFT (HCC): ICD-10-CM

## 2019-04-04 DIAGNOSIS — Z08 ENCOUNTER FOR FOLLOW-UP SURVEILLANCE OF BREAST CANCER: ICD-10-CM

## 2019-04-04 DIAGNOSIS — Z85.3 ENCOUNTER FOR FOLLOW-UP SURVEILLANCE OF BREAST CANCER: ICD-10-CM

## 2019-04-04 PROCEDURE — 99214 OFFICE O/P EST MOD 30 MIN: CPT | Performed by: NURSE PRACTITIONER

## 2019-04-04 ASSESSMENT — ENCOUNTER SYMPTOMS
VOMITING: 0
PALPITATIONS: 0
HEADACHES: 0
WHEEZING: 0
WEIGHT LOSS: 0
TINGLING: 0
CONSTIPATION: 0
DIZZINESS: 1
MYALGIAS: 0
NAUSEA: 0
CHILLS: 0
COUGH: 0
FEVER: 0
DIARRHEA: 0
SHORTNESS OF BREATH: 0

## 2019-04-04 ASSESSMENT — PAIN SCALES - GENERAL: PAINLEVEL: NO PAIN

## 2019-04-04 NOTE — PROGRESS NOTES
"Subjective:      Addie Orellana is a 53 y.o. female who presents for Follow-Up (4wks) evaluation following mastectomy      HPI   Ms. 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  She underwent bilateral mastectomy with sentinel node sampling on 3/13/19 per Dr. Grossman. Pathology showed left breast with invasive grade 3 ductal carcinoma with residual tumor bed measuring 4.5 cm in greatest dimension, 2 left axillary sentinel nodes and 5 additional left axillary nodes positive for metastatic carcinoma.  She presents today for post surgical evaluation and is unaccompanied for today's visit.    Patient continues to recover from surgery and notes increase in overall fatigue.  She lost 3 pounds since her visit on 3/7/19 and states her appetite has recovered.  She continues healing from bilateral mastectomy with mild serous drainage noted at left axilla. There appears to be seroma at left chest with minimal noted tenderness.  Her nails continue to heal as discoloration and thickness is growing out.  She notes \"feeling down\" since surgery.  She gets mildly dizzy if she gets up too quickly but is otherwise asymptomatic.        No Known Allergies      Current Outpatient Prescriptions on File Prior to Visit   Medication Sig Dispense Refill   • Cholecalciferol (VITAMIN D PO) Take 1 Cap by mouth every evening.     • Biotin w/ Vitamins C & E (HAIR/SKIN/NAILS PO) Take 1 Tab by mouth every evening.     • Omega-3 Fatty Acids (FISH OIL) 1000 MG Cap capsule Take 1,000 mg by mouth every evening.     • therapeutic multivitamin-minerals (THERAGRAN-M) Tab Take 1 Tab by mouth every evening.       No current facility-administered medications on file prior to visit.          Review of Systems   Constitutional: Positive for malaise/fatigue (recovering " "from surgery - still usual activity). Negative for chills, fever and weight loss (down 3 lbs since 3/7).   Respiratory: Negative for cough, shortness of breath and wheezing.    Cardiovascular: Negative for chest pain, palpitations and leg swelling.   Gastrointestinal: Negative for constipation, diarrhea, nausea and vomiting.   Genitourinary: Negative for dysuria.        LMP \"many years ago\" had finished menopause before dx (no hyst of hysterectomy, no gyn issues)   Musculoskeletal: Negative for myalgias.   Skin:        Healing bilateral mastectomy surgical site, mild serous drainage at left axilla; nails are growing out - still thick and yellow, with darkened area   Neurological: Positive for dizziness (when up too quick). Negative for tingling and headaches.   Psychiatric/Behavioral: Depression: feeling down about surgery.          Objective:     /72 (BP Location: Right arm, Patient Position: Sitting, BP Cuff Size: Adult)   Pulse 74   Temp 36.6 °C (97.8 °F) (Temporal)   Resp 16   Ht 1.524 m (5')   Wt 95.5 kg (210 lb 10.4 oz)   SpO2 96%   BMI 41.14 kg/m²      Physical Exam   Constitutional: She is oriented to person, place, and time. She appears well-developed and well-nourished. No distress.   HENT:   Head: Normocephalic and atraumatic.   Mouth/Throat: Oropharynx is clear and moist. No oropharyngeal exudate.   Eyes: Pupils are equal, round, and reactive to light. Conjunctivae and EOM are normal. Right eye exhibits no discharge. Left eye exhibits no discharge. No scleral icterus.   Neck: Normal range of motion. Neck supple.   Cardiovascular: Normal rate, regular rhythm and normal heart sounds.  Exam reveals no gallop and no friction rub.    No murmur heard.  Pulmonary/Chest: Effort normal and breath sounds normal. No respiratory distress. She has no wheezes.   Abdominal: Soft. Bowel sounds are normal. She exhibits no distension. There is no tenderness.   Musculoskeletal: Normal range of motion. She " exhibits edema and tenderness (Apparent seroma at left chest, patient encouraged to follow-up with Dr. Grossman's office to update.).   Neurological: She is alert and oriented to person, place, and time.   Skin: Skin is warm and dry. No rash noted. She is not diaphoretic. No erythema. No pallor.   Psychiatric: She has a normal mood and affect. Her behavior is normal.   Vitals reviewed.                       Assessment/Plan:       1. Er+ VA+ carcinoma of breast, left (HCC)  REFERRAL TO RADIATION ONCOLOGY   2. Encounter for follow-up surveillance of breast cancer     3. Left breast cancer with T3 tumor, >5 cm in greatest dimension (HCC)           1.  Seroma: Noted at left breast, patient will follow up with surgeon for any additional recommendations.    2.  Breast cancer: Patient completed neoadjuvant ACT followed by bilateral mastectomy.  She continues to heal from surgery and is referred to radiation oncology for further treatment. She is already following up with physical therapy for lymphedema prevention. Patient will return for reevaluation in 4 weeks, sooner as needed.  Labs to be completed prior to that visit.      She is anticipated to undergo reconstruction per Shell Sen and Ian when she is completed all therapy.            The patient verbalized agreement and understanding of current plan. All questions and concerns were addressed at time of visit.    Please note that this dictation was created using voice recognition software. I have made every reasonable attempt to correct obvious errors, but I expect that there are errors of grammar and possibly content that I did not discover before finalizing the note.

## 2019-04-09 ENCOUNTER — PATIENT OUTREACH (OUTPATIENT)
Dept: OTHER | Facility: MEDICAL CENTER | Age: 54
End: 2019-04-09

## 2019-04-09 ENCOUNTER — HOSPITAL ENCOUNTER (OUTPATIENT)
Dept: RADIATION ONCOLOGY | Facility: MEDICAL CENTER | Age: 54
End: 2019-04-30
Attending: RADIOLOGY
Payer: MEDICAID

## 2019-04-09 DIAGNOSIS — C50.912 LEFT BREAST CANCER WITH T3 TUMOR, >5 CM IN GREATEST DIMENSION (HCC): ICD-10-CM

## 2019-04-09 PROCEDURE — 99205 OFFICE O/P NEW HI 60 MIN: CPT | Performed by: RADIOLOGY

## 2019-04-09 PROCEDURE — 99214 OFFICE O/P EST MOD 30 MIN: CPT | Performed by: RADIOLOGY

## 2019-04-09 NOTE — PROGRESS NOTES
Oncology Nurse Navigator in person outreach. Pt states she was able to get her financial concerns addressed. She states her family is doing well, denied concerns with kids and treatment issues. She states her mom and brothers are her support people. Discussed support services classes, pt reluctant to attend when not actually at [facility] for a medical appointment but stated might be interested in [facility] while in radiation.

## 2019-04-09 NOTE — CONSULTS
RADIATION ONCOLOGY CONSULT    DATE OF SERVICE: 4/9/2019  IDENTIFICATION: A 53 y.o. female with a  cT3N2, grade 3 infiltrating ductal carcinoma ER positive at greater than 95% NJ positive greater than 95% HER-2/kamala IHC 1+ with a Ki-67 of 60%.  Status post neoadjuvant chemotherapy with a partial response ultimately staged as a [id number].  She is here at the kind request of Dr. Linder.      HISTORY OF PRESENT ILLNESS: Patient's history dates back to last summer when she noted a low on the lateral aspect of her left breast.  She subsequently underwent a mammogram and ultrasound on 8/9/2018.  The mass on ultrasound measured 5.7 x 3.1 x 4.4 and the mass in a lymph node area measured 6.5 x 3.9 x 4.7.  There was a dense irregular mass at the original site which was the 6 o'clock position.  8/28/2018 biopsy revealed a grade 3 infiltrating ductal carcinoma ER NJ greater than 95% HER-2/kamala IHC 1+ with a Ki-67 of 60%.  Staging workup included a PET/CT 9/17/2018 showing increased abnormality in the left breast and the axilla consistent with the known disease.  There was asymmetric uptake in the right tonsil thought to be pooling.  She is bracken negative.  She then underwent AC/Taxol and subsequent bilateral mastectomies on 3/13/2019.  The tumor itself was 4.5 cm there was lymphovascular invasion 2 sentinel nodes were taken out both were positive and additional 5 nodes were taken out all were positive as well.  One was a microscopic met.  The largest metastatic deposit was 2.3 cm and there was extranodal extension.  There was also lymphatic invasion.  Postoperatively she is been doing well she is here to discuss radiation therapy to decrease her chance of local recurrence.    PAST MEDICAL HISTORY:   Past Medical History:   Diagnosis Date   • Breast cancer, stage 3 (HCC)    • Cancer (HCC) 2018    stage 3 breast, chemo 2019   • Dental disorder     Partial upper and lower   • Hemorrhagic disorder (HCC)     easily bruises   •  Pneumonia     first of week of her chemo-months ago       PAST SURGICAL HISTORY:  Past Surgical History:   Procedure Laterality Date   • MASTECTOMY Bilateral 3/13/2019    Procedure: MASTECTOMY- SIMPLE  ;  Surgeon: Elena Grossman M.D.;  Location: SURGERY [facility];  Service: General   • NODE BIOPSY SENTINEL Left 3/13/2019    Procedure: LEFT AXILLARY SENTINAL LYMPH NODE BIOPSY;  Surgeon: Elena Grossman M.D.;  Location: SURGERY [facility];  Service: General   • AXILLARY NODE DISSECTION Left 3/13/2019    Procedure: AXILLARY NODE DISSECTION ;  Surgeon: Elena Grossman M.D.;  Location: SURGERY [facility];  Service: General   • NODE BIOPSY Left 3/13/2019    Procedure: LYMPH NODE INJECTION;  Surgeon: Elena Grossman M.D.;  Location: SURGERY [facility];  Service: General   • ANKLE FUSION     • GASTRIC BYPASS LAPAROSCOPIC     • OTHER ABDOMINAL SURGERY      luanne   • OTHER ORTHOPEDIC SURGERY Bilateral     foot surgery to repair arches       GYNECOLOGICAL STATUS:   P:0 A:0  HRT: 0 years  BCP: 0 years      CURRENT MEDICATIONS:  Current Outpatient Prescriptions   Medication Sig Dispense Refill   • Cholecalciferol (VITAMIN D PO) Take 1 Cap by mouth every evening.     • Biotin w/ Vitamins C & E (HAIR/SKIN/NAILS PO) Take 1 Tab by mouth every evening.     • Omega-3 Fatty Acids (FISH OIL) 1000 MG Cap capsule Take 1,000 mg by mouth every evening.     • therapeutic multivitamin-minerals (THERAGRAN-M) Tab Take 1 Tab by mouth every evening.       No current facility-administered medications for this encounter.        ALLERGIES:    Patient has no known allergies.    FAMILY HISTORY:    Family History   Problem Relation Age of Onset   • Cancer Paternal Aunt         lung cancer   • Cancer Paternal Grandfather         lung cancer           SOCIAL HISTORY:     reports that she has never smoked. She has never used smokeless tobacco. She reports that she drinks alcohol. She reports that she does not use drugs.    Patient is single and lives in [place], [place]  Patient is employed as a .       REVIEW OF SYSTEMS: Pertinent positives consist of the masses in the breast she has postoperative pain from the mastectomies.  She had a 30 pound weight loss since she was diagnosed mainly because she is not hungry she has a decrease in appetite.  She has some fatigue some chills dry mouth little bit of change in voice all since chemo.  She has nocturia and incontinence muscle pain joint pain neck pain neck stiffness headaches dizziness and unsteady gait from scoliosis she has a little bit of neuropathy from chemo.  The rest of the review of systems is negative and has been reviewed by me. It is in the nursing note dated 4/9/2019 in [facility]    Pain: 0/10        PHYSICAL EXAM:    0= Fully active, able to carry on all pre-disease performance without restriction.  There were no vitals filed for this visit.         GENERAL: Well-appearing alert and oriented x3 in no apparent distress  Breasts: Bilaterally she has modified radical mastectomies with associated postoperative changes including ecchymosis and induration.  No discrete masses are appreciated in either breast or axilla.  HEENT:  Pupils are equal, round, and reactive to light.  Extraocular muscles   are intact. Sclerae nonicteric.  Conjunctivae pink.  Oral cavity, tongue   protrudes midline.   NECK:   No peripheral adenopathy of the neck, supraclavicular fossa or axillae   bilaterally.  LUNGS:  Clear to ascultation   HEART:  Regular rate and rhythm.  No murmur appreciated  ABDOMEN:  Soft. No evidence of hepatosplenomegaly.  Obese  EXTREMITIES: Stasis changes in both lower extremities and 2+ lymphedema in the lower extremities  NEUROLOGIC:  Cranial nerves II through XII were intact. Normal stance and gait motor and sensory grossly within normal limits                IMPRESSION:    A 53 y.o. with   a  cT3N2, grade 3 infiltrating ductal carcinoma ER positive at greater than 95%  NV positive greater than 95% HER-2/kamala IHC 1+ with a Ki-67 of 60%.  Status post neoadjuvant chemotherapy with a partial response ultimately staged as a [id number]..      RECOMMENDATIONS:   I discussed with patient she definitely requires radiation therapy to decrease her chance of local recurrence within the chest wall and draining lymphatic area.  Patient desires reconstruction I told her it makes it far more difficult after radiation therapy that she has seen Dr. Sosa and he is going to reevaluate her one year after she has completed radiation therapy.  I've described the details of radiation along with the side effects both acute and chronic, including but not exclusive to fatigue, skin reaction, local soreness, swelling, delayed healing, scarring fibrosis discoloration.  She also understands that the radiation therapy makes doing reconstruction much more difficult.  Ample time was allowed for questions, and patient understands.      Thank you for the opportunity to participate in her care.  If any questions or comments, please do not hesitate in calling.    Please note that this dictation was created using voice recognition software. I have made every reasonable attempt to correct obvious errors, but I expect that there are errors of grammar and possibly content that I did not discover before finalizing the note.

## 2019-04-09 NOTE — NON-PROVIDER
Patient was seen today in clinic with Dr. Dunaway for breast cancer.  Vitals signs and weight were obtained and pain assessment was completed.  Allergies and medications were reviewed with the patient.  Review of systems completed.     Vitals/Pain:  There were no vitals filed for this visit.         Allergies:   Patient has no known allergies.    Current Medications:  Current Outpatient Prescriptions   Medication Sig Dispense Refill   • Cholecalciferol (VITAMIN D PO) Take 1 Cap by mouth every evening.     • Biotin w/ Vitamins C & E (HAIR/SKIN/NAILS PO) Take 1 Tab by mouth every evening.     • Omega-3 Fatty Acids (FISH OIL) 1000 MG Cap capsule Take 1,000 mg by mouth every evening.     • therapeutic multivitamin-minerals (THERAGRAN-M) Tab Take 1 Tab by mouth every evening.       No current facility-administered medications for this encounter.          PCP:  Paulette Barahona R.N.

## 2019-04-15 ENCOUNTER — TELEPHONE (OUTPATIENT)
Dept: HEMATOLOGY ONCOLOGY | Facility: MEDICAL CENTER | Age: 54
End: 2019-04-15

## 2019-04-15 ENCOUNTER — HOSPITAL ENCOUNTER (OUTPATIENT)
Dept: RADIOLOGY | Facility: MEDICAL CENTER | Age: 54
End: 2019-04-15
Attending: SURGERY
Payer: MEDICAID

## 2019-04-15 DIAGNOSIS — N63.0 BREAST LUMP: ICD-10-CM

## 2019-04-15 DIAGNOSIS — R22.32 AXILLARY MASS, LEFT: ICD-10-CM

## 2019-04-15 DIAGNOSIS — C50.912 LEFT BREAST CANCER WITH T3 TUMOR, >5 CM IN GREATEST DIMENSION (HCC): ICD-10-CM

## 2019-04-15 PROCEDURE — 76642 ULTRASOUND BREAST LIMITED: CPT | Mod: LT

## 2019-04-15 NOTE — PROGRESS NOTES
Dr. Linder desires post treatment PET scan for reevaluation, prior to radiation therapy, as well as evaluation of any clinical trials for high risk breast cancer.    Referral placed for PET, research coordinator cc's on this note      PET scan completed prior to initiating treatment showed increased uptake at tonsil but patient has not followed up with ENT for further evaluation. We will evaluate via PET prior to re-referral.        _ _ _ _ _ _ _ _ _ _ _ _      9/17/2018 12:24 PM    HISTORY/REASON FOR EXAM:  Left breast cancer staging    TECHNIQUE/EXAM DESCRIPTION AND NUMBER OF VIEWS:  PET body imaging.    Initially, 17.9 mCi F-18 FDG was administered intravenously under standardized conditions. Approximately 45 minutes after FDG administration, the patient was placed in the supine position on the PET CT table. Blood glucose level was 109 mg/dL.    Low dose spiral CT imaging was performed from the skull base to the mid thighs.    PET imaging was then performed from the skull base to the mid thighs. CT images, PET images, and PET/CT fused images were reviewed on a PACS 3D workstation.    The limited non-contrast CT data are used primarily for attenuation correction and anatomic correlation.  Evaluation of solid organs and bowel are especially limited utilizing this technique.    A low dose CT was obtained of the same area without IV contrast for attenuation correction and coregistration, not for a diagnostic scan.    COMPARISON: None.    FINDINGS:    VISUALIZED BRAIN: Normal metabolic activity.    HEAD AND NECK: Asymmetric hypermetabolism in the right tongue base/lingual tonsil with a maximum SUV of 12.14. No hypermetabolic cervical nodes.    CHEST: Background lung activity shows average SUV of 1.2.    Lungs show no hypermetabolic pulmonary nodules.    A 4.5 x 4.0 hypermetabolic left breast mass has a maximum SUV of 26.16.    Large, hypermetabolic left axillary node measures 6.6 x 4.4 cm and has an SUV of 27.8.  Several small hypermetabolic left retropectoral nodes have a maximum SUV of 5.19. There are no hypermetabolic hilar or mediastinal nodes.    Mild cardiomegaly.    ABDOMEN AND PELVIS: Background liver activity shows average SUV of 3.3.    There is normal, uniform metabolic activity in the liver, spleen, adrenal glands, kidneys.    There is no hypermetabolic mesenteric, retroperitoneal, iliac, or inguinal lymphadenopathy.    Nonspecific physiologic activity in the colon and intestine is noted.    Prior gastric bypass surgery with a small hiatal hernia. Nonobstructing left nephrolithiasis. Cholecystectomy. A few small colonic diverticula.    VISUALIZED MUSCULOSKELETAL SYSTEM: No hypermetabolic osseous lesions. Minimal soft tissue hypermetabolism in the left lateral gluteus musculature, likely infectious/inflammatory or posttraumatic     Impression   1. Hypermetabolic left breast mass, consistent with malignancy.  2. Hypermetabolic left axillary and prepectoral lymphadenopathy, consistent with karlie metastases.  3. Asymmetric hypermetabolism in the right tongue base/right lingual tonsil. While it may represent salivary gland secretion pooling, another primary malignancy is also a consideration. Recommend direct visualization.  4. Nonobstructive left nephrolithiasis.       Reading Provider Reading Date   Duy Alan M.D. Sep 17, 2018   Signing Provider Signing Date Signing Time   Duy Alan M.D. Sep 17, 2018  2:36 PM

## 2019-04-15 NOTE — TELEPHONE ENCOUNTER
Left message for patient to return our call. Per Dr. Linder he would like patient to get a post treatment PET Scan. We will have Dolly our imaging scheduler call to schedule this exam.

## 2019-04-15 NOTE — Clinical Note
Soraida,ast cancer? What are the possibilities of a clinical trial for her high risk disease breast cancer?

## 2019-04-17 PROCEDURE — 77290 THER RAD SIMULAJ FIELD CPLX: CPT | Performed by: RADIOLOGY

## 2019-04-17 PROCEDURE — 77334 RADIATION TREATMENT AID(S): CPT | Performed by: RADIOLOGY

## 2019-04-17 PROCEDURE — 77263 THER RADIOLOGY TX PLNG CPLX: CPT | Performed by: RADIOLOGY

## 2019-04-17 PROCEDURE — 77334 RADIATION TREATMENT AID(S): CPT | Mod: 26 | Performed by: RADIOLOGY

## 2019-04-23 PROCEDURE — 77338 DESIGN MLC DEVICE FOR IMRT: CPT | Mod: 26 | Performed by: RADIOLOGY

## 2019-04-23 PROCEDURE — 77301 RADIOTHERAPY DOSE PLAN IMRT: CPT | Mod: 26 | Performed by: RADIOLOGY

## 2019-04-23 PROCEDURE — 77300 RADIATION THERAPY DOSE PLAN: CPT | Mod: 26 | Performed by: RADIOLOGY

## 2019-04-23 PROCEDURE — 77338 DESIGN MLC DEVICE FOR IMRT: CPT | Performed by: RADIOLOGY

## 2019-04-23 PROCEDURE — 77300 RADIATION THERAPY DOSE PLAN: CPT | Performed by: RADIOLOGY

## 2019-04-23 PROCEDURE — 77301 RADIOTHERAPY DOSE PLAN IMRT: CPT | Performed by: RADIOLOGY

## 2019-04-24 ENCOUNTER — DOCUMENTATION (OUTPATIENT)
Dept: HEMATOLOGY ONCOLOGY | Facility: MEDICAL CENTER | Age: 54
End: 2019-04-24

## 2019-04-24 LAB
CHEMOTHERAPY INFUSION START DATE: NORMAL
CHEMOTHERAPY RECORDS: 1.8
CHEMOTHERAPY RECORDS: 2520
CHEMOTHERAPY RECORDS: NORMAL
CHEMOTHERAPY RX CANCER: NORMAL
RAD ONC ARIA COURSE TREATMENT ELAPSED DAYS: NORMAL
RAD ONC ARIA PLAN TREATMENT DATES: NORMAL
RAD ONC ARIA REFERENCE POINT DOSAGE GIVEN TO DATE: 1.8
RAD ONC ARIA REFERENCE POINT DOSAGE GIVEN TO DATE: 1.86
RAD ONC ARIA REFERENCE POINT ID: NORMAL
RAD ONC ARIA REFERENCE POINT ID: NORMAL
RAD ONC ARIA REFERENCE POINT SESSION DOSAGE GIVEN: 1.8
RAD ONC ARIA REFERENCE POINT SESSION DOSAGE GIVEN: 1.86

## 2019-04-24 PROCEDURE — 77280 THER RAD SIMULAJ FIELD SMPL: CPT | Performed by: RADIOLOGY

## 2019-04-24 PROCEDURE — 77385 HCHG IMRT DELIVERY SIMPLE: CPT | Performed by: RADIOLOGY

## 2019-04-24 NOTE — PROGRESS NOTES
Pt arrived in clinic, per Ezio,  Stating that her radiation doctor sent her to see Med Onc re swelling to Left forehead area.  VS were taken: BP - 126/80, P - 63, R - 16, O2 sat - 96% on RA, temp of 99.1.  Left swollen area was noted to be slightly warmer that Right by RN and pt, and temperature was taken temporally.  Pt denied fall, light headedness, dizziness, confusion of headache.  Neuro check was normal with PERRLA from 3mm to 2 mm bilat.  There was a small redened sore noted at the hair line about 4 mm in circumference.  The swelling appeared to be circumfrential from the sore all the way to the eyebrow.  Spoke with BIRDIE Knutson, who recommended pt follow up with PCP and precautions were given regarding when to go to ER.  This was explained to pt.  Also spoke with JAX Souza in Radiation, to please take picture to add to chart for documentation and future comparison.  Pt indicated no additional questions or concerns at this time and ambulated from clinic w/o s/s of distress.

## 2019-04-25 ENCOUNTER — HOSPITAL ENCOUNTER (OUTPATIENT)
Dept: RADIATION ONCOLOGY | Facility: MEDICAL CENTER | Age: 54
End: 2019-04-25

## 2019-04-25 LAB
CHEMOTHERAPY INFUSION START DATE: NORMAL
CHEMOTHERAPY RECORDS: 1.8
CHEMOTHERAPY RECORDS: 2520
CHEMOTHERAPY RECORDS: NORMAL
CHEMOTHERAPY RX CANCER: NORMAL
RAD ONC ARIA COURSE TREATMENT ELAPSED DAYS: NORMAL
RAD ONC ARIA PLAN TREATMENT DATES: NORMAL
RAD ONC ARIA REFERENCE POINT DOSAGE GIVEN TO DATE: 1.83
RAD ONC ARIA REFERENCE POINT DOSAGE GIVEN TO DATE: 3.6
RAD ONC ARIA REFERENCE POINT ID: NORMAL
RAD ONC ARIA REFERENCE POINT ID: NORMAL
RAD ONC ARIA REFERENCE POINT SESSION DOSAGE GIVEN: 1.8
RAD ONC ARIA REFERENCE POINT SESSION DOSAGE GIVEN: 1.83

## 2019-04-25 PROCEDURE — 77385 HCHG IMRT DELIVERY SIMPLE: CPT | Performed by: RADIOLOGY

## 2019-04-25 PROCEDURE — 77014 PR CT GUIDANCE PLACEMENT RAD THERAPY FIELDS: CPT | Mod: 26 | Performed by: RADIOLOGY

## 2019-04-26 ENCOUNTER — HOSPITAL ENCOUNTER (OUTPATIENT)
Dept: RADIATION ONCOLOGY | Facility: MEDICAL CENTER | Age: 54
End: 2019-04-26

## 2019-04-26 LAB
CHEMOTHERAPY INFUSION START DATE: NORMAL
CHEMOTHERAPY RECORDS: 1.8
CHEMOTHERAPY RECORDS: 2520
CHEMOTHERAPY RECORDS: NORMAL
CHEMOTHERAPY RX CANCER: NORMAL
RAD ONC ARIA COURSE TREATMENT ELAPSED DAYS: NORMAL
RAD ONC ARIA PLAN TREATMENT DATES: NORMAL
RAD ONC ARIA REFERENCE POINT DOSAGE GIVEN TO DATE: 3.73
RAD ONC ARIA REFERENCE POINT DOSAGE GIVEN TO DATE: 5.4
RAD ONC ARIA REFERENCE POINT ID: NORMAL
RAD ONC ARIA REFERENCE POINT ID: NORMAL
RAD ONC ARIA REFERENCE POINT SESSION DOSAGE GIVEN: 1.8
RAD ONC ARIA REFERENCE POINT SESSION DOSAGE GIVEN: 1.86

## 2019-04-26 PROCEDURE — 77014 PR CT GUIDANCE PLACEMENT RAD THERAPY FIELDS: CPT | Mod: 26 | Performed by: RADIOLOGY

## 2019-04-26 PROCEDURE — 77385 HCHG IMRT DELIVERY SIMPLE: CPT | Performed by: RADIOLOGY

## 2019-04-26 PROCEDURE — 77336 RADIATION PHYSICS CONSULT: CPT | Performed by: RADIOLOGY

## 2019-04-29 ENCOUNTER — HOSPITAL ENCOUNTER (OUTPATIENT)
Dept: RADIATION ONCOLOGY | Facility: MEDICAL CENTER | Age: 54
End: 2019-04-29

## 2019-04-29 LAB
CHEMOTHERAPY INFUSION START DATE: NORMAL
CHEMOTHERAPY RECORDS: 1.8
CHEMOTHERAPY RECORDS: 2520
CHEMOTHERAPY RECORDS: NORMAL
CHEMOTHERAPY RX CANCER: NORMAL
RAD ONC ARIA COURSE TREATMENT ELAPSED DAYS: NORMAL
RAD ONC ARIA PLAN TREATMENT DATES: NORMAL
RAD ONC ARIA REFERENCE POINT DOSAGE GIVEN TO DATE: 3.66
RAD ONC ARIA REFERENCE POINT DOSAGE GIVEN TO DATE: 7.2
RAD ONC ARIA REFERENCE POINT ID: NORMAL
RAD ONC ARIA REFERENCE POINT ID: NORMAL
RAD ONC ARIA REFERENCE POINT SESSION DOSAGE GIVEN: 1.8
RAD ONC ARIA REFERENCE POINT SESSION DOSAGE GIVEN: 1.83

## 2019-04-29 PROCEDURE — 77014 PR CT GUIDANCE PLACEMENT RAD THERAPY FIELDS: CPT | Mod: 26 | Performed by: RADIOLOGY

## 2019-04-29 PROCEDURE — 77385 HCHG IMRT DELIVERY SIMPLE: CPT | Performed by: RADIOLOGY

## 2019-04-30 ENCOUNTER — HOSPITAL ENCOUNTER (OUTPATIENT)
Dept: RADIATION ONCOLOGY | Facility: MEDICAL CENTER | Age: 54
End: 2019-04-30

## 2019-04-30 LAB
CHEMOTHERAPY INFUSION START DATE: NORMAL
CHEMOTHERAPY RECORDS: 1.8
CHEMOTHERAPY RECORDS: 2520
CHEMOTHERAPY RECORDS: NORMAL
CHEMOTHERAPY RX CANCER: NORMAL
RAD ONC ARIA COURSE TREATMENT ELAPSED DAYS: NORMAL
RAD ONC ARIA PLAN TREATMENT DATES: NORMAL
RAD ONC ARIA REFERENCE POINT DOSAGE GIVEN TO DATE: 5.59
RAD ONC ARIA REFERENCE POINT DOSAGE GIVEN TO DATE: 9
RAD ONC ARIA REFERENCE POINT ID: NORMAL
RAD ONC ARIA REFERENCE POINT ID: NORMAL
RAD ONC ARIA REFERENCE POINT SESSION DOSAGE GIVEN: 1.8
RAD ONC ARIA REFERENCE POINT SESSION DOSAGE GIVEN: 1.86

## 2019-04-30 PROCEDURE — 77385 HCHG IMRT DELIVERY SIMPLE: CPT | Performed by: RADIOLOGY

## 2019-04-30 PROCEDURE — 77014 PR CT GUIDANCE PLACEMENT RAD THERAPY FIELDS: CPT | Mod: 26 | Performed by: RADIOLOGY

## 2019-04-30 PROCEDURE — 77427 RADIATION TX MANAGEMENT X5: CPT | Performed by: RADIOLOGY

## 2019-05-01 ENCOUNTER — HOSPITAL ENCOUNTER (OUTPATIENT)
Dept: RADIATION ONCOLOGY | Facility: MEDICAL CENTER | Age: 54
End: 2019-05-31
Attending: RADIOLOGY
Payer: MEDICAID

## 2019-05-01 LAB
CHEMOTHERAPY INFUSION START DATE: NORMAL
CHEMOTHERAPY RECORDS: 1.8
CHEMOTHERAPY RECORDS: 2520
CHEMOTHERAPY RECORDS: NORMAL
CHEMOTHERAPY RX CANCER: NORMAL
RAD ONC ARIA COURSE TREATMENT ELAPSED DAYS: NORMAL
RAD ONC ARIA PLAN TREATMENT DATES: NORMAL
RAD ONC ARIA REFERENCE POINT DOSAGE GIVEN TO DATE: 10.8
RAD ONC ARIA REFERENCE POINT DOSAGE GIVEN TO DATE: 5.5
RAD ONC ARIA REFERENCE POINT ID: NORMAL
RAD ONC ARIA REFERENCE POINT ID: NORMAL
RAD ONC ARIA REFERENCE POINT SESSION DOSAGE GIVEN: 1.8
RAD ONC ARIA REFERENCE POINT SESSION DOSAGE GIVEN: 1.83

## 2019-05-01 PROCEDURE — 77014 PR CT GUIDANCE PLACEMENT RAD THERAPY FIELDS: CPT | Mod: 26 | Performed by: RADIOLOGY

## 2019-05-01 PROCEDURE — 77385 HCHG IMRT DELIVERY SIMPLE: CPT | Performed by: RADIOLOGY

## 2019-05-02 ENCOUNTER — HOSPITAL ENCOUNTER (OUTPATIENT)
Dept: RADIATION ONCOLOGY | Facility: MEDICAL CENTER | Age: 54
End: 2019-05-02

## 2019-05-02 LAB
CHEMOTHERAPY INFUSION START DATE: NORMAL
CHEMOTHERAPY RECORDS: 1.8
CHEMOTHERAPY RECORDS: 2520
CHEMOTHERAPY RECORDS: NORMAL
CHEMOTHERAPY RX CANCER: NORMAL
RAD ONC ARIA COURSE TREATMENT ELAPSED DAYS: NORMAL
RAD ONC ARIA PLAN TREATMENT DATES: NORMAL
RAD ONC ARIA REFERENCE POINT DOSAGE GIVEN TO DATE: 12.6
RAD ONC ARIA REFERENCE POINT DOSAGE GIVEN TO DATE: 7.46
RAD ONC ARIA REFERENCE POINT ID: NORMAL
RAD ONC ARIA REFERENCE POINT ID: NORMAL
RAD ONC ARIA REFERENCE POINT SESSION DOSAGE GIVEN: 1.8
RAD ONC ARIA REFERENCE POINT SESSION DOSAGE GIVEN: 1.86

## 2019-05-02 PROCEDURE — 77014 PR CT GUIDANCE PLACEMENT RAD THERAPY FIELDS: CPT | Mod: 26 | Performed by: RADIOLOGY

## 2019-05-02 PROCEDURE — 77385 HCHG IMRT DELIVERY SIMPLE: CPT | Performed by: RADIOLOGY

## 2019-05-03 ENCOUNTER — HOSPITAL ENCOUNTER (OUTPATIENT)
Dept: RADIATION ONCOLOGY | Facility: MEDICAL CENTER | Age: 54
End: 2019-05-03

## 2019-05-03 LAB
CHEMOTHERAPY INFUSION START DATE: NORMAL
CHEMOTHERAPY RECORDS: 1.8
CHEMOTHERAPY RECORDS: 2520
CHEMOTHERAPY RECORDS: NORMAL
CHEMOTHERAPY RX CANCER: NORMAL
RAD ONC ARIA COURSE TREATMENT ELAPSED DAYS: NORMAL
RAD ONC ARIA PLAN TREATMENT DATES: NORMAL
RAD ONC ARIA REFERENCE POINT DOSAGE GIVEN TO DATE: 14.4
RAD ONC ARIA REFERENCE POINT DOSAGE GIVEN TO DATE: 7.33
RAD ONC ARIA REFERENCE POINT ID: NORMAL
RAD ONC ARIA REFERENCE POINT ID: NORMAL
RAD ONC ARIA REFERENCE POINT SESSION DOSAGE GIVEN: 1.8
RAD ONC ARIA REFERENCE POINT SESSION DOSAGE GIVEN: 1.83

## 2019-05-03 PROCEDURE — 77385 HCHG IMRT DELIVERY SIMPLE: CPT | Performed by: RADIOLOGY

## 2019-05-03 PROCEDURE — 77014 PR CT GUIDANCE PLACEMENT RAD THERAPY FIELDS: CPT | Mod: 26 | Performed by: RADIOLOGY

## 2019-05-03 PROCEDURE — 77336 RADIATION PHYSICS CONSULT: CPT | Performed by: RADIOLOGY

## 2019-05-06 ENCOUNTER — HOSPITAL ENCOUNTER (OUTPATIENT)
Dept: RADIATION ONCOLOGY | Facility: MEDICAL CENTER | Age: 54
End: 2019-05-06

## 2019-05-06 LAB
CHEMOTHERAPY INFUSION START DATE: NORMAL
CHEMOTHERAPY RECORDS: 1.8
CHEMOTHERAPY RECORDS: 2520
CHEMOTHERAPY RECORDS: NORMAL
CHEMOTHERAPY RX CANCER: NORMAL
RAD ONC ARIA COURSE TREATMENT ELAPSED DAYS: NORMAL
RAD ONC ARIA PLAN TREATMENT DATES: NORMAL
RAD ONC ARIA REFERENCE POINT DOSAGE GIVEN TO DATE: 16.2
RAD ONC ARIA REFERENCE POINT DOSAGE GIVEN TO DATE: 9.32
RAD ONC ARIA REFERENCE POINT ID: NORMAL
RAD ONC ARIA REFERENCE POINT ID: NORMAL
RAD ONC ARIA REFERENCE POINT SESSION DOSAGE GIVEN: 1.8
RAD ONC ARIA REFERENCE POINT SESSION DOSAGE GIVEN: 1.86

## 2019-05-06 PROCEDURE — 77385 HCHG IMRT DELIVERY SIMPLE: CPT | Performed by: RADIOLOGY

## 2019-05-06 PROCEDURE — 77014 PR CT GUIDANCE PLACEMENT RAD THERAPY FIELDS: CPT | Mod: 26 | Performed by: RADIOLOGY

## 2019-05-07 ENCOUNTER — APPOINTMENT (OUTPATIENT)
Dept: PHYSICAL THERAPY | Facility: REHABILITATION | Age: 54
End: 2019-05-07
Attending: SURGERY
Payer: MEDICAID

## 2019-05-07 ENCOUNTER — HOSPITAL ENCOUNTER (OUTPATIENT)
Dept: RADIATION ONCOLOGY | Facility: MEDICAL CENTER | Age: 54
End: 2019-05-07

## 2019-05-07 LAB
CHEMOTHERAPY INFUSION START DATE: NORMAL
CHEMOTHERAPY RECORDS: 1.8
CHEMOTHERAPY RECORDS: 2520
CHEMOTHERAPY RECORDS: NORMAL
CHEMOTHERAPY RX CANCER: NORMAL
RAD ONC ARIA COURSE TREATMENT ELAPSED DAYS: NORMAL
RAD ONC ARIA PLAN TREATMENT DATES: NORMAL
RAD ONC ARIA REFERENCE POINT DOSAGE GIVEN TO DATE: 18
RAD ONC ARIA REFERENCE POINT DOSAGE GIVEN TO DATE: 9.16
RAD ONC ARIA REFERENCE POINT ID: NORMAL
RAD ONC ARIA REFERENCE POINT ID: NORMAL
RAD ONC ARIA REFERENCE POINT SESSION DOSAGE GIVEN: 1.8
RAD ONC ARIA REFERENCE POINT SESSION DOSAGE GIVEN: 1.83

## 2019-05-07 PROCEDURE — 77014 PR CT GUIDANCE PLACEMENT RAD THERAPY FIELDS: CPT | Mod: 26 | Performed by: RADIOLOGY

## 2019-05-07 PROCEDURE — 77427 RADIATION TX MANAGEMENT X5: CPT | Performed by: RADIOLOGY

## 2019-05-07 PROCEDURE — 77385 HCHG IMRT DELIVERY SIMPLE: CPT | Performed by: RADIOLOGY

## 2019-05-08 ENCOUNTER — TELEPHONE (OUTPATIENT)
Dept: HEMATOLOGY ONCOLOGY | Facility: MEDICAL CENTER | Age: 54
End: 2019-05-08

## 2019-05-08 LAB
CHEMOTHERAPY INFUSION START DATE: NORMAL
CHEMOTHERAPY RECORDS: 1.8
CHEMOTHERAPY RECORDS: 2520
CHEMOTHERAPY RECORDS: NORMAL
CHEMOTHERAPY RX CANCER: NORMAL
RAD ONC ARIA COURSE TREATMENT ELAPSED DAYS: NORMAL
RAD ONC ARIA PLAN TREATMENT DATES: NORMAL
RAD ONC ARIA REFERENCE POINT DOSAGE GIVEN TO DATE: 11.18
RAD ONC ARIA REFERENCE POINT DOSAGE GIVEN TO DATE: 19.8
RAD ONC ARIA REFERENCE POINT ID: NORMAL
RAD ONC ARIA REFERENCE POINT ID: NORMAL
RAD ONC ARIA REFERENCE POINT SESSION DOSAGE GIVEN: 1.8
RAD ONC ARIA REFERENCE POINT SESSION DOSAGE GIVEN: 1.86

## 2019-05-08 PROCEDURE — 77385 HCHG IMRT DELIVERY SIMPLE: CPT | Performed by: RADIOLOGY

## 2019-05-08 PROCEDURE — 77014 PR CT GUIDANCE PLACEMENT RAD THERAPY FIELDS: CPT | Mod: 26 | Performed by: RADIOLOGY

## 2019-05-08 NOTE — TELEPHONE ENCOUNTER
1st attempt   Called and lvm for patient to give us a call back and reschedule appointment she missed on 05/07/2019 at her earliest convience. If patient does call back can we please reschedule. Thank you.     Scheduled 05/23/2019 with Dr. Linder. Thank you.

## 2019-05-09 ENCOUNTER — HOSPITAL ENCOUNTER (OUTPATIENT)
Dept: RADIOLOGY | Facility: MEDICAL CENTER | Age: 54
End: 2019-05-09
Attending: NURSE PRACTITIONER
Payer: MEDICAID

## 2019-05-09 ENCOUNTER — HOSPITAL ENCOUNTER (OUTPATIENT)
Dept: RADIATION ONCOLOGY | Facility: MEDICAL CENTER | Age: 54
End: 2019-05-09

## 2019-05-09 DIAGNOSIS — C50.912 LEFT BREAST CANCER WITH T3 TUMOR, >5 CM IN GREATEST DIMENSION (HCC): ICD-10-CM

## 2019-05-09 LAB
CHEMOTHERAPY INFUSION START DATE: NORMAL
CHEMOTHERAPY RECORDS: 1.8
CHEMOTHERAPY RECORDS: 2520
CHEMOTHERAPY RECORDS: NORMAL
CHEMOTHERAPY RX CANCER: NORMAL
RAD ONC ARIA COURSE TREATMENT ELAPSED DAYS: NORMAL
RAD ONC ARIA PLAN TREATMENT DATES: NORMAL
RAD ONC ARIA REFERENCE POINT DOSAGE GIVEN TO DATE: 10.99
RAD ONC ARIA REFERENCE POINT DOSAGE GIVEN TO DATE: 21.6
RAD ONC ARIA REFERENCE POINT ID: NORMAL
RAD ONC ARIA REFERENCE POINT ID: NORMAL
RAD ONC ARIA REFERENCE POINT SESSION DOSAGE GIVEN: 1.8
RAD ONC ARIA REFERENCE POINT SESSION DOSAGE GIVEN: 1.83

## 2019-05-09 PROCEDURE — 77385 HCHG IMRT DELIVERY SIMPLE: CPT | Performed by: RADIOLOGY

## 2019-05-09 PROCEDURE — 77014 PR CT GUIDANCE PLACEMENT RAD THERAPY FIELDS: CPT | Mod: 26 | Performed by: RADIOLOGY

## 2019-05-09 PROCEDURE — A9552 F18 FDG: HCPCS

## 2019-05-10 ENCOUNTER — HOSPITAL ENCOUNTER (OUTPATIENT)
Dept: RADIATION ONCOLOGY | Facility: MEDICAL CENTER | Age: 54
End: 2019-05-10

## 2019-05-10 LAB
CHEMOTHERAPY INFUSION START DATE: NORMAL
CHEMOTHERAPY RECORDS: 1.8
CHEMOTHERAPY RECORDS: 2520
CHEMOTHERAPY RECORDS: NORMAL
CHEMOTHERAPY RX CANCER: NORMAL
RAD ONC ARIA COURSE TREATMENT ELAPSED DAYS: NORMAL
RAD ONC ARIA PLAN TREATMENT DATES: NORMAL
RAD ONC ARIA REFERENCE POINT DOSAGE GIVEN TO DATE: 13.05
RAD ONC ARIA REFERENCE POINT DOSAGE GIVEN TO DATE: 23.4
RAD ONC ARIA REFERENCE POINT ID: NORMAL
RAD ONC ARIA REFERENCE POINT ID: NORMAL
RAD ONC ARIA REFERENCE POINT SESSION DOSAGE GIVEN: 1.8
RAD ONC ARIA REFERENCE POINT SESSION DOSAGE GIVEN: 1.86

## 2019-05-10 PROCEDURE — 77014 PR CT GUIDANCE PLACEMENT RAD THERAPY FIELDS: CPT | Mod: 26 | Performed by: RADIOLOGY

## 2019-05-10 PROCEDURE — 77336 RADIATION PHYSICS CONSULT: CPT | Performed by: RADIOLOGY

## 2019-05-10 PROCEDURE — 77385 HCHG IMRT DELIVERY SIMPLE: CPT | Performed by: RADIOLOGY

## 2019-05-13 ENCOUNTER — HOSPITAL ENCOUNTER (OUTPATIENT)
Dept: RADIATION ONCOLOGY | Facility: MEDICAL CENTER | Age: 54
End: 2019-05-13

## 2019-05-13 LAB
CHEMOTHERAPY INFUSION START DATE: NORMAL
CHEMOTHERAPY RECORDS: 1.8
CHEMOTHERAPY RECORDS: 2520
CHEMOTHERAPY RECORDS: NORMAL
CHEMOTHERAPY RX CANCER: NORMAL
RAD ONC ARIA COURSE TREATMENT ELAPSED DAYS: NORMAL
RAD ONC ARIA PLAN TREATMENT DATES: NORMAL
RAD ONC ARIA REFERENCE POINT DOSAGE GIVEN TO DATE: 12.82
RAD ONC ARIA REFERENCE POINT DOSAGE GIVEN TO DATE: 25.2
RAD ONC ARIA REFERENCE POINT ID: NORMAL
RAD ONC ARIA REFERENCE POINT ID: NORMAL
RAD ONC ARIA REFERENCE POINT SESSION DOSAGE GIVEN: 1.8
RAD ONC ARIA REFERENCE POINT SESSION DOSAGE GIVEN: 1.83

## 2019-05-13 PROCEDURE — 77385 HCHG IMRT DELIVERY SIMPLE: CPT | Performed by: RADIOLOGY

## 2019-05-13 PROCEDURE — 77014 PR CT GUIDANCE PLACEMENT RAD THERAPY FIELDS: CPT | Mod: 26 | Performed by: RADIOLOGY

## 2019-05-14 ENCOUNTER — HOSPITAL ENCOUNTER (OUTPATIENT)
Dept: RADIATION ONCOLOGY | Facility: MEDICAL CENTER | Age: 54
End: 2019-05-14

## 2019-05-14 LAB
CHEMOTHERAPY INFUSION START DATE: NORMAL
CHEMOTHERAPY RECORDS: 1.8
CHEMOTHERAPY RECORDS: 2520
CHEMOTHERAPY RECORDS: NORMAL
CHEMOTHERAPY RX CANCER: NORMAL
RAD ONC ARIA COURSE TREATMENT ELAPSED DAYS: NORMAL
RAD ONC ARIA PLAN TREATMENT DATES: NORMAL
RAD ONC ARIA REFERENCE POINT DOSAGE GIVEN TO DATE: 14.91
RAD ONC ARIA REFERENCE POINT DOSAGE GIVEN TO DATE: 27
RAD ONC ARIA REFERENCE POINT ID: NORMAL
RAD ONC ARIA REFERENCE POINT ID: NORMAL
RAD ONC ARIA REFERENCE POINT SESSION DOSAGE GIVEN: 1.8
RAD ONC ARIA REFERENCE POINT SESSION DOSAGE GIVEN: 1.86

## 2019-05-14 PROCEDURE — 77385 HCHG IMRT DELIVERY SIMPLE: CPT | Performed by: RADIOLOGY

## 2019-05-14 PROCEDURE — 77427 RADIATION TX MANAGEMENT X5: CPT | Performed by: RADIOLOGY

## 2019-05-14 PROCEDURE — 77014 PR CT GUIDANCE PLACEMENT RAD THERAPY FIELDS: CPT | Mod: 26 | Performed by: RADIOLOGY

## 2019-05-15 LAB
CHEMOTHERAPY INFUSION START DATE: NORMAL
CHEMOTHERAPY RECORDS: 1.8
CHEMOTHERAPY RECORDS: 2520
CHEMOTHERAPY RECORDS: NORMAL
CHEMOTHERAPY RX CANCER: NORMAL
RAD ONC ARIA COURSE TREATMENT ELAPSED DAYS: NORMAL
RAD ONC ARIA PLAN TREATMENT DATES: NORMAL
RAD ONC ARIA REFERENCE POINT DOSAGE GIVEN TO DATE: 14.66
RAD ONC ARIA REFERENCE POINT DOSAGE GIVEN TO DATE: 28.8
RAD ONC ARIA REFERENCE POINT ID: NORMAL
RAD ONC ARIA REFERENCE POINT ID: NORMAL
RAD ONC ARIA REFERENCE POINT SESSION DOSAGE GIVEN: 1.8
RAD ONC ARIA REFERENCE POINT SESSION DOSAGE GIVEN: 1.83

## 2019-05-15 PROCEDURE — 77385 HCHG IMRT DELIVERY SIMPLE: CPT | Performed by: RADIOLOGY

## 2019-05-15 PROCEDURE — 77014 PR CT GUIDANCE PLACEMENT RAD THERAPY FIELDS: CPT | Mod: 26 | Performed by: RADIOLOGY

## 2019-05-16 ENCOUNTER — HOSPITAL ENCOUNTER (OUTPATIENT)
Dept: RADIATION ONCOLOGY | Facility: MEDICAL CENTER | Age: 54
End: 2019-05-16

## 2019-05-16 LAB
CHEMOTHERAPY INFUSION START DATE: NORMAL
CHEMOTHERAPY RECORDS: 1.8
CHEMOTHERAPY RECORDS: 2520
CHEMOTHERAPY RECORDS: NORMAL
CHEMOTHERAPY RX CANCER: NORMAL
RAD ONC ARIA COURSE TREATMENT ELAPSED DAYS: NORMAL
RAD ONC ARIA PLAN TREATMENT DATES: NORMAL
RAD ONC ARIA REFERENCE POINT DOSAGE GIVEN TO DATE: 16.78
RAD ONC ARIA REFERENCE POINT DOSAGE GIVEN TO DATE: 30.6
RAD ONC ARIA REFERENCE POINT ID: NORMAL
RAD ONC ARIA REFERENCE POINT ID: NORMAL
RAD ONC ARIA REFERENCE POINT SESSION DOSAGE GIVEN: 1.8
RAD ONC ARIA REFERENCE POINT SESSION DOSAGE GIVEN: 1.86

## 2019-05-16 PROCEDURE — 77014 PR CT GUIDANCE PLACEMENT RAD THERAPY FIELDS: CPT | Mod: 26 | Performed by: RADIOLOGY

## 2019-05-16 PROCEDURE — 77385 HCHG IMRT DELIVERY SIMPLE: CPT | Performed by: RADIOLOGY

## 2019-05-17 ENCOUNTER — HOSPITAL ENCOUNTER (OUTPATIENT)
Dept: RADIATION ONCOLOGY | Facility: MEDICAL CENTER | Age: 54
End: 2019-05-17

## 2019-05-17 LAB
CHEMOTHERAPY INFUSION START DATE: NORMAL
CHEMOTHERAPY RECORDS: 1.8
CHEMOTHERAPY RECORDS: 2520
CHEMOTHERAPY RECORDS: NORMAL
CHEMOTHERAPY RX CANCER: NORMAL
RAD ONC ARIA COURSE TREATMENT ELAPSED DAYS: NORMAL
RAD ONC ARIA PLAN TREATMENT DATES: NORMAL
RAD ONC ARIA REFERENCE POINT DOSAGE GIVEN TO DATE: 16.49
RAD ONC ARIA REFERENCE POINT DOSAGE GIVEN TO DATE: 32.4
RAD ONC ARIA REFERENCE POINT ID: NORMAL
RAD ONC ARIA REFERENCE POINT ID: NORMAL
RAD ONC ARIA REFERENCE POINT SESSION DOSAGE GIVEN: 1.8
RAD ONC ARIA REFERENCE POINT SESSION DOSAGE GIVEN: 1.83

## 2019-05-17 PROCEDURE — 77385 HCHG IMRT DELIVERY SIMPLE: CPT | Performed by: RADIOLOGY

## 2019-05-17 PROCEDURE — 77014 PR CT GUIDANCE PLACEMENT RAD THERAPY FIELDS: CPT | Mod: 26 | Performed by: RADIOLOGY

## 2019-05-17 PROCEDURE — 77336 RADIATION PHYSICS CONSULT: CPT | Performed by: RADIOLOGY

## 2019-05-20 ENCOUNTER — HOSPITAL ENCOUNTER (OUTPATIENT)
Dept: RADIATION ONCOLOGY | Facility: MEDICAL CENTER | Age: 54
End: 2019-05-20

## 2019-05-20 ENCOUNTER — APPOINTMENT (OUTPATIENT)
Dept: PHYSICAL THERAPY | Facility: REHABILITATION | Age: 54
End: 2019-05-20
Payer: MEDICAID

## 2019-05-20 LAB
CHEMOTHERAPY INFUSION START DATE: NORMAL
CHEMOTHERAPY RECORDS: 1.8
CHEMOTHERAPY RECORDS: 2520
CHEMOTHERAPY RECORDS: NORMAL
CHEMOTHERAPY RX CANCER: NORMAL
RAD ONC ARIA COURSE TREATMENT ELAPSED DAYS: NORMAL
RAD ONC ARIA PLAN TREATMENT DATES: NORMAL
RAD ONC ARIA REFERENCE POINT DOSAGE GIVEN TO DATE: 18.64
RAD ONC ARIA REFERENCE POINT DOSAGE GIVEN TO DATE: 34.2
RAD ONC ARIA REFERENCE POINT ID: NORMAL
RAD ONC ARIA REFERENCE POINT ID: NORMAL
RAD ONC ARIA REFERENCE POINT SESSION DOSAGE GIVEN: 1.8
RAD ONC ARIA REFERENCE POINT SESSION DOSAGE GIVEN: 1.86

## 2019-05-20 PROCEDURE — 77385 HCHG IMRT DELIVERY SIMPLE: CPT | Performed by: RADIOLOGY

## 2019-05-20 PROCEDURE — 77014 PR CT GUIDANCE PLACEMENT RAD THERAPY FIELDS: CPT | Mod: 26 | Performed by: RADIOLOGY

## 2019-05-21 ENCOUNTER — HOSPITAL ENCOUNTER (OUTPATIENT)
Dept: RADIATION ONCOLOGY | Facility: MEDICAL CENTER | Age: 54
End: 2019-05-21

## 2019-05-21 LAB
CHEMOTHERAPY INFUSION START DATE: NORMAL
CHEMOTHERAPY RECORDS: 1.8
CHEMOTHERAPY RECORDS: 2520
CHEMOTHERAPY RECORDS: NORMAL
CHEMOTHERAPY RX CANCER: NORMAL
RAD ONC ARIA COURSE TREATMENT ELAPSED DAYS: NORMAL
RAD ONC ARIA PLAN TREATMENT DATES: NORMAL
RAD ONC ARIA REFERENCE POINT DOSAGE GIVEN TO DATE: 18.32
RAD ONC ARIA REFERENCE POINT DOSAGE GIVEN TO DATE: 36
RAD ONC ARIA REFERENCE POINT ID: NORMAL
RAD ONC ARIA REFERENCE POINT ID: NORMAL
RAD ONC ARIA REFERENCE POINT SESSION DOSAGE GIVEN: 1.8
RAD ONC ARIA REFERENCE POINT SESSION DOSAGE GIVEN: 1.83

## 2019-05-21 PROCEDURE — 77427 RADIATION TX MANAGEMENT X5: CPT | Performed by: RADIOLOGY

## 2019-05-21 PROCEDURE — 77014 PR CT GUIDANCE PLACEMENT RAD THERAPY FIELDS: CPT | Mod: 26 | Performed by: RADIOLOGY

## 2019-05-21 PROCEDURE — 77385 HCHG IMRT DELIVERY SIMPLE: CPT | Performed by: RADIOLOGY

## 2019-05-22 LAB
CHEMOTHERAPY INFUSION START DATE: NORMAL
CHEMOTHERAPY RECORDS: 1.8
CHEMOTHERAPY RECORDS: 2520
CHEMOTHERAPY RECORDS: NORMAL
CHEMOTHERAPY RX CANCER: NORMAL
RAD ONC ARIA COURSE TREATMENT ELAPSED DAYS: NORMAL
RAD ONC ARIA PLAN TREATMENT DATES: NORMAL
RAD ONC ARIA REFERENCE POINT DOSAGE GIVEN TO DATE: 20.5
RAD ONC ARIA REFERENCE POINT DOSAGE GIVEN TO DATE: 37.8
RAD ONC ARIA REFERENCE POINT ID: NORMAL
RAD ONC ARIA REFERENCE POINT ID: NORMAL
RAD ONC ARIA REFERENCE POINT SESSION DOSAGE GIVEN: 1.8
RAD ONC ARIA REFERENCE POINT SESSION DOSAGE GIVEN: 1.86

## 2019-05-22 PROCEDURE — 77385 HCHG IMRT DELIVERY SIMPLE: CPT | Performed by: RADIOLOGY

## 2019-05-22 PROCEDURE — 77014 PR CT GUIDANCE PLACEMENT RAD THERAPY FIELDS: CPT | Mod: 26 | Performed by: RADIOLOGY

## 2019-05-23 ENCOUNTER — OFFICE VISIT (OUTPATIENT)
Dept: HEMATOLOGY ONCOLOGY | Facility: MEDICAL CENTER | Age: 54
End: 2019-05-23
Payer: MEDICAID

## 2019-05-23 ENCOUNTER — HOSPITAL ENCOUNTER (OUTPATIENT)
Dept: RADIATION ONCOLOGY | Facility: MEDICAL CENTER | Age: 54
End: 2019-05-23

## 2019-05-23 VITALS
HEIGHT: 60 IN | BODY MASS INDEX: 42.78 KG/M2 | SYSTOLIC BLOOD PRESSURE: 132 MMHG | HEART RATE: 79 BPM | TEMPERATURE: 98.1 F | WEIGHT: 217.93 LBS | OXYGEN SATURATION: 98 % | DIASTOLIC BLOOD PRESSURE: 70 MMHG | RESPIRATION RATE: 16 BRPM

## 2019-05-23 DIAGNOSIS — C50.912 ER+ PR+ CARCINOMA OF BREAST, LEFT (HCC): ICD-10-CM

## 2019-05-23 DIAGNOSIS — C50.912 LEFT BREAST CANCER WITH T3 TUMOR, >5 CM IN GREATEST DIMENSION (HCC): ICD-10-CM

## 2019-05-23 DIAGNOSIS — Z17.0 ER+ PR+ CARCINOMA OF BREAST, LEFT (HCC): ICD-10-CM

## 2019-05-23 LAB
CHEMOTHERAPY INFUSION START DATE: NORMAL
CHEMOTHERAPY RECORDS: 1.8
CHEMOTHERAPY RECORDS: 2520
CHEMOTHERAPY RECORDS: NORMAL
CHEMOTHERAPY RX CANCER: NORMAL
RAD ONC ARIA COURSE TREATMENT ELAPSED DAYS: NORMAL
RAD ONC ARIA PLAN TREATMENT DATES: NORMAL
RAD ONC ARIA REFERENCE POINT DOSAGE GIVEN TO DATE: 20.15
RAD ONC ARIA REFERENCE POINT DOSAGE GIVEN TO DATE: 39.6
RAD ONC ARIA REFERENCE POINT ID: NORMAL
RAD ONC ARIA REFERENCE POINT ID: NORMAL
RAD ONC ARIA REFERENCE POINT SESSION DOSAGE GIVEN: 1.8
RAD ONC ARIA REFERENCE POINT SESSION DOSAGE GIVEN: 1.83

## 2019-05-23 PROCEDURE — 77014 PR CT GUIDANCE PLACEMENT RAD THERAPY FIELDS: CPT | Mod: 26 | Performed by: RADIOLOGY

## 2019-05-23 PROCEDURE — 99215 OFFICE O/P EST HI 40 MIN: CPT | Mod: GC | Performed by: INTERNAL MEDICINE

## 2019-05-23 PROCEDURE — 77385 HCHG IMRT DELIVERY SIMPLE: CPT | Performed by: RADIOLOGY

## 2019-05-23 RX ORDER — ANASTROZOLE 1 MG/1
1 TABLET ORAL DAILY
Qty: 30 TAB | Refills: 3 | Status: SHIPPED | OUTPATIENT
Start: 2019-05-23 | End: 2021-11-18

## 2019-05-23 ASSESSMENT — PAIN SCALES - GENERAL: PAINLEVEL: NO PAIN

## 2019-05-23 NOTE — PROGRESS NOTES
Date of visit: 5/23/2019  9:34 AM      Chief Complaint- Locally advanced breast cancer, ER/[place] positive Her2 kamala negative.       Identification/Prior relevant history: Addie Orellana  is a 54 y.o. year old female who is here for follow-up of locally advanced breast cancer    -Palpable left breast mass since 4/2018. Also noticed left axillary mass  -Outside mammogram/ultrasound - large irregular mass in the 6:00 position of the left breast along with a large left axillary lymph node  -Ultrasound biopsy high-grade invasive ductal carcinoma, poorly differentiated. A year. Positive and HER-2/kamala negative. Ki- 67 -60%.  She is G0, P0. No hormone replacement therapy. No significant family history of breast carcinoma that she knows of.   - 9/17/18 - PET scan  1. Hypermetabolic left breast mass, consistent with malignancy.  2. Hypermetabolic left axillary and prepectoral lymphadenopathy, consistent with karlie metastases.  3. Asymmetric hypermetabolism in the right tongue base/right lingual tonsil. While it may represent salivary gland secretion pooling, another primary malignancy is also a consideration.   - 10/18/2018 - Genetic Testing       - No clinically significant variants for BRCA1/2 Detected.    -3/7/19- her breast surgery has been delayed as she had some family issues.  I was supposed to see her with a postoperative pathology results.  She is currently feeling better otherwise with the most of the chemotherapy side effects resolved.    -3/13/2019-Bilateral mastectomies with Dr. Grossman.    -4/4/19-completed 4 cycles of AC.    Amendment signed by:  Jamaal Lackey MD  Amendment Date:  3/21/2109  Original Report Date:  3/18/2019    FINAL DIAGNOSIS:    A. Left breast:         Invasive grade 3 ductal carcinoma.         Residual tumor bed measures 4.5 cm in greatest dimension.         Inferior superficial margin involved by invasive carcinoma.         Deep margin is 5 mm away from invasive carcinoma.         Invasive  carcinoma directly invades into the dermis without          epidermis involvement or skin ulceration.  B. Left axillary sentinel lymph node:         Two sentinel lymph nodes positive for metastatic carcinoma          (2/2).  C. Right breast:         Benign fibrous glandular/fibroadipose breast tissue with focal          usual ductal hyperplasia (UDH).         Benign nipple/skin also identified.         No malignancy identified.  D. Left axillary content:         Five lymph nodes positive for metastatic carcinoma (5/5)    Synoptic Report for Invasive Carcinoma of the Breast:         -Procedure: Total mastectomy.       -Specimen Laterality: Left.       -Tumor Site: 6 o'clock position.       -Tumor Size: Residual tumor bed measures 4.5 cm in greatest        dimension.       -Histologic Type: Invasive ductal carcinoma.       -Histologic Grade ([place] Histologic Score):        Glandular (Acinar)/Tubular Differentiation: Score 3        Nuclear Pleomorphism: Score 3        Mitotic Rate: Score 3        Overall Grade: 3       -Tumor Focality: Unifocal.       -Ductal Carcinoma In Situ (DCIS): No DCIS in specimen.       -Lobular Carcinoma In Situ (LCIS): No LCIS in specimen       -Tumor Extension: Invasive carcinoma directly invades into the        dermis without epidermis involvement or skin ulceration.       -Margins:        Invasive Carcinoma Margins: Inferior superficial margin involved        by invasive carcinoma.        Distance from closest margin: Deep margin is 5 mm away from        invasive carcinoma. All other margins are > 5 mm away from        invasive carcinoma.        DCIS Margins: Not applicable.         Distance from closest margin: Not applicable.       -Regional Lymph Nodes:        Total number of lymph nodes examined: 7        Number of sentinel lymph node: 2         Number of lymph nodes with macrometastases (>0.2 cm): 6         Number of lymph nodes with micrometastases (>0.2mm to 0.2cm &/or          >200 cells): 1         Number of lymph nodes with isolated tumor cells (=0.2 mm and         =200 cells): 0         Size of largest metastatic deposit: 2.3 cm.         Extranodal Extension: Present.       -Treatment Effect:        Treatment Effect in the Breast: No definite response to        presurgical therapy in the invasive carcinoma.        Treatment Effect in the Lymph Nodes: No definite response to        presurgical therapy in metastatic carcinoma.       -Lymph-Vascular Invasion: Present.       -Pathologic Staging:        Primary Tumor: ypT2        Regional Lymph Nodes: ypN2a        Distant Metastasis: Not applicable.       -Additional Pathologic Findings: Not identified.       -Ancillary Studies: Performed on previous core biopsy at outside        institution case [id number] from date 8/28/2018:        Estrogen Receptor (ER): Positive, strong, >95%        Progesterone Receptor (PgR): Positive, strong, > 95%        Ki-67:   60%        HER2:        Immunoperoxidase Studies: Negative (+1)        In Situ Hybridization for HER2: Not performed.       -Microcalcifications: Not identified.         -Residual cancer burden:       (1) Primary tumor bed          Primary tumor bed area: 4.3 cm x 3.5 cm.          Overall cancer cellularity (as percentage of area):   15%.           Percentage of cancer that is in situ disease: 0%.       (2) Lymph nodes         Number of positive lymph nodes: 7.            Diameter largest metastasis: 2.3.           Residual cancer burden: 4.001.         Residual cancer burden class: RCB-III.    Interim history  -5/9/29 PET scan:  1.  Necrosis versus fluid collections are now identified in the left breast and left lateral chest wall at the mid axillary line that appear decreased in size compared to the prior exam. Extensive elevated activity within these areas seen on the prior   PET CT is no longer present. No significant residual activity is identified at these locations.  2.  There are 2  small lymph nodes in the left axillary area which do demonstrate elevated activity. This activity could indicate residual neoplastic involvement of these nodes versus inflammation or infection.  3.  No new evidence of abnormal activity mass or adenopathy in the chest.  4.  No PET/CT evidence of distant metastatic disease in the neck abdomen or pelvis.  -4/4/19-completed 4 cycles of AC.  -Undergoing radiation therapy completed ~ 24/33 sessions.  -Side effects: Hair fell out but growing back.  Rash from XRT.  No nausea or vomiting.  Lost few pounds.  Increased fatigue.        Past Medical History:      Past Medical History:   Diagnosis Date   • Breast cancer, stage 3 (HCC)    • Cancer (HCC) 2018    stage 3 breast, chemo 2019   • Dental disorder     Partial upper and lower   • Hemorrhagic disorder (HCC)     easily bruises   • Pneumonia     first of week of her chemo-months ago       Past surgical history:       Past Surgical History:   Procedure Laterality Date   • MASTECTOMY Bilateral 3/13/2019    Procedure: MASTECTOMY- SIMPLE  ;  Surgeon: Elena Grossman M.D.;  Location: [facility];  Service: General   • NODE BIOPSY SENTINEL Left 3/13/2019    Procedure: LEFT AXILLARY SENTINAL LYMPH NODE BIOPSY;  Surgeon: Elena Grossman M.D.;  Location: [facility];  Service: General   • AXILLARY NODE DISSECTION Left 3/13/2019    Procedure: AXILLARY NODE DISSECTION ;  Surgeon: Elena Grossman M.D.;  Location: [facility];  Service: General   • NODE BIOPSY Left 3/13/2019    Procedure: LYMPH NODE INJECTION;  Surgeon: Elena Grossman M.D.;  Location: [facility];  Service: General   • ANKLE FUSION     • GASTRIC BYPASS LAPAROSCOPIC     • OTHER ABDOMINAL SURGERY      luanne   • OTHER ORTHOPEDIC SURGERY Bilateral     foot surgery to repair arches       Allergies:       Patient has no known allergies.    Medications:         Current Outpatient Prescriptions   Medication Sig Dispense Refill    • Cholecalciferol (VITAMIN D PO) Take 1 Cap by mouth every evening.     • Biotin w/ Vitamins C & E (HAIR/SKIN/NAILS PO) Take 1 Tab by mouth every evening.     • Omega-3 Fatty Acids (FISH OIL) 1000 MG Cap capsule Take 1,000 mg by mouth every evening.     • therapeutic multivitamin-minerals (THERAGRAN-M) Tab Take 1 Tab by mouth every evening.       No current facility-administered medications for this visit.          Social History:     Social History     Social History   • Marital status: Single     Spouse name: N/A   • Number of children: N/A   • Years of education: N/A     Occupational History   • Not on file.     Social History Main Topics   • Smoking status: Never Smoker   • Smokeless tobacco: Never Used   • Alcohol use Yes      Comment: rarely   • Drug use: No   • Sexual activity: Not on file     Other Topics Concern   • Not on file     Social History Narrative   • No narrative on file       Family History:      Family History   Problem Relation Age of Onset   • Cancer Paternal Aunt         lung cancer   • Cancer Paternal Grandfather         lung cancer       Review of Systems:  All other review of systems are negative except what was mentioned above in the HPI.    Constitutional: Negative for fever, chills.  Positive for weight loss and malaise/fatigue.    HEENT: No new auditory or visual complaints. No sore throat and neck pain.     Respiratory: Negative for cough, sputum production, shortness of breath and wheezing.    Cardiovascular: Negative for chest pain, palpitations, orthopnea and leg swelling.    Gastrointestinal: Negative for nausea, vomiting and abdominal pain.  Some heartburn.  Genitourinary: Negative for dysuria, hematuria    Musculoskeletal: No new arthralgias or myalgias.  Skin: Negative for itching.  Positive for rash on chest improved with Aquaphore.    Neurological: Negative for focal weakness and headaches.    Endo/Heme/Allergies: No abnormal bleed/bruise.    Psychiatric/Behavioral: No new  depression/anxiety.    Physical Exam:  Vitals: /70   Pulse 79   Temp 36.7 °C (98.1 °F)   Resp 16   Ht 1.524 m (5')   Wt 98.9 kg (217 lb 14.8 oz)   SpO2 98%   BMI 42.56 kg/m²     General: Not in acute distress, alert and oriented x 3  HEENT: No pallor, icterus. Oropharynx clear.   Neck: Supple, no palpable masses.  Lymph nodes: No palpable cervical, supraclavicular, axillary or inguinal lymphadenopathy.  Firm mobile left axillary mass 2-3 cm.  CVS: regular rate and rhythm, no rubs or gallops.  CHEST: Well-healing bilateral mastectomy incisions.  RESP: Clear to auscultate bilaterally, no wheezing or crackles.   ABD: Soft, non tender, non distended, positive bowel sounds, no palpable organomegaly  EXT: No edema or cyanosis  CNS: Alert and oriented x3, No focal deficits.  Skin- Erythematous rash on chest.      Labs:   Hospital Outpatient Visit on 05/23/2019   Component Date Value Ref Range Status   • Course ID 05/23/2019 C1_LCW/SCV   Final   • Course Start Date 05/23/2019 4/8/2019  4:03 PM   Final   • Course Elapsed Days 05/23/2019 29 @ [id number]   Final   • Course Intent 05/23/2019 Curative   Final   • Plan Treatment Dates 05/23/2019    Final                    Value:First Treatment Date: 201904241011  Last Treatment Date: [id number]     • RP ID 05/23/2019 L CW   Final   • RP Dosage Given to Date (Gy) 05/23/2019 39.22769059   Final   • RP Session Dosage Given (Gy) 05/23/2019 1.72499102   Final   • RP ID 05/23/2019 L CW Bolus   Final   • RP Dosage Given to Date (Gy) 05/23/2019 20.67619102   Final   • RP Session Dosage Given (Gy) 05/23/2019 1.04655024   Final   • Plan ID 05/23/2019 L CW Bolus   Final   • Plan Name 05/23/2019 L CW No Chuck   Final   • Plan Fractions Treated to Date 05/23/2019 11 of 14   Final   • Plan Prescribed Dose Per Fraction * 05/23/2019 1.8   Final   • Plan Total Prescribed Dose (cGy) 05/23/2019 2520   Final   Hospital Outpatient Visit on 05/22/2019   Component Date Value Ref Range  Status   • Course ID 05/22/2019 C1_LCW/SCV   Final   • Course Start Date 05/22/2019 4/8/2019  4:03 PM   Final   • Course Elapsed Days 05/22/2019 28 @ [id number]   Final   • Course Intent 05/22/2019 Curative   Final   • Plan Treatment Dates 05/22/2019    Final                    Value:First Treatment Date: 201904241011  Last Treatment Date: [id number]     • RP ID 05/22/2019 L CW   Final   • RP Dosage Given to Date (Gy) 05/22/2019 37.10093132   Final   • RP Session Dosage Given (Gy) 05/22/2019 1.77617850   Final   • RP ID 05/22/2019 L CW No Chuck CP   Final   • RP Dosage Given to Date (Gy) 05/22/2019 20.43440439   Final   • RP Session Dosage Given (Gy) 05/22/2019 1.73243034   Final   • Plan ID 05/22/2019 L CW No Chuck   Final   • Plan Name 05/22/2019 L CW No Chuck   Final   • Plan Fractions Treated to Date 05/22/2019 11 of 14   Final   • Plan Prescribed Dose Per Fraction * 05/22/2019 1.8   Final   • Plan Total Prescribed Dose (cGy) 05/22/2019 2520   Final   Hospital Outpatient Visit on 05/21/2019   Component Date Value Ref Range Status   • Course ID 05/21/2019 C1_LCW/SCV   Final   • Course Start Date 05/21/2019 4/8/2019  4:03 PM   Final   • Course Elapsed Days 05/21/2019 27 @ [id number]   Final   • Course Intent 05/21/2019 Curative   Final   • Plan Treatment Dates 05/21/2019    Final                    Value:First Treatment Date: 201904241011  Last Treatment Date: [id number]     • RP ID 05/21/2019 L CW   Final   • RP Dosage Given to Date (Gy) 05/21/2019 36.1444854   Final   • RP Session Dosage Given (Gy) 05/21/2019 1.64295583   Final   • RP ID 05/21/2019 L CW Bolus   Final   • RP Dosage Given to Date (Gy) 05/21/2019 18.9952827   Final   • RP Session Dosage Given (Gy) 05/21/2019 1.75489456   Final   • Plan ID 05/21/2019 L CW Bolus   Final   • Plan Name 05/21/2019 L CW No Chuck   Final   • Plan Fractions Treated to Date 05/21/2019 10 of 14   Final   • Plan Prescribed Dose Per Fraction * 05/21/2019 1.8   Final   • Plan  Total Prescribed Dose (cGy) 05/21/2019 2520   Final   Hospital Outpatient Visit on 05/20/2019   Component Date Value Ref Range Status   • Course ID 05/20/2019 C1_LCW/SCV   Final   • Course Start Date 05/20/2019 4/8/2019  4:03 PM   Final   • Course Elapsed Days 05/20/2019 26 @ [id number]   Final   • Course Intent 05/20/2019 Curative   Final   • Plan Treatment Dates 05/20/2019    Final                    Value:First Treatment Date: 201904241011  Last Treatment Date: 201905200921     • RP ID 05/20/2019 L CW   Final   • RP Dosage Given to Date (Gy) 05/20/2019 34.34356474   Final   • RP Session Dosage Given (Gy) 05/20/2019 1.30077266   Final   • RP ID 05/20/2019 L CW No Chuck CP   Final   • RP Dosage Given to Date (Gy) 05/20/2019 18.6060392   Final   • RP Session Dosage Given (Gy) 05/20/2019 1.00440658   Final   • Plan ID 05/20/2019 L CW No Hcuck   Final   • Plan Name 05/20/2019 L CW No Chuck   Final   • Plan Fractions Treated to Date 05/20/2019 10 of 14   Final   • Plan Prescribed Dose Per Fraction * 05/20/2019 1.8   Final   • Plan Total Prescribed Dose (cGy) 05/20/2019 2520   Final   Hospital Outpatient Visit on 05/17/2019   Component Date Value Ref Range Status   • Course ID 05/17/2019 C1_LCW/SCV   Final   • Course Start Date 05/17/2019 4/8/2019  4:03 PM   Final   • Course Elapsed Days 05/17/2019 23 @ [id number]   Final   • Course Intent 05/17/2019 Curative   Final   • Plan Treatment Dates 05/17/2019    Final                    Value:First Treatment Date: 201904241011  Last Treatment Date: 201905170911     • RP ID 05/17/2019 L CW   Final   • RP Dosage Given to Date (Gy) 05/17/2019 32.32187602   Final   • RP Session Dosage Given (Gy) 05/17/2019 1.23649662   Final   • RP ID 05/17/2019 L CW Bolus   Final   • RP Dosage Given to Date (Gy) 05/17/2019 16.54528136   Final   • RP Session Dosage Given (Gy) 05/17/2019 1.25755228   Final   • Plan ID 05/17/2019 L CW Bolus   Final   • Plan Name 05/17/2019 L CW No Chuck   Final   • Plan  Fractions Treated to Date 05/17/2019 9 of 14   Final   • Plan Prescribed Dose Per Fraction * 05/17/2019 1.8   Final   • Plan Total Prescribed Dose (cGy) 05/17/2019 2520   Final             Assessment and Plan:      Locally adanced breast Ca , ER/[place] positive Her2 kamala negative, poorly differentiated, high-grade with high Ki-67.        She agreed and verbalized  agreement and understanding with the current plan.  I answered all questions and concerns at this time         Please note that this dictation was created using voice recognition software. I have made every reasonable attempt to correct obvious errors, but I expect that there are errors of grammar and possibly content that I did not discover before finalizing the note.      SIGNATURES:  Gatuam Huff    CC:  Noam Caldwell M.D.  No ref. provider found

## 2019-05-24 ENCOUNTER — HOSPITAL ENCOUNTER (OUTPATIENT)
Dept: RADIATION ONCOLOGY | Facility: MEDICAL CENTER | Age: 54
End: 2019-05-24

## 2019-05-24 ENCOUNTER — APPOINTMENT (OUTPATIENT)
Dept: PHYSICAL THERAPY | Facility: REHABILITATION | Age: 54
End: 2019-05-24
Payer: MEDICAID

## 2019-05-24 LAB
CHEMOTHERAPY INFUSION START DATE: NORMAL
CHEMOTHERAPY RECORDS: 1.8
CHEMOTHERAPY RECORDS: 2520
CHEMOTHERAPY RECORDS: NORMAL
CHEMOTHERAPY RX CANCER: NORMAL
RAD ONC ARIA COURSE TREATMENT ELAPSED DAYS: NORMAL
RAD ONC ARIA PLAN TREATMENT DATES: NORMAL
RAD ONC ARIA REFERENCE POINT DOSAGE GIVEN TO DATE: 22.37
RAD ONC ARIA REFERENCE POINT DOSAGE GIVEN TO DATE: 41.4
RAD ONC ARIA REFERENCE POINT ID: NORMAL
RAD ONC ARIA REFERENCE POINT ID: NORMAL
RAD ONC ARIA REFERENCE POINT SESSION DOSAGE GIVEN: 1.8
RAD ONC ARIA REFERENCE POINT SESSION DOSAGE GIVEN: 1.86

## 2019-05-24 PROCEDURE — 77336 RADIATION PHYSICS CONSULT: CPT | Performed by: RADIOLOGY

## 2019-05-24 PROCEDURE — 77385 HCHG IMRT DELIVERY SIMPLE: CPT | Performed by: RADIOLOGY

## 2019-05-24 PROCEDURE — 77014 PR CT GUIDANCE PLACEMENT RAD THERAPY FIELDS: CPT | Mod: 26 | Performed by: RADIOLOGY

## 2019-05-28 ENCOUNTER — HOSPITAL ENCOUNTER (OUTPATIENT)
Dept: RADIATION ONCOLOGY | Facility: MEDICAL CENTER | Age: 54
End: 2019-05-28

## 2019-05-28 ENCOUNTER — APPOINTMENT (OUTPATIENT)
Dept: PHYSICAL THERAPY | Facility: REHABILITATION | Age: 54
End: 2019-05-28
Payer: MEDICAID

## 2019-05-28 LAB
CHEMOTHERAPY INFUSION START DATE: NORMAL
CHEMOTHERAPY RECORDS: 1.8
CHEMOTHERAPY RECORDS: 2520
CHEMOTHERAPY RECORDS: NORMAL
CHEMOTHERAPY RX CANCER: NORMAL
RAD ONC ARIA COURSE TREATMENT ELAPSED DAYS: NORMAL
RAD ONC ARIA PLAN TREATMENT DATES: NORMAL
RAD ONC ARIA REFERENCE POINT DOSAGE GIVEN TO DATE: 21.98
RAD ONC ARIA REFERENCE POINT DOSAGE GIVEN TO DATE: 43.2
RAD ONC ARIA REFERENCE POINT ID: NORMAL
RAD ONC ARIA REFERENCE POINT ID: NORMAL
RAD ONC ARIA REFERENCE POINT SESSION DOSAGE GIVEN: 1.8
RAD ONC ARIA REFERENCE POINT SESSION DOSAGE GIVEN: 1.83

## 2019-05-28 PROCEDURE — 77014 PR CT GUIDANCE PLACEMENT RAD THERAPY FIELDS: CPT | Mod: 26 | Performed by: RADIOLOGY

## 2019-05-28 PROCEDURE — 77385 HCHG IMRT DELIVERY SIMPLE: CPT | Performed by: RADIOLOGY

## 2019-05-30 ENCOUNTER — HOSPITAL ENCOUNTER (OUTPATIENT)
Dept: RADIATION ONCOLOGY | Facility: MEDICAL CENTER | Age: 54
End: 2019-05-30

## 2019-05-30 LAB
CHEMOTHERAPY INFUSION START DATE: NORMAL
CHEMOTHERAPY RECORDS: 1.8
CHEMOTHERAPY RECORDS: 2520
CHEMOTHERAPY RECORDS: NORMAL
CHEMOTHERAPY RX CANCER: NORMAL
RAD ONC ARIA COURSE TREATMENT ELAPSED DAYS: NORMAL
RAD ONC ARIA PLAN TREATMENT DATES: NORMAL
RAD ONC ARIA REFERENCE POINT DOSAGE GIVEN TO DATE: 24.23
RAD ONC ARIA REFERENCE POINT DOSAGE GIVEN TO DATE: 45
RAD ONC ARIA REFERENCE POINT ID: NORMAL
RAD ONC ARIA REFERENCE POINT ID: NORMAL
RAD ONC ARIA REFERENCE POINT SESSION DOSAGE GIVEN: 1.8
RAD ONC ARIA REFERENCE POINT SESSION DOSAGE GIVEN: 1.86

## 2019-05-30 PROCEDURE — 77014 PR CT GUIDANCE PLACEMENT RAD THERAPY FIELDS: CPT | Mod: 26 | Performed by: RADIOLOGY

## 2019-05-30 PROCEDURE — 77427 RADIATION TX MANAGEMENT X5: CPT | Performed by: RADIOLOGY

## 2019-05-30 PROCEDURE — 77385 HCHG IMRT DELIVERY SIMPLE: CPT | Performed by: RADIOLOGY

## 2019-05-31 ENCOUNTER — APPOINTMENT (OUTPATIENT)
Dept: PHYSICAL THERAPY | Facility: REHABILITATION | Age: 54
End: 2019-05-31
Payer: MEDICAID

## 2019-05-31 ENCOUNTER — HOSPITAL ENCOUNTER (OUTPATIENT)
Dept: RADIATION ONCOLOGY | Facility: MEDICAL CENTER | Age: 54
End: 2019-05-31

## 2019-05-31 LAB
CHEMOTHERAPY INFUSION START DATE: NORMAL
CHEMOTHERAPY RECORDS: 1.8
CHEMOTHERAPY RECORDS: 2520
CHEMOTHERAPY RECORDS: NORMAL
CHEMOTHERAPY RX CANCER: NORMAL
RAD ONC ARIA COURSE TREATMENT ELAPSED DAYS: NORMAL
RAD ONC ARIA PLAN TREATMENT DATES: NORMAL
RAD ONC ARIA REFERENCE POINT DOSAGE GIVEN TO DATE: 23.81
RAD ONC ARIA REFERENCE POINT DOSAGE GIVEN TO DATE: 46.8
RAD ONC ARIA REFERENCE POINT ID: NORMAL
RAD ONC ARIA REFERENCE POINT ID: NORMAL
RAD ONC ARIA REFERENCE POINT SESSION DOSAGE GIVEN: 1.8
RAD ONC ARIA REFERENCE POINT SESSION DOSAGE GIVEN: 1.83

## 2019-05-31 PROCEDURE — 77385 HCHG IMRT DELIVERY SIMPLE: CPT | Performed by: RADIOLOGY

## 2019-05-31 PROCEDURE — 77334 RADIATION TREATMENT AID(S): CPT | Mod: XU | Performed by: RADIOLOGY

## 2019-05-31 PROCEDURE — 77334 RADIATION TREATMENT AID(S): CPT | Mod: 26 | Performed by: RADIOLOGY

## 2019-05-31 PROCEDURE — 77307 TELETHX ISODOSE PLAN CPLX: CPT | Performed by: RADIOLOGY

## 2019-06-03 ENCOUNTER — HOSPITAL ENCOUNTER (OUTPATIENT)
Dept: RADIATION ONCOLOGY | Facility: MEDICAL CENTER | Age: 54
End: 2019-06-30
Attending: RADIOLOGY

## 2019-06-03 ENCOUNTER — HOSPITAL ENCOUNTER (OUTPATIENT)
Dept: RADIATION ONCOLOGY | Facility: MEDICAL CENTER | Age: 54
End: 2019-06-03

## 2019-06-03 ENCOUNTER — APPOINTMENT (OUTPATIENT)
Dept: PHYSICAL THERAPY | Facility: REHABILITATION | Age: 54
End: 2019-06-03
Attending: SURGERY
Payer: MEDICAID

## 2019-06-03 LAB
CHEMOTHERAPY INFUSION START DATE: NORMAL
CHEMOTHERAPY RECORDS: 1.8
CHEMOTHERAPY RECORDS: 2520
CHEMOTHERAPY RECORDS: NORMAL
CHEMOTHERAPY RX CANCER: NORMAL
RAD ONC ARIA COURSE TREATMENT ELAPSED DAYS: NORMAL
RAD ONC ARIA PLAN TREATMENT DATES: NORMAL
RAD ONC ARIA REFERENCE POINT DOSAGE GIVEN TO DATE: 26.1
RAD ONC ARIA REFERENCE POINT DOSAGE GIVEN TO DATE: 48.6
RAD ONC ARIA REFERENCE POINT ID: NORMAL
RAD ONC ARIA REFERENCE POINT ID: NORMAL
RAD ONC ARIA REFERENCE POINT SESSION DOSAGE GIVEN: 1.8
RAD ONC ARIA REFERENCE POINT SESSION DOSAGE GIVEN: 1.86

## 2019-06-03 PROCEDURE — 77385 HCHG IMRT DELIVERY SIMPLE: CPT | Performed by: RADIOLOGY

## 2019-06-03 PROCEDURE — 77014 PR CT GUIDANCE PLACEMENT RAD THERAPY FIELDS: CPT | Mod: 26 | Performed by: RADIOLOGY

## 2019-06-04 PROCEDURE — 77014 PR CT GUIDANCE PLACEMENT RAD THERAPY FIELDS: CPT | Mod: 26 | Performed by: RADIOLOGY

## 2019-06-04 PROCEDURE — 77336 RADIATION PHYSICS CONSULT: CPT | Performed by: RADIOLOGY

## 2019-06-04 PROCEDURE — 77385 HCHG IMRT DELIVERY SIMPLE: CPT | Performed by: RADIOLOGY

## 2019-06-05 ENCOUNTER — HOSPITAL ENCOUNTER (OUTPATIENT)
Dept: RADIATION ONCOLOGY | Facility: MEDICAL CENTER | Age: 54
End: 2019-06-30
Attending: RADIOLOGY

## 2019-06-05 ENCOUNTER — HOSPITAL ENCOUNTER (OUTPATIENT)
Dept: RADIATION ONCOLOGY | Facility: MEDICAL CENTER | Age: 54
End: 2019-06-05

## 2019-06-05 LAB
CHEMOTHERAPY INFUSION START DATE: NORMAL
CHEMOTHERAPY RECORDS: 1000
CHEMOTHERAPY RECORDS: 2
CHEMOTHERAPY RECORDS: NORMAL
CHEMOTHERAPY RX CANCER: NORMAL
RAD ONC ARIA COURSE TREATMENT ELAPSED DAYS: NORMAL
RAD ONC ARIA PLAN TREATMENT DATES: NORMAL
RAD ONC ARIA REFERENCE POINT DOSAGE GIVEN TO DATE: 2
RAD ONC ARIA REFERENCE POINT ID: NORMAL
RAD ONC ARIA REFERENCE POINT SESSION DOSAGE GIVEN: 2

## 2019-06-05 PROCEDURE — 77280 THER RAD SIMULAJ FIELD SMPL: CPT | Mod: 26 | Performed by: RADIOLOGY

## 2019-06-05 PROCEDURE — 77280 THER RAD SIMULAJ FIELD SMPL: CPT | Performed by: RADIOLOGY

## 2019-06-05 PROCEDURE — 77412 RADIATION TX DELIVERY LVL 3: CPT | Performed by: RADIOLOGY

## 2019-06-06 ENCOUNTER — HOSPITAL ENCOUNTER (OUTPATIENT)
Dept: RADIATION ONCOLOGY | Facility: MEDICAL CENTER | Age: 54
End: 2019-06-06

## 2019-06-06 LAB
CHEMOTHERAPY INFUSION START DATE: NORMAL
CHEMOTHERAPY RECORDS: 1000
CHEMOTHERAPY RECORDS: 2
CHEMOTHERAPY RECORDS: NORMAL
CHEMOTHERAPY RX CANCER: NORMAL
RAD ONC ARIA COURSE TREATMENT ELAPSED DAYS: NORMAL
RAD ONC ARIA PLAN TREATMENT DATES: NORMAL
RAD ONC ARIA REFERENCE POINT DOSAGE GIVEN TO DATE: 4
RAD ONC ARIA REFERENCE POINT ID: NORMAL
RAD ONC ARIA REFERENCE POINT SESSION DOSAGE GIVEN: 2

## 2019-06-06 PROCEDURE — 77412 RADIATION TX DELIVERY LVL 3: CPT | Performed by: RADIOLOGY

## 2019-06-06 PROCEDURE — 77427 RADIATION TX MANAGEMENT X5: CPT | Performed by: RADIOLOGY

## 2019-06-07 ENCOUNTER — APPOINTMENT (OUTPATIENT)
Dept: PHYSICAL THERAPY | Facility: REHABILITATION | Age: 54
End: 2019-06-07
Payer: MEDICAID

## 2019-06-07 ENCOUNTER — HOSPITAL ENCOUNTER (OUTPATIENT)
Dept: RADIATION ONCOLOGY | Facility: MEDICAL CENTER | Age: 54
End: 2019-06-07

## 2019-06-07 ENCOUNTER — HOSPITAL ENCOUNTER (OUTPATIENT)
Dept: RADIATION ONCOLOGY | Facility: MEDICAL CENTER | Age: 54
End: 2019-06-30
Attending: RADIOLOGY

## 2019-06-07 LAB
CHEMOTHERAPY INFUSION START DATE: NORMAL
CHEMOTHERAPY RECORDS: 1000
CHEMOTHERAPY RECORDS: 2
CHEMOTHERAPY RECORDS: NORMAL
CHEMOTHERAPY RX CANCER: NORMAL
RAD ONC ARIA COURSE TREATMENT ELAPSED DAYS: NORMAL
RAD ONC ARIA PLAN TREATMENT DATES: NORMAL
RAD ONC ARIA REFERENCE POINT DOSAGE GIVEN TO DATE: 6
RAD ONC ARIA REFERENCE POINT ID: NORMAL
RAD ONC ARIA REFERENCE POINT SESSION DOSAGE GIVEN: 2

## 2019-06-07 PROCEDURE — 77412 RADIATION TX DELIVERY LVL 3: CPT | Performed by: RADIOLOGY

## 2019-06-10 ENCOUNTER — HOSPITAL ENCOUNTER (OUTPATIENT)
Dept: RADIATION ONCOLOGY | Facility: MEDICAL CENTER | Age: 54
End: 2019-06-10

## 2019-06-10 ENCOUNTER — APPOINTMENT (OUTPATIENT)
Dept: PHYSICAL THERAPY | Facility: REHABILITATION | Age: 54
End: 2019-06-10
Payer: MEDICAID

## 2019-06-10 LAB
CHEMOTHERAPY INFUSION START DATE: NORMAL
CHEMOTHERAPY RECORDS: 1000
CHEMOTHERAPY RECORDS: 2
CHEMOTHERAPY RECORDS: NORMAL
CHEMOTHERAPY RX CANCER: NORMAL
RAD ONC ARIA COURSE TREATMENT ELAPSED DAYS: NORMAL
RAD ONC ARIA PLAN TREATMENT DATES: NORMAL
RAD ONC ARIA REFERENCE POINT DOSAGE GIVEN TO DATE: 8
RAD ONC ARIA REFERENCE POINT ID: NORMAL
RAD ONC ARIA REFERENCE POINT SESSION DOSAGE GIVEN: 2

## 2019-06-10 PROCEDURE — 77412 RADIATION TX DELIVERY LVL 3: CPT | Performed by: RADIOLOGY

## 2019-06-11 ENCOUNTER — HOSPITAL ENCOUNTER (OUTPATIENT)
Dept: RADIATION ONCOLOGY | Facility: MEDICAL CENTER | Age: 54
End: 2019-06-11

## 2019-06-11 LAB
CHEMOTHERAPY INFUSION START DATE: NORMAL
CHEMOTHERAPY RECORDS: 1000
CHEMOTHERAPY RECORDS: 2
CHEMOTHERAPY RECORDS: NORMAL
CHEMOTHERAPY RX CANCER: NORMAL
RAD ONC ARIA COURSE TREATMENT ELAPSED DAYS: NORMAL
RAD ONC ARIA PLAN TREATMENT DATES: NORMAL
RAD ONC ARIA REFERENCE POINT DOSAGE GIVEN TO DATE: 10
RAD ONC ARIA REFERENCE POINT ID: NORMAL
RAD ONC ARIA REFERENCE POINT SESSION DOSAGE GIVEN: 2

## 2019-06-11 PROCEDURE — 77412 RADIATION TX DELIVERY LVL 3: CPT | Performed by: RADIOLOGY

## 2019-06-11 PROCEDURE — 77427 RADIATION TX MANAGEMENT X5: CPT | Performed by: RADIOLOGY

## 2019-06-11 PROCEDURE — 77336 RADIATION PHYSICS CONSULT: CPT | Performed by: RADIOLOGY

## 2019-06-14 ENCOUNTER — APPOINTMENT (OUTPATIENT)
Dept: PHYSICAL THERAPY | Facility: REHABILITATION | Age: 54
End: 2019-06-14
Payer: MEDICAID

## 2019-06-17 LAB
CHEMOTHERAPY INFUSION START DATE: NORMAL
CHEMOTHERAPY RECORDS: 1.8
CHEMOTHERAPY RECORDS: 1.8
CHEMOTHERAPY RECORDS: 1000
CHEMOTHERAPY RECORDS: 2
CHEMOTHERAPY RECORDS: 2520
CHEMOTHERAPY RECORDS: 2520
CHEMOTHERAPY RECORDS: NORMAL
CHEMOTHERAPY RX CANCER: NORMAL
RAD ONC ARIA COURSE TREATMENT ELAPSED DAYS: NORMAL
RAD ONC ARIA PLAN TREATMENT DATES: NORMAL
RAD ONC ARIA REFERENCE POINT DOSAGE GIVEN TO DATE: 10
RAD ONC ARIA REFERENCE POINT DOSAGE GIVEN TO DATE: 25.65
RAD ONC ARIA REFERENCE POINT DOSAGE GIVEN TO DATE: 26.1
RAD ONC ARIA REFERENCE POINT DOSAGE GIVEN TO DATE: 50.4
RAD ONC ARIA REFERENCE POINT ID: NORMAL

## 2019-06-25 ENCOUNTER — PATIENT OUTREACH (OUTPATIENT)
Dept: OTHER | Facility: MEDICAL CENTER | Age: 54
End: 2019-06-25

## 2019-07-25 ENCOUNTER — TELEPHONE (OUTPATIENT)
Dept: RADIATION ONCOLOGY | Facility: MEDICAL CENTER | Age: 54
End: 2019-07-25

## 2019-10-07 DIAGNOSIS — C50.912 ER+ PR+ CARCINOMA OF BREAST, LEFT (HCC): ICD-10-CM

## 2019-10-07 DIAGNOSIS — Z17.0 ER+ PR+ CARCINOMA OF BREAST, LEFT (HCC): ICD-10-CM

## 2019-10-07 NOTE — TELEPHONE ENCOUNTER
Unable to reach patient on number in the chart to see if she is transferring care to [facility] or staying with [facility]. Patient does not have any future appointments with our office.    Left message on patients emergency contact number to have patient return our call.

## 2019-10-10 RX ORDER — ANASTROZOLE 1 MG/1
TABLET ORAL
OUTPATIENT
Start: 2019-10-10

## 2019-10-10 NOTE — TELEPHONE ENCOUNTER
2nd attempt    Unable to reach patient on number in the chart to see if she is transferring care to [facility] or staying with [facility]. Patient does not have any future appointments with our office.     Spoke to patient and she has seen Dr. Linder at [facility]. She will contact his office to get the refill on Arimidex.

## 2020-03-11 ENCOUNTER — HOSPITAL ENCOUNTER (OUTPATIENT)
Dept: RADIOLOGY | Facility: MEDICAL CENTER | Age: 55
End: 2020-03-11
Attending: INTERNAL MEDICINE
Payer: MEDICARE

## 2020-03-11 DIAGNOSIS — C50.812 MALIGNANT NEOPLASM OF OVERLAPPING SITES OF LEFT FEMALE BREAST, UNSPECIFIED ESTROGEN RECEPTOR STATUS (HCC): ICD-10-CM

## 2020-03-11 PROCEDURE — 72100 X-RAY EXAM L-S SPINE 2/3 VWS: CPT

## 2020-03-11 PROCEDURE — 72072 X-RAY EXAM THORAC SPINE 3VWS: CPT

## 2020-03-17 ENCOUNTER — HOSPITAL ENCOUNTER (OUTPATIENT)
Dept: RADIOLOGY | Facility: MEDICAL CENTER | Age: 55
End: 2020-03-17
Attending: INTERNAL MEDICINE
Payer: MEDICARE

## 2020-03-17 DIAGNOSIS — C50.812 MALIGNANT NEOPLASM OF OVERLAPPING SITES OF LEFT FEMALE BREAST, UNSPECIFIED ESTROGEN RECEPTOR STATUS (HCC): ICD-10-CM

## 2020-03-17 PROCEDURE — A9503 TC99M MEDRONATE: HCPCS

## 2020-06-11 ENCOUNTER — HOSPITAL ENCOUNTER (OUTPATIENT)
Dept: LAB | Facility: MEDICAL CENTER | Age: 55
End: 2020-06-11
Attending: INTERNAL MEDICINE
Payer: MEDICARE

## 2020-06-11 ENCOUNTER — HOSPITAL ENCOUNTER (OUTPATIENT)
Dept: LAB | Facility: MEDICAL CENTER | Age: 55
End: 2020-06-11
Attending: FAMILY MEDICINE
Payer: MEDICARE

## 2020-06-11 LAB
ALBUMIN SERPL BCP-MCNC: 3.6 G/DL (ref 3.2–4.9)
ALBUMIN/GLOB SERPL: 1.4 G/DL
ALP SERPL-CCNC: 89 U/L (ref 30–99)
ALT SERPL-CCNC: 17 U/L (ref 2–50)
ANION GAP SERPL CALC-SCNC: 11 MMOL/L (ref 7–16)
AST SERPL-CCNC: 18 U/L (ref 12–45)
BASOPHILS # BLD AUTO: 0.6 % (ref 0–1.8)
BASOPHILS # BLD: 0.03 K/UL (ref 0–0.12)
BILIRUB SERPL-MCNC: 0.4 MG/DL (ref 0.1–1.5)
BUN SERPL-MCNC: 14 MG/DL (ref 8–22)
CALCIUM SERPL-MCNC: 8.8 MG/DL (ref 8.5–10.5)
CHLORIDE SERPL-SCNC: 106 MMOL/L (ref 96–112)
CHOLEST SERPL-MCNC: 148 MG/DL (ref 100–199)
CO2 SERPL-SCNC: 27 MMOL/L (ref 20–33)
CREAT SERPL-MCNC: 0.66 MG/DL (ref 0.5–1.4)
EOSINOPHIL # BLD AUTO: 0.07 K/UL (ref 0–0.51)
EOSINOPHIL NFR BLD: 1.4 % (ref 0–6.9)
ERYTHROCYTE [DISTWIDTH] IN BLOOD BY AUTOMATED COUNT: 47 FL (ref 35.9–50)
EST. AVERAGE GLUCOSE BLD GHB EST-MCNC: 108 MG/DL
FASTING STATUS PATIENT QL REPORTED: NORMAL
FASTING STATUS PATIENT QL REPORTED: NORMAL
GLOBULIN SER CALC-MCNC: 2.6 G/DL (ref 1.9–3.5)
GLUCOSE SERPL-MCNC: 100 MG/DL (ref 65–99)
HBA1C MFR BLD: 5.4 % (ref 0–5.6)
HCT VFR BLD AUTO: 45 % (ref 37–47)
HDLC SERPL-MCNC: 47 MG/DL
HGB BLD-MCNC: 14.4 G/DL (ref 12–16)
IMM GRANULOCYTES # BLD AUTO: 0.02 K/UL (ref 0–0.11)
IMM GRANULOCYTES NFR BLD AUTO: 0.4 % (ref 0–0.9)
LDLC SERPL CALC-MCNC: 76 MG/DL
LYMPHOCYTES # BLD AUTO: 0.74 K/UL (ref 1–4.8)
LYMPHOCYTES NFR BLD: 15.2 % (ref 22–41)
MCH RBC QN AUTO: 30.1 PG (ref 27–33)
MCHC RBC AUTO-ENTMCNC: 32 G/DL (ref 33.6–35)
MCV RBC AUTO: 93.9 FL (ref 81.4–97.8)
MONOCYTES # BLD AUTO: 0.45 K/UL (ref 0–0.85)
MONOCYTES NFR BLD AUTO: 9.3 % (ref 0–13.4)
NEUTROPHILS # BLD AUTO: 3.55 K/UL (ref 2–7.15)
NEUTROPHILS NFR BLD: 73.1 % (ref 44–72)
NRBC # BLD AUTO: 0 K/UL
NRBC BLD-RTO: 0 /100 WBC
PLATELET # BLD AUTO: 138 K/UL (ref 164–446)
PMV BLD AUTO: 9.5 FL (ref 9–12.9)
POTASSIUM SERPL-SCNC: 4.1 MMOL/L (ref 3.6–5.5)
PROT SERPL-MCNC: 6.2 G/DL (ref 6–8.2)
RBC # BLD AUTO: 4.79 M/UL (ref 4.2–5.4)
SODIUM SERPL-SCNC: 144 MMOL/L (ref 135–145)
TRIGL SERPL-MCNC: 123 MG/DL (ref 0–149)
WBC # BLD AUTO: 4.9 K/UL (ref 4.8–10.8)

## 2020-06-11 PROCEDURE — 85025 COMPLETE CBC W/AUTO DIFF WBC: CPT

## 2020-06-11 PROCEDURE — 80061 LIPID PANEL: CPT | Mod: GA

## 2020-06-11 PROCEDURE — 80053 COMPREHEN METABOLIC PANEL: CPT

## 2020-06-11 PROCEDURE — 83036 HEMOGLOBIN GLYCOSYLATED A1C: CPT | Mod: GA

## 2020-06-11 PROCEDURE — 36415 COLL VENOUS BLD VENIPUNCTURE: CPT

## 2020-06-22 ENCOUNTER — HOSPITAL ENCOUNTER (OUTPATIENT)
Dept: RADIOLOGY | Facility: MEDICAL CENTER | Age: 55
End: 2020-06-22
Attending: INTERNAL MEDICINE
Payer: MEDICARE

## 2020-06-22 DIAGNOSIS — C50.812 CANCER OF MIDLINE OF BREAST, LEFT (HCC): ICD-10-CM

## 2020-06-22 DIAGNOSIS — C50.812 MALIGNANT NEOPLASM OF OVERLAPPING SITES OF LEFT FEMALE BREAST, UNSPECIFIED ESTROGEN RECEPTOR STATUS (HCC): ICD-10-CM

## 2020-06-22 PROCEDURE — 71046 X-RAY EXAM CHEST 2 VIEWS: CPT

## 2020-06-22 PROCEDURE — 72156 MRI NECK SPINE W/O & W/DYE: CPT

## 2020-06-22 PROCEDURE — A9576 INJ PROHANCE MULTIPACK: HCPCS | Performed by: INTERNAL MEDICINE

## 2020-06-22 PROCEDURE — 700117 HCHG RX CONTRAST REV CODE 255: Performed by: INTERNAL MEDICINE

## 2020-06-22 PROCEDURE — 72157 MRI CHEST SPINE W/O & W/DYE: CPT

## 2020-06-22 RX ADMIN — GADOTERIDOL 20 ML: 279.3 INJECTION, SOLUTION INTRAVENOUS at 09:05

## 2020-10-06 NOTE — ED NOTES
Health Maintenance Due   Topic Date Due   • DTaP/Tdap/Td Vaccine (1 - Tdap) 11/14/1942   • Shingles Vaccine (1 of 2) 11/14/1972   • Influenza Vaccine (1) 09/01/2020   • Medicare Wellness Visit  09/23/2020       Patient is due for the topics as listed above and wishes to proceed with them. Orders placed for Immunization(s) Influenza.    Declines shingles and tdap.          Large Tegaderm used.

## 2021-07-13 NOTE — PROGRESS NOTES
Patient here for Cycle 4 A/C. Right double lumen PICC in place. Brisk blood return noted x 2 lumens. Dressing changed using sterile technique. Labs drawn previously and reviewed. Pre-medications given per MAR. Doxorubicin given per MAR. Cytoxan given per MAR over 1 hour. PICC line flushed per protocol. Neulasta ON-Pro applied. Next appointment scheduled. Discharged to self care; no apparent distress noted.    Pt on phone looking for a person to stay with her.

## 2021-11-11 ENCOUNTER — HOSPITAL ENCOUNTER (OUTPATIENT)
Facility: MEDICAL CENTER | Age: 56
End: 2021-11-11
Attending: NURSE PRACTITIONER
Payer: MEDICARE

## 2021-11-11 ENCOUNTER — OFFICE VISIT (OUTPATIENT)
Dept: URGENT CARE | Facility: PHYSICIAN GROUP | Age: 56
End: 2021-11-11
Payer: MEDICARE

## 2021-11-11 VITALS
RESPIRATION RATE: 24 BRPM | DIASTOLIC BLOOD PRESSURE: 68 MMHG | HEART RATE: 108 BPM | OXYGEN SATURATION: 95 % | BODY MASS INDEX: 47.12 KG/M2 | HEIGHT: 60 IN | SYSTOLIC BLOOD PRESSURE: 144 MMHG | TEMPERATURE: 98.7 F | WEIGHT: 240 LBS

## 2021-11-11 DIAGNOSIS — R06.02 SHORTNESS OF BREATH: ICD-10-CM

## 2021-11-11 DIAGNOSIS — R06.2 WHEEZING: ICD-10-CM

## 2021-11-11 DIAGNOSIS — R06.82 TACHYPNEA: ICD-10-CM

## 2021-11-11 DIAGNOSIS — R53.83 OTHER FATIGUE: ICD-10-CM

## 2021-11-11 DIAGNOSIS — M79.10 MYALGIA: ICD-10-CM

## 2021-11-11 DIAGNOSIS — Z20.828 EXPOSURE TO RESPIRATORY SYNCYTIAL VIRUS (RSV): ICD-10-CM

## 2021-11-11 DIAGNOSIS — J21.0 RSV (ACUTE BRONCHIOLITIS DUE TO RESPIRATORY SYNCYTIAL VIRUS): ICD-10-CM

## 2021-11-11 DIAGNOSIS — R05.9 COUGH: ICD-10-CM

## 2021-11-11 DIAGNOSIS — R09.81 NASAL CONGESTION: ICD-10-CM

## 2021-11-11 DIAGNOSIS — R50.9 FEVER AND CHILLS: ICD-10-CM

## 2021-11-11 PROBLEM — C50.919 MALIGNANT NEOPLASM OF BREAST (HCC): Status: ACTIVE | Noted: 2018-06-19

## 2021-11-11 PROBLEM — I83.90 VARICOSE VEINS: Status: ACTIVE | Noted: 2021-05-28

## 2021-11-11 PROBLEM — M75.80 ROTATOR CUFF TENDINITIS: Status: ACTIVE | Noted: 2020-06-10

## 2021-11-11 PROBLEM — R23.1 LIVEDO RETICULARIS: Status: ACTIVE | Noted: 2019-05-09

## 2021-11-11 PROBLEM — M79.89 SWELLING OF UPPER EXTREMITY: Status: ACTIVE | Noted: 2019-10-02

## 2021-11-11 PROBLEM — E66.01 MORBID (SEVERE) OBESITY DUE TO EXCESS CALORIES (HCC): Status: ACTIVE | Noted: 2021-05-28

## 2021-11-11 PROBLEM — L98.9 DISORDER OF SCALP: Status: ACTIVE | Noted: 2019-05-09

## 2021-11-11 PROBLEM — I87.2 PERIPHERAL VENOUS INSUFFICIENCY: Status: ACTIVE | Noted: 2021-05-28

## 2021-11-11 PROBLEM — N63.0 MASS OF BREAST: Status: ACTIVE | Noted: 2018-06-19

## 2021-11-11 PROBLEM — M50.30 DEGENERATION OF INTERVERTEBRAL DISC OF CERVICAL REGION: Status: ACTIVE | Noted: 2020-07-09

## 2021-11-11 PROBLEM — I89.0 LYMPHEDEMA OF UPPER EXTREMITY: Status: ACTIVE | Noted: 2019-10-02

## 2021-11-11 PROBLEM — Z00.00 PREVENTATIVE HEALTH CARE: Status: ACTIVE | Noted: 2018-06-19

## 2021-11-11 LAB
EXTERNAL QUALITY CONTROL: NORMAL
FLUAV+FLUBV AG SPEC QL IA: NEGATIVE
INT CON NEG: NEGATIVE
INT CON NEG: NEGATIVE
INT CON POS: POSITIVE
INT CON POS: POSITIVE
RSV AG SPEC QL IA: POSITIVE
SARS-COV+SARS-COV-2 AG RESP QL IA.RAPID: NEGATIVE

## 2021-11-11 PROCEDURE — 87807 RSV ASSAY W/OPTIC: CPT | Performed by: NURSE PRACTITIONER

## 2021-11-11 PROCEDURE — 99204 OFFICE O/P NEW MOD 45 MIN: CPT | Mod: CS | Performed by: NURSE PRACTITIONER

## 2021-11-11 PROCEDURE — 87426 SARSCOV CORONAVIRUS AG IA: CPT | Performed by: NURSE PRACTITIONER

## 2021-11-11 PROCEDURE — U0005 INFEC AGEN DETEC AMPLI PROBE: HCPCS

## 2021-11-11 PROCEDURE — 87804 INFLUENZA ASSAY W/OPTIC: CPT | Performed by: NURSE PRACTITIONER

## 2021-11-11 PROCEDURE — U0003 INFECTIOUS AGENT DETECTION BY NUCLEIC ACID (DNA OR RNA); SEVERE ACUTE RESPIRATORY SYNDROME CORONAVIRUS 2 (SARS-COV-2) (CORONAVIRUS DISEASE [COVID-19]), AMPLIFIED PROBE TECHNIQUE, MAKING USE OF HIGH THROUGHPUT TECHNOLOGIES AS DESCRIBED BY CMS-2020-01-R: HCPCS

## 2021-11-11 RX ORDER — ALBUTEROL SULFATE 90 UG/1
2 AEROSOL, METERED RESPIRATORY (INHALATION) EVERY 6 HOURS PRN
Qty: 8.5 G | Refills: 0 | Status: SHIPPED | OUTPATIENT
Start: 2021-11-11 | End: 2023-04-07

## 2021-11-11 RX ORDER — PREDNISONE 10 MG/1
TABLET ORAL
Qty: 30 TABLET | Refills: 0 | Status: SHIPPED | OUTPATIENT
Start: 2021-11-11 | End: 2021-11-15

## 2021-11-11 RX ORDER — DEXAMETHASONE SODIUM PHOSPHATE 4 MG/ML
8 INJECTION, SOLUTION INTRA-ARTICULAR; INTRALESIONAL; INTRAMUSCULAR; INTRAVENOUS; SOFT TISSUE ONCE
Status: COMPLETED | OUTPATIENT
Start: 2021-11-11 | End: 2021-11-11

## 2021-11-11 RX ADMIN — DEXAMETHASONE SODIUM PHOSPHATE 8 MG: 4 INJECTION, SOLUTION INTRA-ARTICULAR; INTRALESIONAL; INTRAMUSCULAR; INTRAVENOUS; SOFT TISSUE at 13:20

## 2021-11-11 ASSESSMENT — ENCOUNTER SYMPTOMS
PSYCHIATRIC NEGATIVE: 1
SHORTNESS OF BREATH: 1
MUSCULOSKELETAL NEGATIVE: 1
WHEEZING: 1
SORE THROAT: 1
COUGH: 1
CARDIOVASCULAR NEGATIVE: 1
FEVER: 1
NEUROLOGICAL NEGATIVE: 1
EYES NEGATIVE: 1
GASTROINTESTINAL NEGATIVE: 1
CHILLS: 1

## 2021-11-11 ASSESSMENT — FIBROSIS 4 INDEX: FIB4 SCORE: 1.8

## 2021-11-11 NOTE — PROGRESS NOTES
Subjective:   Addie Orellana is a 56 y.o. female who presents for Cough (productive; onset 2x wks) and Other (Pt states she was exposed RSV. )       Cough  Associated symptoms include chills, a fever, a sore throat, shortness of breath and wheezing.   Other  Associated symptoms include chills, congestion, coughing, a fever and a sore throat.     Pt presents for evaluation of a new problem, reports 2-week history of productive cough that has been increasingly worse over the past 1 to 2 days.  Patient states she also has had intermittent fever and chills, nasal congestion, fatigue, myalgia, and shortness of breath.  Patient is a , has been exposed to recent RSV as well as other unknown illnesses.  Patient has tried over-the-counter antihistamine for her symptoms which has not been helpful.      Review of Systems   Constitutional: Positive for chills, fever and malaise/fatigue.   HENT: Positive for congestion and sore throat.    Eyes: Negative.    Respiratory: Positive for cough, shortness of breath and wheezing.    Cardiovascular: Negative.    Gastrointestinal: Negative.    Genitourinary: Negative.    Musculoskeletal: Negative.    Skin: Negative.    Neurological: Negative.    Psychiatric/Behavioral: Negative.    All other systems reviewed and are negative.      MEDS:   Current Outpatient Medications:   •  anastrozole (ARIMIDEX) 1 MG Tab, Take 1 Tab by mouth every day., Disp: 30 Tab, Rfl: 3  •  Cholecalciferol (VITAMIN D PO), Take 1 Cap by mouth every evening., Disp: , Rfl:   •  Biotin w/ Vitamins C & E (HAIR/SKIN/NAILS PO), Take 1 Tab by mouth every evening., Disp: , Rfl:   •  Omega-3 Fatty Acids (FISH OIL) 1000 MG Cap capsule, Take 1,000 mg by mouth every evening., Disp: , Rfl:   •  therapeutic multivitamin-minerals (THERAGRAN-M) Tab, Take 1 Tab by mouth every evening., Disp: , Rfl:   ALLERGIES: No Known Allergies    Patient's PMH, SocHx, SurgHx, FamHx, Drug allergies and medications were  reviewed.     Objective:   /68 (BP Location: Left arm, Patient Position: Sitting, BP Cuff Size: Adult long)   Pulse (!) 108   Temp 37.1 °C (98.7 °F) (Temporal)   Resp (!) 24   Ht 1.524 m (5')   Wt 109 kg (240 lb)   SpO2 95%   BMI 46.87 kg/m²     Physical Exam  Vitals and nursing note reviewed.   Constitutional:       General: She is awake.      Appearance: Normal appearance. She is well-developed.   HENT:      Head: Normocephalic and atraumatic.      Right Ear: Tympanic membrane, ear canal and external ear normal.      Left Ear: Tympanic membrane, ear canal and external ear normal.      Nose: Congestion present. No nasal tenderness, mucosal edema or rhinorrhea.      Right Turbinates: Enlarged.      Left Turbinates: Enlarged.      Mouth/Throat:      Lips: Pink.      Mouth: Mucous membranes are moist.      Pharynx: Oropharynx is clear. Uvula midline. Posterior oropharyngeal erythema present.   Eyes:      Extraocular Movements: Extraocular movements intact.      Conjunctiva/sclera: Conjunctivae normal.   Cardiovascular:      Rate and Rhythm: Regular rhythm. Tachycardia present.      Pulses: Normal pulses.      Heart sounds: Normal heart sounds.   Pulmonary:      Effort: Pulmonary effort is normal.      Breath sounds: Examination of the right-upper field reveals wheezing. Examination of the left-upper field reveals wheezing. Examination of the right-middle field reveals wheezing. Examination of the right-lower field reveals wheezing. Examination of the left-lower field reveals wheezing. Decreased breath sounds and wheezing present.      Comments: Patient initially tachypneic upon entering exam room for vitals, however after being able to sit and rest, respiratory rate is 18.  Musculoskeletal:         General: Normal range of motion.      Cervical back: Normal range of motion and neck supple.   Skin:     General: Skin is warm and dry.   Neurological:      General: No focal deficit present.      Mental  Status: She is alert and oriented to person, place, and time.   Psychiatric:         Mood and Affect: Mood normal.         Behavior: Behavior normal. Behavior is cooperative.         Thought Content: Thought content normal.         Judgment: Judgment normal.         Assessment/Plan:   Assessment    1. RSV (acute bronchiolitis due to respiratory syncytial virus)    2. Shortness of breath  - SARS-CoV-2 PCR (24 hour In-House): Collect NP swab in VTM; Future  - POCT SARS-COV Antigen KENZIE (Symptomatic Only)  - POCT RSV  - POCT Influenza A/B  - dexamethasone (DECADRON) injection 8 mg  - albuterol 108 (90 Base) MCG/ACT Aero Soln inhalation aerosol; Inhale 2 Puffs every 6 hours as needed for Shortness of Breath.  Dispense: 8.5 g; Refill: 0    3. Cough  - SARS-CoV-2 PCR (24 hour In-House): Collect NP swab in VTM; Future  - POCT SARS-COV Antigen KENZIE (Symptomatic Only)  - POCT RSV  - POCT Influenza A/B  - dexamethasone (DECADRON) injection 8 mg  - albuterol 108 (90 Base) MCG/ACT Aero Soln inhalation aerosol; Inhale 2 Puffs every 6 hours as needed for Shortness of Breath.  Dispense: 8.5 g; Refill: 0    4. Nasal congestion  - SARS-CoV-2 PCR (24 hour In-House): Collect NP swab in VTM; Future  - POCT SARS-COV Antigen KENZIE (Symptomatic Only)  - POCT RSV  - POCT Influenza A/B    5. Wheezing  - SARS-CoV-2 PCR (24 hour In-House): Collect NP swab in VTM; Future  - POCT SARS-COV Antigen KENZIE (Symptomatic Only)  - POCT RSV  - POCT Influenza A/B  - dexamethasone (DECADRON) injection 8 mg  - albuterol 108 (90 Base) MCG/ACT Aero Soln inhalation aerosol; Inhale 2 Puffs every 6 hours as needed for Shortness of Breath.  Dispense: 8.5 g; Refill: 0    6. Other fatigue  - SARS-CoV-2 PCR (24 hour In-House): Collect NP swab in VTM; Future  - POCT SARS-COV Antigen KENZIE (Symptomatic Only)  - POCT RSV  - POCT Influenza A/B    7. Fever and chills  - SARS-CoV-2 PCR (24 hour In-House): Collect NP swab in VTM; Future  - POCT SARS-COV Antigen KENZIE (Symptomatic  Only)  - POCT RSV  - POCT Influenza A/B    8. Myalgia  - SARS-CoV-2 PCR (24 hour In-House): Collect NP swab in VTM; Future  - POCT SARS-COV Antigen KENZIE (Symptomatic Only)  - POCT RSV  - POCT Influenza A/B    9. Exposure to respiratory syncytial virus (RSV)  - POCT RSV    10. Tachypnea    Vital signs stable and verified at today's acute urgent care visit.  Reviewed test results completed in clinic, positive for RSV.  Will obtain PCR COVID testing.  Advised to home isolate until test results return.  Supportive care options also discussed, to include alternating Tylenol and Advil, cough/cold/flu OTC medications for symptomatic relief, in addition to rest, fluids as tolerated and deep breathing exercises.  Patient noticed improvement in office with administration of Decadron as well as albuterol inhaler.  She is safe for outpatient treatment at this time.  Differential diagnosis, natural history, and indications for immediate follow-up were discussed.     Advised the patient to follow-up with the primary care provider for recheck, reevaluation, and/or consideration of further management if necessary. Return to urgent care with any worsening symptoms or if there is no improvement in their current condition. Strict red flags discussed and indications to immediately call 911 or present to the Emergency Department.  All questions were encouraged and answered to the patient's satisfaction and understanding, and they agree to the plan of care.     I personally reviewed prior external notes and test results pertinent to today's visit.  I have independently reviewed and interpreted all diagnostics ordered during this urgent care acute visit. Time spent evaluating this patient was a minimum of 30 minutes and includes preparing for visit, counseling/education, exam and evaluation, obtaining history, independent interpretation and ordering lab/test/procedures.      Please note that this dictation was created using voice  recognition software. I have made a reasonable attempt to correct obvious errors, but I expect that there are errors of grammar and possibly content that I did not discover before finalizing the note.

## 2021-11-12 LAB — COVID ORDER STATUS COVID19: NORMAL

## 2021-11-13 LAB
SARS-COV-2 RNA RESP QL NAA+PROBE: NOTDETECTED
SPECIMEN SOURCE: NORMAL

## 2021-11-18 ENCOUNTER — HOSPITAL ENCOUNTER (INPATIENT)
Facility: MEDICAL CENTER | Age: 56
LOS: 3 days | DRG: 871 | End: 2021-11-21
Attending: EMERGENCY MEDICINE | Admitting: STUDENT IN AN ORGANIZED HEALTH CARE EDUCATION/TRAINING PROGRAM
Payer: MEDICARE

## 2021-11-18 ENCOUNTER — APPOINTMENT (OUTPATIENT)
Dept: RADIOLOGY | Facility: MEDICAL CENTER | Age: 56
DRG: 871 | End: 2021-11-18
Payer: MEDICARE

## 2021-11-18 ENCOUNTER — APPOINTMENT (OUTPATIENT)
Dept: RADIOLOGY | Facility: MEDICAL CENTER | Age: 56
DRG: 871 | End: 2021-11-18
Attending: EMERGENCY MEDICINE
Payer: MEDICARE

## 2021-11-18 DIAGNOSIS — R06.02 SHORTNESS OF BREATH: ICD-10-CM

## 2021-11-18 DIAGNOSIS — N63.0 MASS OF BREAST: ICD-10-CM

## 2021-11-18 DIAGNOSIS — R09.02 HYPOXEMIA: ICD-10-CM

## 2021-11-18 DIAGNOSIS — J21.0 RSV (ACUTE BRONCHIOLITIS DUE TO RESPIRATORY SYNCYTIAL VIRUS): ICD-10-CM

## 2021-11-18 DIAGNOSIS — C50.919 MALIGNANT NEOPLASM OF FEMALE BREAST, UNSPECIFIED ESTROGEN RECEPTOR STATUS, UNSPECIFIED LATERALITY, UNSPECIFIED SITE OF BREAST (HCC): ICD-10-CM

## 2021-11-18 DIAGNOSIS — J06.9 UPPER RESPIRATORY TRACT INFECTION, UNSPECIFIED TYPE: ICD-10-CM

## 2021-11-18 DIAGNOSIS — J18.9 PNEUMONIA DUE TO INFECTIOUS ORGANISM, UNSPECIFIED LATERALITY, UNSPECIFIED PART OF LUNG: ICD-10-CM

## 2021-11-18 LAB
ALBUMIN SERPL BCP-MCNC: 4 G/DL (ref 3.2–4.9)
ALBUMIN/GLOB SERPL: 1.3 G/DL
ALP SERPL-CCNC: 73 U/L (ref 30–99)
ALT SERPL-CCNC: 22 U/L (ref 2–50)
ANION GAP SERPL CALC-SCNC: 14 MMOL/L (ref 7–16)
APPEARANCE UR: ABNORMAL
AST SERPL-CCNC: 18 U/L (ref 12–45)
BACTERIA #/AREA URNS HPF: ABNORMAL /HPF
BASOPHILS # BLD AUTO: 0.2 % (ref 0–1.8)
BASOPHILS # BLD: 0.02 K/UL (ref 0–0.12)
BILIRUB SERPL-MCNC: 1.1 MG/DL (ref 0.1–1.5)
BILIRUB UR QL STRIP.AUTO: NEGATIVE
BUN SERPL-MCNC: 12 MG/DL (ref 8–22)
CALCIUM SERPL-MCNC: 8.4 MG/DL (ref 8.5–10.5)
CHLORIDE SERPL-SCNC: 99 MMOL/L (ref 96–112)
CO2 SERPL-SCNC: 24 MMOL/L (ref 20–33)
COLOR UR: YELLOW
CREAT SERPL-MCNC: 0.61 MG/DL (ref 0.5–1.4)
D DIMER PPP IA.FEU-MCNC: 1.47 UG/ML (FEU) (ref 0–0.5)
EKG IMPRESSION: NORMAL
EOSINOPHIL # BLD AUTO: 0 K/UL (ref 0–0.51)
EOSINOPHIL NFR BLD: 0 % (ref 0–6.9)
EPI CELLS #/AREA URNS HPF: NEGATIVE /HPF
ERYTHROCYTE [DISTWIDTH] IN BLOOD BY AUTOMATED COUNT: 47.7 FL (ref 35.9–50)
FLUAV AG SPEC QL IA: NEGATIVE
FLUBV AG SPEC QL IA: NEGATIVE
GLOBULIN SER CALC-MCNC: 3.1 G/DL (ref 1.9–3.5)
GLUCOSE SERPL-MCNC: 163 MG/DL (ref 65–99)
GLUCOSE UR STRIP.AUTO-MCNC: NEGATIVE MG/DL
HCT VFR BLD AUTO: 44.5 % (ref 37–47)
HGB BLD-MCNC: 15.5 G/DL (ref 12–16)
HYALINE CASTS #/AREA URNS LPF: ABNORMAL /LPF
IMM GRANULOCYTES # BLD AUTO: 0.08 K/UL (ref 0–0.11)
IMM GRANULOCYTES NFR BLD AUTO: 0.9 % (ref 0–0.9)
INR PPP: 1 (ref 0.87–1.13)
KETONES UR STRIP.AUTO-MCNC: 15 MG/DL
LACTATE BLD-SCNC: 1.6 MMOL/L (ref 0.5–2)
LEUKOCYTE ESTERASE UR QL STRIP.AUTO: ABNORMAL
LYMPHOCYTES # BLD AUTO: 0.43 K/UL (ref 1–4.8)
LYMPHOCYTES NFR BLD: 5.1 % (ref 22–41)
MAGNESIUM SERPL-MCNC: 1.4 MG/DL (ref 1.5–2.5)
MCH RBC QN AUTO: 31.8 PG (ref 27–33)
MCHC RBC AUTO-ENTMCNC: 34.8 G/DL (ref 33.6–35)
MCV RBC AUTO: 91.4 FL (ref 81.4–97.8)
MICRO URNS: ABNORMAL
MONOCYTES # BLD AUTO: 0.69 K/UL (ref 0–0.85)
MONOCYTES NFR BLD AUTO: 8.2 % (ref 0–13.4)
NEUTROPHILS # BLD AUTO: 7.21 K/UL (ref 2–7.15)
NEUTROPHILS NFR BLD: 85.6 % (ref 44–72)
NITRITE UR QL STRIP.AUTO: POSITIVE
NRBC # BLD AUTO: 0 K/UL
NRBC BLD-RTO: 0 /100 WBC
PH UR STRIP.AUTO: 5.5 [PH] (ref 5–8)
PLATELET # BLD AUTO: 115 K/UL (ref 164–446)
PMV BLD AUTO: 9.1 FL (ref 9–12.9)
POTASSIUM SERPL-SCNC: 3.2 MMOL/L (ref 3.6–5.5)
PROCALCITONIN SERPL-MCNC: 1.09 NG/ML
PROT SERPL-MCNC: 7.1 G/DL (ref 6–8.2)
PROT UR QL STRIP: 30 MG/DL
PROTHROMBIN TIME: 12.9 SEC (ref 12–14.6)
RBC # BLD AUTO: 4.87 M/UL (ref 4.2–5.4)
RBC # URNS HPF: ABNORMAL /HPF
RBC UR QL AUTO: ABNORMAL
SARS-COV+SARS-COV-2 AG RESP QL IA.RAPID: NOTDETECTED
SODIUM SERPL-SCNC: 137 MMOL/L (ref 135–145)
SP GR UR STRIP.AUTO: 1.02
SPECIMEN SOURCE: NORMAL
UROBILINOGEN UR STRIP.AUTO-MCNC: 0.2 MG/DL
WBC # BLD AUTO: 8.4 K/UL (ref 4.8–10.8)
WBC #/AREA URNS HPF: ABNORMAL /HPF

## 2021-11-18 PROCEDURE — 93005 ELECTROCARDIOGRAM TRACING: CPT

## 2021-11-18 PROCEDURE — 85610 PROTHROMBIN TIME: CPT

## 2021-11-18 PROCEDURE — 71045 X-RAY EXAM CHEST 1 VIEW: CPT

## 2021-11-18 PROCEDURE — 87186 SC STD MICRODIL/AGAR DIL: CPT

## 2021-11-18 PROCEDURE — A9270 NON-COVERED ITEM OR SERVICE: HCPCS | Performed by: STUDENT IN AN ORGANIZED HEALTH CARE EDUCATION/TRAINING PROGRAM

## 2021-11-18 PROCEDURE — 700105 HCHG RX REV CODE 258: Performed by: STUDENT IN AN ORGANIZED HEALTH CARE EDUCATION/TRAINING PROGRAM

## 2021-11-18 PROCEDURE — 700102 HCHG RX REV CODE 250 W/ 637 OVERRIDE(OP)

## 2021-11-18 PROCEDURE — 87040 BLOOD CULTURE FOR BACTERIA: CPT

## 2021-11-18 PROCEDURE — A9270 NON-COVERED ITEM OR SERVICE: HCPCS

## 2021-11-18 PROCEDURE — 81001 URINALYSIS AUTO W/SCOPE: CPT

## 2021-11-18 PROCEDURE — A9270 NON-COVERED ITEM OR SERVICE: HCPCS | Performed by: EMERGENCY MEDICINE

## 2021-11-18 PROCEDURE — 93005 ELECTROCARDIOGRAM TRACING: CPT | Performed by: EMERGENCY MEDICINE

## 2021-11-18 PROCEDURE — 99223 1ST HOSP IP/OBS HIGH 75: CPT | Mod: AI | Performed by: STUDENT IN AN ORGANIZED HEALTH CARE EDUCATION/TRAINING PROGRAM

## 2021-11-18 PROCEDURE — 87426 SARSCOV CORONAVIRUS AG IA: CPT

## 2021-11-18 PROCEDURE — 87086 URINE CULTURE/COLONY COUNT: CPT

## 2021-11-18 PROCEDURE — 83735 ASSAY OF MAGNESIUM: CPT

## 2021-11-18 PROCEDURE — 85379 FIBRIN DEGRADATION QUANT: CPT

## 2021-11-18 PROCEDURE — 770001 HCHG ROOM/CARE - MED/SURG/GYN PRIV*

## 2021-11-18 PROCEDURE — 87400 INFLUENZA A/B EACH AG IA: CPT

## 2021-11-18 PROCEDURE — C9803 HOPD COVID-19 SPEC COLLECT: HCPCS | Performed by: EMERGENCY MEDICINE

## 2021-11-18 PROCEDURE — 700105 HCHG RX REV CODE 258: Performed by: EMERGENCY MEDICINE

## 2021-11-18 PROCEDURE — 96365 THER/PROPH/DIAG IV INF INIT: CPT

## 2021-11-18 PROCEDURE — 87077 CULTURE AEROBIC IDENTIFY: CPT

## 2021-11-18 PROCEDURE — 96372 THER/PROPH/DIAG INJ SC/IM: CPT

## 2021-11-18 PROCEDURE — 85025 COMPLETE CBC W/AUTO DIFF WBC: CPT

## 2021-11-18 PROCEDURE — 96375 TX/PRO/DX INJ NEW DRUG ADDON: CPT

## 2021-11-18 PROCEDURE — 96367 TX/PROPH/DG ADDL SEQ IV INF: CPT

## 2021-11-18 PROCEDURE — 83605 ASSAY OF LACTIC ACID: CPT

## 2021-11-18 PROCEDURE — 700102 HCHG RX REV CODE 250 W/ 637 OVERRIDE(OP): Performed by: STUDENT IN AN ORGANIZED HEALTH CARE EDUCATION/TRAINING PROGRAM

## 2021-11-18 PROCEDURE — 80053 COMPREHEN METABOLIC PANEL: CPT

## 2021-11-18 PROCEDURE — 700111 HCHG RX REV CODE 636 W/ 250 OVERRIDE (IP): Performed by: STUDENT IN AN ORGANIZED HEALTH CARE EDUCATION/TRAINING PROGRAM

## 2021-11-18 PROCEDURE — 36415 COLL VENOUS BLD VENIPUNCTURE: CPT

## 2021-11-18 PROCEDURE — 84145 PROCALCITONIN (PCT): CPT

## 2021-11-18 PROCEDURE — 700102 HCHG RX REV CODE 250 W/ 637 OVERRIDE(OP): Performed by: EMERGENCY MEDICINE

## 2021-11-18 PROCEDURE — 99285 EMERGENCY DEPT VISIT HI MDM: CPT

## 2021-11-18 PROCEDURE — 700111 HCHG RX REV CODE 636 W/ 250 OVERRIDE (IP): Mod: JW | Performed by: EMERGENCY MEDICINE

## 2021-11-18 RX ORDER — ANASTROZOLE 1 MG/1
1 TABLET ORAL DAILY
Status: DISCONTINUED | OUTPATIENT
Start: 2021-11-19 | End: 2021-11-21 | Stop reason: HOSPADM

## 2021-11-18 RX ORDER — ALBUTEROL SULFATE 90 UG/1
2 AEROSOL, METERED RESPIRATORY (INHALATION) EVERY 6 HOURS PRN
Status: DISCONTINUED | OUTPATIENT
Start: 2021-11-18 | End: 2021-11-21 | Stop reason: HOSPADM

## 2021-11-18 RX ORDER — ONDANSETRON 2 MG/ML
4 INJECTION INTRAMUSCULAR; INTRAVENOUS EVERY 4 HOURS PRN
Status: DISCONTINUED | OUTPATIENT
Start: 2021-11-18 | End: 2021-11-21 | Stop reason: HOSPADM

## 2021-11-18 RX ORDER — KETOROLAC TROMETHAMINE 30 MG/ML
30 INJECTION, SOLUTION INTRAMUSCULAR; INTRAVENOUS ONCE
Status: COMPLETED | OUTPATIENT
Start: 2021-11-18 | End: 2021-11-18

## 2021-11-18 RX ORDER — OXYCODONE HYDROCHLORIDE 10 MG/1
10 TABLET ORAL
Status: DISCONTINUED | OUTPATIENT
Start: 2021-11-18 | End: 2021-11-21 | Stop reason: HOSPADM

## 2021-11-18 RX ORDER — AMOXICILLIN 250 MG
2 CAPSULE ORAL 2 TIMES DAILY
Status: DISCONTINUED | OUTPATIENT
Start: 2021-11-18 | End: 2021-11-19

## 2021-11-18 RX ORDER — ACETAMINOPHEN 325 MG/1
650 TABLET ORAL ONCE
Status: COMPLETED | OUTPATIENT
Start: 2021-11-18 | End: 2021-11-18

## 2021-11-18 RX ORDER — ALBUTEROL SULFATE 90 UG/1
2 AEROSOL, METERED RESPIRATORY (INHALATION) ONCE
Status: COMPLETED | OUTPATIENT
Start: 2021-11-18 | End: 2021-11-18

## 2021-11-18 RX ORDER — IBUPROFEN 200 MG
600 TABLET ORAL 2 TIMES DAILY PRN
COMMUNITY
End: 2022-01-27

## 2021-11-18 RX ORDER — PROMETHAZINE HYDROCHLORIDE 25 MG/1
12.5-25 SUPPOSITORY RECTAL EVERY 4 HOURS PRN
Status: DISCONTINUED | OUTPATIENT
Start: 2021-11-18 | End: 2021-11-21 | Stop reason: HOSPADM

## 2021-11-18 RX ORDER — SODIUM CHLORIDE 9 MG/ML
1000 INJECTION, SOLUTION INTRAVENOUS ONCE
Status: COMPLETED | OUTPATIENT
Start: 2021-11-18 | End: 2021-11-18

## 2021-11-18 RX ORDER — AZITHROMYCIN 500 MG/1
500 INJECTION, POWDER, LYOPHILIZED, FOR SOLUTION INTRAVENOUS ONCE
Status: COMPLETED | OUTPATIENT
Start: 2021-11-18 | End: 2021-11-18

## 2021-11-18 RX ORDER — BISACODYL 10 MG
10 SUPPOSITORY, RECTAL RECTAL
Status: DISCONTINUED | OUTPATIENT
Start: 2021-11-18 | End: 2021-11-19

## 2021-11-18 RX ORDER — ENALAPRILAT 1.25 MG/ML
1.25 INJECTION INTRAVENOUS EVERY 6 HOURS PRN
Status: DISCONTINUED | OUTPATIENT
Start: 2021-11-18 | End: 2021-11-21 | Stop reason: HOSPADM

## 2021-11-18 RX ORDER — POLYETHYLENE GLYCOL 3350 17 G/17G
1 POWDER, FOR SOLUTION ORAL
Status: DISCONTINUED | OUTPATIENT
Start: 2021-11-18 | End: 2021-11-19

## 2021-11-18 RX ORDER — ONDANSETRON 4 MG/1
4 TABLET, ORALLY DISINTEGRATING ORAL EVERY 4 HOURS PRN
Status: DISCONTINUED | OUTPATIENT
Start: 2021-11-18 | End: 2021-11-21 | Stop reason: HOSPADM

## 2021-11-18 RX ORDER — SODIUM CHLORIDE, SODIUM LACTATE, POTASSIUM CHLORIDE, CALCIUM CHLORIDE 600; 310; 30; 20 MG/100ML; MG/100ML; MG/100ML; MG/100ML
INJECTION, SOLUTION INTRAVENOUS CONTINUOUS
Status: ACTIVE | OUTPATIENT
Start: 2021-11-18 | End: 2021-11-19

## 2021-11-18 RX ORDER — PROMETHAZINE HYDROCHLORIDE 25 MG/1
12.5-25 TABLET ORAL EVERY 4 HOURS PRN
Status: DISCONTINUED | OUTPATIENT
Start: 2021-11-18 | End: 2021-11-21 | Stop reason: HOSPADM

## 2021-11-18 RX ORDER — PROCHLORPERAZINE EDISYLATE 5 MG/ML
5-10 INJECTION INTRAMUSCULAR; INTRAVENOUS EVERY 4 HOURS PRN
Status: DISCONTINUED | OUTPATIENT
Start: 2021-11-18 | End: 2021-11-21 | Stop reason: HOSPADM

## 2021-11-18 RX ORDER — ONDANSETRON 2 MG/ML
4 INJECTION INTRAMUSCULAR; INTRAVENOUS ONCE
Status: COMPLETED | OUTPATIENT
Start: 2021-11-18 | End: 2021-11-18

## 2021-11-18 RX ORDER — HEPARIN SODIUM 5000 [USP'U]/ML
5000 INJECTION, SOLUTION INTRAVENOUS; SUBCUTANEOUS EVERY 8 HOURS
Status: DISCONTINUED | OUTPATIENT
Start: 2021-11-18 | End: 2021-11-21 | Stop reason: HOSPADM

## 2021-11-18 RX ORDER — OXYCODONE HYDROCHLORIDE 5 MG/1
5 TABLET ORAL
Status: DISCONTINUED | OUTPATIENT
Start: 2021-11-18 | End: 2021-11-21 | Stop reason: HOSPADM

## 2021-11-18 RX ORDER — ACETAMINOPHEN 325 MG/1
650 TABLET ORAL EVERY 6 HOURS PRN
Status: DISCONTINUED | OUTPATIENT
Start: 2021-11-18 | End: 2021-11-21 | Stop reason: HOSPADM

## 2021-11-18 RX ORDER — CLONIDINE HYDROCHLORIDE 0.1 MG/1
0.1 TABLET ORAL EVERY 6 HOURS PRN
Status: DISCONTINUED | OUTPATIENT
Start: 2021-11-18 | End: 2021-11-21 | Stop reason: HOSPADM

## 2021-11-18 RX ORDER — AZITHROMYCIN 250 MG/1
500 TABLET, FILM COATED ORAL DAILY
Status: COMPLETED | OUTPATIENT
Start: 2021-11-19 | End: 2021-11-20

## 2021-11-18 RX ORDER — DEXAMETHASONE SODIUM PHOSPHATE 4 MG/ML
10 INJECTION, SOLUTION INTRA-ARTICULAR; INTRALESIONAL; INTRAMUSCULAR; INTRAVENOUS; SOFT TISSUE ONCE
Status: COMPLETED | OUTPATIENT
Start: 2021-11-18 | End: 2021-11-18

## 2021-11-18 RX ORDER — LABETALOL HYDROCHLORIDE 5 MG/ML
10 INJECTION, SOLUTION INTRAVENOUS EVERY 4 HOURS PRN
Status: DISCONTINUED | OUTPATIENT
Start: 2021-11-18 | End: 2021-11-21 | Stop reason: HOSPADM

## 2021-11-18 RX ORDER — HYDROMORPHONE HYDROCHLORIDE 1 MG/ML
0.5 INJECTION, SOLUTION INTRAMUSCULAR; INTRAVENOUS; SUBCUTANEOUS
Status: DISCONTINUED | OUTPATIENT
Start: 2021-11-18 | End: 2021-11-21 | Stop reason: HOSPADM

## 2021-11-18 RX ADMIN — HEPARIN SODIUM 5000 UNITS: 5000 INJECTION, SOLUTION INTRAVENOUS; SUBCUTANEOUS at 23:44

## 2021-11-18 RX ADMIN — AZITHROMYCIN MONOHYDRATE 500 MG: 500 INJECTION, POWDER, LYOPHILIZED, FOR SOLUTION INTRAVENOUS at 21:38

## 2021-11-18 RX ADMIN — SENNOSIDES AND DOCUSATE SODIUM 2 TABLET: 50; 8.6 TABLET ORAL at 23:44

## 2021-11-18 RX ADMIN — SODIUM CHLORIDE, POTASSIUM CHLORIDE, SODIUM LACTATE AND CALCIUM CHLORIDE: 600; 310; 30; 20 INJECTION, SOLUTION INTRAVENOUS at 23:44

## 2021-11-18 RX ADMIN — ACETAMINOPHEN 650 MG: 325 TABLET, FILM COATED ORAL at 17:36

## 2021-11-18 RX ADMIN — DEXAMETHASONE SODIUM PHOSPHATE 10 MG: 4 INJECTION, SOLUTION INTRA-ARTICULAR; INTRALESIONAL; INTRAMUSCULAR; INTRAVENOUS; SOFT TISSUE at 20:52

## 2021-11-18 RX ADMIN — SODIUM CHLORIDE 3 G: 900 INJECTION INTRAVENOUS at 21:00

## 2021-11-18 RX ADMIN — ONDANSETRON 4 MG: 2 INJECTION INTRAMUSCULAR; INTRAVENOUS at 20:51

## 2021-11-18 RX ADMIN — ALBUTEROL SULFATE 2 PUFF: 90 AEROSOL, METERED RESPIRATORY (INHALATION) at 21:38

## 2021-11-18 RX ADMIN — SODIUM CHLORIDE 1000 ML: 9 INJECTION, SOLUTION INTRAVENOUS at 20:52

## 2021-11-18 RX ADMIN — KETOROLAC TROMETHAMINE 30 MG: 30 INJECTION, SOLUTION INTRAMUSCULAR; INTRAVENOUS at 20:52

## 2021-11-18 ASSESSMENT — ENCOUNTER SYMPTOMS
DIARRHEA: 1
FEVER: 1
VOMITING: 1
SHORTNESS OF BREATH: 1
NAUSEA: 1
COUGH: 1
HEADACHES: 1

## 2021-11-18 ASSESSMENT — FIBROSIS 4 INDEX: FIB4 SCORE: 1.8

## 2021-11-18 NOTE — ED TRIAGE NOTES
Chief Complaint   Patient presents with   • Cough   • Shortness of Breath   • Fever   • Body Aches   • Headache   • N/V   • Diarrhea     Pt wheeled to triage with above complaints. Symptoms started this morning, spo2 89% on room air, she was placed on 3L nc.   Pt was vaccinated for covid-19 couple months ago.

## 2021-11-19 ENCOUNTER — APPOINTMENT (OUTPATIENT)
Dept: RADIOLOGY | Facility: MEDICAL CENTER | Age: 56
DRG: 871 | End: 2021-11-19
Attending: STUDENT IN AN ORGANIZED HEALTH CARE EDUCATION/TRAINING PROGRAM
Payer: MEDICARE

## 2021-11-19 PROBLEM — R11.2 NAUSEA & VOMITING: Status: ACTIVE | Noted: 2021-11-19

## 2021-11-19 PROBLEM — R19.7 DIARRHEA: Status: ACTIVE | Noted: 2021-11-19

## 2021-11-19 PROBLEM — J96.01 ACUTE RESPIRATORY FAILURE WITH HYPOXIA (HCC): Status: ACTIVE | Noted: 2021-11-19

## 2021-11-19 PROBLEM — A41.9 SEPSIS (HCC): Status: ACTIVE | Noted: 2021-11-19

## 2021-11-19 PROBLEM — D69.6 THROMBOCYTOPENIA (HCC): Status: ACTIVE | Noted: 2021-11-19

## 2021-11-19 LAB
ALBUMIN SERPL BCP-MCNC: 3.5 G/DL (ref 3.2–4.9)
ALBUMIN/GLOB SERPL: 1.2 G/DL
ALP SERPL-CCNC: 66 U/L (ref 30–99)
ALT SERPL-CCNC: 19 U/L (ref 2–50)
ANION GAP SERPL CALC-SCNC: 10 MMOL/L (ref 7–16)
AST SERPL-CCNC: 20 U/L (ref 12–45)
BASOPHILS # BLD AUTO: 0.2 % (ref 0–1.8)
BASOPHILS # BLD: 0.01 K/UL (ref 0–0.12)
BILIRUB SERPL-MCNC: 1 MG/DL (ref 0.1–1.5)
BUN SERPL-MCNC: 12 MG/DL (ref 8–22)
CALCIUM SERPL-MCNC: 8.1 MG/DL (ref 8.5–10.5)
CHLORIDE SERPL-SCNC: 101 MMOL/L (ref 96–112)
CO2 SERPL-SCNC: 27 MMOL/L (ref 20–33)
CREAT SERPL-MCNC: 0.49 MG/DL (ref 0.5–1.4)
EOSINOPHIL # BLD AUTO: 0 K/UL (ref 0–0.51)
EOSINOPHIL NFR BLD: 0 % (ref 0–6.9)
ERYTHROCYTE [DISTWIDTH] IN BLOOD BY AUTOMATED COUNT: 48.4 FL (ref 35.9–50)
GLOBULIN SER CALC-MCNC: 2.9 G/DL (ref 1.9–3.5)
GLUCOSE SERPL-MCNC: 163 MG/DL (ref 65–99)
HCT VFR BLD AUTO: 45.4 % (ref 37–47)
HGB BLD-MCNC: 14.9 G/DL (ref 12–16)
IMM GRANULOCYTES # BLD AUTO: 0.04 K/UL (ref 0–0.11)
IMM GRANULOCYTES NFR BLD AUTO: 0.7 % (ref 0–0.9)
LACTATE BLD-SCNC: 1.2 MMOL/L (ref 0.5–2)
LYMPHOCYTES # BLD AUTO: 0.28 K/UL (ref 1–4.8)
LYMPHOCYTES NFR BLD: 5.1 % (ref 22–41)
MCH RBC QN AUTO: 30.8 PG (ref 27–33)
MCHC RBC AUTO-ENTMCNC: 32.8 G/DL (ref 33.6–35)
MCV RBC AUTO: 93.8 FL (ref 81.4–97.8)
MONOCYTES # BLD AUTO: 0.22 K/UL (ref 0–0.85)
MONOCYTES NFR BLD AUTO: 4 % (ref 0–13.4)
NEUTROPHILS # BLD AUTO: 4.91 K/UL (ref 2–7.15)
NEUTROPHILS NFR BLD: 90 % (ref 44–72)
NRBC # BLD AUTO: 0 K/UL
NRBC BLD-RTO: 0 /100 WBC
PLATELET # BLD AUTO: 128 K/UL (ref 164–446)
PMV BLD AUTO: 9.3 FL (ref 9–12.9)
POTASSIUM SERPL-SCNC: 3.9 MMOL/L (ref 3.6–5.5)
PROT SERPL-MCNC: 6.4 G/DL (ref 6–8.2)
RBC # BLD AUTO: 4.84 M/UL (ref 4.2–5.4)
SODIUM SERPL-SCNC: 138 MMOL/L (ref 135–145)
WBC # BLD AUTO: 5.5 K/UL (ref 4.8–10.8)

## 2021-11-19 PROCEDURE — 700111 HCHG RX REV CODE 636 W/ 250 OVERRIDE (IP): Performed by: HOSPITALIST

## 2021-11-19 PROCEDURE — 770001 HCHG ROOM/CARE - MED/SURG/GYN PRIV*

## 2021-11-19 PROCEDURE — 85025 COMPLETE CBC W/AUTO DIFF WBC: CPT

## 2021-11-19 PROCEDURE — 87205 SMEAR GRAM STAIN: CPT

## 2021-11-19 PROCEDURE — 96367 TX/PROPH/DG ADDL SEQ IV INF: CPT

## 2021-11-19 PROCEDURE — 700102 HCHG RX REV CODE 250 W/ 637 OVERRIDE(OP): Performed by: STUDENT IN AN ORGANIZED HEALTH CARE EDUCATION/TRAINING PROGRAM

## 2021-11-19 PROCEDURE — 700111 HCHG RX REV CODE 636 W/ 250 OVERRIDE (IP): Performed by: STUDENT IN AN ORGANIZED HEALTH CARE EDUCATION/TRAINING PROGRAM

## 2021-11-19 PROCEDURE — 96372 THER/PROPH/DIAG INJ SC/IM: CPT

## 2021-11-19 PROCEDURE — 700117 HCHG RX CONTRAST REV CODE 255: Performed by: STUDENT IN AN ORGANIZED HEALTH CARE EDUCATION/TRAINING PROGRAM

## 2021-11-19 PROCEDURE — 700105 HCHG RX REV CODE 258: Performed by: STUDENT IN AN ORGANIZED HEALTH CARE EDUCATION/TRAINING PROGRAM

## 2021-11-19 PROCEDURE — A9270 NON-COVERED ITEM OR SERVICE: HCPCS | Performed by: STUDENT IN AN ORGANIZED HEALTH CARE EDUCATION/TRAINING PROGRAM

## 2021-11-19 PROCEDURE — 80053 COMPREHEN METABOLIC PANEL: CPT

## 2021-11-19 PROCEDURE — 83605 ASSAY OF LACTIC ACID: CPT

## 2021-11-19 PROCEDURE — 99232 SBSQ HOSP IP/OBS MODERATE 35: CPT | Performed by: HOSPITALIST

## 2021-11-19 PROCEDURE — 96366 THER/PROPH/DIAG IV INF ADDON: CPT

## 2021-11-19 PROCEDURE — 71275 CT ANGIOGRAPHY CHEST: CPT | Mod: ME

## 2021-11-19 PROCEDURE — 87070 CULTURE OTHR SPECIMN AEROBIC: CPT

## 2021-11-19 RX ORDER — MAGNESIUM SULFATE HEPTAHYDRATE 40 MG/ML
2 INJECTION, SOLUTION INTRAVENOUS ONCE
Status: COMPLETED | OUTPATIENT
Start: 2021-11-19 | End: 2021-11-19

## 2021-11-19 RX ORDER — LOPERAMIDE HYDROCHLORIDE 2 MG/1
2 CAPSULE ORAL 4 TIMES DAILY PRN
Status: DISCONTINUED | OUTPATIENT
Start: 2021-11-19 | End: 2021-11-21 | Stop reason: HOSPADM

## 2021-11-19 RX ORDER — PREDNISONE 20 MG/1
40 TABLET ORAL DAILY
Status: COMPLETED | OUTPATIENT
Start: 2021-11-19 | End: 2021-11-21

## 2021-11-19 RX ORDER — POTASSIUM CHLORIDE 20 MEQ/1
40 TABLET, EXTENDED RELEASE ORAL ONCE
Status: COMPLETED | OUTPATIENT
Start: 2021-11-19 | End: 2021-11-19

## 2021-11-19 RX ORDER — PREDNISONE 1 MG/1
1 TABLET ORAL DAILY
Status: ON HOLD | COMMUNITY
Start: 2021-11-11 | End: 2021-11-21

## 2021-11-19 RX ADMIN — HEPARIN SODIUM 5000 UNITS: 5000 INJECTION, SOLUTION INTRAVENOUS; SUBCUTANEOUS at 05:58

## 2021-11-19 RX ADMIN — POTASSIUM CHLORIDE 40 MEQ: 1500 TABLET, EXTENDED RELEASE ORAL at 05:57

## 2021-11-19 RX ADMIN — SODIUM CHLORIDE 3 G: 900 INJECTION INTRAVENOUS at 05:58

## 2021-11-19 RX ADMIN — MAGNESIUM SULFATE HEPTAHYDRATE 2 G: 40 INJECTION, SOLUTION INTRAVENOUS at 05:58

## 2021-11-19 RX ADMIN — SODIUM CHLORIDE 3 G: 900 INJECTION INTRAVENOUS at 20:32

## 2021-11-19 RX ADMIN — SODIUM CHLORIDE 3 G: 900 INJECTION INTRAVENOUS at 12:10

## 2021-11-19 RX ADMIN — ANASTROZOLE 1 MG: 1 TABLET, COATED ORAL at 12:10

## 2021-11-19 RX ADMIN — IOHEXOL 50 ML: 350 INJECTION, SOLUTION INTRAVENOUS at 05:02

## 2021-11-19 RX ADMIN — HEPARIN SODIUM 5000 UNITS: 5000 INJECTION, SOLUTION INTRAVENOUS; SUBCUTANEOUS at 20:32

## 2021-11-19 RX ADMIN — AZITHROMYCIN MONOHYDRATE 500 MG: 250 TABLET ORAL at 05:57

## 2021-11-19 RX ADMIN — HEPARIN SODIUM 5000 UNITS: 5000 INJECTION, SOLUTION INTRAVENOUS; SUBCUTANEOUS at 14:57

## 2021-11-19 RX ADMIN — ACETAMINOPHEN 650 MG: 325 TABLET, FILM COATED ORAL at 15:06

## 2021-11-19 RX ADMIN — PREDNISONE 40 MG: 20 TABLET ORAL at 15:06

## 2021-11-19 ASSESSMENT — ENCOUNTER SYMPTOMS
STRIDOR: 0
ABDOMINAL PAIN: 0
FEVER: 0
WHEEZING: 0
DEPRESSION: 0
DOUBLE VISION: 0
PALPITATIONS: 0
SORE THROAT: 0
EYE DISCHARGE: 0
FOCAL WEAKNESS: 0
WEIGHT LOSS: 0
BLOOD IN STOOL: 0
BACK PAIN: 0
SHORTNESS OF BREATH: 1
BLURRED VISION: 0
VOMITING: 0
INSOMNIA: 0
HEARTBURN: 0
WEAKNESS: 0
NERVOUS/ANXIOUS: 0
SORE THROAT: 1
SPUTUM PRODUCTION: 1
BRUISES/BLEEDS EASILY: 0
SPEECH CHANGE: 0
CHILLS: 1
CONSTIPATION: 0
CHILLS: 0
NAUSEA: 0
FEVER: 1
DIARRHEA: 0
HEADACHES: 0
NECK PAIN: 0
DIZZINESS: 0
COUGH: 1
LOSS OF CONSCIOUSNESS: 0

## 2021-11-19 ASSESSMENT — PAIN DESCRIPTION - PAIN TYPE
TYPE: ACUTE PAIN

## 2021-11-19 ASSESSMENT — COPD QUESTIONNAIRES
HAVE YOU SMOKED AT LEAST 100 CIGARETTES IN YOUR ENTIRE LIFE: NO/DON'T KNOW
DO YOU EVER COUGH UP ANY MUCUS OR PHLEGM?: NO/ONLY WITH OCCASIONAL COLDS OR INFECTIONS
DURING THE PAST 4 WEEKS HOW MUCH DID YOU FEEL SHORT OF BREATH: NONE/LITTLE OF THE TIME
COPD SCREENING SCORE: 1

## 2021-11-19 ASSESSMENT — COGNITIVE AND FUNCTIONAL STATUS - GENERAL
DAILY ACTIVITIY SCORE: 24
SUGGESTED CMS G CODE MODIFIER DAILY ACTIVITY: CH
MOBILITY SCORE: 24
SUGGESTED CMS G CODE MODIFIER MOBILITY: CH

## 2021-11-19 ASSESSMENT — LIFESTYLE VARIABLES
CONSUMPTION TOTAL: INCOMPLETE
ALCOHOL_USE: YES
TOTAL SCORE: 0
DOES PATIENT WANT TO STOP DRINKING: NO
TOTAL SCORE: 0
EVER HAD A DRINK FIRST THING IN THE MORNING TO STEADY YOUR NERVES TO GET RID OF A HANGOVER: NO
HAVE PEOPLE ANNOYED YOU BY CRITICIZING YOUR DRINKING: NO
EVER FELT BAD OR GUILTY ABOUT YOUR DRINKING: NO
HAVE YOU EVER FELT YOU SHOULD CUT DOWN ON YOUR DRINKING: NO
TOTAL SCORE: 0

## 2021-11-19 ASSESSMENT — FIBROSIS 4 INDEX
FIB4 SCORE: 2.04

## 2021-11-19 ASSESSMENT — PATIENT HEALTH QUESTIONNAIRE - PHQ9
2. FEELING DOWN, DEPRESSED, IRRITABLE, OR HOPELESS: NOT AT ALL
SUM OF ALL RESPONSES TO PHQ9 QUESTIONS 1 AND 2: 0
1. LITTLE INTEREST OR PLEASURE IN DOING THINGS: NOT AT ALL
SUM OF ALL RESPONSES TO PHQ9 QUESTIONS 1 AND 2: 0
2. FEELING DOWN, DEPRESSED, IRRITABLE, OR HOPELESS: NOT AT ALL
1. LITTLE INTEREST OR PLEASURE IN DOING THINGS: NOT AT ALL

## 2021-11-19 NOTE — H&P
Hospital Medicine History & Physical Note    Date of Service  11/18/2021    Primary Care Physician  Thuy Eldridge M.D.    Consultants  None    Code Status  Full Code    Chief Complaint  Chief Complaint   Patient presents with   • Cough   • Shortness of Breath   • Fever   • Body Aches   • Headache   • N/V   • Diarrhea       History of Presenting Illness  Addie Orellana is a 56 y.o. female who presented 11/18/2021 with complaints of shortness of breath, cough with sputum production, fevers, chills, nausea, vomiting, diarrhea for the past few days.  Patient states she is vaccinated for COVID-19 and denies anosmia, ageusia.  She has been having green sputum production for the past few days.  She also has been complaining of associated headaches without visual aura and denies history of migraines.  She is compliant with her anastrozole for her breast cancer diagnosis and denies history of DVT/PE.  Otherwise denies: Substernal chest pain, melena, hematochezia, dysuria, hematuria, incoordination, vertigo, syncope, visual changes.    Vital signs at time of presentation were as follows: 100.4, 124, 32, 179/95, 93% 3 L nasal cannula.  Patient noted to desaturate 88% on room air and requires subsequent supplemental oxygen.    Chest x-ray performed and reveals no focal infiltrate and cardiomegaly.    Blood culture obtained x2, lactic acid measured at 1.6.  CMP reveals mild hypokalemia of 3.2 and otherwise relatively unremarkable.  CBC reveals mild thrombocytopenia of 115 and otherwise unremarkable.  Procalcitonin measured at 1.09.  Urinalysis consistent with UTI.  ERP administered IV antibiotics and consulted hospitalist for admission.  I had ordered D-dimer due to concern for possibility of PE due to her febrile illness, tachycardia, shortness of breath with hypoxia and history of breast cancer diagnosis.  D-dimer elevated 1.47 and subsequent CTA chest ordered and pending.  Patient noted to be hypomagnesemic as well at  1.4 and subsequent 2 g magnesium sulfate IV infusion ordered.    Patient agreeable to inpatient hospitalization and full CODE STATUS at time of evaluation.  She is alert and oriented x4.  She agrees to inpatient hospitalization for sepsis possibly from CAP/acute hypoxic respiratory failure and electrolyte derangement.  She will be optimized in ED and subsequently transferred to medical cespedes floor for further optimization of medical management.    I discussed the plan of care with patient.    Review of Systems  Review of Systems   Constitutional: Positive for chills and fever.   HENT: Negative for congestion and sore throat.    Eyes: Negative for blurred vision and double vision.   Respiratory: Positive for cough, sputum production and shortness of breath. Negative for wheezing.    Cardiovascular: Negative for chest pain and palpitations.   Gastrointestinal: Negative for abdominal pain, blood in stool, constipation, diarrhea, heartburn, melena, nausea and vomiting.   Genitourinary: Negative for dysuria and frequency.   Musculoskeletal: Negative for back pain and neck pain.   Skin: Negative for itching and rash.   Neurological: Negative for focal weakness, loss of consciousness, weakness and headaches.   Endo/Heme/Allergies: Negative for environmental allergies. Does not bruise/bleed easily.   Psychiatric/Behavioral: Negative for depression. The patient does not have insomnia.        Past Medical History   has a past medical history of Breast cancer, stage 3 (HCC), Cancer (HCC) (2018), Dental disorder, Hemorrhagic disorder (HCC), and Pneumonia.    Surgical History   has a past surgical history that includes gastric bypass laparoscopic; ankle fusion; other orthopedic surgery (Bilateral); other abdominal surgery; mastectomy (Bilateral, 3/13/2019); node biopsy sentinel (Left, 3/13/2019); axillary node dissection (Left, 3/13/2019); and node biopsy (Left, 3/13/2019).     Family History  family history includes Cancer in  her paternal aunt and paternal grandfather.   Family history reviewed with patient. There is no family history that is pertinent to the chief complaint.     Social History   reports that she has never smoked. She has never used smokeless tobacco. She reports current alcohol use. She reports that she does not use drugs.    Allergies  No Known Allergies    Medications  Prior to Admission Medications   Prescriptions Last Dose Informant Patient Reported? Taking?   CALCIUM PO 2-3 days at [facility] Patient Yes Yes   Sig: Take 1 Tablet by mouth every day.   Omega-3 Fatty Acids (FISH OIL) 1000 MG Cap capsule 5-6 days at [facility] Patient Yes No   Sig: Take 1,000 mg by mouth every evening.   albuterol 108 (90 Base) MCG/ACT Aero Soln inhalation aerosol 11/16/2021 at [facility] Patient No No   Sig: Inhale 2 Puffs every 6 hours as needed for Shortness of Breath.   ibuprofen (MOTRIN) 200 MG Tab 11/17/2021 at pm Patient Yes Yes   Sig: Take 600 mg by mouth 2 times a day as needed.   therapeutic multivitamin-minerals (THERAGRAN-M) Tab 2-3 days at [facility] Patient Yes No   Sig: Take 1 Tab by mouth every evening.      Facility-Administered Medications: None       Physical Exam  Temp:  [36.2 °C (97.1 °F)-38 °C (100.4 °F)] 36.2 °C (97.1 °F)  Pulse:  [] 53  Resp:  [16-32] 18  BP: (130-179)/(65-95) 137/65  SpO2:  [88 %-96 %] 94 %  Blood Pressure: 137/65   Temperature: 36.2 °C (97.1 °F)   Pulse: (!) 53   Respiration: 18   Pulse Oximetry: 94 %       Physical Exam  Constitutional:       Appearance: Normal appearance. She is obese.   HENT:      Head: Normocephalic and atraumatic.      Mouth/Throat:      Mouth: Mucous membranes are moist.      Pharynx: Oropharynx is clear. No posterior oropharyngeal erythema.   Eyes:      General: No scleral icterus.     Extraocular Movements: Extraocular movements intact.      Conjunctiva/sclera: Conjunctivae normal.      Pupils: Pupils are equal, round, and reactive to light.   Neck:      Vascular: No carotid bruit.    Cardiovascular:      Rate and Rhythm: Regular rhythm. Tachycardia present.      Pulses: Normal pulses.      Heart sounds: Normal heart sounds. No murmur heard.  No friction rub. No gallop.    Pulmonary:      Effort: Pulmonary effort is normal.      Breath sounds: Normal breath sounds. No wheezing, rhonchi or rales.   Abdominal:      General: Abdomen is flat. Bowel sounds are normal. There is no distension.      Palpations: There is no mass.      Tenderness: There is no abdominal tenderness. There is no rebound.   Musculoskeletal:         General: No swelling. Normal range of motion.      Cervical back: Normal range of motion.      Right lower leg: No edema.      Left lower leg: No edema.   Lymphadenopathy:      Cervical: No cervical adenopathy.   Skin:     General: Skin is warm and dry.      Capillary Refill: Capillary refill takes less than 2 seconds.      Findings: No erythema or rash.   Neurological:      General: No focal deficit present.      Mental Status: She is alert and oriented to person, place, and time. Mental status is at baseline.      Cranial Nerves: No cranial nerve deficit.      Sensory: No sensory deficit.      Motor: No weakness.   Psychiatric:         Mood and Affect: Mood normal.         Behavior: Behavior normal.         Laboratory:  Recent Labs     11/18/21  1604   WBC 8.4   RBC 4.87   HEMOGLOBIN 15.5   HEMATOCRIT 44.5   MCV 91.4   MCH 31.8   MCHC 34.8   RDW 47.7   PLATELETCT 115*   MPV 9.1     Recent Labs     11/18/21  1604   SODIUM 137   POTASSIUM 3.2*   CHLORIDE 99   CO2 24   GLUCOSE 163*   BUN 12   CREATININE 0.61   CALCIUM 8.4*     Recent Labs     11/18/21  1604   ALTSGPT 22   ASTSGOT 18   ALKPHOSPHAT 73   TBILIRUBIN 1.1   GLUCOSE 163*     Recent Labs     11/18/21  1604   INR 1.00     No results for input(s): NTPROBNP in the last 72 hours.      No results for input(s): TROPONINT in the last 72 hours.      I reviewed and interpreted the above twelve-lead EKG and do appreciate sinus  tachycardia with lateral ischemic changes with ST segment depressions in lateral leads.  Ventricular hypertrophy appreciated and unable to rule out ventricular strain pattern in setting of positive D-dimer.  Imaging:  DX-CHEST-PORTABLE (1 VIEW)   Final Result         1.  Bilateral basilar atelectasis, no focal infiltrate   2.  Cardiomegaly      CT-CTA CHEST PULMONARY ARTERY W/ RECONS    (Results Pending)       I reviewed and interpreted the above chest x-ray and do appreciate cardiomegaly and bibasilar atelectasis as seen above.      Assessment/Plan:  I anticipate this patient will require at least two midnights for appropriate medical management, necessitating inpatient admission.    * Sepsis (HCC)- (present on admission)  Assessment & Plan  This is Sepsis Present on admission  SIRS criteria identified on my evaluation include: Tachycardia, with heart rate greater than 90 BPM and Tachypnea, with respirations greater than 20 per minute  Source is presumed respiratory  Sepsis protocol initiated  Fluid resuscitation ordered per protocol  IV antibiotics as appropriate for source of sepsis  While organ dysfunction may be noted elsewhere in this problem list or in the chart, degree of organ dysfunction does not meet CMS criteria for severe sepsis  Procalcitonin elevated, respiratory culture ordered and pending.  Influenza A/B, RSV and Covid tested for negative.  Urinalysis consistent with UTI however asymptomatic.  Continue IV antibiotics      Acute respiratory failure with hypoxia (HCC)- (present on admission)  Assessment & Plan  D-dimer elevated and subsequent CTA chest ordered in the setting of breast cancer  Maintain SPO2 with supplemental oxygen greater than 88%  Chest x-ray as above  Last echocardiogram within normal limits      Diarrhea- (present on admission)  Assessment & Plan  Stool softeners held  Imodium as needed  Lactated Ringer infusion 75 cc/h with cut off    Nausea & vomiting- (present on  admission)  Assessment & Plan  QTC within normal limits on twelve-lead EKG  Antiemetics ordered      Er+ MA+ carcinoma of breast, 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 daily      VTE prophylaxis: heparin ppx

## 2021-11-19 NOTE — ED PROVIDER NOTES
ED Provider Note    Scribed for Bhavya Holman M.D. by Josue Cortez-Reyes. 11/18/2021, 7:34 PM.    Primary care provider: Thuy Eldridge M.D.  Means of arrival: Walk-in  History obtained from: Patient  History limited by: None    CHIEF COMPLAINT  Chief Complaint   Patient presents with   • Cough   • Shortness of Breath   • Fever   • Body Aches   • Headache   • N/V   • Diarrhea       HPI  Addie Kathie Orellana is a 56 y.o. female who presents to the Emergency Department for evaluation of shortness of breath onset about 3 weeks ago. She declares that she presents to the ED today as her shortness of breath increased in severity this morning when she woke up. She endorses additional cough, fevers, body aches, headaches, nausea, vomiting and diarrhea. She notes that she was seen at urgent care a week ago and tested positive for RSV. She notes that she was given an albuterol inhaler adding that she has no relief of her symptoms when she uses it. The patient has a past history of  breast cancer but denies any other major past medical history.     I reviewed the patient's past medical records which showed that the patient was seen at urgent care on 11/11/21 for evaluation of similar symptoms. The patient tested negative for COVID and influenza at this time but tested positive for RSV. She was treated with albuterol and Decadron and discharged home.     REVIEW OF SYSTEMS  Review of Systems   Constitutional: Positive for fever.   Respiratory: Positive for cough and shortness of breath.    Gastrointestinal: Positive for diarrhea, nausea and vomiting.   Musculoskeletal:        Positive for body aches   Neurological: Positive for headaches.     All other systems reviewed and negative.    PAST MEDICAL HISTORY   has a past medical history of Breast cancer, stage 3 (HCC), Cancer (HCC) (2018), Dental disorder, Hemorrhagic disorder (HCC), and Pneumonia.    SURGICAL HISTORY   has a past surgical history that includes gastric bypass  laparoscopic; ankle fusion; other orthopedic surgery (Bilateral); other abdominal surgery; mastectomy (Bilateral, 3/13/2019); node biopsy sentinel (Left, 3/13/2019); axillary node dissection (Left, 3/13/2019); and node biopsy (Left, 3/13/2019).    SOCIAL HISTORY  Social History     Tobacco Use   • Smoking status: Never Smoker   • Smokeless tobacco: Never Used   Vaping Use   • Vaping Use: Never used   Substance Use Topics   • Alcohol use: Yes     Comment: wine   • Drug use: No      Social History     Substance and Sexual Activity   Drug Use No       FAMILY HISTORY  Family History   Problem Relation Age of Onset   • Cancer Paternal Aunt         lung cancer   • Cancer Paternal Grandfather         lung cancer       CURRENT MEDICATIONS  Current Outpatient Medications   Medication Instructions   • albuterol 108 (90 Base) MCG/ACT Aero Soln inhalation aerosol 2 Puffs, Inhalation, EVERY 6 HOURS PRN   • anastrozole (ARIMIDEX) 1 mg, Oral, DAILY   • Biotin w/ Vitamins C & E (HAIR/SKIN/NAILS PO) 1 Tablet, Oral, EVERY EVENING   • Cholecalciferol (VITAMIN D PO) 1 Capsule, Oral, EVERY EVENING   • fish oil 1,000 mg, Oral, EVERY EVENING   • therapeutic multivitamin-minerals (THERAGRAN-M) Tab 1 Tablet, Oral, EVERY EVENING       ALLERGIES  No Known Allergies    PHYSICAL EXAM  VITAL SIGNS: BP (!) 162/91   Pulse (!) 116   Temp 36.2 °C (97.1 °F) (Temporal)   Resp 18   Wt 109 kg (240 lb)   SpO2 95%   BMI 46.87 kg/m²   Vitals reviewed by myself.  Physical Exam  Nursing note and vitals reviewed.  Constitutional: Well-developed and well-nourished.  Appears short of breath  HENT: Head is normocephalic and atraumatic.  Eyes: extra-ocular movements intact  Cardiovascular: Tachycardic rate and regular rhythm. No murmur heard.  Pulmonary/Chest: Tachypneic with rales in the bilateral lower lobes, right greater than left  Abdominal: Soft and non-tender. No distention.    Musculoskeletal: Extremities exhibit normal range of motion without edema  or tenderness.   Neurological: Awake and alert  Skin: Skin is warm and dry. No rash.       DIAGNOSTIC STUDIES /  LABS  Labs Reviewed   CBC WITH DIFFERENTIAL - Abnormal; Notable for the following components:       Result Value    Platelet Count 115 (*)     Neutrophils-Polys 85.60 (*)     Lymphocytes 5.10 (*)     Neutrophils (Absolute) 7.21 (*)     Lymphs (Absolute) 0.43 (*)     All other components within normal limits   COMP METABOLIC PANEL - Abnormal; Notable for the following components:    Potassium 3.2 (*)     Glucose 163 (*)     Calcium 8.4 (*)     All other components within normal limits   URINALYSIS - Abnormal; Notable for the following components:    Character Cloudy (*)     Ketones 15 (*)     Protein 30 (*)     Nitrite Positive (*)     Leukocyte Esterase Moderate (*)     Occult Blood Moderate (*)     All other components within normal limits    Narrative:     Indication for culture:->Evaluation for sepsis without a  clear source of infection  If not done within the last 24 hours   PROCALCITONIN - Abnormal; Notable for the following components:    Procalcitonin 1.09 (*)     All other components within normal limits   D-DIMER - Abnormal; Notable for the following components:    D-Dimer Screen 1.47 (*)     All other components within normal limits   MAGNESIUM - Abnormal; Notable for the following components:    Magnesium 1.4 (*)     All other components within normal limits    Narrative:     If not done within the last 4 hours  Indicate which anticoagulants the patient is on:->UNKNOWN   URINE MICROSCOPIC (W/UA) - Abnormal; Notable for the following components:    WBC Packed (*)     RBC 20-50 (*)     Bacteria Many (*)     All other components within normal limits    Narrative:     Indication for culture:->Evaluation for sepsis without a  clear source of infection  If not done within the last 24 hours   LACTIC ACID   URINE CULTURE(NEW)    Narrative:     Indication for culture:->Evaluation for sepsis without  "a  clear source of infection  If not done within the last 24 hours   BLOOD CULTURE    Narrative:     Per Hospital Policy: Only change Specimen Src: to \"Line\" if  specified by physician order.   BLOOD CULTURE    Narrative:     Per Hospital Policy: Only change Specimen Src: to \"Line\" if  specified by physician order.   ESTIMATED GFR   COV, FLU A/B, ANTIGENS, KENZIE    Narrative:     Have you been in close contact with a person who is suspected  or known to be positive for COVID-19 within the last 30 days  (e.g. last seen that person < 30 days ago)->No   PROTHROMBIN TIME   LACTIC ACID   LACTIC ACID   LACTIC ACID   CULTURE RESPIRATORY W/ GRM STN   CULTURE RESPIRATORY W/ GRM STN   CBC WITH DIFFERENTIAL   COMP METABOLIC PANEL       All labs reviewed by me.      RADIOLOGY  DX-CHEST-PORTABLE (1 VIEW)   Final Result         1.  Bilateral basilar atelectasis, no focal infiltrate   2.  Cardiomegaly        The radiologist's interpretation of all radiological studies have been reviewed by me.    REASSESSMENT  7:47 PM - Patient seen and examined at bedside. I informed the patient of my plan to obtain lab work and imaging to evaluate her symptoms. Patient verbalizes understanding and agreement to this plan of care. The patient will be treated with IV fluids for her vomiting and tachycardia.    8:55 PM - I reevaluated the patient at bedside. I updated her on her lab results and informed her of my plan to treat her with antibiotics.     10:00 PM - I reevaluated the patient at bedside. I informed her of my plan of having her admitted to the hospital given her dependence on oxygen support. Patient verbalizes understanding and agreement to this plan of care.     10:07 PM - I discussed the patient's case and the above findings with Dr. Nava (Hospitalist) who agrees to evaluate the patient for hospitalization.       HYDRATION: Based on the patient's presentation of Tachycardia the patient was given IV fluids. IV Hydration was used because " oral hydration was not adequate alone. Upon recheck following hydration, the patient was well improved.            COURSE & MEDICAL DECISION MAKING  Nursing notes, VS, PMSFHx reviewed in chart.    Patient is a 56-year-old female who comes in for evaluation of shortness of breath, fevers of myalgias.  Differential diagnosis includes viral syndrome, superimposed bacterial infection, pneumonia, electrolyte disturbance.  Diagnostic work-up includes labs and chest x-ray.    Patient's initial vitals are notable for hypertension to tachycardia.  Patient is treated with IV fluids, Toradol, albuterol, Zofran and dexamethasone after which she feels improved.  Initially patient was requiring oxygen for borderline hypoxemia in the emergency department.  I attempted to wean her however she desatted to 86% and was placed back on oxygen.  Chest x-ray returns demonstrates no definitive lobar pneumonia however procalcitonin is elevated, patient is hypoxic and symptoms are consistent with bacterial pneumonia superimposed on her RSV infection.  Patient is started on antibiotics for pneumonia. As patient is hypoxic she will be hospitalized for ongoing treatment.  Discussed the case with Dr. Nava who has accepted patient for hospitalization.  Patient is in guarded condition.      DISPOSITION:  Patient will be hospitalized by Dr. Nava in guarded condition.    FINAL IMPRESSION  1. Upper respiratory tract infection, unspecified type    2. Pneumonia due to infectious organism, unspecified laterality, unspecified part of lung    3. Hypoxemia    4. Shortness of breath          I, Josue Cortez-Reyes (Guerreroibe), am scribing for, and in the presence of, Bhavya Holman M.D..    Electronically signed by: Josue Cortez-Reyes (Pretty), 11/18/2021    Bhavya SORIA M.D. personally performed the services described in this documentation, as scribed by Josue Cortez-Reyes in my presence, and it is both accurate and complete. C.    The note  accurately reflects work and decisions made by me.  Bhavya Holman M.D.  11/19/2021  1:09 AM

## 2021-11-19 NOTE — PROGRESS NOTES
4 Eyes Skin Assessment Completed by JAX Garcia and JAX Flores.    Head WDL  Ears WDL  Nose WDL  Mouth WDL  Neck WDL  Breast/Chest Scar  Shoulder Blades Redness, bruise left shoulder, small scab.  Spine WDL  (R) Arm/Elbow/Hand Redness and Discoloration  (L) Arm/Elbow/Hand Redness and Scab  Abdomen WDL  Groin WDL  Scrotum/Coccyx/Buttocks WDL  (R) Leg Redness and Edema  (L) Leg Redness and Edema  (R) Heel/Foot/Toe redness  (L) Heel/Foot/Toe Redness          Devices In Places Nasal Cannula      Interventions In Place NC W/Ear Foams    Possible Skin Injury Yes    Pictures Uploaded Into Epic Yes  Wound Consult Placed Yes  RN Wound Prevention Protocol Ordered No      Faded bruise and scab on right shoulder    Large bruise right flank    Redness under bilateral pannus, small open would under left pannus.    Purple dry flaky skin bilateral lower extremities.

## 2021-11-19 NOTE — ASSESSMENT & PLAN NOTE
Down trend with lactic  Normal WBC  Has a UTI and on Unasyn. Monitor cultures  Recent positive for RSV which may cause SIRs    Resolved.

## 2021-11-19 NOTE — ED NOTES
Med rec completed per [facility].   Patient filled a 5 day course of prednisone on 11/10/2021

## 2021-11-19 NOTE — ED NOTES
Med Rec partially completed per patient   Allergies reviewed  No ORAL antibiotics in last 30 days    Patient reports that she does not know what medications she is taking and referred me to her home pharmacy [facility] on [facility].  Phone:[phone]  Hours:M-F [phone] Sat:[id number] Sun:[phone]

## 2021-11-19 NOTE — ASSESSMENT & PLAN NOTE
D-dimer elevated and subsequent CTA chest negative for DVT  RSV positive on 11/11. Supportive care  RT protocol  Albuterol w/ spacer prn  Supplemental oxygen if need  Antitussives  Influenza and COVID negative  Pickwickian body habitus

## 2021-11-19 NOTE — PROGRESS NOTES
Patient resting comfortably, o2 sats and respiratory effort within normal limits, will continue to monitor patient status

## 2021-11-19 NOTE — ED NOTES
Report to Jose CAMARA. Pt transported to [place] [id number] via gurney by  tech. Pt alert and oriented at this time. Pt belongings and paper work sent with patient.

## 2021-11-19 NOTE — PROGRESS NOTES
Hospital Medicine Daily Progress Note    Date of Service  11/19/2021    Chief Complaint      Hospital Course  Addie Orellana is a 56 y.o. female with a history of breast cancer (six years out and on anastrozole), obesity, headaches, vaccinated for COVID-19 admitted 11/18/2021 with cough, sputum production and shortness of breath.  She was recently diagnosed outpatient with RSV on 11/11 and negative for influenza infection.  States her niece has an upper respiratory infection and shared with her.  The patient was negative for PE on CTA chest.  The patient was also negative for SARS-CoV-2.  The patient had mild elevated procalcitonin 1.09.  A UA showed possible infection.  She was started on unasyn and azithromycin.      Interval Problem Update  11/19: Cough.  On room air sating 95%.  Back pain.  Feels weak and still mildly short of breath.  Educated on use of albuterol inhaler with a spacer. No recent travel.  No personal hx of blood clots.    I have personally seen and examined the patient at bedside. I discussed the plan of care with patient and bedside RN.    Consultants/Specialty  None    Code Status  Full Code    Disposition  Patient is not medically cleared.   Anticipate discharge to to home with close outpatient follow-up.  I have placed the appropriate orders for post-discharge needs.    Review of Systems  Review of Systems   Constitutional: Negative for chills, fever and weight loss.   HENT: Positive for sore throat.    Eyes: Negative for discharge.   Respiratory: Positive for cough, sputum production and shortness of breath. Negative for stridor.    Cardiovascular: Negative for chest pain, palpitations and leg swelling.   Gastrointestinal: Negative for abdominal pain, diarrhea, nausea and vomiting.   Genitourinary: Negative for dysuria and hematuria.   Musculoskeletal: Negative for back pain and joint pain.   Skin: Negative for rash.        Bruising to right lower flank   Neurological: Negative for  dizziness, speech change and headaches.   Psychiatric/Behavioral: The patient is not nervous/anxious.         Physical Exam  Temp:  [36.2 °C (97.1 °F)-38 °C (100.4 °F)] 36.2 °C (97.1 °F)  Pulse:  [] 61  Resp:  [16-32] 24  BP: (123-179)/() 167/105  SpO2:  [88 %-96 %] 96 %    Physical Exam  Vitals reviewed.   Constitutional:       Appearance: Normal appearance. She is obese. She is not diaphoretic.   HENT:      Head: Normocephalic and atraumatic.      Nose: Nose normal.      Mouth/Throat:      Mouth: Mucous membranes are moist.      Pharynx: No oropharyngeal exudate.   Eyes:      General: No scleral icterus.        Right eye: No discharge.         Left eye: No discharge.      Extraocular Movements: Extraocular movements intact.      Conjunctiva/sclera: Conjunctivae normal.   Cardiovascular:      Rate and Rhythm: Normal rate and regular rhythm.      Pulses:           Radial pulses are 2+ on the right side and 2+ on the left side.        Dorsalis pedis pulses are 2+ on the right side and 2+ on the left side.      Heart sounds: No murmur heard.      Pulmonary:      Effort: Pulmonary effort is normal. No respiratory distress.      Breath sounds: Decreased breath sounds and rales present. No wheezing.   Abdominal:      General: Bowel sounds are normal. There is no distension.      Palpations: Abdomen is soft. There is no mass.      Tenderness: There is no abdominal tenderness.   Musculoskeletal:         General: No swelling or tenderness.      Cervical back: No tenderness. No muscular tenderness.      Right lower leg: Edema (trace) present.      Left lower leg: Edema present.   Lymphadenopathy:      Cervical: No cervical adenopathy.   Skin:     Coloration: Skin is not jaundiced or pale.   Neurological:      General: No focal deficit present.      Mental Status: She is alert and oriented to person, place, and time. Mental status is at baseline.      Cranial Nerves: No cranial nerve deficit.   Psychiatric:          Mood and Affect: Mood normal.         Behavior: Behavior normal.         Fluids    Intake/Output Summary (Last 24 hours) at 11/19/2021 1345  Last data filed at 11/19/2021 0700  Gross per 24 hour   Intake 332.5 ml   Output 350 ml   Net -17.5 ml       Laboratory  Recent Labs     11/18/21  1604 11/19/21  0415   WBC 8.4 5.5   RBC 4.87 4.84   HEMOGLOBIN 15.5 14.9   HEMATOCRIT 44.5 45.4   MCV 91.4 93.8   MCH 31.8 30.8   MCHC 34.8 32.8*   RDW 47.7 48.4   PLATELETCT 115* 128*   MPV 9.1 9.3     Recent Labs     11/18/21  1604 11/19/21  0415   SODIUM 137 138   POTASSIUM 3.2* 3.9   CHLORIDE 99 101   CO2 24 27   GLUCOSE 163* 163*   BUN 12 12   CREATININE 0.61 0.49*   CALCIUM 8.4* 8.1*     Recent Labs     11/18/21  1604   INR 1.00               Imaging  CT-CTA CHEST PULMONARY ARTERY W/ RECONS   Final Result         1.  No pulmonary embolus appreciated.   2.  Linear density in the left chest wall, could represent scarring, otherwise indeterminate for recurrence at surgical site.   3.  Hepatomegaly and diffuse hepatic steatosis   4.  Hiatal hernia      DX-CHEST-PORTABLE (1 VIEW)   Final Result         1.  Bilateral basilar atelectasis, no focal infiltrate   2.  Cardiomegaly      US-EXTREMITY VENOUS LOWER BILAT    (Results Pending)        Assessment/Plan  * Sepsis (HCC)- (present on admission)  Assessment & Plan  Down trend with lactic  Normal WBC  Has a UTI and on Unasyn.  Monitor cultures  Recent positive for RSV which may cause SIRs  Unasyn and azithromycin for now.      Thrombocytopenia (HCC)  Assessment & Plan  11/19: Plt:128  Monitor cbc  Obese and likely hepatic steatosis (as noted on CT).    Diarrhea- (present on admission)  Assessment & Plan  Stool softeners held  Imodium as needed  Lactated Ringer infusion 75 cc/h.  Will DC.    Nausea & vomiting- (present on admission)  Assessment & Plan  QTC within normal limits on twelve-lead EKG  Antiemetics ordered      Acute respiratory failure with hypoxia (HCC)- (present on  admission)  Assessment & Plan  D-dimer elevated and subsequent CTA chest negative for DVT  RSV positive on 11/11. Supportive care  RT protocol  Albuterol w/ spacer prn  Supplemental oxygen if need  Antitussives  Influenza and COVID negative  Pickwickian body habitus    Er+ ID+ carcinoma of breast, 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 daily  Follow up outpatient.       VTE prophylaxis: heparin ppx    I have performed a physical exam and reviewed and updated ROS and Plan today (11/19/2021). In review of yesterday's note (11/18/2021), there are no changes except as documented above.

## 2021-11-19 NOTE — PROGRESS NOTES
Assumed care of patient at 1430 from Kaia RN. Patient is A&O x4, states pain level is 3/10. Bed locked in lowest position with 2 rail up. Call light  in place, belongings at bedside. Patient states she has a headache at this time and hourly rounding is in place. 2 RN skin check complete. Patient is on 1 liter oxygen.

## 2021-11-20 ENCOUNTER — APPOINTMENT (OUTPATIENT)
Dept: RADIOLOGY | Facility: MEDICAL CENTER | Age: 56
DRG: 871 | End: 2021-11-20
Attending: HOSPITALIST
Payer: MEDICARE

## 2021-11-20 PROBLEM — J21.0 RSV (ACUTE BRONCHIOLITIS DUE TO RESPIRATORY SYNCYTIAL VIRUS): Status: ACTIVE | Noted: 2021-11-20

## 2021-11-20 PROBLEM — N30.00 ACUTE CYSTITIS WITHOUT HEMATURIA: Status: ACTIVE | Noted: 2021-11-20

## 2021-11-20 LAB
GRAM STN SPEC: NORMAL
SIGNIFICANT IND 70042: NORMAL
SITE SITE: NORMAL
SOURCE SOURCE: NORMAL

## 2021-11-20 PROCEDURE — 93970 EXTREMITY STUDY: CPT

## 2021-11-20 PROCEDURE — 770001 HCHG ROOM/CARE - MED/SURG/GYN PRIV*

## 2021-11-20 PROCEDURE — 700111 HCHG RX REV CODE 636 W/ 250 OVERRIDE (IP): Performed by: STUDENT IN AN ORGANIZED HEALTH CARE EDUCATION/TRAINING PROGRAM

## 2021-11-20 PROCEDURE — A9270 NON-COVERED ITEM OR SERVICE: HCPCS | Performed by: HOSPITALIST

## 2021-11-20 PROCEDURE — 700111 HCHG RX REV CODE 636 W/ 250 OVERRIDE (IP): Performed by: HOSPITALIST

## 2021-11-20 PROCEDURE — 700102 HCHG RX REV CODE 250 W/ 637 OVERRIDE(OP): Performed by: STUDENT IN AN ORGANIZED HEALTH CARE EDUCATION/TRAINING PROGRAM

## 2021-11-20 PROCEDURE — 99232 SBSQ HOSP IP/OBS MODERATE 35: CPT | Performed by: STUDENT IN AN ORGANIZED HEALTH CARE EDUCATION/TRAINING PROGRAM

## 2021-11-20 PROCEDURE — 700105 HCHG RX REV CODE 258: Performed by: STUDENT IN AN ORGANIZED HEALTH CARE EDUCATION/TRAINING PROGRAM

## 2021-11-20 PROCEDURE — 700102 HCHG RX REV CODE 250 W/ 637 OVERRIDE(OP): Performed by: HOSPITALIST

## 2021-11-20 PROCEDURE — A9270 NON-COVERED ITEM OR SERVICE: HCPCS | Performed by: STUDENT IN AN ORGANIZED HEALTH CARE EDUCATION/TRAINING PROGRAM

## 2021-11-20 RX ORDER — BENZONATATE 100 MG/1
100 CAPSULE ORAL 3 TIMES DAILY
Status: DISCONTINUED | OUTPATIENT
Start: 2021-11-20 | End: 2021-11-21 | Stop reason: HOSPADM

## 2021-11-20 RX ORDER — POLYETHYLENE GLYCOL 3350 17 G/17G
1 POWDER, FOR SOLUTION ORAL
Status: DISCONTINUED | OUTPATIENT
Start: 2021-11-20 | End: 2021-11-21 | Stop reason: HOSPADM

## 2021-11-20 RX ORDER — AMOXICILLIN 250 MG
2 CAPSULE ORAL 2 TIMES DAILY
Status: DISCONTINUED | OUTPATIENT
Start: 2021-11-20 | End: 2021-11-21 | Stop reason: HOSPADM

## 2021-11-20 RX ORDER — BISACODYL 10 MG
10 SUPPOSITORY, RECTAL RECTAL
Status: DISCONTINUED | OUTPATIENT
Start: 2021-11-20 | End: 2021-11-21 | Stop reason: HOSPADM

## 2021-11-20 RX ADMIN — DOCUSATE SODIUM 50 MG AND SENNOSIDES 8.6 MG 2 TABLET: 8.6; 5 TABLET, FILM COATED ORAL at 17:04

## 2021-11-20 RX ADMIN — BENZONATATE 100 MG: 100 CAPSULE ORAL at 12:09

## 2021-11-20 RX ADMIN — BENZONATATE 100 MG: 100 CAPSULE ORAL at 17:04

## 2021-11-20 RX ADMIN — SODIUM CHLORIDE 3 G: 900 INJECTION INTRAVENOUS at 01:50

## 2021-11-20 RX ADMIN — SODIUM CHLORIDE 3 G: 900 INJECTION INTRAVENOUS at 14:27

## 2021-11-20 RX ADMIN — HEPARIN SODIUM 5000 UNITS: 5000 INJECTION, SOLUTION INTRAVENOUS; SUBCUTANEOUS at 14:26

## 2021-11-20 RX ADMIN — PREDNISONE 40 MG: 20 TABLET ORAL at 04:48

## 2021-11-20 RX ADMIN — AZITHROMYCIN MONOHYDRATE 500 MG: 250 TABLET ORAL at 04:48

## 2021-11-20 RX ADMIN — ACETAMINOPHEN 650 MG: 325 TABLET, FILM COATED ORAL at 09:40

## 2021-11-20 RX ADMIN — ANASTROZOLE 1 MG: 1 TABLET, COATED ORAL at 05:19

## 2021-11-20 RX ADMIN — GUAIFENESIN 200 MG: 100 SOLUTION ORAL at 21:13

## 2021-11-20 RX ADMIN — SODIUM CHLORIDE 3 G: 900 INJECTION INTRAVENOUS at 08:31

## 2021-11-20 RX ADMIN — HEPARIN SODIUM 5000 UNITS: 5000 INJECTION, SOLUTION INTRAVENOUS; SUBCUTANEOUS at 21:14

## 2021-11-20 RX ADMIN — HEPARIN SODIUM 5000 UNITS: 5000 INJECTION, SOLUTION INTRAVENOUS; SUBCUTANEOUS at 04:48

## 2021-11-20 RX ADMIN — SODIUM CHLORIDE 3 G: 900 INJECTION INTRAVENOUS at 21:08

## 2021-11-20 ASSESSMENT — ENCOUNTER SYMPTOMS
COUGH: 1
NAUSEA: 0
SPUTUM PRODUCTION: 1
STRIDOR: 0
VOMITING: 0
SHORTNESS OF BREATH: 1
NERVOUS/ANXIOUS: 0
WEIGHT LOSS: 0
SORE THROAT: 1
PALPITATIONS: 0
ABDOMINAL PAIN: 0
FEVER: 0
SPEECH CHANGE: 0
DIZZINESS: 0
EYE DISCHARGE: 0
HEADACHES: 0
CHILLS: 0
DIARRHEA: 0
BACK PAIN: 0

## 2021-11-20 ASSESSMENT — PAIN DESCRIPTION - PAIN TYPE
TYPE: ACUTE PAIN
TYPE: ACUTE PAIN

## 2021-11-20 ASSESSMENT — FIBROSIS 4 INDEX: FIB4 SCORE: 2.04

## 2021-11-20 NOTE — CARE PLAN
The patient is Stable - Low risk of patient condition declining or worsening    Shift Goals  Clinical Goals: Pain control  Patient Goals: Discharge  Family Goals: N/A    Progress made toward(s) clinical / shift goals:  Progressing    Problem: Knowledge Deficit - Standard  Goal: Patient and family/care givers will demonstrate understanding of plan of care, disease process/condition, diagnostic tests and medications  Outcome: Progressing   Pt is progressing towards goal of understanding care plan and disease process. Pt educated with current diagnostic tests and medications and pt able to verbalize knowledge back to RN.   Problem: Pain - Standard  Goal: Alleviation of pain or a reduction in pain to the patient’s comfort goal  Outcome: Progressing  Pt is progressing towards goal of pain control. Pt educated on importance of pain control and notifying RN of needs.

## 2021-11-20 NOTE — CARE PLAN
The patient is Stable - Low risk of patient condition declining or worsening    Shift Goals  Clinical Goals: Abx treatment   Patient Goals: sleep  Family Goals: NA    Progress made toward(s) clinical / shift goals:  Pt is still continuing her abx treatment. Pt declines pain is sleeping.       Problem: Knowledge Deficit - Standard  Goal: Patient and family/care givers will demonstrate understanding of plan of care, disease process/condition, diagnostic tests and medications  Outcome: Progressing     Problem: Pain - Standard  Goal: Alleviation of pain or a reduction in pain to the patient’s comfort goal  Outcome: Progressing

## 2021-11-20 NOTE — PROGRESS NOTES
Hospital Medicine Daily Progress Note    Date of Service  11/20/2021    Chief Complaint      Hospital Course  Addie Orellana is a 56 y.o. female with a history of breast cancer (six years out and on anastrozole), obesity, headaches, vaccinated for COVID-19 admitted 11/18/2021 with cough, sputum production and shortness of breath.  She was recently diagnosed outpatient with RSV on 11/11 and negative for influenza infection.  States her niece has an upper respiratory infection and shared with her.  The patient was negative for PE on CTA chest.  The patient was also negative for SARS-CoV-2.  The patient had mild elevated procalcitonin 1.09.  A UA showed possible infection.  She was started on unasyn and azithromycin.      Interval Problem Update  Patient seen and examined at bedside this morning.  No acute events overnight.     Has been fever and chills free the last 24 hours. Cultures remain negative.   Continues to complain of persistent cough, will start on scheduled antitussive.  Bilateral lower extremity venous Dopplers ordered for leg swelling.  If culture remains negative, with improvement in her symptoms, will plan for discharge home tomorrow morning.    I have personally seen and examined the patient at bedside. I discussed the plan of care with patient and bedside RN.    Consultants/Specialty  None    Code Status  Full Code    Disposition  Patient is not medically cleared.   Anticipate discharge to to home with close outpatient follow-up.  I have placed the appropriate orders for post-discharge needs.    Review of Systems  Review of Systems   Constitutional: Negative for chills, fever and weight loss.   HENT: Positive for sore throat.    Eyes: Negative for discharge.   Respiratory: Positive for cough, sputum production and shortness of breath. Negative for stridor.    Cardiovascular: Negative for chest pain, palpitations and leg swelling.   Gastrointestinal: Negative for abdominal pain, diarrhea, nausea  and vomiting.   Genitourinary: Negative for dysuria and hematuria.   Musculoskeletal: Negative for back pain and joint pain.   Skin: Negative for rash.        Bruising to right lower flank   Neurological: Negative for dizziness, speech change and headaches.   Psychiatric/Behavioral: The patient is not nervous/anxious.         Physical Exam  Temp:  [36.1 °C (97 °F)-36.7 °C (98.1 °F)] 36.2 °C (97.1 °F)  Pulse:  [81-84] 81  Resp:  [16-18] 16  BP: (132-147)/(90-97) 132/90  SpO2:  [94 %-97 %] 94 %    Physical Exam  Vitals reviewed.   Constitutional:       Appearance: Normal appearance. She is obese. She is not diaphoretic.   HENT:      Head: Normocephalic and atraumatic.      Nose: Nose normal.      Mouth/Throat:      Mouth: Mucous membranes are moist.      Pharynx: No oropharyngeal exudate.   Eyes:      General: No scleral icterus.        Right eye: No discharge.         Left eye: No discharge.      Extraocular Movements: Extraocular movements intact.      Conjunctiva/sclera: Conjunctivae normal.   Cardiovascular:      Rate and Rhythm: Normal rate and regular rhythm.      Pulses:           Radial pulses are 2+ on the right side and 2+ on the left side.        Dorsalis pedis pulses are 2+ on the right side and 2+ on the left side.      Heart sounds: No murmur heard.      Pulmonary:      Effort: Pulmonary effort is normal. No respiratory distress.      Breath sounds: Decreased breath sounds and rales present. No wheezing.   Abdominal:      General: Bowel sounds are normal. There is no distension.      Palpations: Abdomen is soft. There is no mass.      Tenderness: There is no abdominal tenderness.   Musculoskeletal:         General: No swelling or tenderness.      Cervical back: No tenderness. No muscular tenderness.      Right lower leg: Edema (trace) present.      Left lower leg: Edema present.   Lymphadenopathy:      Cervical: No cervical adenopathy.   Skin:     Coloration: Skin is not jaundiced or pale.    Neurological:      General: No focal deficit present.      Mental Status: She is alert and oriented to person, place, and time. Mental status is at baseline.      Cranial Nerves: No cranial nerve deficit.   Psychiatric:         Mood and Affect: Mood normal.         Behavior: Behavior normal.         Fluids  No intake or output data in the 24 hours ending 11/20/21 1107    Laboratory  Recent Labs     11/18/21  1604 11/19/21  0415   WBC 8.4 5.5   RBC 4.87 4.84   HEMOGLOBIN 15.5 14.9   HEMATOCRIT 44.5 45.4   MCV 91.4 93.8   MCH 31.8 30.8   MCHC 34.8 32.8*   RDW 47.7 48.4   PLATELETCT 115* 128*   MPV 9.1 9.3     Recent Labs     11/18/21  1604 11/19/21  0415   SODIUM 137 138   POTASSIUM 3.2* 3.9   CHLORIDE 99 101   CO2 24 27   GLUCOSE 163* 163*   BUN 12 12   CREATININE 0.61 0.49*   CALCIUM 8.4* 8.1*     Recent Labs     11/18/21  1604   INR 1.00               Imaging  CT-CTA CHEST PULMONARY ARTERY W/ RECONS   Final Result         1.  No pulmonary embolus appreciated.   2.  Linear density in the left chest wall, could represent scarring, otherwise indeterminate for recurrence at surgical site.   3.  Hepatomegaly and diffuse hepatic steatosis   4.  Hiatal hernia      DX-CHEST-PORTABLE (1 VIEW)   Final Result         1.  Bilateral basilar atelectasis, no focal infiltrate   2.  Cardiomegaly      US-EXTREMITY VENOUS LOWER BILAT    (Results Pending)        Assessment/Plan  * RSV (acute bronchiolitis due to respiratory syncytial virus)  Assessment & Plan  Recent diagnosed outpatient.  Patient presented with sepsis and respiratory-like symptoms.  Procalcitonin elevated.  Continue supportive treatment.        Acute cystitis without hematuria  Assessment & Plan  Follow-up on cultures.  Continue antibiotics.    Thrombocytopenia (HCC)  Assessment & Plan  11/19: Plt:128  Monitor cbc  Obese and likely hepatic steatosis (as noted on CT).    Diarrhea- (present on admission)  Assessment & Plan  Stool softeners held  Imodium as  needed  Lactated Ringer infusion 75 cc/h.  Will DC.    Resolved.    Nausea & vomiting- (present on admission)  Assessment & Plan  QTC within normal limits on twelve-lead EKG  Antiemetics ordered    Resolved.    Sepsis (HCC)- (present on admission)  Assessment & Plan  Down trend with lactic  Normal WBC  Has a UTI and on Unasyn. Monitor cultures  Recent positive for RSV which may cause SIRs    Resolved.      Acute respiratory failure with hypoxia (HCC)- (present on admission)  Assessment & Plan  D-dimer elevated and subsequent CTA chest negative for DVT  RSV positive on 11/11. 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 daily  Follow up outpatient.       VTE prophylaxis: heparin ppx    I have performed a physical exam and reviewed and updated ROS and Plan today (11/20/2021). In review of yesterday's note (11/19/2021), there are no changes except as documented above.    Patient is has a high medical complexity and is at high risk for complication, morbidity, and mortality.    Case discussed with patient, nurse staff and during multidisciplinary rounds.

## 2021-11-20 NOTE — ASSESSMENT & PLAN NOTE
Recent diagnosed outpatient.  Patient presented with sepsis and respiratory-like symptoms.  Procalcitonin elevated.  Continue supportive treatment.

## 2021-11-20 NOTE — PROGRESS NOTES
Report received from previous shift. Assumed care of patient at 2050, patient is A&O x 4; pt knows self, location, time, and situation. Patient is a no Fall risk, bed locked and in lowest position, bed alarm on. Patient declines pain at the moment. Plan of care reviewed with patient, will implement and continue to monitor. Hourly rounding is in place and call light/belongings within reach.

## 2021-11-20 NOTE — CARE PLAN
Problem: Knowledge Deficit - Standard  Goal: Patient and family/care givers will demonstrate understanding of plan of care, disease process/condition, diagnostic tests and medications  Outcome: Progressing     Problem: Physical Regulation  Goal: Signs and symptoms of infection will decrease  Outcome: Progressing   The patient is Stable - Low risk of patient condition declining or worsening    Shift Goals  Clinical Goals: continue antibiotics   Patient Goals: rest    Progress made toward(s) clinical / shift goals:  Patient shows understanding of plan of care. Patient does not show worsening signs of infection.     Patient is not progressing towards the following goals:

## 2021-11-20 NOTE — PROGRESS NOTES
Assume care of pt at 0700. Report received from NOC RN. Pt is A/O x4. Pain is 3/10 and is generalized. Pt is resting in bed. Bed in lowest and locked position, call light within reach, hourly rounding in place. Labs reviewed. Communication board updated. Will continue to implement care. Crisis charting in place due to COVID-19 surge.

## 2021-11-21 VITALS
OXYGEN SATURATION: 96 % | DIASTOLIC BLOOD PRESSURE: 54 MMHG | TEMPERATURE: 97 F | HEIGHT: 60 IN | HEART RATE: 92 BPM | RESPIRATION RATE: 18 BRPM | WEIGHT: 233.25 LBS | SYSTOLIC BLOOD PRESSURE: 126 MMHG | BODY MASS INDEX: 45.79 KG/M2

## 2021-11-21 LAB
ANION GAP SERPL CALC-SCNC: 6 MMOL/L (ref 7–16)
BACTERIA SPEC RESP CULT: NORMAL
BACTERIA UR CULT: ABNORMAL
BACTERIA UR CULT: ABNORMAL
BUN SERPL-MCNC: 13 MG/DL (ref 8–22)
CALCIUM SERPL-MCNC: 8.2 MG/DL (ref 8.5–10.5)
CHLORIDE SERPL-SCNC: 107 MMOL/L (ref 96–112)
CO2 SERPL-SCNC: 29 MMOL/L (ref 20–33)
CREAT SERPL-MCNC: 0.61 MG/DL (ref 0.5–1.4)
ERYTHROCYTE [DISTWIDTH] IN BLOOD BY AUTOMATED COUNT: 50.5 FL (ref 35.9–50)
GLUCOSE SERPL-MCNC: 99 MG/DL (ref 65–99)
GRAM STN SPEC: NORMAL
HCT VFR BLD AUTO: 42.7 % (ref 37–47)
HGB BLD-MCNC: 13.6 G/DL (ref 12–16)
MAGNESIUM SERPL-MCNC: 2 MG/DL (ref 1.5–2.5)
MCH RBC QN AUTO: 31.1 PG (ref 27–33)
MCHC RBC AUTO-ENTMCNC: 31.9 G/DL (ref 33.6–35)
MCV RBC AUTO: 97.5 FL (ref 81.4–97.8)
PLATELET # BLD AUTO: 113 K/UL (ref 164–446)
PMV BLD AUTO: 10 FL (ref 9–12.9)
POTASSIUM SERPL-SCNC: 3.9 MMOL/L (ref 3.6–5.5)
RBC # BLD AUTO: 4.38 M/UL (ref 4.2–5.4)
SIGNIFICANT IND 70042: ABNORMAL
SIGNIFICANT IND 70042: NORMAL
SITE SITE: ABNORMAL
SITE SITE: NORMAL
SODIUM SERPL-SCNC: 142 MMOL/L (ref 135–145)
SOURCE SOURCE: ABNORMAL
SOURCE SOURCE: NORMAL
WBC # BLD AUTO: 4.7 K/UL (ref 4.8–10.8)

## 2021-11-21 PROCEDURE — 700105 HCHG RX REV CODE 258: Performed by: STUDENT IN AN ORGANIZED HEALTH CARE EDUCATION/TRAINING PROGRAM

## 2021-11-21 PROCEDURE — 700111 HCHG RX REV CODE 636 W/ 250 OVERRIDE (IP): Performed by: HOSPITALIST

## 2021-11-21 PROCEDURE — 85027 COMPLETE CBC AUTOMATED: CPT

## 2021-11-21 PROCEDURE — 36415 COLL VENOUS BLD VENIPUNCTURE: CPT

## 2021-11-21 PROCEDURE — 700111 HCHG RX REV CODE 636 W/ 250 OVERRIDE (IP): Performed by: STUDENT IN AN ORGANIZED HEALTH CARE EDUCATION/TRAINING PROGRAM

## 2021-11-21 PROCEDURE — 80048 BASIC METABOLIC PNL TOTAL CA: CPT

## 2021-11-21 PROCEDURE — 700102 HCHG RX REV CODE 250 W/ 637 OVERRIDE(OP): Performed by: STUDENT IN AN ORGANIZED HEALTH CARE EDUCATION/TRAINING PROGRAM

## 2021-11-21 PROCEDURE — 83735 ASSAY OF MAGNESIUM: CPT

## 2021-11-21 PROCEDURE — 99239 HOSP IP/OBS DSCHRG MGMT >30: CPT | Performed by: STUDENT IN AN ORGANIZED HEALTH CARE EDUCATION/TRAINING PROGRAM

## 2021-11-21 PROCEDURE — A9270 NON-COVERED ITEM OR SERVICE: HCPCS | Performed by: STUDENT IN AN ORGANIZED HEALTH CARE EDUCATION/TRAINING PROGRAM

## 2021-11-21 RX ORDER — BENZONATATE 100 MG/1
100 CAPSULE ORAL 3 TIMES DAILY
Qty: 60 CAPSULE | Refills: 0 | Status: SHIPPED | OUTPATIENT
Start: 2021-11-21 | End: 2022-01-27

## 2021-11-21 RX ORDER — ANASTROZOLE 1 MG/1
1 TABLET ORAL DAILY
Qty: 30 TABLET | Refills: 3
Start: 2021-11-21 | End: 2023-04-07

## 2021-11-21 RX ADMIN — ANASTROZOLE 1 MG: 1 TABLET, COATED ORAL at 05:03

## 2021-11-21 RX ADMIN — PREDNISONE 40 MG: 20 TABLET ORAL at 05:03

## 2021-11-21 RX ADMIN — BENZONATATE 100 MG: 100 CAPSULE ORAL at 05:03

## 2021-11-21 RX ADMIN — HEPARIN SODIUM 5000 UNITS: 5000 INJECTION, SOLUTION INTRAVENOUS; SUBCUTANEOUS at 05:04

## 2021-11-21 RX ADMIN — SODIUM CHLORIDE 3 G: 900 INJECTION INTRAVENOUS at 09:14

## 2021-11-21 RX ADMIN — SODIUM CHLORIDE 3 G: 900 INJECTION INTRAVENOUS at 01:50

## 2021-11-21 ASSESSMENT — PAIN DESCRIPTION - PAIN TYPE: TYPE: ACUTE PAIN

## 2021-11-21 NOTE — DISCHARGE SUMMARY
Discharge Summary    CHIEF COMPLAINT ON ADMISSION  Chief Complaint   Patient presents with   • Cough   • Shortness of Breath   • Fever   • Body Aches   • Headache   • N/V   • Diarrhea       Reason for Admission  Generalized weakness, dizziness     Admission Date  11/18/2021    CODE STATUS  Full Code    HPI & HOSPITAL COURSE  Addie Orellana is a 56 y.o. female with a history of breast cancer (six years out and on anastrozole), obesity, headaches, vaccinated for COVID-19 admitted 11/18/2021 with cough, sputum production and shortness of breath.  She was recently diagnosed outpatient with RSV on 11/11 and negative for influenza infection. States her niece has an upper respiratory infection and shared with her. The patient was negative for PE on CTA chest.  The patient was also negative for SARS-CoV-2.  The patient had mild elevated procalcitonin 1.09.  A UA showed possible infection.  She was started on unasyn for UTI and also treated for RSV pneumonia with supportive care. Urine culture came back positive for E. coli. She briefly required oxygen supplementation in the hospital but was weaned down to room air prior to discharge.    Therefore, she is discharged in fair and stable condition to home with close outpatient follow-up.    The patient met 2-midnight criteria for an inpatient stay at the time of discharge.    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 *      FOLLOW UP  No future appointments.  No follow-up provider  specified.    MEDICATIONS ON DISCHARGE     Medication List      START taking these medications      Instructions   benzonatate 100 MG Caps  Commonly known as: TESSALON   Take 1 Capsule by mouth 3 times a day.  Dose: 100 mg        CONTINUE taking these medications      Instructions   albuterol 108 (90 Base) MCG/ACT Aers inhalation aerosol   Inhale 2 Puffs every 6 hours as needed for Shortness of Breath.  Dose: 2 Puff     anastrozole 1 MG Tabs  Commonly known as: Arimidex   Take 1 Tablet by mouth every day.  Dose: 1 mg     CALCIUM PO   Take 1 Tablet by mouth every day.  Dose: 1 Tablet     fish oil 1000 MG Caps capsule   Take 1,000 mg by mouth every evening.  Dose: 1,000 mg     ibuprofen 200 MG Tabs  Commonly known as: MOTRIN   Take 600 mg by mouth 2 times a day as needed.  Dose: 600 mg     therapeutic multivitamin-minerals Tabs   Take 1 Tab by mouth every evening.  Dose: 1 Tablet        STOP taking these medications    predniSONE 1 MG Tabs  Commonly known as: DELTASONE            Allergies  No Known Allergies    DIET  Orders Placed This Encounter   Procedures   • Diet Order Diet: Regular     Standing Status:   Standing     Number of Occurrences:   1     Order Specific Question:   Diet:     Answer:   Regular [1]       ACTIVITY  As tolerated.  Weight bearing as tolerated    CONSULTATIONS  None    PROCEDURES  None    LABORATORY  Lab Results   Component Value Date    SODIUM 142 11/21/2021    POTASSIUM 3.9 11/21/2021    CHLORIDE 107 11/21/2021    CO2 29 11/21/2021    GLUCOSE 99 11/21/2021    BUN 13 11/21/2021    CREATININE 0.61 11/21/2021        Lab Results   Component Value Date    WBC 4.7 (L) 11/21/2021    HEMOGLOBIN 13.6 11/21/2021    HEMATOCRIT 42.7 11/21/2021    PLATELETCT 113 (L) 11/21/2021        Total time of the discharge process exceeds 35 minutes.

## 2021-11-21 NOTE — DISCHARGE INSTRUCTIONS
Discharge Instructions    Discharged to home by car with relative. Discharged via wheelchair, hospital escort: Yes.  Special equipment needed: Not Applicable    Be sure to schedule a follow-up appointment with your primary care doctor or any specialists as instructed.     Discharge Plan:   Diet Plan: Discussed  Activity Level: Discussed  Confirmed Follow up Appointment: Patient to Call and Schedule Appointment  Confirmed Symptoms Management: Discussed  Medication Reconciliation Updated: Yes  Influenza Vaccine Indication: Patient Refuses    I understand that a diet low in cholesterol, fat, and sodium is recommended for good health. Unless I have been given specific instructions below for another diet, I accept this instruction as my diet prescription.   Other diet: N/A    Special Instructions: None    · Is patient discharged on Warfarin / Coumadin?   No     Depression / Suicide Risk    As you are discharged from this [facility] facility, it is important to learn how to keep safe from harming yourself.    Recognize the warning signs:  · Abrupt changes in personality, positive or negative- including increase in energy   · Giving away possessions  · Change in eating patterns- significant weight changes-  positive or negative  · Change in sleeping patterns- unable to sleep or sleeping all the time   · Unwillingness or inability to communicate  · Depression  · Unusual sadness, discouragement and loneliness  · Talk of wanting to die  · Neglect of personal appearance   · Rebelliousness- reckless behavior  · Withdrawal from people/activities they love  · Confusion- inability to concentrate     If you or a loved one observes any of these behaviors or has concerns about self-harm, here's what you can do:  · Talk about it- your feelings and reasons for harming yourself  · Remove any means that you might use to hurt yourself (examples: pills, rope, extension cords, firearm)  · Get professional help from the community (Mental  Health, Substance Abuse, psychological counseling)  · Do not be alone:Call your Safe Contact- someone whom you trust who will be there for you.  · Call your local CRISIS HOTLINE 617-8310 or 405-906-2554  · Call your local Children's Mobile Crisis Response Team [place] (148) 250-6494 or www.[employer]  · Call the toll free National Suicide Prevention Hotlines   · National Suicide Prevention Lifeline 821-752-EHOO (4440)  · National Hope Line Network 800-SUICIDE (781-4582)      Respiratory Syncytial Virus, Adult  Respiratory syncytial virus (RSV) infection is an infection caused by RSV, a common virus. This virus is similar to viruses that cause the common cold and the flu. RSV infection can affect the nose, throat, windpipe, and lungs (respiratory system). When the infection is severe, it can cause:  · Bronchiolitis. This condition causes inflammation of the air passages in the lungs (bronchioles).  · Pneumonia. This condition causes inflammation of the air sacs in the lungs.  RSV infection spreads from person to person (is contagious) through droplets from coughs and sneezes (respiratory secretions). This condition is rarely serious when it occurs in adults.  What are the causes?  This condition is caused by contact with the RSV. This can happen by:  · Breathing respiratory secretions that fly through the air.  · Touching something that has been exposed to the virus (is contaminated) and then touching your mouth, nose, or eyes.  · Coming in close contact with someone who has this infection. This may happen if you:  ? Hug or kiss.  ? Shake or hold hands.  ? Eat or drink using the same dishes or utensils.  What increases the risk?  The following factors may make you more likely to develop this condition:  · Being 65 years of age or older.  · Having certain health conditions, including:  ? A long-term (chronic) lung condition, such as chronic obstructive pulmonary disease (COPD).  ? An immune system that is  weak. This is your body's defense system.  ? Down syndrome.  ? Heart disease.  · Working in a hospital or other health care facility.  · Living in a long-term health care facility.  RVS infections are most common from the months of November to April. But they can happen any time of year.  What are the signs or symptoms?  Symptoms of this condition include:  · Having a runny nose.  · Coughing. You may have a cough that brings up mucus (productive cough).  · Sneezing.  · Having a fever.  · Wanting to eat less than usual.  · Breathing loudly (wheezing).  · Having shortness of breath.  · Having fluid build up in the lungs (respiratory distress).  How is this diagnosed?  This condition may be diagnosed based on:  · Your symptoms.  · Your medical history.  · A physical exam.  · A chest X-ray to rule out pneumonia.  · Blood tests or tests of mucus from your lungs (sputum). These tests may be done for older adults.  · A test of a sample of your respiratory secretions.  How is this treated?  In most cases, the RSV infection will go away after 1-2 weeks of caring for yourself at home.   Sometimes, RSV infection is severe and causes pneumonia. If you develop pneumonia, you may need to be treated in the hospital. You may be given:  · Oxygen therapy.  · Antibiotic medicine.  · Medicines to open your bronchioles (bronchodilators).  Follow these instructions at home:  Medicines  · Take over-the-counter and prescription medicines only as told by your health care provider.  · If you were prescribed an antibiotic medicine, take it as told by your health care provider. Do not stop using the antibiotic even if you start to feel better.  Lifestyle  · Eat a healthy diet.  · Do not drink alcohol.  · Do not use any products that contain nicotine or tobacco, such as cigarettes, e-cigarettes, and chewing tobacco. If you need help quitting, ask your health care provider.  · Rest at home until your symptoms go away.  · Return to your normal  activities as told by your health care provider. Ask your health care provider what activities are safe for you.  General instructions  · Drink enough fluid to keep your urine pale yellow.  · Gargle with a salt-water mixture 3-4 times a day or as needed. To make a salt-water mixture, completely dissolve ½-1 tsp (3-6 g) of salt in 1 cup (237 mL) of warm water.  · Keep all follow-up visits as told by your health care provider. This is important.  How is this prevented?  To prevent catching and spreading RSV:  · Wash your hands often with soap and water. If soap and water are not available, use hand . This liquid kills germs. Do not touch your face without first cleaning your hands.  · Stay home if you have symptoms of the common cold or the flu.  · Cover your nose and mouth when you cough or sneeze.  · Avoid large groups of people.  · Keep a safe distance from people who are coughing or sneezing.  Contact a health care provider if:  · Your symptoms get worse or have not changed after 2 weeks.  · You have:  ? A fever.  ? Hot flashes, sweating, or chills that keep happening.  ? A cough that brings up much more mucus than usual.  ? A cough that brings up blood.  ? Respiratory distress that gets worse.  · You feel:  ? Very tired (lethargic).  ? Confused.  Get help right away if you have:  · Respiratory distress that becomes severe.  · Loss of consciousness.  These symptoms may represent a serious problem that is an emergency. Do not wait to see if the symptoms will go away. Get medical help right away. Call your local emergency services (911 in the [place] Do not drive yourself to the hospital.  This information is not intended to replace advice given to you by your health care provider. Make sure you discuss any questions you have with your health care provider.  Document Released: 05/30/2017 Document Revised: 12/20/2019 Document Reviewed: 05/30/2017  [employer] Patient Education © 2020 [employer]  Inc[employer]      COVID-19  COVID-19 is a respiratory infection that is caused by a virus called severe acute respiratory syndrome coronavirus 2 (SARS-CoV-2). The disease is also known as coronavirus disease or novel coronavirus. In some people, the virus may not cause any symptoms. In others, it may cause a serious infection. The infection can get worse quickly and can lead to complications, such as:  · Pneumonia, or infection of the lungs.  · Acute respiratory distress syndrome or ARDS. This is fluid build-up in the lungs.  · Acute respiratory failure. This is a condition in which there is not enough oxygen passing from the lungs to the body.  · Sepsis or septic shock. This is a serious bodily reaction to an infection.  · Blood clotting problems.  · Secondary infections due to bacteria or fungus.  The virus that causes COVID-19 is contagious. This means that it can spread from person to person through droplets from coughs and sneezes (respiratory secretions).  What are the causes?  This illness is caused by a virus. You may catch the virus by:  · Breathing in droplets from an infected person's cough or sneeze.  · Touching something, like a table or a doorknob, that was exposed to the virus (contaminated) and then touching your mouth, nose, or eyes.  What increases the risk?  Risk for infection  You are more likely to be infected with this virus if you:  · Live in or travel to an area with a COVID-19 outbreak.  · Come in contact with a sick person who recently traveled to an area with a COVID-19 outbreak.  · Provide care for or live with a person who is infected with COVID-19.  Risk for serious illness  You are more likely to become seriously ill from the virus if you:  · Are 65 years of age or older.  · Have a long-term disease that lowers your body's ability to fight infection (immunocompromised).  · Live in a nursing home or long-term care facility.  · Have a long-term (chronic) disease such as:  ? Chronic lung disease,  including chronic obstructive pulmonary disease or asthma  ? Heart disease.  ? Diabetes.  ? Chronic kidney disease.  ? Liver disease.  · Are obese.  What are the signs or symptoms?  Symptoms of this condition can range from mild to severe. Symptoms may appear any time from 2 to 14 days after being exposed to the virus. They include:  · A fever.  · A cough.  · Difficulty breathing.  · Chills.  · Muscle pains.  · A sore throat.  · Loss of taste or smell.  Some people may also have stomach problems, such as nausea, vomiting, or diarrhea.  Other people may not have any symptoms of COVID-19.  How is this diagnosed?  This condition may be diagnosed based on:  · Your signs and symptoms, especially if:  ? You live in an area with a COVID-19 outbreak.  ? You recently traveled to or from an area where the virus is common.  ? You provide care for or live with a person who was diagnosed with COVID-19.  · A physical exam.  · Lab tests, which may include:  ? A nasal swab to take a sample of fluid from your nose.  ? A throat swab to take a sample of fluid from your throat.  ? A sample of mucus from your lungs (sputum).  ? Blood tests.  · Imaging tests, which may include, X-rays, CT scan, or ultrasound.  How is this treated?  At present, there is no medicine to treat COVID-19. Medicines that treat other diseases are being used on a trial basis to see if they are effective against COVID-19.  Your health care provider will talk with you about ways to treat your symptoms. For most people, the infection is mild and can be managed at home with rest, fluids, and over-the-counter medicines.  Treatment for a serious infection usually takes places in a hospital intensive care unit (ICU). It may include one or more of the following treatments. These treatments are given until your symptoms improve.  · Receiving fluids and medicines through an IV.  · Supplemental oxygen. Extra oxygen is given through a tube in the nose, a face mask, or a  chen.  · Positioning you to lie on your stomach (prone position). This makes it easier for oxygen to get into the lungs.  · Continuous positive airway pressure (CPAP) or bi-level positive airway pressure (BPAP) machine. This treatment uses mild air pressure to keep the airways open. A tube that is connected to a motor delivers oxygen to the body.  · Ventilator. This treatment moves air into and out of the lungs by using a tube that is placed in your windpipe.  · Tracheostomy. This is a procedure to create a hole in the neck so that a breathing tube can be inserted.  · Extracorporeal membrane oxygenation (ECMO). This procedure gives the lungs a chance to recover by taking over the functions of the heart and lungs. It supplies oxygen to the body and removes carbon dioxide.  Follow these instructions at home:  Lifestyle  · If you are sick, stay home except to get medical care. Your health care provider will tell you how long to stay home. Call your health care provider before you go for medical care.  · Rest at home as told by your health care provider.  · Do not use any products that contain nicotine or tobacco, such as cigarettes, e-cigarettes, and chewing tobacco. If you need help quitting, ask your health care provider.  · Return to your normal activities as told by your health care provider. Ask your health care provider what activities are safe for you.  General instructions  · Take over-the-counter and prescription medicines only as told by your health care provider.  · Drink enough fluid to keep your urine pale yellow.  · Keep all follow-up visits as told by your health care provider. This is important.  How is this prevented?    There is no vaccine to help prevent COVID-19 infection. However, there are steps you can take to protect yourself and others from this virus.  To protect yourself:   · Do not travel to areas where COVID-19 is a risk. The areas where COVID-19 is reported change often. To identify  high-risk areas and travel restrictions, check the [facility] travel website: wwwnc.cdc.gov/travel/notices  · If you live in, or must travel to, an area where COVID-19 is a risk, take precautions to avoid infection.  ? Stay away from people who are sick.  ? Wash your hands often with soap and water for 20 seconds. If soap and water are not available, use an alcohol-based hand .  ? Avoid touching your mouth, face, eyes, or nose.  ? Avoid going out in public, follow guidance from your state and local health authorities.  ? If you must go out in public, wear a cloth face covering or face mask.  ? Disinfect objects and surfaces that are frequently touched every day. This may include:  § Counters and tables.  § Doorknobs and light switches.  § Sinks and faucets.  § Electronics, such as phones, remote controls, keyboards, computers, and tablets.  To protect others:  If you have symptoms of COVID-19, take steps to prevent the virus from spreading to others.  · If you think you have a COVID-19 infection, contact your health care provider right away. Tell your health care team that you think you may have a COVID-19 infection.  · Stay home. Leave your house only to seek medical care. Do not use public transport.  · Do not travel while you are sick.  · Wash your hands often with soap and water for 20 seconds. If soap and water are not available, use alcohol-based hand .  · Stay away from other members of your household. Let healthy household members care for children and pets, if possible. If you have to care for children or pets, wash your hands often and wear a mask. If possible, stay in your own room, separate from others. Use a different bathroom.  · Make sure that all people in your household wash their hands well and often.  · Cough or sneeze into a tissue or your sleeve or elbow. Do not cough or sneeze into your hand or into the air.  · Wear a cloth face covering or face mask.  Where to find more  information  · Centers for Disease Control and Prevention: www.cdc.gov/coronavirus/2019-ncov/index.html  · World Health Organization: www.who.int/health-topics/coronavirus  Contact a health care provider if:  · You live in or have traveled to an area where COVID-19 is a risk and you have symptoms of the infection.  · You have had contact with someone who has COVID-19 and you have symptoms of the infection.  Get help right away if:  · You have trouble breathing.  · You have pain or pressure in your chest.  · You have confusion.  · You have bluish lips and fingernails.  · You have difficulty waking from sleep.  · You have symptoms that get worse.  These symptoms may represent a serious problem that is an emergency. Do not wait to see if the symptoms will go away. Get medical help right away. Call your local emergency services (911 in the [place] Do not drive yourself to the hospital. Let the emergency medical personnel know if you think you have COVID-19.  Summary  · COVID-19 is a respiratory infection that is caused by a virus. It is also known as coronavirus disease or novel coronavirus. It can cause serious infections, such as pneumonia, acute respiratory distress syndrome, acute respiratory failure, or sepsis.  · The virus that causes COVID-19 is contagious. This means that it can spread from person to person through droplets from coughs and sneezes.  · You are more likely to develop a serious illness if you are 65 years of age or older, have a weak immunity, live in a nursing home, or have chronic disease.  · There is no medicine to treat COVID-19. Your health care provider will talk with you about ways to treat your symptoms.  · Take steps to protect yourself and others from infection. Wash your hands often and disinfect objects and surfaces that are frequently touched every day. Stay away from people who are sick and wear a mask if you are sick.  This information is not intended to replace advice given to you by  your health care provider. Make sure you discuss any questions you have with your health care provider.  Document Released: 01/23/2020 Document Revised: 05/14/2020 Document Reviewed: 01/23/2020  [employer]vier Patient Education © 2020 [employer] Inc.

## 2021-11-21 NOTE — CARE PLAN
The patient is Stable - Low risk of patient condition declining or worsening    Shift Goals  Clinical Goals: ABX treatment  Patient Goals: discharge  Family Goals: N/A    Progress made toward(s) clinical / shift goals:  Pt is currently on ABX for PNA. Possible discharge today.    Patient is not progressing towards the following goals:

## 2021-11-21 NOTE — PROGRESS NOTES
Assume care of pt at 1900. Report received from day RN. Pt is A/O x4. Pain is 1/10. Pt is resting in bed. Bed in lowest and locked position, call light in reach, hourly rounding in place. Labs reviewed. Communication board updated. Will continue to monitor.

## 2021-11-21 NOTE — PROGRESS NOTES
Assumed care of patient at 0700 from Yuliana RN. Patient is A&Ox 4, states pain level is 0/10. Bed locked in lowest position with 2 rails up. Call light in place, belongings at bedside. Patient expresses zero needs at this time and hourly rounding is in place.     COVID 19 surge in effect.

## 2021-11-21 NOTE — CARE PLAN
The patient is Stable - Low risk of patient condition declining or worsening    Shift Goals  Clinical Goals: Abx treatment, discharge home   Patient Goals: Go home  Family Goals: N/A    Progress made toward(s) clinical / shift goals:  Pt. To be discharged today per Physician.     Patient is not progressing towards the following goals:

## 2021-11-30 NOTE — DOCUMENTATION QUERY
[facility]                                                                       Query Response Note      PATIENT:               SISSY JUNE  ACCT #:                  [id number]  MRN:                     [id number]  :                      1965  ADMIT DATE:       2021 7:00 PM  DISCH DATE:        2021 10:30 AM  RESPONDING  PROVIDER #:        [id number]           QUERY TEXT:    This patient was admitted with Sepsis, RSV pneumonia, and an e.coli UTI.  The patient has also been diagnosed with acute respiratory failure.      Based upon the clinical findings, risk factors, and treatment, can the diagnosis of sepsis and any associated relationship(s) be further specified?      The patient's Clinical Indicators include:  Clinical indicators  Laboratory  Recent Labs    21 1604  21 0415   WBC 8.4                           5.5     CXR:  Impression:   1.  Bilateral basilar atelectasis, no focal infiltrate   2.  Cardiomegaly    Sepsis (HCC)- (present on admission)  Assessment & Plan  Down trend with lactic  Normal WBC  Has a UTI and on Unasyn. Monitor cultures  Recent positive for RSV which may cause SIRs    Acute respiratory failure with hypoxia (HCC)- (present on admission)  Assessment & Plan  D-dimer elevated and subsequent CTA chest ordered in the setting of breast cancer    Treatment or Monitoring  -  I attempted to wean her however she desatted to 86% and was placed back on oxygen.  - RT protocol  Albuterol w/ spacer prn  Supplemental oxygen if need  Antitussives  - IV Unasyn    Risk Factors  -Urine culture came back positive for E. coli  -RSV positive on   -hx breast cancer   -obesity     [place] contact information  Amanda Guzman CCS  Coding   [email or web address]  Options provided:   -- Severe sepsis with associated acute respiratory failure   -- Simple Sepsis without related organ  failure/dysfunction   -- Other explanation of clinical findings (Please document)   -- Unable to determine      Query created by: Amanda Guzman on 11/29/2021 5:04 PM    RESPONSE TEXT:    Simple Sepsis without related organ failure/dysfunction          Electronically signed by:  MATTY RAINES MD 11/30/2021 10:10 AM

## 2021-12-14 NOTE — DOCUMENTATION QUERY
[facility]                                                                       Query Response Note      PATIENT:               SISSY JUNE  ACCT #:                  [id number]  MRN:                     [id number]  :                      1965  ADMIT DATE:       2021 7:00 PM  DISCH DATE:        2021 10:30 AM  RESPONDING  PROVIDER #:        [id number]           QUERY TEXT:    There is conflicting documentation in the medical record regarding the source of sepsis.  This patient was admitted with SOB and a recent outpatient diagnosis of RSV.  Upon admission, urinalysis was positive for an asymptomatic e.coli UTI.  It is unclear to the , with current provider documentation, which infection is responsible for sepsis.      NOTE:  If an appropriate response is not listed below, please respond with a new note.      The patient's Clinical Indicators include:  CLINICAL INDICATORS:  Blood Pressure: 137/65  Temperature: 36.2 °C (97.1 °F)  Pulse: (!) 53  Respiration: 18  Pulse Oximetry: 94 %    DX-CHEST-PORTABLE (1 VIEW)  Final Result  1.  Bilateral basilar atelectasis, no focal infiltrate  2.  Cardiomegaly      Per H&P:  * Sepsis (HCC)- (present on admission)  Assessment & Plan  This is Sepsis Present on admission  SIRS criteria identified on my evaluation include: Tachycardia, with heart rate greater than 90 BPM and Tachypnea, with respirations greater than 20 per minute  Source is presumed respiratory    per  PN:  Sepsis (HCC)- (present on admission)  Assessment & Plan  Down trend with lactic  Normal WBC  Has a UTI and on Unasyn.     TREATMENT:  RT protocol  Albuterol w/ spacer prn  Supplemental oxygen if need  Antitussives  - IV Unasyn  RISK FACTORS:  Risk Factors  -Urine culture came back positive for E. coli  -RSV positive on   -hx breast cancer   -obesity      CONTACT:  Amanda Guzman [facility]  Coding Reimbursement  Specialist  [email or web address]  Options provided:   -- Sepsis is due to or associated with RSV infection   -- Sepsis is due to or associated with e.coli UTI   -- Sepsis is due to both of the above   -- Unable to determine      Query created by: Amanda Guzman on 12/11/2021 9:43 AM    RESPONSE TEXT:    Sepsis is due to or associated with RSV infection          Electronically signed by:  MATTY RAINES MD 12/14/2021 11:34 AM

## 2022-01-11 ENCOUNTER — APPOINTMENT (OUTPATIENT)
Dept: MEDICAL GROUP | Facility: CLINIC | Age: 57
End: 2022-01-11
Payer: MEDICARE

## 2022-01-27 ENCOUNTER — APPOINTMENT (OUTPATIENT)
Dept: RADIOLOGY | Facility: MEDICAL CENTER | Age: 57
End: 2022-01-27
Attending: EMERGENCY MEDICINE
Payer: MEDICARE

## 2022-01-27 ENCOUNTER — HOSPITAL ENCOUNTER (EMERGENCY)
Facility: MEDICAL CENTER | Age: 57
End: 2022-01-27
Attending: EMERGENCY MEDICINE
Payer: MEDICARE

## 2022-01-27 VITALS
HEART RATE: 97 BPM | DIASTOLIC BLOOD PRESSURE: 86 MMHG | SYSTOLIC BLOOD PRESSURE: 129 MMHG | WEIGHT: 220.46 LBS | BODY MASS INDEX: 43.28 KG/M2 | RESPIRATION RATE: 20 BRPM | HEIGHT: 60 IN | OXYGEN SATURATION: 95 % | TEMPERATURE: 98.6 F

## 2022-01-27 DIAGNOSIS — E86.0 DEHYDRATION: ICD-10-CM

## 2022-01-27 DIAGNOSIS — R00.0 TACHYCARDIA: ICD-10-CM

## 2022-01-27 DIAGNOSIS — B33.8 RSV (RESPIRATORY SYNCYTIAL VIRUS INFECTION): ICD-10-CM

## 2022-01-27 DIAGNOSIS — R19.7 DIARRHEA, UNSPECIFIED TYPE: ICD-10-CM

## 2022-01-27 LAB
ALBUMIN SERPL BCP-MCNC: 3.9 G/DL (ref 3.2–4.9)
ALBUMIN/GLOB SERPL: 1.1 G/DL
ALP SERPL-CCNC: 70 U/L (ref 30–99)
ALT SERPL-CCNC: 21 U/L (ref 2–50)
ANION GAP SERPL CALC-SCNC: 17 MMOL/L (ref 7–16)
APPEARANCE UR: CLEAR
AST SERPL-CCNC: 19 U/L (ref 12–45)
BACTERIA #/AREA URNS HPF: NEGATIVE /HPF
BASOPHILS # BLD AUTO: 0.1 % (ref 0–1.8)
BASOPHILS # BLD: 0.02 K/UL (ref 0–0.12)
BILIRUB SERPL-MCNC: 1.4 MG/DL (ref 0.1–1.5)
BILIRUB UR QL STRIP.AUTO: NEGATIVE
BUN SERPL-MCNC: 8 MG/DL (ref 8–22)
CALCIUM SERPL-MCNC: 9.3 MG/DL (ref 8.5–10.5)
CHLORIDE SERPL-SCNC: 99 MMOL/L (ref 96–112)
CO2 SERPL-SCNC: 20 MMOL/L (ref 20–33)
COLOR UR: YELLOW
CREAT SERPL-MCNC: 0.74 MG/DL (ref 0.5–1.4)
D DIMER PPP IA.FEU-MCNC: 1.38 UG/ML (FEU) (ref 0–0.5)
EKG IMPRESSION: NORMAL
EOSINOPHIL # BLD AUTO: 0 K/UL (ref 0–0.51)
EOSINOPHIL NFR BLD: 0 % (ref 0–6.9)
EPI CELLS #/AREA URNS HPF: NEGATIVE /HPF
ERYTHROCYTE [DISTWIDTH] IN BLOOD BY AUTOMATED COUNT: 47.2 FL (ref 35.9–50)
FLUAV RNA SPEC QL NAA+PROBE: NEGATIVE
FLUBV RNA SPEC QL NAA+PROBE: NEGATIVE
GLOBULIN SER CALC-MCNC: 3.5 G/DL (ref 1.9–3.5)
GLUCOSE BLD-MCNC: 169 MG/DL (ref 65–99)
GLUCOSE SERPL-MCNC: 149 MG/DL (ref 65–99)
GLUCOSE UR STRIP.AUTO-MCNC: NEGATIVE MG/DL
HCT VFR BLD AUTO: 46.8 % (ref 37–47)
HGB BLD-MCNC: 15.8 G/DL (ref 12–16)
HYALINE CASTS #/AREA URNS LPF: ABNORMAL /LPF
IMM GRANULOCYTES # BLD AUTO: 0.07 K/UL (ref 0–0.11)
IMM GRANULOCYTES NFR BLD AUTO: 0.5 % (ref 0–0.9)
KETONES UR STRIP.AUTO-MCNC: NEGATIVE MG/DL
LACTATE BLD-SCNC: 2.2 MMOL/L (ref 0.5–2)
LEUKOCYTE ESTERASE UR QL STRIP.AUTO: ABNORMAL
LYMPHOCYTES # BLD AUTO: 0.96 K/UL (ref 1–4.8)
LYMPHOCYTES NFR BLD: 6.8 % (ref 22–41)
MCH RBC QN AUTO: 30.6 PG (ref 27–33)
MCHC RBC AUTO-ENTMCNC: 33.8 G/DL (ref 33.6–35)
MCV RBC AUTO: 90.7 FL (ref 81.4–97.8)
MICRO URNS: ABNORMAL
MONOCYTES # BLD AUTO: 0.82 K/UL (ref 0–0.85)
MONOCYTES NFR BLD AUTO: 5.8 % (ref 0–13.4)
NEUTROPHILS # BLD AUTO: 12.16 K/UL (ref 2–7.15)
NEUTROPHILS NFR BLD: 86.8 % (ref 44–72)
NITRITE UR QL STRIP.AUTO: NEGATIVE
NRBC # BLD AUTO: 0 K/UL
NRBC BLD-RTO: 0 /100 WBC
NT-PROBNP SERPL IA-MCNC: 3215 PG/ML (ref 0–125)
PH UR STRIP.AUTO: 6.5 [PH] (ref 5–8)
PLATELET # BLD AUTO: 163 K/UL (ref 164–446)
PMV BLD AUTO: 9.2 FL (ref 9–12.9)
POTASSIUM SERPL-SCNC: 3.5 MMOL/L (ref 3.6–5.5)
PROCALCITONIN SERPL-MCNC: 0.2 NG/ML
PROT SERPL-MCNC: 7.4 G/DL (ref 6–8.2)
PROT UR QL STRIP: NEGATIVE MG/DL
RBC # BLD AUTO: 5.16 M/UL (ref 4.2–5.4)
RBC # URNS HPF: ABNORMAL /HPF
RBC UR QL AUTO: ABNORMAL
RSV RNA SPEC QL NAA+PROBE: POSITIVE
SARS-COV-2 RNA RESP QL NAA+PROBE: NOTDETECTED
SODIUM SERPL-SCNC: 136 MMOL/L (ref 135–145)
SP GR UR STRIP.AUTO: 1.02
SPECIMEN SOURCE: ABNORMAL
TROPONIN T SERPL-MCNC: 15 NG/L (ref 6–19)
UROBILINOGEN UR STRIP.AUTO-MCNC: 0.2 MG/DL
WBC # BLD AUTO: 14 K/UL (ref 4.8–10.8)
WBC #/AREA URNS HPF: ABNORMAL /HPF

## 2022-01-27 PROCEDURE — 71275 CT ANGIOGRAPHY CHEST: CPT | Mod: ME

## 2022-01-27 PROCEDURE — 87186 SC STD MICRODIL/AGAR DIL: CPT

## 2022-01-27 PROCEDURE — 87040 BLOOD CULTURE FOR BACTERIA: CPT

## 2022-01-27 PROCEDURE — 83605 ASSAY OF LACTIC ACID: CPT

## 2022-01-27 PROCEDURE — 84145 PROCALCITONIN (PCT): CPT

## 2022-01-27 PROCEDURE — 700105 HCHG RX REV CODE 258: Performed by: EMERGENCY MEDICINE

## 2022-01-27 PROCEDURE — 84484 ASSAY OF TROPONIN QUANT: CPT

## 2022-01-27 PROCEDURE — A9270 NON-COVERED ITEM OR SERVICE: HCPCS | Performed by: EMERGENCY MEDICINE

## 2022-01-27 PROCEDURE — 87077 CULTURE AEROBIC IDENTIFY: CPT

## 2022-01-27 PROCEDURE — 96375 TX/PRO/DX INJ NEW DRUG ADDON: CPT

## 2022-01-27 PROCEDURE — 93005 ELECTROCARDIOGRAM TRACING: CPT | Performed by: EMERGENCY MEDICINE

## 2022-01-27 PROCEDURE — 96374 THER/PROPH/DIAG INJ IV PUSH: CPT

## 2022-01-27 PROCEDURE — C9803 HOPD COVID-19 SPEC COLLECT: HCPCS | Performed by: EMERGENCY MEDICINE

## 2022-01-27 PROCEDURE — 700117 HCHG RX CONTRAST REV CODE 255: Performed by: EMERGENCY MEDICINE

## 2022-01-27 PROCEDURE — 0241U HCHG SARS-COV-2 COVID-19 NFCT DS RESP RNA 4 TRGT MIC: CPT

## 2022-01-27 PROCEDURE — 99285 EMERGENCY DEPT VISIT HI MDM: CPT

## 2022-01-27 PROCEDURE — 700111 HCHG RX REV CODE 636 W/ 250 OVERRIDE (IP): Performed by: EMERGENCY MEDICINE

## 2022-01-27 PROCEDURE — 82962 GLUCOSE BLOOD TEST: CPT

## 2022-01-27 PROCEDURE — 81001 URINALYSIS AUTO W/SCOPE: CPT

## 2022-01-27 PROCEDURE — 85025 COMPLETE CBC W/AUTO DIFF WBC: CPT

## 2022-01-27 PROCEDURE — 80053 COMPREHEN METABOLIC PANEL: CPT

## 2022-01-27 PROCEDURE — 93005 ELECTROCARDIOGRAM TRACING: CPT

## 2022-01-27 PROCEDURE — 85379 FIBRIN DEGRADATION QUANT: CPT

## 2022-01-27 PROCEDURE — 700102 HCHG RX REV CODE 250 W/ 637 OVERRIDE(OP): Performed by: EMERGENCY MEDICINE

## 2022-01-27 PROCEDURE — 71045 X-RAY EXAM CHEST 1 VIEW: CPT

## 2022-01-27 PROCEDURE — 83880 ASSAY OF NATRIURETIC PEPTIDE: CPT

## 2022-01-27 RX ORDER — CEFTRIAXONE 2 G/1
2 INJECTION, POWDER, FOR SOLUTION INTRAMUSCULAR; INTRAVENOUS ONCE
Status: COMPLETED | OUTPATIENT
Start: 2022-01-27 | End: 2022-01-27

## 2022-01-27 RX ORDER — SODIUM CHLORIDE 9 MG/ML
1000 INJECTION, SOLUTION INTRAVENOUS ONCE
Status: COMPLETED | OUTPATIENT
Start: 2022-01-27 | End: 2022-01-27

## 2022-01-27 RX ORDER — METOCLOPRAMIDE HYDROCHLORIDE 5 MG/ML
10 INJECTION INTRAMUSCULAR; INTRAVENOUS ONCE
Status: COMPLETED | OUTPATIENT
Start: 2022-01-27 | End: 2022-01-27

## 2022-01-27 RX ORDER — SODIUM CHLORIDE, SODIUM LACTATE, POTASSIUM CHLORIDE, AND CALCIUM CHLORIDE .6; .31; .03; .02 G/100ML; G/100ML; G/100ML; G/100ML
30 INJECTION, SOLUTION INTRAVENOUS ONCE
Status: COMPLETED | OUTPATIENT
Start: 2022-01-27 | End: 2022-01-27

## 2022-01-27 RX ORDER — ACETAMINOPHEN 325 MG/1
650 TABLET ORAL ONCE
Status: COMPLETED | OUTPATIENT
Start: 2022-01-27 | End: 2022-01-27

## 2022-01-27 RX ORDER — DIPHENHYDRAMINE HYDROCHLORIDE 50 MG/ML
12.5 INJECTION INTRAMUSCULAR; INTRAVENOUS ONCE
Status: COMPLETED | OUTPATIENT
Start: 2022-01-27 | End: 2022-01-27

## 2022-01-27 RX ORDER — ACETAMINOPHEN 500 MG
500-1000 TABLET ORAL EVERY 6 HOURS PRN
Status: SHIPPED | COMMUNITY
End: 2023-04-21

## 2022-01-27 RX ORDER — ONDANSETRON 4 MG/1
4 TABLET, ORALLY DISINTEGRATING ORAL EVERY 6 HOURS PRN
Qty: 10 TABLET | Refills: 0 | Status: SHIPPED | OUTPATIENT
Start: 2022-01-27 | End: 2023-04-07

## 2022-01-27 RX ADMIN — SODIUM CHLORIDE 1000 ML: 9 INJECTION, SOLUTION INTRAVENOUS at 20:15

## 2022-01-27 RX ADMIN — SODIUM CHLORIDE, POTASSIUM CHLORIDE, SODIUM LACTATE AND CALCIUM CHLORIDE 1365 ML: 600; 310; 30; 20 INJECTION, SOLUTION INTRAVENOUS at 17:20

## 2022-01-27 RX ADMIN — IOHEXOL 50 ML: 350 INJECTION, SOLUTION INTRAVENOUS at 18:36

## 2022-01-27 RX ADMIN — ACETAMINOPHEN 650 MG: 325 TABLET, FILM COATED ORAL at 17:37

## 2022-01-27 RX ADMIN — METOCLOPRAMIDE 10 MG: 5 INJECTION, SOLUTION INTRAMUSCULAR; INTRAVENOUS at 17:17

## 2022-01-27 RX ADMIN — CEFTRIAXONE SODIUM 2 G: 2 INJECTION, POWDER, FOR SOLUTION INTRAMUSCULAR; INTRAVENOUS at 18:33

## 2022-01-27 RX ADMIN — DIPHENHYDRAMINE HYDROCHLORIDE 12.5 MG: 50 INJECTION INTRAMUSCULAR; INTRAVENOUS at 17:18

## 2022-01-27 ASSESSMENT — FIBROSIS 4 INDEX: FIB4 SCORE: 2.31

## 2022-01-27 NOTE — LETTER
1/28/2022               Addie Kathie Esteban  [address] [facility] [address]        Dear Addie (MR#[id number])    As we have been unable to contact you by phone, this letter is sent in regards to your recent visit to the [facility] Emergency Department on 1/27/2022. During the visit, tests were performed to assist the physician in a medical diagnosis. A review of those tests requires that we notify you of the following:    Your blood culture and sensitivity was POSITIVE for bacteria, and further treatment with intravenous antibiotics is recommended. WE RECOMMEND RETURN TO THE EMERGENCY DEPARTMENT AS SOON AS POSSIBLE FOR TREATMENT. If you have any other questions or concerns, please call me at the number below.      Thank you for your cooperation in the matter.    Sincerely,  ED Culture Follow-Up Staff  Tonya Ortiz, PharmD    [facility], Emergency Department  [address] [place] [id number] [phone] (Tonya's phone number)  [phone] (ED Culture Line)

## 2022-01-27 NOTE — ED TRIAGE NOTES
"Wheeled to triage after an EKG  Chief Complaint   Patient presents with   • Dizziness     sudden onset at 1340, worse when standing   • Blurred Vision     both eyes   • N/V     Also c/o of a headache that started in the middle of the night. Pt's family member checked her BD at home and it read \"high.\" rechecked BS in triage and it was 169, BP is evaluated at 150/107 and HR is 149.  "

## 2022-01-28 NOTE — ED NOTES
Report received from SANTY RN. Pt fluid bolus complete with HR sustaining 120's. ERP updated. Pt previously able to demonstrate ability to walk. Orders received for additional fluid therapy.

## 2022-01-28 NOTE — ED NOTES
"ED Positive Culture Follow-up/Notification Note:    Date: 1/28/2022     Patient seen in the ED on 1/27/2022 for dizziness, fever, and shortness of breath. Patient received ceftriaxone 2 g IV x1 in the ED.  1. Dehydration    2. Diarrhea, unspecified type    3. Tachycardia    4. RSV (respiratory syncytial virus infection)       Discharge Medication List as of 1/27/2022  9:09 PM      START taking these medications    Details   ondansetron (ZOFRAN ODT) 4 MG TABLET DISPERSIBLE Take 1 Tablet by mouth every 6 hours as needed for Nausea., Disp-10 Tablet, R-0, Normal             Allergies: Patient has no known allergies.     Vitals:    01/27/22 1911 01/27/22 2000 01/27/22 2048 01/27/22 2056   BP:  138/65  129/86   Pulse:  (!) 125 97    Resp:  (!) 34 20    Temp:    37 °C (98.6 °F)   TempSrc:       SpO2: 88% 93% 94% 95%   Weight:       Height:           Final cultures:   Results     Procedure Component Value Units Date/Time    BLOOD CULTURE [[id number]]  (Abnormal) Collected: 01/27/22 1607    Order Status: Completed Specimen: Blood from Peripheral Updated: 01/28/22 0641     Significant Indicator POS     Source BLD     Site PERIPHERAL     Culture Result Growth detected by Bactec instrument. 01/28/2022  06:39  Gram Stain: Gram negative rods.      Narrative:      2 of 2 blood culture x2  Sites order. Per Hospital Policy:  Only change Specimen Src: to \"Line\" if specified by physician  order.  No site indicated    BLOOD CULTURE [[id number]]  (Abnormal) Collected: 01/27/22 1655    Order Status: Completed Specimen: Blood from Peripheral Updated: 01/28/22 0608     Significant Indicator POS     Source BLD     Site PERIPHERAL     Culture Result Growth detected by Bactec instrument. 01/28/2022  06:06  Gram Stain: Gram negative rods.      Narrative:      CALL  [facility]  ER tel. ,  1 of 2 for Blood Culture x 2 sites order. Per Hospital  Policy: Only change Specimen Src: to \"Line\" if specified by  physician order.  No site indicated    URINALYSIS " "[[id number]]  (Abnormal) Collected: 01/27/22 2020    Order Status: Completed Specimen: Urine Updated: 01/27/22 2110     Color Yellow     Character Clear     Specific Gravity 1.025     Ph 6.5     Glucose Negative mg/dL      Ketones Negative mg/dL      Protein Negative mg/dL      Bilirubin Negative     Urobilinogen, Urine 0.2     Nitrite Negative     Leukocyte Esterase Moderate     Occult Blood Moderate     Micro Urine Req Microscopic    COV-2, FLU A/B, AND RSV BY PCR (2-4 HOURS CEPHEID): Collect NP swab in [place] [[id number]]  (Abnormal) Collected: 01/27/22 1609    Order Status: Completed Specimen: Respirate Updated: 01/27/22 2042     Influenza virus A RNA Negative     Influenza virus B, PCR Negative     RSV, PCR POSITIVE     SARS-CoV-2 by PCR NotDetected     Comment: PATIENTS: Important information regarding your results and instructions can  be found at [email or web address]   \"After your  Covid-19 Test\"    [facility] providers: PLEASE REFER TO DE-ESCALATION AND RETESTING PROTOCOL  on [email or web address]    **The [employer] GeneXpert Xpress SARS-CoV-2 RT-PCR Test has been made  available for use under the Emergency Use Authorization (EUA) only.          SARS-CoV-2 Source NP Swab          Plan:   Patient with gram negative ambreen bacteremia in 2/2 blood culture sets. Unclear source at this point. Recommend that patient return to ED for IV antibiotic therapy. Attempted to call patient, but no answer so LVM to return phone call as soon as possible. Patient did receive ceftriaxone in the ED yesterday which will help provide some coverage until about 1800 today.    If patient returns to ED, recommend re-starting treatment with ceftriaxone 2 g IV Q24H until organism is identified.    Tonya Ortiz, PharmD  PGY2 Infectious Diseases Pharmacy Resident    Addendum 1/28/2022  Attempted to call patient again, but still no answer. Tried to call work phone number, but it was a wrong number. Will send letter to " notify patient of results and recommend to return for treatment.    Tonya Ortiz, PharmD  PGY2 Infectious Diseases Pharmacy Resident    Addendum 2/3/2022  Received call back from patient today. Today bueno a full week since she was seen in the ED. She says she is feeling great. She reports no fever, chills, or weakness. Informed her of her blood culture results - finalized with pan-susceptible E. Coli. Patient did receive one dose of ceftriaxone in the emergency department. This one dose may have been sufficient to help clear the bacteremia - however, would still recommend 7 days of antibiotic treatment given gram negative bacteremia based on best available evidence. Given patient is feeling very well, will prescribe oral antibiotic therapy to be taken outpatient. Stressed to the patient the importance of following up with her primary care provider as soon as possible, and that if she begins to have fever, chills, or is feeling unwell, she should present immediately to the emergency department. Patient verbalized understanding and was agreeable to plan.    Rx for Bactrim 1 DS tablet BID x7 days #14 no refills electronically sent to [facility] at [address] [place] [address] in [place].    Tonya Ortiz, Madie'  PGY2 Infectious Diseases Pharmacy Resident

## 2022-01-28 NOTE — ED NOTES
Patient educated on discharge instructions, follow up appointments, prescriptions, and home care. Patient verbalized understanding. Patient wheeled to  juice.

## 2022-01-28 NOTE — ED PROVIDER NOTES
ED Provider Note    ED Provider Note    Primary care provider: Iliana Barbosa M.D.  Means of arrival: POV  History obtained from: Patient    CHIEF COMPLAINT  Chief Complaint   Patient presents with   • Dizziness     sudden onset at 1340, worse when standing   • Blurred Vision     both eyes   • N/V     Seen at 4:35 PM.   HPI  Addienorma Orellana is a 56 y.o. female who presents to the Emergency Department with several days of a cough, subjective fevers with a T-max of 99 point something, nausea, posttussive emesis, headaches, shortness of breath, diarrhea, urinary hesitancy, generalized malaise, generalized weakness.  In addition to this she notes some low back pain and side pain as well.    The headaches and low back pain are a fairly regular occurrence for this patient.  She denies worst pain of her life.  She has had a history of some chronic back pain due to scoliosis and a history of frequent migraines.  She feels dizziness that began yesterday, worsening today and this is described as more of a lightheadedness and a feeling if she is going to fall when she stands up.  She denies vertigo symptoms.    REVIEW OF SYSTEMS  See HPI,   Remainder of ROS negative.     PAST MEDICAL HISTORY   has a past medical history of Breast cancer, stage 3 (HCC), Cancer (HCC) (2018), Dental disorder, Hemorrhagic disorder (HCC), and Pneumonia.    SURGICAL HISTORY   has a past surgical history that includes gastric bypass laparoscopic; ankle fusion; other orthopedic surgery (Bilateral); other abdominal surgery; mastectomy (Bilateral, 3/13/2019); node biopsy sentinel (Left, 3/13/2019); axillary node dissection (Left, 3/13/2019); and node biopsy (Left, 3/13/2019).    SOCIAL HISTORY  Social History     Tobacco Use   • Smoking status: Never Smoker   • Smokeless tobacco: Never Used   Vaping Use   • Vaping Use: Never used   Substance Use Topics   • Alcohol use: Not Currently     Comment: quit 2 months ago   • Drug use: No      Social  History     Substance and Sexual Activity   Drug Use No       FAMILY HISTORY  Family History   Problem Relation Age of Onset   • Cancer Paternal Aunt         lung cancer   • Cancer Paternal Grandfather         lung cancer       CURRENT MEDICATIONS  Reviewed.  See Encounter Summary.     ALLERGIES  No Known Allergies    PHYSICAL EXAM  VITAL SIGNS: /86   Pulse 97   Temp 36.9 °C (98.5 °F) (Temporal)   Resp 20   Ht 1.524 m (5')   Wt 100 kg (220 lb 7.4 oz)   SpO2 95%   BMI 43.06 kg/m²   Constitutional: Awake, alert in no apparent distress.  Coughing and spitting into an emesis basin.  HENT: Normocephalic, Bilateral external ears normal. Nose normal.   Eyes: Conjunctiva normal, non-icteric, EOMI.    Thorax & Lungs: Easy unlabored respirations, Clear to ascultation bilaterally.  Cardiovascular: Tachycardic, No murmurs, rubs or gallops. Bilateral pulses symmetrical.   Abdomen:  Soft, nontender, nondistended, normal active bowel sounds.   :    Skin: Visualized skin is  Dry, No erythema, No rash.   Musculoskeletal:   1+ lower extremity edema, patient states this is chronic and unchanged.  Neurologic: Alert, Grossly non-focal.  Cranial nerves intact, normal speech.  Psychiatric: Normal affect, Normal mood  Lymphatic:  No cervical LAD    EKG   12 lead Interpreted by me  Rhythm: Sinus tach  Rate: 133  Axis: normal  Ectopy: none  Conduction: normal  ST Segments: no acute change  T Waves: no acute change  Clinical Impression: Sinus tach with some slight depressions laterally, these were seen on prior EKGs.  No acute changes compared to previous.    RADIOLOGY  CT-CTA CHEST PULMONARY ARTERY W/ RECONS   Final Result      1.  No CT evidence of pulmonary embolus.      2.  Hiatal hernia is again identified.      3.  No new infiltrates or consolidations.            DX-CHEST-PORTABLE (1 VIEW)   Final Result         Ill-defined opacifications in each lung have increased to a mild degree, compared to the prior radiograph.   This could indicate worsening of pulmonary edema or inflammation.            COURSE & MEDICAL DECISION MAKING  Pertinent Labs & Imaging studies reviewed. (See chart for details)    Differential diagnoses include but are not limited to: Sepsis, pulmonary embolus, dehydration, migraine headache, Covid-19, nonspecific viral illness    4:35 PM - Medical record reviewed, patient seen and examined at bedside.    7:46 PM: After 1.6 L of IV fluids the patient still was persistently tachycardic, on room air oxygen is between 88 and 92% on room air.  She is well-appearing and feels improved.  I am concerned that she still has a resting tachycardia, work-up has been unrevealing.  It is possible still some mild dehydration, plan to give additional fluids, will talk with the CDU about observation status.    8:59 PM: Patient has tested positive for RSV.  This would explain her headaches, diarrhea, cough and resting tachycardia.  She received an additional liter of IV fluids, resting heart rate now is 105.  Oxygenation is between 88 and 94% on room air.   I explained this to the patient, at this time I do not feel that she meets criteria for hospitalization and she will be discharged.  She is directed return for any worsening shortness of breath, lightheadedness or any other concern.    Decision Making:  This is a pleasant 56 y.o. year old female who presents with symptoms that are suggestive of a nonspecific viral illness.  She has headaches, diarrhea, cough, nausea, vomiting, generalized malaise.  The headache itself seems to be more of her persistent migraines.  She received Tylenol, Reglan, Benadryl with significant reduction in her headache.  She received 1.6 L of IV fluids.  Heart rate improved from 145 down to 125 but still significantly tachycardic.  D-dimer was elevated at 1.38, therefore the patient underwent CT of the chest that is negative for infiltrate or pulmonary embolus.  Procalcitonin is reassuring at 0.20,  therefore unlikely to be bacterial.  She does have a white count of 14.0 and received 2 g of Rocephin while we are completing the work-up.  At this point it does not appear to be a bacterial process.  I do not feel that additional antibiotics are indicated.    After lengthy stay in the ER, RSV again returned positive which would explain all of the above symptoms.  We discussed the test result and the patient will be discharged at this time.    Discharge Medications:  New Prescriptions    NIRMATRELVIR & RITONAVIR 20 X 150 MG & 10 X 100MG TABLET THERAPY PACK    Take 300 mg nirmatrelvir (two 150 mg tablets) with 100 mg  ritonavir (one 100 mg tablet) by mouth, with all three tablets taken together  twice daily for 5 days.    ONDANSETRON (ZOFRAN ODT) 4 MG TABLET DISPERSIBLE    Take 1 Tablet by mouth every 6 hours as needed for Nausea.       The patient was discharged home (see d/c instructions) was told to return immediately for any signs or symptoms listed, or any worsening at all.  The patient verbally agreed to the discharge precautions and follow-up plan which is documented in EPIC.        FINAL IMPRESSION  1. Dehydration    2. Diarrhea, unspecified type    3. Tachycardia    4. COVID-19

## 2022-01-28 NOTE — ED NOTES
"Pt stated, \"It is getting harder to breathe.\" Pt notified that she had been placed in a bed, that she would be called as soon as possible.  "

## 2022-01-30 LAB
BACTERIA BLD CULT: ABNORMAL
SIGNIFICANT IND 70042: ABNORMAL
SIGNIFICANT IND 70042: ABNORMAL
SITE SITE: ABNORMAL
SITE SITE: ABNORMAL
SOURCE SOURCE: ABNORMAL
SOURCE SOURCE: ABNORMAL

## 2022-02-03 RX ORDER — SULFAMETHOXAZOLE AND TRIMETHOPRIM 800; 160 MG/1; MG/1
1 TABLET ORAL 2 TIMES DAILY
Qty: 14 TABLET | Refills: 0 | Status: SHIPPED | OUTPATIENT
Start: 2022-02-03 | End: 2022-02-10

## 2022-03-11 ENCOUNTER — TELEPHONE (OUTPATIENT)
Dept: CARDIOLOGY | Facility: MEDICAL CENTER | Age: 57
End: 2022-03-11
Payer: MEDICARE

## 2022-03-11 NOTE — TELEPHONE ENCOUNTER
LVM asking if patient has seen another Cardiologist in the past or had any cardiac testing done outside of [facility] to call with information so that records can be requested prior to appointment.scheduled with Dr. Hanson on 3/18/2022.

## 2022-09-19 ENCOUNTER — APPOINTMENT (OUTPATIENT)
Dept: MEDICAL GROUP | Facility: CLINIC | Age: 57
End: 2022-09-19
Payer: MEDICARE

## 2022-10-04 ENCOUNTER — TELEPHONE (OUTPATIENT)
Dept: CARDIOLOGY | Facility: MEDICAL CENTER | Age: 57
End: 2022-10-04
Payer: MEDICARE

## 2022-10-04 NOTE — TELEPHONE ENCOUNTER
Spoke to patient in regards to records for NP appointment with Dr. Patel. Patient has never been treated by a cardiologist, all recent records in epic including blood work, cardiac testing, and EKG. Confirmed appt date, time and location.

## 2022-11-03 ENCOUNTER — PATIENT MESSAGE (OUTPATIENT)
Dept: HEALTH INFORMATION MANAGEMENT | Facility: OTHER | Age: 57
End: 2022-11-03

## 2022-12-27 ENCOUNTER — TELEPHONE (OUTPATIENT)
Dept: CARDIOLOGY | Facility: MEDICAL CENTER | Age: 57
End: 2022-12-27
Payer: MEDICARE

## 2022-12-27 NOTE — TELEPHONE ENCOUNTER
Left message for patient to call back and reschedule missed appointment from 12/14 with General Cardiologist.    Thank you!

## 2023-04-07 ENCOUNTER — APPOINTMENT (OUTPATIENT)
Dept: RADIOLOGY | Facility: MEDICAL CENTER | Age: 58
DRG: 493 | End: 2023-04-07
Attending: ORTHOPAEDIC SURGERY
Payer: MEDICARE

## 2023-04-07 ENCOUNTER — APPOINTMENT (OUTPATIENT)
Dept: RADIOLOGY | Facility: MEDICAL CENTER | Age: 58
DRG: 493 | End: 2023-04-07
Attending: EMERGENCY MEDICINE
Payer: MEDICARE

## 2023-04-07 ENCOUNTER — HOSPITAL ENCOUNTER (INPATIENT)
Facility: MEDICAL CENTER | Age: 58
LOS: 6 days | DRG: 493 | End: 2023-04-13
Attending: EMERGENCY MEDICINE | Admitting: FAMILY MEDICINE
Payer: MEDICARE

## 2023-04-07 ENCOUNTER — ANESTHESIA EVENT (OUTPATIENT)
Dept: SURGERY | Facility: MEDICAL CENTER | Age: 58
DRG: 493 | End: 2023-04-07
Payer: MEDICARE

## 2023-04-07 ENCOUNTER — ANESTHESIA (OUTPATIENT)
Dept: SURGERY | Facility: MEDICAL CENTER | Age: 58
DRG: 493 | End: 2023-04-07
Payer: MEDICARE

## 2023-04-07 DIAGNOSIS — M80.00XA OSTEOPOROSIS WITH CURRENT PATHOLOGICAL FRACTURE, UNSPECIFIED OSTEOPOROSIS TYPE, INITIAL ENCOUNTER: ICD-10-CM

## 2023-04-07 DIAGNOSIS — M79.89 SWELLING OF UPPER EXTREMITY: ICD-10-CM

## 2023-04-07 DIAGNOSIS — S82.402A CLOSED FRACTURE OF SHAFT OF LEFT FIBULA, UNSPECIFIED FRACTURE MORPHOLOGY, INITIAL ENCOUNTER: ICD-10-CM

## 2023-04-07 DIAGNOSIS — S82.302A CLOSED FRACTURE OF DISTAL END OF LEFT TIBIA, UNSPECIFIED FRACTURE MORPHOLOGY, INITIAL ENCOUNTER: ICD-10-CM

## 2023-04-07 DIAGNOSIS — S42.209A DISPLACED FRACTURE OF PROXIMAL END OF HUMERUS: ICD-10-CM

## 2023-04-07 DIAGNOSIS — S82.301F: ICD-10-CM

## 2023-04-07 DIAGNOSIS — M80.00XD OSTEOPOROSIS WITH CURRENT PATHOLOGICAL FRACTURE WITH ROUTINE HEALING, UNSPECIFIED OSTEOPOROSIS TYPE, SUBSEQUENT ENCOUNTER: ICD-10-CM

## 2023-04-07 PROBLEM — J96.01 ACUTE RESPIRATORY FAILURE WITH HYPOXIA (HCC): Status: RESOLVED | Noted: 2021-11-19 | Resolved: 2023-04-07

## 2023-04-07 PROBLEM — S82.309A FRACTURE OF DISTAL END OF TIBIA: Status: ACTIVE | Noted: 2023-04-07

## 2023-04-07 PROBLEM — R11.2 NAUSEA & VOMITING: Status: RESOLVED | Noted: 2021-11-19 | Resolved: 2023-04-07

## 2023-04-07 PROBLEM — R19.7 DIARRHEA: Status: RESOLVED | Noted: 2021-11-19 | Resolved: 2023-04-07

## 2023-04-07 PROBLEM — A41.9 SEPSIS (HCC): Status: RESOLVED | Noted: 2021-11-19 | Resolved: 2023-04-07

## 2023-04-07 PROBLEM — N30.00 ACUTE CYSTITIS WITHOUT HEMATURIA: Status: RESOLVED | Noted: 2021-11-20 | Resolved: 2023-04-07

## 2023-04-07 PROBLEM — S82.409A CLOSED FRACTURE OF SHAFT OF FIBULA: Status: ACTIVE | Noted: 2023-04-07

## 2023-04-07 PROBLEM — J21.0 RSV (ACUTE BRONCHIOLITIS DUE TO RESPIRATORY SYNCYTIAL VIRUS): Status: RESOLVED | Noted: 2021-11-20 | Resolved: 2023-04-07

## 2023-04-07 LAB
ANION GAP SERPL CALC-SCNC: 12 MMOL/L (ref 7–16)
BUN SERPL-MCNC: 10 MG/DL (ref 8–22)
CALCIUM SERPL-MCNC: 8.1 MG/DL (ref 8.5–10.5)
CHLORIDE SERPL-SCNC: 105 MMOL/L (ref 96–112)
CO2 SERPL-SCNC: 22 MMOL/L (ref 20–33)
CREAT SERPL-MCNC: 0.58 MG/DL (ref 0.5–1.4)
ERYTHROCYTE [DISTWIDTH] IN BLOOD BY AUTOMATED COUNT: 46.4 FL (ref 35.9–50)
GFR SERPLBLD CREATININE-BSD FMLA CKD-EPI: 104 ML/MIN/1.73 M 2
GLUCOSE SERPL-MCNC: 136 MG/DL (ref 65–99)
HCT VFR BLD AUTO: 35.8 % (ref 37–47)
HGB BLD-MCNC: 11.4 G/DL (ref 12–16)
MCH RBC QN AUTO: 28.1 PG (ref 27–33)
MCHC RBC AUTO-ENTMCNC: 31.8 G/DL (ref 33.6–35)
MCV RBC AUTO: 88.4 FL (ref 81.4–97.8)
PLATELET # BLD AUTO: 145 K/UL (ref 164–446)
PMV BLD AUTO: 10.1 FL (ref 9–12.9)
POTASSIUM SERPL-SCNC: 4.2 MMOL/L (ref 3.6–5.5)
RBC # BLD AUTO: 4.05 M/UL (ref 4.2–5.4)
SODIUM SERPL-SCNC: 139 MMOL/L (ref 135–145)
WBC # BLD AUTO: 9.8 K/UL (ref 4.8–10.8)

## 2023-04-07 PROCEDURE — 160035 HCHG PACU - 1ST 60 MINS PHASE I: Performed by: ORTHOPAEDIC SURGERY

## 2023-04-07 PROCEDURE — 700105 HCHG RX REV CODE 258: Performed by: STUDENT IN AN ORGANIZED HEALTH CARE EDUCATION/TRAINING PROGRAM

## 2023-04-07 PROCEDURE — 700102 HCHG RX REV CODE 250 W/ 637 OVERRIDE(OP): Performed by: STUDENT IN AN ORGANIZED HEALTH CARE EDUCATION/TRAINING PROGRAM

## 2023-04-07 PROCEDURE — 700105 HCHG RX REV CODE 258: Performed by: ORTHOPAEDIC SURGERY

## 2023-04-07 PROCEDURE — 99291 CRITICAL CARE FIRST HOUR: CPT

## 2023-04-07 PROCEDURE — 770001 HCHG ROOM/CARE - MED/SURG/GYN PRIV*

## 2023-04-07 PROCEDURE — 110371 HCHG SHELL REV 272: Performed by: ORTHOPAEDIC SURGERY

## 2023-04-07 PROCEDURE — 0QSH36Z REPOSITION LEFT TIBIA WITH INTRAMEDULLARY INTERNAL FIXATION DEVICE, PERCUTANEOUS APPROACH: ICD-10-PCS | Performed by: ORTHOPAEDIC SURGERY

## 2023-04-07 PROCEDURE — 0PSG04Z REPOSITION LEFT HUMERAL SHAFT WITH INTERNAL FIXATION DEVICE, OPEN APPROACH: ICD-10-PCS | Performed by: ORTHOPAEDIC SURGERY

## 2023-04-07 PROCEDURE — 160041 HCHG SURGERY MINUTES - EA ADDL 1 MIN LEVEL 4: Performed by: ORTHOPAEDIC SURGERY

## 2023-04-07 PROCEDURE — 96374 THER/PROPH/DIAG INJ IV PUSH: CPT

## 2023-04-07 PROCEDURE — 85027 COMPLETE CBC AUTOMATED: CPT

## 2023-04-07 PROCEDURE — 73560 X-RAY EXAM OF KNEE 1 OR 2: CPT | Mod: LT

## 2023-04-07 PROCEDURE — C1713 ANCHOR/SCREW BN/BN,TIS/BN: HCPCS | Performed by: ORTHOPAEDIC SURGERY

## 2023-04-07 PROCEDURE — 73630 X-RAY EXAM OF FOOT: CPT | Mod: RT

## 2023-04-07 PROCEDURE — 700105 HCHG RX REV CODE 258: Performed by: ANESTHESIOLOGY

## 2023-04-07 PROCEDURE — 700111 HCHG RX REV CODE 636 W/ 250 OVERRIDE (IP): Performed by: ORTHOPAEDIC SURGERY

## 2023-04-07 PROCEDURE — 160009 HCHG ANES TIME/MIN: Performed by: ORTHOPAEDIC SURGERY

## 2023-04-07 PROCEDURE — A9270 NON-COVERED ITEM OR SERVICE: HCPCS | Performed by: ANESTHESIOLOGY

## 2023-04-07 PROCEDURE — 160029 HCHG SURGERY MINUTES - 1ST 30 MINS LEVEL 4: Performed by: ORTHOPAEDIC SURGERY

## 2023-04-07 PROCEDURE — 302875 HCHG BANDAGE ACE 4 OR 6""

## 2023-04-07 PROCEDURE — 160048 HCHG OR STATISTICAL LEVEL 1-5: Performed by: ORTHOPAEDIC SURGERY

## 2023-04-07 PROCEDURE — 700101 HCHG RX REV CODE 250: Performed by: ANESTHESIOLOGY

## 2023-04-07 PROCEDURE — 73590 X-RAY EXAM OF LOWER LEG: CPT | Mod: LT

## 2023-04-07 PROCEDURE — 29515 APPLICATION SHORT LEG SPLINT: CPT

## 2023-04-07 PROCEDURE — 73060 X-RAY EXAM OF HUMERUS: CPT | Mod: LT

## 2023-04-07 PROCEDURE — A9270 NON-COVERED ITEM OR SERVICE: HCPCS | Performed by: STUDENT IN AN ORGANIZED HEALTH CARE EDUCATION/TRAINING PROGRAM

## 2023-04-07 PROCEDURE — 73700 CT LOWER EXTREMITY W/O DYE: CPT | Mod: LT

## 2023-04-07 PROCEDURE — 700111 HCHG RX REV CODE 636 W/ 250 OVERRIDE (IP): Performed by: ANESTHESIOLOGY

## 2023-04-07 PROCEDURE — 302874 HCHG BANDAGE ACE 2 OR 3""

## 2023-04-07 PROCEDURE — 05QF0ZZ REPAIR LEFT CEPHALIC VEIN, OPEN APPROACH: ICD-10-PCS | Performed by: ORTHOPAEDIC SURGERY

## 2023-04-07 PROCEDURE — 73552 X-RAY EXAM OF FEMUR 2/>: CPT | Mod: LT

## 2023-04-07 PROCEDURE — 80048 BASIC METABOLIC PNL TOTAL CA: CPT

## 2023-04-07 PROCEDURE — 96376 TX/PRO/DX INJ SAME DRUG ADON: CPT

## 2023-04-07 PROCEDURE — 93005 ELECTROCARDIOGRAM TRACING: CPT | Performed by: ORTHOPAEDIC SURGERY

## 2023-04-07 PROCEDURE — 160002 HCHG RECOVERY MINUTES (STAT): Performed by: ORTHOPAEDIC SURGERY

## 2023-04-07 PROCEDURE — 700111 HCHG RX REV CODE 636 W/ 250 OVERRIDE (IP): Performed by: EMERGENCY MEDICINE

## 2023-04-07 PROCEDURE — 01392 ANES OPN PX UPR TIB FIB&/PAT: CPT | Performed by: ANESTHESIOLOGY

## 2023-04-07 PROCEDURE — 700102 HCHG RX REV CODE 250 W/ 637 OVERRIDE(OP): Performed by: ANESTHESIOLOGY

## 2023-04-07 PROCEDURE — 160036 HCHG PACU - EA ADDL 30 MINS PHASE I: Performed by: ORTHOPAEDIC SURGERY

## 2023-04-07 PROCEDURE — 502240 HCHG MISC OR SUPPLY RC 0272: Performed by: ORTHOPAEDIC SURGERY

## 2023-04-07 DEVICE — WIRE FIXATION KIRSCHNER TROCAR POINT: Type: IMPLANTABLE DEVICE | Site: HUMERUS | Status: FUNCTIONAL

## 2023-04-07 DEVICE — IMPLANTABLE DEVICE: Type: IMPLANTABLE DEVICE | Site: HUMERUS | Status: FUNCTIONAL

## 2023-04-07 DEVICE — WIRE FIXATION KIRSCHNER T2 WCH L285 MM OD3 MM STERILE: Type: IMPLANTABLE DEVICE | Site: TIBIA | Status: FUNCTIONAL

## 2023-04-07 DEVICE — 4MM LOCKING SCREW 4.0MM  L42MM: Type: IMPLANTABLE DEVICE | Site: HUMERUS | Status: FUNCTIONAL

## 2023-04-07 DEVICE — ADVANCED LOCKING SCREW 5X45MM: Type: IMPLANTABLE DEVICE | Site: TIBIA | Status: FUNCTIONAL

## 2023-04-07 DEVICE — ADVANCED LOCKING SCREW 5X40MM: Type: IMPLANTABLE DEVICE | Site: TIBIA | Status: FUNCTIONAL

## 2023-04-07 DEVICE — IMPLANTABLE DEVICE: Type: IMPLANTABLE DEVICE | Site: TIBIA | Status: FUNCTIONAL

## 2023-04-07 DEVICE — ADVANCED LOCKING SCREW 5X42.5MM: Type: IMPLANTABLE DEVICE | Site: TIBIA | Status: FUNCTIONAL

## 2023-04-07 DEVICE — 4MM LOCKING SCREW 4.0MM  L40MM: Type: IMPLANTABLE DEVICE | Site: HUMERUS | Status: FUNCTIONAL

## 2023-04-07 RX ORDER — MEPERIDINE HYDROCHLORIDE 25 MG/ML
6.25 INJECTION INTRAMUSCULAR; INTRAVENOUS; SUBCUTANEOUS
Status: DISCONTINUED | OUTPATIENT
Start: 2023-04-07 | End: 2023-04-07 | Stop reason: HOSPADM

## 2023-04-07 RX ORDER — SODIUM CHLORIDE 9 MG/ML
INJECTION, SOLUTION INTRAVENOUS CONTINUOUS
Status: DISCONTINUED | OUTPATIENT
Start: 2023-04-07 | End: 2023-04-08

## 2023-04-07 RX ORDER — ONDANSETRON 2 MG/ML
INJECTION INTRAMUSCULAR; INTRAVENOUS PRN
Status: DISCONTINUED | OUTPATIENT
Start: 2023-04-07 | End: 2023-04-07 | Stop reason: SURG

## 2023-04-07 RX ORDER — LABETALOL HYDROCHLORIDE 5 MG/ML
5 INJECTION, SOLUTION INTRAVENOUS
Status: DISCONTINUED | OUTPATIENT
Start: 2023-04-07 | End: 2023-04-07 | Stop reason: HOSPADM

## 2023-04-07 RX ORDER — HYDROMORPHONE HYDROCHLORIDE 1 MG/ML
0.4 INJECTION, SOLUTION INTRAMUSCULAR; INTRAVENOUS; SUBCUTANEOUS
Status: DISCONTINUED | OUTPATIENT
Start: 2023-04-07 | End: 2023-04-07 | Stop reason: HOSPADM

## 2023-04-07 RX ORDER — PROMETHAZINE HYDROCHLORIDE 25 MG/1
12.5-25 SUPPOSITORY RECTAL EVERY 4 HOURS PRN
Status: DISCONTINUED | OUTPATIENT
Start: 2023-04-07 | End: 2023-04-13 | Stop reason: HOSPADM

## 2023-04-07 RX ORDER — OXYCODONE HYDROCHLORIDE 5 MG/1
5 TABLET ORAL
Status: DISCONTINUED | OUTPATIENT
Start: 2023-04-07 | End: 2023-04-10

## 2023-04-07 RX ORDER — SODIUM CHLORIDE, SODIUM LACTATE, POTASSIUM CHLORIDE, CALCIUM CHLORIDE 600; 310; 30; 20 MG/100ML; MG/100ML; MG/100ML; MG/100ML
INJECTION, SOLUTION INTRAVENOUS
Status: DISCONTINUED | OUTPATIENT
Start: 2023-04-07 | End: 2023-04-07 | Stop reason: SURG

## 2023-04-07 RX ORDER — HYDROMORPHONE HYDROCHLORIDE 1 MG/ML
0.2 INJECTION, SOLUTION INTRAMUSCULAR; INTRAVENOUS; SUBCUTANEOUS
Status: DISCONTINUED | OUTPATIENT
Start: 2023-04-07 | End: 2023-04-07 | Stop reason: HOSPADM

## 2023-04-07 RX ORDER — DEXAMETHASONE SODIUM PHOSPHATE 4 MG/ML
INJECTION, SOLUTION INTRA-ARTICULAR; INTRALESIONAL; INTRAMUSCULAR; INTRAVENOUS; SOFT TISSUE PRN
Status: DISCONTINUED | OUTPATIENT
Start: 2023-04-07 | End: 2023-04-07 | Stop reason: SURG

## 2023-04-07 RX ORDER — ALBUTEROL SULFATE 2.5 MG/3ML
2.5 SOLUTION RESPIRATORY (INHALATION)
Status: DISCONTINUED | OUTPATIENT
Start: 2023-04-07 | End: 2023-04-07 | Stop reason: HOSPADM

## 2023-04-07 RX ORDER — POLYETHYLENE GLYCOL 3350 17 G/17G
1 POWDER, FOR SOLUTION ORAL
Status: DISCONTINUED | OUTPATIENT
Start: 2023-04-07 | End: 2023-04-08

## 2023-04-07 RX ORDER — AMOXICILLIN 250 MG
2 CAPSULE ORAL 2 TIMES DAILY
Status: DISCONTINUED | OUTPATIENT
Start: 2023-04-07 | End: 2023-04-08

## 2023-04-07 RX ORDER — SODIUM CHLORIDE, SODIUM LACTATE, POTASSIUM CHLORIDE, CALCIUM CHLORIDE 600; 310; 30; 20 MG/100ML; MG/100ML; MG/100ML; MG/100ML
INJECTION, SOLUTION INTRAVENOUS CONTINUOUS
Status: DISCONTINUED | OUTPATIENT
Start: 2023-04-07 | End: 2023-04-07 | Stop reason: HOSPADM

## 2023-04-07 RX ORDER — ONDANSETRON 2 MG/ML
4 INJECTION INTRAMUSCULAR; INTRAVENOUS
Status: DISCONTINUED | OUTPATIENT
Start: 2023-04-07 | End: 2023-04-07 | Stop reason: HOSPADM

## 2023-04-07 RX ORDER — OXYCODONE HCL 5 MG/5 ML
10 SOLUTION, ORAL ORAL
Status: COMPLETED | OUTPATIENT
Start: 2023-04-07 | End: 2023-04-07

## 2023-04-07 RX ORDER — ONDANSETRON 2 MG/ML
4 INJECTION INTRAMUSCULAR; INTRAVENOUS EVERY 4 HOURS PRN
Status: DISCONTINUED | OUTPATIENT
Start: 2023-04-07 | End: 2023-04-13 | Stop reason: HOSPADM

## 2023-04-07 RX ORDER — HYDROMORPHONE HYDROCHLORIDE 2 MG/ML
INJECTION, SOLUTION INTRAMUSCULAR; INTRAVENOUS; SUBCUTANEOUS PRN
Status: DISCONTINUED | OUTPATIENT
Start: 2023-04-07 | End: 2023-04-07 | Stop reason: SURG

## 2023-04-07 RX ORDER — KETAMINE HYDROCHLORIDE 50 MG/ML
INJECTION, SOLUTION, CONCENTRATE INTRAMUSCULAR; INTRAVENOUS PRN
Status: DISCONTINUED | OUTPATIENT
Start: 2023-04-07 | End: 2023-04-07 | Stop reason: SURG

## 2023-04-07 RX ORDER — ROCURONIUM BROMIDE 10 MG/ML
INJECTION, SOLUTION INTRAVENOUS PRN
Status: DISCONTINUED | OUTPATIENT
Start: 2023-04-07 | End: 2023-04-07 | Stop reason: SURG

## 2023-04-07 RX ORDER — LIDOCAINE HYDROCHLORIDE 40 MG/ML
SOLUTION TOPICAL PRN
Status: DISCONTINUED | OUTPATIENT
Start: 2023-04-07 | End: 2023-04-07 | Stop reason: SURG

## 2023-04-07 RX ORDER — CEFAZOLIN SODIUM 1 G/3ML
INJECTION, POWDER, FOR SOLUTION INTRAMUSCULAR; INTRAVENOUS PRN
Status: DISCONTINUED | OUTPATIENT
Start: 2023-04-07 | End: 2023-04-07 | Stop reason: SURG

## 2023-04-07 RX ORDER — PHENYLEPHRINE HCL IN 0.9% NACL 0.5 MG/5ML
SYRINGE (ML) INTRAVENOUS PRN
Status: DISCONTINUED | OUTPATIENT
Start: 2023-04-07 | End: 2023-04-07 | Stop reason: SURG

## 2023-04-07 RX ORDER — HYDROMORPHONE HYDROCHLORIDE 1 MG/ML
0.25 INJECTION, SOLUTION INTRAMUSCULAR; INTRAVENOUS; SUBCUTANEOUS
Status: DISCONTINUED | OUTPATIENT
Start: 2023-04-07 | End: 2023-04-10

## 2023-04-07 RX ORDER — HYDROMORPHONE HYDROCHLORIDE 1 MG/ML
0.5 INJECTION, SOLUTION INTRAMUSCULAR; INTRAVENOUS; SUBCUTANEOUS ONCE
Status: COMPLETED | OUTPATIENT
Start: 2023-04-07 | End: 2023-04-07

## 2023-04-07 RX ORDER — PROMETHAZINE HYDROCHLORIDE 25 MG/1
12.5-25 TABLET ORAL EVERY 4 HOURS PRN
Status: DISCONTINUED | OUTPATIENT
Start: 2023-04-07 | End: 2023-04-13 | Stop reason: HOSPADM

## 2023-04-07 RX ORDER — OXYCODONE HYDROCHLORIDE 5 MG/1
2.5 TABLET ORAL
Status: DISCONTINUED | OUTPATIENT
Start: 2023-04-07 | End: 2023-04-10

## 2023-04-07 RX ORDER — ACETAMINOPHEN/DIPHENHYDRAMINE 500MG-25MG
2 TABLET ORAL
COMMUNITY
End: 2023-04-21

## 2023-04-07 RX ORDER — BISACODYL 10 MG
10 SUPPOSITORY, RECTAL RECTAL
Status: DISCONTINUED | OUTPATIENT
Start: 2023-04-07 | End: 2023-04-08

## 2023-04-07 RX ORDER — DIPHENHYDRAMINE HYDROCHLORIDE 50 MG/ML
12.5 INJECTION INTRAMUSCULAR; INTRAVENOUS
Status: DISCONTINUED | OUTPATIENT
Start: 2023-04-07 | End: 2023-04-07 | Stop reason: HOSPADM

## 2023-04-07 RX ORDER — HYDROMORPHONE HYDROCHLORIDE 1 MG/ML
0.1 INJECTION, SOLUTION INTRAMUSCULAR; INTRAVENOUS; SUBCUTANEOUS
Status: DISCONTINUED | OUTPATIENT
Start: 2023-04-07 | End: 2023-04-07 | Stop reason: HOSPADM

## 2023-04-07 RX ORDER — ACETAMINOPHEN 325 MG/1
650 TABLET ORAL EVERY 6 HOURS PRN
Status: DISCONTINUED | OUTPATIENT
Start: 2023-04-07 | End: 2023-04-13 | Stop reason: HOSPADM

## 2023-04-07 RX ORDER — OXYCODONE HCL 5 MG/5 ML
5 SOLUTION, ORAL ORAL
Status: COMPLETED | OUTPATIENT
Start: 2023-04-07 | End: 2023-04-07

## 2023-04-07 RX ORDER — EPHEDRINE SULFATE 50 MG/ML
5 INJECTION, SOLUTION INTRAVENOUS
Status: DISCONTINUED | OUTPATIENT
Start: 2023-04-07 | End: 2023-04-07 | Stop reason: HOSPADM

## 2023-04-07 RX ORDER — LIDOCAINE HYDROCHLORIDE 20 MG/ML
INJECTION, SOLUTION EPIDURAL; INFILTRATION; INTRACAUDAL; PERINEURAL PRN
Status: DISCONTINUED | OUTPATIENT
Start: 2023-04-07 | End: 2023-04-07 | Stop reason: SURG

## 2023-04-07 RX ORDER — HYDRALAZINE HYDROCHLORIDE 20 MG/ML
5 INJECTION INTRAMUSCULAR; INTRAVENOUS
Status: DISCONTINUED | OUTPATIENT
Start: 2023-04-07 | End: 2023-04-07 | Stop reason: HOSPADM

## 2023-04-07 RX ORDER — ONDANSETRON 4 MG/1
4 TABLET, ORALLY DISINTEGRATING ORAL EVERY 4 HOURS PRN
Status: DISCONTINUED | OUTPATIENT
Start: 2023-04-07 | End: 2023-04-13 | Stop reason: HOSPADM

## 2023-04-07 RX ORDER — KETOROLAC TROMETHAMINE 30 MG/ML
INJECTION, SOLUTION INTRAMUSCULAR; INTRAVENOUS PRN
Status: DISCONTINUED | OUTPATIENT
Start: 2023-04-07 | End: 2023-04-07 | Stop reason: SURG

## 2023-04-07 RX ORDER — HYDROMORPHONE HYDROCHLORIDE 1 MG/ML
1 INJECTION, SOLUTION INTRAMUSCULAR; INTRAVENOUS; SUBCUTANEOUS ONCE
Status: COMPLETED | OUTPATIENT
Start: 2023-04-07 | End: 2023-04-07

## 2023-04-07 RX ORDER — PROCHLORPERAZINE EDISYLATE 5 MG/ML
5-10 INJECTION INTRAMUSCULAR; INTRAVENOUS EVERY 4 HOURS PRN
Status: DISCONTINUED | OUTPATIENT
Start: 2023-04-07 | End: 2023-04-13 | Stop reason: HOSPADM

## 2023-04-07 RX ORDER — HALOPERIDOL 5 MG/ML
1 INJECTION INTRAMUSCULAR
Status: DISCONTINUED | OUTPATIENT
Start: 2023-04-07 | End: 2023-04-07 | Stop reason: HOSPADM

## 2023-04-07 RX ADMIN — Medication 200 MCG: at 14:05

## 2023-04-07 RX ADMIN — SODIUM CHLORIDE: 9 INJECTION, SOLUTION INTRAVENOUS at 18:51

## 2023-04-07 RX ADMIN — HYDROMORPHONE HYDROCHLORIDE 0.5 MG: 2 INJECTION INTRAMUSCULAR; INTRAVENOUS; SUBCUTANEOUS at 16:10

## 2023-04-07 RX ADMIN — Medication 100 MCG: at 14:17

## 2023-04-07 RX ADMIN — HYDROMORPHONE HYDROCHLORIDE 1 MG: 1 INJECTION, SOLUTION INTRAMUSCULAR; INTRAVENOUS; SUBCUTANEOUS at 11:53

## 2023-04-07 RX ADMIN — SODIUM CHLORIDE, POTASSIUM CHLORIDE, SODIUM LACTATE AND CALCIUM CHLORIDE: 600; 310; 30; 20 INJECTION, SOLUTION INTRAVENOUS at 13:40

## 2023-04-07 RX ADMIN — OXYCODONE HYDROCHLORIDE 10 MG: 5 SOLUTION ORAL at 17:43

## 2023-04-07 RX ADMIN — HYDROMORPHONE HYDROCHLORIDE 0.2 MG: 1 INJECTION, SOLUTION INTRAMUSCULAR; INTRAVENOUS; SUBCUTANEOUS at 17:14

## 2023-04-07 RX ADMIN — HYDROMORPHONE HYDROCHLORIDE 0.4 MG: 1 INJECTION, SOLUTION INTRAMUSCULAR; INTRAVENOUS; SUBCUTANEOUS at 17:01

## 2023-04-07 RX ADMIN — Medication 25 MG: at 14:12

## 2023-04-07 RX ADMIN — LIDOCAINE HYDROCHLORIDE 4 ML: 40 SOLUTION TOPICAL at 13:47

## 2023-04-07 RX ADMIN — Medication 200 MCG: at 14:25

## 2023-04-07 RX ADMIN — KETOROLAC TROMETHAMINE 30 MG: 30 INJECTION, SOLUTION INTRAMUSCULAR at 16:21

## 2023-04-07 RX ADMIN — Medication 25 MG: at 16:22

## 2023-04-07 RX ADMIN — DEXAMETHASONE SODIUM PHOSPHATE 8 MG: 4 INJECTION, SOLUTION INTRA-ARTICULAR; INTRALESIONAL; INTRAMUSCULAR; INTRAVENOUS; SOFT TISSUE at 15:27

## 2023-04-07 RX ADMIN — HYDROMORPHONE HYDROCHLORIDE 0.4 MG: 1 INJECTION, SOLUTION INTRAMUSCULAR; INTRAVENOUS; SUBCUTANEOUS at 16:50

## 2023-04-07 RX ADMIN — CEFAZOLIN 2 G: 330 INJECTION, POWDER, FOR SOLUTION INTRAMUSCULAR; INTRAVENOUS at 14:01

## 2023-04-07 RX ADMIN — HYDROMORPHONE HYDROCHLORIDE 0.5 MG: 1 INJECTION, SOLUTION INTRAMUSCULAR; INTRAVENOUS; SUBCUTANEOUS at 09:42

## 2023-04-07 RX ADMIN — ONDANSETRON 4 MG: 2 INJECTION INTRAMUSCULAR; INTRAVENOUS at 16:21

## 2023-04-07 RX ADMIN — HYDROMORPHONE HYDROCHLORIDE 0.5 MG: 1 INJECTION, SOLUTION INTRAMUSCULAR; INTRAVENOUS; SUBCUTANEOUS at 08:25

## 2023-04-07 RX ADMIN — FENTANYL CITRATE 50 MCG: 50 INJECTION, SOLUTION INTRAMUSCULAR; INTRAVENOUS at 13:44

## 2023-04-07 RX ADMIN — OXYCODONE 5 MG: 5 TABLET ORAL at 23:21

## 2023-04-07 RX ADMIN — HYDROMORPHONE HYDROCHLORIDE 0.5 MG: 2 INJECTION INTRAMUSCULAR; INTRAVENOUS; SUBCUTANEOUS at 15:16

## 2023-04-07 RX ADMIN — SUGAMMADEX 200 MG: 100 INJECTION, SOLUTION INTRAVENOUS at 16:21

## 2023-04-07 RX ADMIN — CEFAZOLIN 2 G: 2 INJECTION, POWDER, FOR SOLUTION INTRAMUSCULAR; INTRAVENOUS at 22:30

## 2023-04-07 RX ADMIN — LIDOCAINE HYDROCHLORIDE 100 MG: 20 INJECTION, SOLUTION EPIDURAL; INFILTRATION; INTRACAUDAL at 13:46

## 2023-04-07 RX ADMIN — ROCURONIUM BROMIDE 100 MG: 10 INJECTION, SOLUTION INTRAVENOUS at 13:46

## 2023-04-07 RX ADMIN — FENTANYL CITRATE 50 MCG: 50 INJECTION, SOLUTION INTRAMUSCULAR; INTRAVENOUS at 14:11

## 2023-04-07 RX ADMIN — PROPOFOL 200 MG: 10 INJECTION, EMULSION INTRAVENOUS at 13:46

## 2023-04-07 ASSESSMENT — PAIN DESCRIPTION - PAIN TYPE
TYPE: SURGICAL PAIN
TYPE: ACUTE PAIN
TYPE: SURGICAL PAIN

## 2023-04-07 ASSESSMENT — PAIN SCALES - GENERAL: PAIN_LEVEL: 4

## 2023-04-07 ASSESSMENT — FIBROSIS 4 INDEX: FIB4 SCORE: 1.48

## 2023-04-07 NOTE — H&P
[facility] MEDICINE HISTORY AND PHYSICAL     PATIENT ID:  NAME:  Addie Orellana  MRN:               [id number]  YOB: 1965    Date of Admission: 4/7/2023     Attending: Dr. Méndez     Resident: Bhavya Valente MD     Primary Care Physician:  Dr. Barbosa     CC:  fall     HPI: History is obtained from chart review: Addie Orellana is a 57 y.o. female with PMHx breast cancer s/p 6 years in remission, obesity who presented after a GLF.  Notes state that patient slipped and fell this morning.  She did not have loss of consciousness.  However, she presented with left arm pain and left leg pain.    ERCourse:  Vitals: Blood pressure 179/105; heart rate 89; pulse ox 99% on room air  CT ankle: Distal tibial fracture  X-ray: Diffuse osteopenia noted.  Displaced proximal humeral fracture; spiral fracture of distal tibia; midshaft left fibular fracture    Ortho was consulted from the emergency room and patient was immediately taken to the OR for interventions.      REVIEW OF SYSTEMS:   Ten systems reviewed and were negative except as noted in the HPI.                PAST MEDICAL HISTORY:  Past Medical History:   Diagnosis Date    Breast cancer, stage 3 (HCC)     Cancer (HCC) 2018    stage 3 breast, chemo 2019    Dental disorder     Partial upper and lower    Hemorrhagic disorder (HCC)     easily bruises    Pneumonia     first of week of her chemo-months ago       PAST SURGICAL HISTORY:  Past Surgical History:   Procedure Laterality Date    MASTECTOMY Bilateral 3/13/2019    Procedure: MASTECTOMY- SIMPLE  ;  Surgeon: Elena Grossman M.D.;  Location: [facility];  Service: General    NODE BIOPSY SENTINEL Left 3/13/2019    Procedure: LEFT AXILLARY SENTINAL LYMPH NODE BIOPSY;  Surgeon: Elena Grossman M.D.;  Location: [facility];  Service: General    AXILLARY NODE DISSECTION Left 3/13/2019    Procedure: AXILLARY NODE DISSECTION ;  Surgeon: Elena Grossman M.D.;  Location: [facility]  ORS;  Service: General    NODE BIOPSY Left 3/13/2019    Procedure: LYMPH NODE INJECTION;  Surgeon: Elena Grossman M.D.;  Location: SURGERY [facility];  Service: General    ANKLE FUSION      GASTRIC BYPASS LAPAROSCOPIC      OTHER ABDOMINAL SURGERY      luanne    OTHER ORTHOPEDIC SURGERY Bilateral     foot surgery to repair arches       FAMILY HISTORY:  Family History   Problem Relation Age of Onset    Cancer Paternal Aunt         lung cancer    Cancer Paternal Grandfather         lung cancer       SOCIAL HISTORY: Unable to obtain due to patient being in the operating room    DIET:   Orders Placed This Encounter   Procedures    Diet NPO Restrict to: Strict     Standing Status:   Standing     Number of Occurrences:   1     Order Specific Question:   Diet NPO Restrict to:     Answer:   Strict [1]       ALLERGIES:  No Known Allergies    OUTPATIENT MEDICATIONS:    Current Facility-Administered Medications:     senna-docusate (PERICOLACE or SENOKOT S) 8.6-50 MG per tablet 2 Tablet, 2 Tablet, Oral, BID **AND** polyethylene glycol/lytes (MIRALAX) PACKET 1 Packet, 1 Packet, Oral, QDAY PRN **AND** magnesium hydroxide (MILK OF MAGNESIA) suspension 30 mL, 30 mL, Oral, QDAY PRN **AND** bisacodyl (DULCOLAX) suppository 10 mg, 10 mg, Rectal, QDAY PRN, Bhavya Valente M.D.    NS infusion, , Intravenous, Continuous, Bhavya Valente M.D.    acetaminophen (Tylenol) tablet 650 mg, 650 mg, Oral, Q6HRS PRN, Bhavya Valente M.D.    Notify provider if pain remains uncontrolled, , , CONTINUOUS **AND** Use the Numeric Rating Scale (NRS), Mares-Baker Faces (WBF), or FLACC on regular floors and Critical-Care Pain Observation Tool (CPOT) on ICUs/Trauma to assess pain, , , CONTINUOUS **AND** Pulse Ox, , , CONTINUOUS **AND** Pharmacy Consult Request ...Pain Management Review 1 Each, 1 Each, Other, PHARMACY TO DOSE **AND** If patient difficult to arouse and/or has respiratory depression (respiratory rate of 10 or less), stop any  opiates that are currently infusing and call a Rapid Response., , , CONTINUOUS, Bhavya Valente M.D.    oxyCODONE immediate-release (ROXICODONE) tablet 2.5 mg, 2.5 mg, Oral, Q3HRS PRN **OR** oxyCODONE immediate-release (ROXICODONE) tablet 5 mg, 5 mg, Oral, Q3HRS PRN **OR** HYDROmorphone (Dilaudid) injection 0.25 mg, 0.25 mg, Intravenous, Q3HRS PRN, Bhavya Valente M.D.    ondansetron (ZOFRAN) syringe/vial injection 4 mg, 4 mg, Intravenous, Q4HRS PRN, Bhavya Valente M.D.    ondansetron (ZOFRAN ODT) dispertab 4 mg, 4 mg, Oral, Q4HRS PRN, Bhavya Valente M.D.    promethazine (PHENERGAN) tablet 12.5-25 mg, 12.5-25 mg, Oral, Q4HRS PRN, Bhavya Valente M.D.    promethazine (PHENERGAN) suppository 12.5-25 mg, 12.5-25 mg, Rectal, Q4HRS PRN, Bhavya Valente M.D.    prochlorperazine (COMPAZINE) injection 5-10 mg, 5-10 mg, Intravenous, Q4HRS PRN, Bhavya Valente M.D.    Facility-Administered Medications Ordered in Other Encounters:     fentaNYL (SUBLIMAZE) injection, , Intravenous, PRN, Miki Carson M.D., 50 mcg at 04/07/23 1411    ketamine 50 mg/ml, , Intravenous, PRN, Miki Carson M.D., 25 mg at 04/07/23 1412    Lactated Ringers, , Intravenous, Intra-Op Continuous, Miki Carson M.D., New Bag at 04/07/23 1340    ceFAZolin (ANCEF) injection, , Intravenous, PRN, Miki Carson M.D., 2 g at 04/07/23 1401    propofol (DIPRIVAN) injection, , Intravenous, PRN, Miki Carson M.D., 200 mg at 04/07/23 1346    rocuronium (ZEMURON) injection, , Intravenous, PRN, Miki Carson M.D., 100 mg at 04/07/23 1346    lidocaine PF (XYLOCAINE-MPF) 2 % injection PF, , Intravenous, PRN, Miki Carson M.D., 100 mg at 04/07/23 1346    lidocaine 4% (LYRING-O-JET) topical soln, , Intratracheal, PRN, Miki Carson M.D., 4 mL at 04/07/23 1347    phenylephrine (TERRELL-SYNEPHRINE) 100 mcg/mL inj (IV Push Syringe), , Intravenous, PRN, Miki Carson M.D., 200 mcg at 04/07/23 1425    PHYSICAL  EXAM:  Vitals:    23 0930 23 1000 23 1030 23 1225   BP: (!) 167/81 (!) 144/87  (!) 140/94   Pulse: (!) 105 (!) 107  (!) 116   Resp: 16 16  16   Temp:       TempSrc:    Temporal   SpO2: 90%  95% 91%   Weight:       Height:       , Temp (24hrs), Av.1 °C (97 °F), Min:36.1 °C (97 °F), Max:36.1 °C (97 °F)  , Pulse Oximetry: 91 %, O2 (LPM): 0, O2 Delivery Device: None - Room Air    Physical exam: Deferred due to patient being in the operating room      LAB TESTS:   Recent Labs     23  1230   WBC 9.8   RBC 4.05*   HEMOGLOBIN 11.4*   HEMATOCRIT 35.8*   MCV 88.4   MCH 28.1   RDW 46.4   PLATELETCT 145*   MPV 10.1         Recent Labs     23  1230   SODIUM 139   POTASSIUM 4.2   CHLORIDE 105   CO2 22   BUN 10   CREATININE 0.58   CALCIUM 8.1*       CULTURES:   Results       ** No results found for the last 168 hours. **            IMAGES:  CT-ANKLE W/O PLUS RECONS LEFT   Final Result      1.  Comminuted and mildly displaced distal tibial diaphyseal fracture without evidence of articular extension.   2.  Comminuted and mildly displaced mid/distal fibular shaft fracture.   3.  Osteopenia.   4.  Skin thickening and mild subcutaneous edema of the visualized lower extremity. This may reflect inflammation or scar.      DX-FOOT-COMPLETE 3+ RIGHT   Final Result         1.  No radiographic evidence of acute injury.      2.  Osteoporosis.      3.  Previous mid and hindfoot fusion.      DX-FEMUR-2+ LEFT   Final Result      1.  Marked osteopenia without evidence of displaced fracture.   2.  Moderate degenerative changes of the left hip joint.      DX-KNEE 2- LEFT   Final Result      1.  Marked osteopenia.   2.  Remote appearing deformity of the proximal fibular shaft. Acute on chronic injury not excluded.   3.  Possible small joint effusion.   4.  Tricompartmental degenerative changes.      DX-HUMERUS 2+ LEFT   Final Result      1.  Comminuted and displaced proximal humeral shaft fracture.   2.   Decreased osseous mineralization.      DX-TIBIA AND FIBULA LEFT   Final Result      1.  Osteopenia.      2.  Spiral fracture of the left distal tibia in near anatomic alignment.      3.  Comminuted fracture of mid shaft of left fibula in near-anatomic alignment.      4.  Oblique fractures of the left proximal fibula in near-anatomic alignment.      DX-PORTABLE FLUORO > 1 HOUR    (Results Pending)   DX-TIBIA AND FIBULA LEFT    (Results Pending)   DX-HUMERUS 2+ LEFT    (Results Pending)       CONSULTS:   Ortho surgery     ASSESSMENT/PLAN:   History is obtained from chart review: Addie Orellana is a 57 y.o. female with PMHx breast cancer s/p 6 years in remission, obesity who presented after a GLF.  Admitted for humerus, fibular, tibial fractures and planned surgical repair.    * Displaced fracture of proximal end of humerus- (present on admission)  Assessment & Plan  Ortho is following; currently in the OR  - Tylenol as needed mild pain  - Oxycodone 5 mg to 10 mg for moderate pain  - IV Dilaudid for severe pain  - PT OT consults placed    Closed fracture of shaft of fibula  Assessment & Plan  Ortho is following; currently in the OR  - Tylenol as needed mild pain  - Oxycodone 5 mg to 10 mg for moderate pain  - IV Dilaudid for severe pain  - PT OT consults placed    Osteoporosis with current pathological fracture  Assessment & Plan  Diffuse osteopenia and osteoporosis is noted on x-rays.  Likely secondary to chemotherapy and radiation therapy for aggressive breast cancer.  - To discuss bisphosphonate therapy with patient    Fracture of distal end of tibia  Assessment & Plan  Ortho is following; currently in the OR  - Tylenol as needed mild pain  - Oxycodone 5 mg to 10 mg for moderate pain  - IV Dilaudid for severe pain  - PT OT consults placed    Malignant neoplasm of breast (HCC)- (present on admission)  Assessment & Plan  History of breast cancer. ER/WA positive Her2 kamala negative, poorly differentiated, high-grade.  Per  chart review-patient has stated she is in remission.  No current oncology notes are on record.  - Discuss further with patient after surgery      Core Measures:   Fluids: N.p.o. for surgery; IV fluids after  Lines: IVP  Abx: Intraoperative  DVT prophylaxis: Holding due to surgery  Code Status: Full code-we will confirm with patient tomorrow    Bhavya Valente MD  PGY-3  [facility]

## 2023-04-07 NOTE — ED NOTES
Pharmacy Medication Reconciliation      ~Medication reconciliation updated and complete per patient at bedside   ~Allergies have been verified   ~No oral ABX within the last 30 days  ~Patient home pharmacy :  [facility]

## 2023-04-07 NOTE — PROGRESS NOTES
57yoF with left displaced tibia/fibula fxs s/p IMN and left displaced humerus fracture s/p ORIF 4/6.      S: Having some pain in PACU wakes and follows commands    O:    Vitals:    04/07/23 1645   BP: (!) 142/74   Pulse: 100   Resp: 20   Temp:    SpO2: 98%     Exam:  General-mild distress due to pain, following commands  LUE-dressing c/d/I, NVI distally  LLE-dressing c/d/I, flexing/extending toes, BCR in toes    A: 57yoF with left displaced tibia/fibula fxs s/p IMN and left displaced humerus fracture s/p ORIF 4/6.      Recs:  --readmit postop  --WBAT LLE in boot  --NWB LUE with sling for comfort, okay for shoulder/elbow ROM as tolerated  --ancef x 2 doses postop  --okay to start DVT chemoprophylaxis tomorrow am, continue SCDs  --fu 2 weeks postop

## 2023-04-07 NOTE — ASSESSMENT & PLAN NOTE
POD#6 S/P   1.  Open treatment with intramedullary nailing, left tibia shaft fracture.  2.  Closed treatment with manipulation, left fibular shaft fracture.  3.  Open treatment with internal fixation, left humeral shaft fracture    - Tylenol and oxycodone as needed mild pain

## 2023-04-07 NOTE — ED TRIAGE NOTES
Chief Complaint   Patient presents with    GLF     Pt biba [place] from home, pt slipped on dog urine, pt fell on left side causing pain above her left ankle, and left shoulder, pt was given 50 mcg fentanyl x2, 4mg zofran, 30 of fentanyl in route.  -LOC, -thinners     BP (!) 179/105   Pulse 89   Temp 36.1 °C (97 °F) (Temporal)   Resp 18   Ht 1.524 m (5')   Wt 104 kg (230 lb)   SpO2 99%   BMI 44.92 kg/m²

## 2023-04-07 NOTE — ANESTHESIA PREPROCEDURE EVALUATION
Case: [id number] Date/Time: 04/07/23 1300    Procedures:       INSERTION, INTRAMEDULLARY HENNY, TIBIA      INSERTION, INTRAMEDULLARY HENNY, HUMERUS    Location: [facility]  / SURGERY [facility]    Surgeons: Joselito Claire M.D.        Ground level fall, obesity, L humerus fx, left tibia fx.     Relevant Problems   CARDIAC   (positive) Livedo reticularis   (positive) Peripheral venous insufficiency   (positive) Varicose veins       Physical Exam    Airway   Mallampati: III  TM distance: >3 FB  Neck ROM: full       Cardiovascular - normal exam  Rhythm: regular  Rate: normal  (-) murmur     Dental - normal exam           Pulmonary - normal exam  Breath sounds clear to auscultation     Abdominal    Neurological - normal exam                 Anesthesia Plan    ASA 3   ASA physical status 3 criteria: morbid obesity - BMI greater than or equal to 40    Plan - general       Airway plan will be ETT          Induction: intravenous    Postoperative Plan: Postoperative administration of opioids is intended.    Pertinent diagnostic labs and testing reviewed    Informed Consent:    Anesthetic plan and risks discussed with patient.    Use of blood products discussed with: patient whom consented to blood products.

## 2023-04-07 NOTE — ASSESSMENT & PLAN NOTE
History of breast cancer. ER/CA positive Her2 kamala negative, poorly differentiated, high-grade.  Per chart review-patient has stated she is in remission.  No current oncology notes are on record.  - f/u outpatient

## 2023-04-07 NOTE — ED PROVIDER NOTES
ER Provider Note    Scribed for Yuliana Islas M.d. by Sonia Hunt. 4/7/2023  8:05 AM    Primary Care Provider: Iliana Barbosa M.D.    CHIEF COMPLAINT  Chief Complaint   Patient presents with    GLF     Pt biba [place] from home, pt slipped on dog urine, pt fell on left side causing pain above her left ankle, and left shoulder, pt was given 50 mcg fentanyl x2, 4mg zofran, 30 of fentanyl in route.  -LOC, -thinners     LIMITATION TO HISTORY   Select: : None    HPI/ROS  OUTSIDE HISTORIAN(S):  None    EXTERNAL RECORDS REVIEWED  Inpatient Notes History of breast cancer 7 years ago.  Admitted last November for RSV pneumonia.     Addie Orellana is a 57 y.o. female who presents to the ED via [facility] for evaluation of ground level fall onset prior to arrival. She endorses slipping and falling in dog urine. There was no loss of consciousness. But, it is questionable whether patient hit her head or not. She denies headache or dizziness. En route to the ED, patient was medicated with 50 mcg Fentanyl twice and 2 mg Zofran. She admits to associated symptoms of left arm pain, left lower extremity pain, and right big toe pain,  but denies left hip pain, loss of sensation, new extremity tingling, neck pain, back pain, shortness of breath, or rib pain. No alleviating factors were reported. She is unable to move her left arm of left leg due to pain and its not able to ambulate. Patient is not on blood thinners. Addie drinks alcohol.       PAST MEDICAL HISTORY  Past Medical History:   Diagnosis Date    Breast cancer, stage 3 (HCC)     Cancer (HCC) 2018    stage 3 breast, chemo 2019    Dental disorder     Partial upper and lower    Hemorrhagic disorder (HCC)     easily bruises    Pneumonia     first of week of her chemo-months ago       SURGICAL HISTORY  Past Surgical History:   Procedure Laterality Date    MASTECTOMY Bilateral 3/13/2019    Procedure: MASTECTOMY- SIMPLE  ;  Surgeon: Elena Grossman M.D.;  Location: SURGERY  [facility];  Service: General    NODE BIOPSY SENTINEL Left 3/13/2019    Procedure: LEFT AXILLARY SENTINAL LYMPH NODE BIOPSY;  Surgeon: Elena Grossman M.D.;  Location: SURGERY [facility];  Service: General    AXILLARY NODE DISSECTION Left 3/13/2019    Procedure: AXILLARY NODE DISSECTION ;  Surgeon: Elena Grossman M.D.;  Location: SURGERY [facility];  Service: General    NODE BIOPSY Left 3/13/2019    Procedure: LYMPH NODE INJECTION;  Surgeon: Elena Grossman M.D.;  Location: SURGERY [facility];  Service: General    ANKLE FUSION      GASTRIC BYPASS LAPAROSCOPIC      OTHER ABDOMINAL SURGERY      luanne    OTHER ORTHOPEDIC SURGERY Bilateral     foot surgery to repair arches       FAMILY HISTORY  Family History   Problem Relation Age of Onset    Cancer Paternal Aunt         lung cancer    Cancer Paternal Grandfather         lung cancer       SOCIAL HISTORY   reports that she has never smoked. She has never used smokeless tobacco. She reports current alcohol use of about 0.6 oz per week. She reports that she does not use drugs.    CURRENT MEDICATIONS  Previous Medications    ACETAMINOPHEN (TYLENOL) 500 MG TAB    Take 500-1,000 mg by mouth every 6 hours as needed for Mild Pain.    CALCIUM PO    Take 1 Tablet by mouth every day.    DIPHENHYDRAMINE-APAP, SLEEP, (TYLENOL PM EXTRA STRENGTH)  MG TAB    Take 2 Tablets by mouth at bedtime as needed (SLEEP).       ALLERGIES  Patient has no known allergies.    PHYSICAL EXAM  BP (!) 179/105   Pulse 89   Temp 36.1 °C (97 °F) (Temporal)   Resp 18   Ht 1.524 m (5')   Wt 104 kg (230 lb)   SpO2 99%   BMI 44.92 kg/m²     Constitutional:  Well developed, moderate distress, Non-toxic appearance.   HENT: Normocephalic, Atraumatic, Bilateral external ears normal, Oropharynx moist, No oral exudates, Nose normal.   Eyes: PERRL, EOMI, Conjunctiva normal, No discharge.   Neck: Normal range of motion, Supple, No stridor. No midline tenderness  Cardiovascular:  Normal heart rate, Normal rhythm  Thorax & Lungs: Normal breath sounds, No respiratory distress, No chest tenderness.   Abdomen: Benign abdominal exam, no guarding no rebound, no tenderness, no distention  Skin: Warm, Dry, No erythema, No rash.   Back: No tenderness, No CVA tenderness.   Extremities: Intact distal pulses, No edema, tenderness to the tibfib area on left with limited range of motion. No knee effusion. Intact sensation to light touch. No tenderness over the hip. Left arm tenderness with limited range of motion. No anterior fullness. Good  strength. +2 radial pulse. No tenderness over the forearm.   Neurologic: Alert & oriented x 3, Normal motor function, Normal sensory function, No focal deficits noted.   Psychiatric: appropriate      DIAGNOSTIC STUDIES & PROCEDURES    Radiology:   The attending Emergency Physician has independently interpreted the diagnostic imaging associated with this visit and is awaiting the final reading from the radiologist, which will be displayed below.    Preliminary interpretation is a follows: fracture of left humerus and left tib/fib  Radiologist interpretation:     CT-ANKLE W/O PLUS RECONS LEFT   Final Result      1.  Comminuted and mildly displaced distal tibial diaphyseal fracture without evidence of articular extension.   2.  Comminuted and mildly displaced mid/distal fibular shaft fracture.   3.  Osteopenia.   4.  Skin thickening and mild subcutaneous edema of the visualized lower extremity. This may reflect inflammation or scar.      DX-FOOT-COMPLETE 3+ RIGHT   Final Result         1.  No radiographic evidence of acute injury.      2.  Osteoporosis.      3.  Previous mid and hindfoot fusion.      DX-FEMUR-2+ LEFT   Final Result      1.  Marked osteopenia without evidence of displaced fracture.   2.  Moderate degenerative changes of the left hip joint.      DX-KNEE 2- LEFT   Final Result      1.  Marked osteopenia.   2.  Remote appearing deformity of the proximal  fibular shaft. Acute on chronic injury not excluded.   3.  Possible small joint effusion.   4.  Tricompartmental degenerative changes.      DX-HUMERUS 2+ LEFT   Final Result      1.  Comminuted and displaced proximal humeral shaft fracture.   2.  Decreased osseous mineralization.      DX-TIBIA AND FIBULA LEFT   Final Result      1.  Osteopenia.      2.  Spiral fracture of the left distal tibia in near anatomic alignment.      3.  Comminuted fracture of mid shaft of left fibula in near-anatomic alignment.      4.  Oblique fractures of the left proximal fibula in near-anatomic alignment.      DX-PORTABLE FLUORO > 1 HOUR    (Results Pending)   DX-TIBIA AND FIBULA LEFT    (Results Pending)   DX-HUMERUS 2+ LEFT    (Results Pending)        COURSE & MEDICAL DECISION MAKING    ED Observation Status? Yes; I am placing the patient in to an observation status due to a diagnostic uncertainty as well as therapeutic intensity. Patient placed in observation status at 8:12 AM, 4/7/2023.     Observation plan is as follows: X-ray imaging to evaluate for possible fractures from her fall.    Upon Reevaluation, the patient's condition has: not improved; and will be escalated to hospitalization.    Patient discharged from ED Observation status at 12:08 PM (Time) 4/7/2023. (Date).     INITIAL ASSESSMENT AND PLAN  Care Narrative: Patient presents to the emergency department after a fall.  Patient is unsure if she hit her head.  However she denies loss of consciousness.  She denies headache or dizziness.  She denies feeling confused.  She has a GCS of 15.  Nonfocal neurologic exam.  Will not proceed with head CT at this point.  No neck or back pain.  No numbness or tingling.  History does not suggest syncope.  Obvious pain to left arm and left leg.  Requiring multiple doses of pain medication due to her severe pain.  We will proceed with imaging to further evaluate for fracture.  She is neurovascularly intact.      8:05 AM - Patient seen  and evaluated at bedside. Addie Orellana is a 57 y.o. female who presents with ground level fall. Patient will be treated with Dilaudid 0.5 mg for her symptoms. Ordered DX-tibia left, DX-fibula left, and DX-knee left to evaluate. She understands and agrees to the plan of care. Patient has a GCS of 15. There was no head injury. She is not on anticoagulation so we will hold head CT. Will rule out fracture vs contusion of extremity.     9:36 AM - Patient was reevaluated at bedside. Discussed radiology results with the patient; she has fractures in her fibula and tibia. Informed patient that we are still waiting on another scan and will plan to consult ortho. Patient verbalizes understanding and agreement to this plan of care.     9:38 AM - Patient will be treated with Dilaudid injection 0.5 mg.     10:58 AM - [facility].     11:12 AM I discussed the patient's case and the above findings with Dr. Claire (Ortho) who will take patient to operation room.      11:14 AM - Updated patient on plan for surgery. Patient verbalizes understanding and agreement to this plan of care.     11:17 AM - [facility]    12:02 PM I discussed the patient's case and the above findings with Dr. Palomino ([facility] hospital) who will kindly agreed to evaluate the patient.                       DISPOSITION AND DISCUSSIONS  I have discussed management of the patient with the following physicians and THALIA's: Dr. Claire (Ortho) and Dr. Palomino ([facility])     Discussion of management with other [facility] or appropriate source(s): None     Barriers to care at this time, including but not limited to:  None .       DISPOSITION:  Patient will be hospitalized by Dr. Palomino in guarded condition.    FINAL IMPRESSION   Fall  Closed spiral fracture of distal tibia  Closed midshaft fibula fracture  Closed spiral fracture of proximal humerus     ISonia (Pretty), am scribing for, and in the presence of, Yuliana Islas M.D..    Electronically signed  by: Sonia Hunt (Scribe), 4/7/2023    IYuliana M.D. personally performed the services described in this documentation, as scribed by Sonia Hunt in my presence, and it is both accurate and complete.    The note accurately reflects work and decisions made by me.  Yuliana Islas M.D.  4/7/2023  3:51 PM

## 2023-04-07 NOTE — PROGRESS NOTES
57yoF with left displaced tibia/fibula fxs and left displaced humerus fracture.  Planning surgical fixation for both injuries.  See full dictated consultation for further details.

## 2023-04-07 NOTE — ANESTHESIA PROCEDURE NOTES
Airway    Date/Time: 4/7/2023 1:47 PM  Performed by: Miki Carson M.D.  Authorized by: Miki Carson M.D.     Location:  OR  Urgency:  Elective  Indications for Airway Management:  Anesthesia      Spontaneous Ventilation: absent    Sedation Level:  Deep  Preoxygenated: Yes    Patient Position:  Sniffing  Mask Difficulty Assessment:  0 - not attempted  Final Airway Type:  Endotracheal airway  Final Endotracheal Airway:  ETT  Cuffed: Yes    Technique Used for Successful ETT Placement:  Direct laryngoscopy    Insertion Site:  Oral  Blade Type:  Chip  Laryngoscope Blade/Videolaryngoscope Blade Size:  3  ETT Size (mm):  7.0  Measured from:  Lips  ETT to Lips (cm):  21  Placement Verified by: auscultation and capnometry    Cormack-Lehane Classification:  Grade I - full view of glottis  Number of Attempts at Approach:  1  Number of Other Approaches Attempted:  0

## 2023-04-07 NOTE — CONSULTS
DATE OF SERVICE:  04/07/2023     ORTHOPEDIC CONSULTATION     REQUESTING PHYSICIAN:  Yuliana Islas MD, emergency department.     REASON FOR CONSULTATION:  Left humerus fracture and left distal tibia fracture   and fibula fractures.     HISTORY OF PRESENT ILLNESS:  The patient is 57 years old.  She slipped at home   injuring her left leg and her left arm.  She does have a history of   osteoporosis.  She has had multiple surgeries in the left ankle and foot in   the past, specifically fibula fixation and hindfoot fusion.  She denies   numbness or paresthesias in the left lower or left upper extremities.  She   denies other obvious injuries.  She does have a remote history of breast   cancer and states she is in remission from that.     ALLERGIES:  No known drug allergies.     OUTPATIENT MEDICATIONS:  Tylenol, Zofran, Arimidex, albuterol, therapeutic   multivitamins, calcium, magnesium.     PAST MEDICAL DIAGNOSES:  Suspected osteoporosis, history of stage III breast   cancer, status post chemotherapy and mastectomy.     PAST SURGICAL HISTORY:  Left ankle arthrodesis, left distal fibula fracture   fixation, gastric bypass surgery, mastectomy, cholecystectomy.     SOCIAL HISTORY:  The patient is a nonsmoker.  She does drink alcohol daily.    She denies illicit drug use.     PHYSICAL EXAMINATION:  VITAL SIGNS:  Temperature is 97, heart rate 103, respiratory rate 15, blood   pressure 174/93, pulse oximetry 96% on room air.  GENERAL APPEARANCE:  The patient is alert, pleasant, cooperative, in no acute   distress, lying supine on the ER gurney.  MUSCULOSKELETAL:  Left upper extremity, she has a sling in place.  She has   sensation intact to light touch in the axillary, median, radial and ulnar   nerve distribution.  She has motor function intact with AIN, PIN, median,   radial and ulnar nerve distributions with palpable radial pulse.  Left lower   extremity, she has mild swelling.  There are no wounds.  She is able to    slightly dorsi and plantarflex the foot, and flex and extend toes.  She has   sensation intact to light touch in the toes and a palpable dorsalis pedis   pulse.  She is nontender to palpation in left knee.  There is no evidence of   obvious traumatic deformity of the right lower or right upper extremity, which   are grossly neurovascularly intact.     DIAGNOSTIC IMAGING:  Plain x-rays of the left humerus shows a comminuted   displaced proximal third humerus shaft fracture.  Plain x-rays of the left   tibia and fibula show a comminuted displaced distal third tibia shaft fracture   and a fibular shaft fracture proximal to the retained deep implants in the   distal fibula.     ASSESSMENT:  A 57-year-old female with clinical suspicion for osteoporosis and   pathologic fracture consisting of a left proximal third humerus shaft   fracture and left distal third tibia and fibula fractures adjacent to retained   deep implants.     RECOMMENDATIONS:  1.  I discussed findings with the patient.  I do feel she would benefit from   surgical reduction and fixation for the tibia and the humerus fractures given   the combination of injuries and the degree of displacement.  We discussed   alternatives to surgery being nonoperative management, which I feel will be   suboptimal with regard to fracture healing and her rehabilitation.  2.  We did discuss potential risks of surgery such as infection, loss of   fixation, nonunion, malunion, potential implant prominence requiring removal,   potential for injury to neurovascular structures and general risks of   anesthesia.  She expressed understanding and wished to proceed with surgery   when possible.  3.  Recommend admission to the medical service and should be nonweightbearing   left upper and left lower extremities in the meantime I will make preparations   to get her to the operating room today for surgical management.        ______________________________  Joselito Claire,  MD LOONEY/SHAVONNE/VERA    DD:  04/07/2023 11:57  DT:  04/07/2023 12:47    Job#:  [id number]   Negative

## 2023-04-07 NOTE — OR SURGEON
Immediate Post OP Note    PreOp Diagnosis: Left displaced tibia/fibula shaft fractures, left comminuted/displaced humerus shaft fracture      PostOp Diagnosis: same      Procedure(s):  INSERTION, INTRAMEDULLARY HENNY, TIBIA - Wound Class: Clean  INSERTION, INTRAMEDULLARY HENNY, HUMERUS - Wound Class: Clean    Surgeon(s):  Joselito Claire M.D.    Anesthesiologist/Type of Anesthesia:  Anesthesiologist: Miki Carson M.D./General    Surgical Staff:  Circulator: Bernadette Teran R.N.  Scrub Person: Myriam Adkins  Radiology Technologist: Shirley Martínez; Aaliyah Adams    Specimens removed if any:  * No specimens in log *    Estimated Blood Loss: 400cc    Findings: see dictation    Complications: none known    PLAN:  --readmit postop  --WBAT LLE in boot  --NWB LUE with sling for comfort, okay for shoulder/elbow ROM as tolerated  --ancef x 2 doses postop  --okay to start DVT chemoprophylaxis tomorrow am, continue SCDs  --fu 2 weeks postop        4/7/2023 4:35 PM Joselito Claire M.D.

## 2023-04-07 NOTE — ANESTHESIA POSTPROCEDURE EVALUATION
Patient: Addie Orellana    Procedure Summary     Date: 04/07/23 Room / Location: [place] [id number] / SURGERY [facility]    Anesthesia Start: 1340 Anesthesia Stop: 1637    Procedures:       INSERTION, INTRAMEDULLARY HENNY, TIBIA (Left: Leg Lower)      INSERTION, INTRAMEDULLARY HENNY, HUMERUS (Left: Arm Upper) Diagnosis: (left displaced tibia/fibula fxs and left displaced humerus fracture)    Surgeons: Joselito Claire M.D. Responsible Provider: Miki Carson M.D.    Anesthesia Type: general ASA Status: 3          Final Anesthesia Type: general  Last vitals  BP   Blood Pressure: (!) 140/94    Temp   36.1 °C (97 °F)    Pulse   (!) 116   Resp   16    SpO2   91 %      Anesthesia Post Evaluation    Patient location during evaluation: PACU  Patient participation: complete - patient participated  Level of consciousness: sleepy but conscious  Pain score: 4    Airway patency: patent  Anesthetic complications: no  Cardiovascular status: hemodynamically stable  Respiratory status: acceptable  Hydration status: euvolemic    PONV: none          There were no known notable events for this encounter.     Nurse Pain Score: 10 (NPRS)

## 2023-04-07 NOTE — ED NOTES
Pt resting in bed, eyes closed, equal chest rise noted, no signs of distress noted at this time, will continue to monitor

## 2023-04-08 LAB
ALBUMIN SERPL BCP-MCNC: 3 G/DL (ref 3.2–4.9)
ALBUMIN/GLOB SERPL: 1.1 G/DL
ALP SERPL-CCNC: 70 U/L (ref 30–99)
ALT SERPL-CCNC: 12 U/L (ref 2–50)
ANION GAP SERPL CALC-SCNC: 9 MMOL/L (ref 7–16)
AST SERPL-CCNC: 15 U/L (ref 12–45)
BASOPHILS # BLD AUTO: 0.1 % (ref 0–1.8)
BASOPHILS # BLD: 0.01 K/UL (ref 0–0.12)
BILIRUB SERPL-MCNC: 0.3 MG/DL (ref 0.1–1.5)
BUN SERPL-MCNC: 12 MG/DL (ref 8–22)
CALCIUM ALBUM COR SERPL-MCNC: 8.8 MG/DL (ref 8.5–10.5)
CALCIUM SERPL-MCNC: 8 MG/DL (ref 8.5–10.5)
CHLORIDE SERPL-SCNC: 102 MMOL/L (ref 96–112)
CO2 SERPL-SCNC: 22 MMOL/L (ref 20–33)
CREAT SERPL-MCNC: 0.71 MG/DL (ref 0.5–1.4)
EKG IMPRESSION: NORMAL
EOSINOPHIL # BLD AUTO: 0 K/UL (ref 0–0.51)
EOSINOPHIL NFR BLD: 0 % (ref 0–6.9)
ERYTHROCYTE [DISTWIDTH] IN BLOOD BY AUTOMATED COUNT: 46.3 FL (ref 35.9–50)
GFR SERPLBLD CREATININE-BSD FMLA CKD-EPI: 98 ML/MIN/1.73 M 2
GLOBULIN SER CALC-MCNC: 2.8 G/DL (ref 1.9–3.5)
GLUCOSE SERPL-MCNC: 198 MG/DL (ref 65–99)
HCT VFR BLD AUTO: 32.3 % (ref 37–47)
HGB BLD-MCNC: 9.9 G/DL (ref 12–16)
IMM GRANULOCYTES # BLD AUTO: 0.06 K/UL (ref 0–0.11)
IMM GRANULOCYTES NFR BLD AUTO: 0.7 % (ref 0–0.9)
LYMPHOCYTES # BLD AUTO: 0.52 K/UL (ref 1–4.8)
LYMPHOCYTES NFR BLD: 5.6 % (ref 22–41)
MCH RBC QN AUTO: 27.7 PG (ref 27–33)
MCHC RBC AUTO-ENTMCNC: 30.7 G/DL (ref 33.6–35)
MCV RBC AUTO: 90.5 FL (ref 81.4–97.8)
MONOCYTES # BLD AUTO: 0.37 K/UL (ref 0–0.85)
MONOCYTES NFR BLD AUTO: 4 % (ref 0–13.4)
NEUTROPHILS # BLD AUTO: 8.26 K/UL (ref 2–7.15)
NEUTROPHILS NFR BLD: 89.6 % (ref 44–72)
NRBC # BLD AUTO: 0 K/UL
NRBC BLD-RTO: 0 /100 WBC
PLATELET # BLD AUTO: 128 K/UL (ref 164–446)
PMV BLD AUTO: 10.2 FL (ref 9–12.9)
POTASSIUM SERPL-SCNC: 4.8 MMOL/L (ref 3.6–5.5)
PROT SERPL-MCNC: 5.8 G/DL (ref 6–8.2)
RBC # BLD AUTO: 3.57 M/UL (ref 4.2–5.4)
SODIUM SERPL-SCNC: 133 MMOL/L (ref 135–145)
WBC # BLD AUTO: 9.2 K/UL (ref 4.8–10.8)

## 2023-04-08 PROCEDURE — 97166 OT EVAL MOD COMPLEX 45 MIN: CPT

## 2023-04-08 PROCEDURE — 97163 PT EVAL HIGH COMPLEX 45 MIN: CPT

## 2023-04-08 PROCEDURE — 51798 US URINE CAPACITY MEASURE: CPT

## 2023-04-08 PROCEDURE — 80053 COMPREHEN METABOLIC PANEL: CPT

## 2023-04-08 PROCEDURE — A9270 NON-COVERED ITEM OR SERVICE: HCPCS | Performed by: STUDENT IN AN ORGANIZED HEALTH CARE EDUCATION/TRAINING PROGRAM

## 2023-04-08 PROCEDURE — 36415 COLL VENOUS BLD VENIPUNCTURE: CPT

## 2023-04-08 PROCEDURE — 700111 HCHG RX REV CODE 636 W/ 250 OVERRIDE (IP)

## 2023-04-08 PROCEDURE — 770001 HCHG ROOM/CARE - MED/SURG/GYN PRIV*

## 2023-04-08 PROCEDURE — 93010 ELECTROCARDIOGRAM REPORT: CPT | Performed by: INTERNAL MEDICINE

## 2023-04-08 PROCEDURE — 700111 HCHG RX REV CODE 636 W/ 250 OVERRIDE (IP): Performed by: ORTHOPAEDIC SURGERY

## 2023-04-08 PROCEDURE — 99223 1ST HOSP IP/OBS HIGH 75: CPT | Mod: GC | Performed by: FAMILY MEDICINE

## 2023-04-08 PROCEDURE — 700105 HCHG RX REV CODE 258: Performed by: ORTHOPAEDIC SURGERY

## 2023-04-08 PROCEDURE — 97535 SELF CARE MNGMENT TRAINING: CPT

## 2023-04-08 PROCEDURE — 97530 THERAPEUTIC ACTIVITIES: CPT

## 2023-04-08 PROCEDURE — 85025 COMPLETE CBC W/AUTO DIFF WBC: CPT

## 2023-04-08 PROCEDURE — 700102 HCHG RX REV CODE 250 W/ 637 OVERRIDE(OP)

## 2023-04-08 PROCEDURE — 700102 HCHG RX REV CODE 250 W/ 637 OVERRIDE(OP): Performed by: STUDENT IN AN ORGANIZED HEALTH CARE EDUCATION/TRAINING PROGRAM

## 2023-04-08 PROCEDURE — A9270 NON-COVERED ITEM OR SERVICE: HCPCS

## 2023-04-08 RX ORDER — ENOXAPARIN SODIUM 100 MG/ML
30 INJECTION SUBCUTANEOUS EVERY 12 HOURS
Status: DISCONTINUED | OUTPATIENT
Start: 2023-04-08 | End: 2023-04-13 | Stop reason: HOSPADM

## 2023-04-08 RX ORDER — BISACODYL 10 MG
10 SUPPOSITORY, RECTAL RECTAL
Status: DISCONTINUED | OUTPATIENT
Start: 2023-04-08 | End: 2023-04-13 | Stop reason: HOSPADM

## 2023-04-08 RX ORDER — ENOXAPARIN SODIUM 100 MG/ML
40 INJECTION SUBCUTANEOUS EVERY 12 HOURS
Status: DISCONTINUED | OUTPATIENT
Start: 2023-04-08 | End: 2023-04-08

## 2023-04-08 RX ORDER — POLYETHYLENE GLYCOL 3350 17 G/17G
1 POWDER, FOR SOLUTION ORAL DAILY
Status: DISCONTINUED | OUTPATIENT
Start: 2023-04-08 | End: 2023-04-13 | Stop reason: HOSPADM

## 2023-04-08 RX ORDER — AMOXICILLIN 250 MG
2 CAPSULE ORAL 2 TIMES DAILY
Status: DISCONTINUED | OUTPATIENT
Start: 2023-04-08 | End: 2023-04-13 | Stop reason: HOSPADM

## 2023-04-08 RX ADMIN — ENOXAPARIN SODIUM 30 MG: 30 INJECTION SUBCUTANEOUS at 08:31

## 2023-04-08 RX ADMIN — OXYCODONE 5 MG: 5 TABLET ORAL at 05:14

## 2023-04-08 RX ADMIN — SENNOSIDES AND DOCUSATE SODIUM 2 TABLET: 50; 8.6 TABLET ORAL at 18:16

## 2023-04-08 RX ADMIN — OXYCODONE 5 MG: 5 TABLET ORAL at 14:51

## 2023-04-08 RX ADMIN — OXYCODONE 5 MG: 5 TABLET ORAL at 21:53

## 2023-04-08 RX ADMIN — POLYETHYLENE GLYCOL 3350 1 PACKET: 17 POWDER, FOR SOLUTION ORAL at 14:50

## 2023-04-08 RX ADMIN — CEFAZOLIN 2 G: 2 INJECTION, POWDER, FOR SOLUTION INTRAMUSCULAR; INTRAVENOUS at 05:13

## 2023-04-08 RX ADMIN — SENNOSIDES AND DOCUSATE SODIUM 2 TABLET: 50; 8.6 TABLET ORAL at 05:11

## 2023-04-08 RX ADMIN — ENOXAPARIN SODIUM 30 MG: 30 INJECTION SUBCUTANEOUS at 21:53

## 2023-04-08 RX ADMIN — OXYCODONE 5 MG: 5 TABLET ORAL at 09:44

## 2023-04-08 ASSESSMENT — ACTIVITIES OF DAILY LIVING (ADL): TOILETING: INDEPENDENT

## 2023-04-08 ASSESSMENT — COGNITIVE AND FUNCTIONAL STATUS - GENERAL
SUGGESTED CMS G CODE MODIFIER MOBILITY: CN
HELP NEEDED FOR BATHING: A LOT
CLIMB 3 TO 5 STEPS WITH RAILING: TOTAL
TURNING FROM BACK TO SIDE WHILE IN FLAT BAD: UNABLE
MOVING FROM LYING ON BACK TO SITTING ON SIDE OF FLAT BED: UNABLE
STANDING UP FROM CHAIR USING ARMS: TOTAL
DRESSING REGULAR UPPER BODY CLOTHING: A LOT
MOVING TO AND FROM BED TO CHAIR: UNABLE
WALKING IN HOSPITAL ROOM: TOTAL
DRESSING REGULAR LOWER BODY CLOTHING: A LOT
SUGGESTED CMS G CODE MODIFIER DAILY ACTIVITY: CK
PERSONAL GROOMING: A LITTLE
MOBILITY SCORE: 6
DAILY ACTIVITIY SCORE: 15
TOILETING: A LOT

## 2023-04-08 ASSESSMENT — PAIN DESCRIPTION - PAIN TYPE
TYPE: ACUTE PAIN;SURGICAL PAIN
TYPE: SURGICAL PAIN
TYPE: ACUTE PAIN;SURGICAL PAIN
TYPE: SURGICAL PAIN

## 2023-04-08 ASSESSMENT — GAIT ASSESSMENTS: GAIT LEVEL OF ASSIST: UNABLE TO PARTICIPATE

## 2023-04-08 NOTE — CARE PLAN
The patient is Stable - Low risk of patient condition declining or worsening    Shift Goals  Clinical Goals: mobility; pain control  Patient Goals: comfort; rest  Family Goals: not present    Progress made toward(s) clinical / shift goals:    Problem: Pain - Standard  Goal: Alleviation of pain or a reduction in pain to the patient’s comfort goal  Outcome: Progressing     Problem: Knowledge Deficit - Standard  Goal: Patient and family/care givers will demonstrate understanding of plan of care, disease process/condition, diagnostic tests and medications  Outcome: Progressing       Patient is not progressing towards the following goals:

## 2023-04-08 NOTE — PROGRESS NOTES
[facility] MEDICINE PROGRESS NOTE        Attending:   Maritza Méndez MD    Resident:   Olga Macedo D.O.    PATIENT:   Addie Orellana; [id number]; 1965    ID:   Addie Orellana is a 57 y.o. female with PMHx breast cancer s/p 6 years in remission, obesity who presented after a GLF.  Admitted for humerus, fibular, tibial fractures.     SUBJECTIVE:   Pt is now s/p insertions of intramedullary rods to L tibia and humerus by ortho yesterday.  No complications.    Ortho recs: Weightbearing okay with left lower extremity, range of motion with left upper extremities without weightbearing for the next 6 weeks.  Ancef 2 doses for postop prophylaxis.  Start DVT prophylaxis this a.m.-4/8.  Follow-up Ortho 2 weeks postop for wound check, staple removal and x-rays.    On encounter, patient is she has a diagnosis of osteogenesis imperfecta.  Patient is in pain but is not in agony at this time.  She complains of left hand numbness which slightly decreased in the afternoon.  Patient understands she will need a good amount of time to recover.    On second encounter, patient states that PT saw patient and it was very painful to participate in therapy.  PT/OT recommended postacute placement for additional ED and OT services prior to discharge home.    In the afternoon, patient was cleared to DC to SNF per Ortho.    OBJECTIVE:  Vitals:    04/08/23 0400 04/08/23 0818 04/08/23 1200 04/08/23 1616   BP: 114/73 116/75 113/75 102/63   Pulse: (!) 103 90 (!) 101 (!) 115   Resp: 17 13 15 18   Temp: 36.9 °C (98.4 °F) 36.7 °C (98.1 °F) 37 °C (98.6 °F) 36.6 °C (97.9 °F)   TempSrc: Temporal  Temporal Temporal   SpO2: 95% 97% 90% 93%   Weight:       Height:           Intake/Output Summary (Last 24 hours) at 4/8/2023 0643  Last data filed at 4/7/2023 1800  Gross per 24 hour   Intake 3850 ml   Output 500 ml   Net 3350 ml       PHYSICAL EXAM:  General: resting in bed, no acute distress but in mild pain  HEENT: NC/AT. EOMI.   Cardiovascular: RRR  without murmurs. Normal capillary refill   Respiratory: CTAB  Abdomen: soft, nontender, nondistended, no masses  EXT: Left arm with bandage over it and significant swelling over humerus, left leg and Ortho boot.  Skin: No erythema/lesions other than presumed to be under Ortho bandages.  Neuro: Non-focal    LABS:  Recent Labs     04/07/23  1230 04/08/23  0045   WBC 9.8 9.2   RBC 4.05* 3.57*   HEMOGLOBIN 11.4* 9.9*   HEMATOCRIT 35.8* 32.3*   MCV 88.4 90.5   MCH 28.1 27.7   RDW 46.4 46.3   PLATELETCT 145* 128*   MPV 10.1 10.2   NEUTSPOLYS  --  89.60*   LYMPHOCYTES  --  5.60*   MONOCYTES  --  4.00   EOSINOPHILS  --  0.00   BASOPHILS  --  0.10     Recent Labs     04/07/23  1230 04/08/23  0045   SODIUM 139 133*   POTASSIUM 4.2 4.8   CHLORIDE 105 102   CO2 22 22   BUN 10 12   CREATININE 0.58 0.71   CALCIUM 8.1* 8.0*   ALBUMIN  --  3.0*     Estimated GFR/CRCL = Estimated Creatinine Clearance: 93.9 mL/min (by C-G formula based on SCr of 0.71 mg/dL).  Recent Labs     04/07/23  1230 04/08/23  0045   GLUCOSE 136* 198*     Recent Labs     04/08/23  0045   ASTSGOT 15   ALTSGPT 12   TBILIRUBIN 0.3   ALKPHOSPHAT 70   GLOBULIN 2.8             No results for input(s): INR, APTT, FIBRINOGEN in the last 72 hours.    Invalid input(s): DIMER      IMAGING:  DX-TIBIA AND FIBULA LEFT   Final Result      Portable fluoroscopy as described.      DX-HUMERUS 2+ LEFT   Final Result      Portable fluoroscopy as described.      CT-ANKLE W/O PLUS RECONS LEFT   Final Result      1.  Comminuted and mildly displaced distal tibial diaphyseal fracture without evidence of articular extension.   2.  Comminuted and mildly displaced mid/distal fibular shaft fracture.   3.  Osteopenia.   4.  Skin thickening and mild subcutaneous edema of the visualized lower extremity. This may reflect inflammation or scar.      DX-FOOT-COMPLETE 3+ RIGHT   Final Result         1.  No radiographic evidence of acute injury.      2.  Osteoporosis.      3.  Previous mid and  hindfoot fusion.      DX-FEMUR-2+ LEFT   Final Result      1.  Marked osteopenia without evidence of displaced fracture.   2.  Moderate degenerative changes of the left hip joint.      DX-KNEE 2- LEFT   Final Result      1.  Marked osteopenia.   2.  Remote appearing deformity of the proximal fibular shaft. Acute on chronic injury not excluded.   3.  Possible small joint effusion.   4.  Tricompartmental degenerative changes.      DX-HUMERUS 2+ LEFT   Final Result      1.  Comminuted and displaced proximal humeral shaft fracture.   2.  Decreased osseous mineralization.      DX-TIBIA AND FIBULA LEFT   Final Result      1.  Osteopenia.      2.  Spiral fracture of the left distal tibia in near anatomic alignment.      3.  Comminuted fracture of mid shaft of left fibula in near-anatomic alignment.      4.  Oblique fractures of the left proximal fibula in near-anatomic alignment.      DX-PORTABLE FLUORO > 1 HOUR    (Results Pending)       MEDS:  Current Facility-Administered Medications   Medication Last Admin    enoxaparin (Lovenox) inj 30 mg 30 mg at 04/08/23 0831    senna-docusate (PERICOLACE or SENOKOT S) 8.6-50 MG per tablet 2 Tablet      And    polyethylene glycol/lytes (MIRALAX) PACKET 1 Packet 1 Packet at 04/08/23 1450    And    magnesium hydroxide (MILK OF MAGNESIA) suspension 30 mL      And    bisacodyl (DULCOLAX) suppository 10 mg      NS infusion Stopped at 04/08/23 0040    acetaminophen (Tylenol) tablet 650 mg      Pharmacy Consult Request ...Pain Management Review 1 Each      oxyCODONE immediate-release (ROXICODONE) tablet 2.5 mg      Or    oxyCODONE immediate-release (ROXICODONE) tablet 5 mg 5 mg at 04/08/23 1451    Or    HYDROmorphone (Dilaudid) injection 0.25 mg      ondansetron (ZOFRAN) syringe/vial injection 4 mg      ondansetron (ZOFRAN ODT) dispertab 4 mg      promethazine (PHENERGAN) tablet 12.5-25 mg      promethazine (PHENERGAN) suppository 12.5-25 mg      prochlorperazine (COMPAZINE) injection 5-10  mg         ASSESSMENT/PLAN:    Malignant neoplasm of breast (HCC)  History of breast cancer. ER/CA positive Her2 kamala negative, poorly differentiated, high-grade.  Per chart review-patient has stated she is in remission.  No current oncology notes are on record.  - Discuss further with patient after surgery    Displaced fracture of proximal end of humerus  S/p open intramedullary ambreen of left humerus.  DC to SNF per Ortho, PT/OT recommend DC to postacute rehab for continued therapy before discharge home.  - Tylenol as needed mild pain  - Oxycodone 5 mg to 10 mg for moderate pain  - IV Dilaudid for severe pain  - Plan to address placement at rehab/SNF tomorrow.    Fracture of distal end of tibia  S/p intramedullary ambreen of tibia on 4/8.  - Tylenol as needed mild pain  - Oxycodone 5 mg to 10 mg for moderate pain  - IV Dilaudid for severe pain  - Address DC to rehab/SNF tomorrow, recommended per Ortho and PT/OT.    Osteoporosis with current pathological fracture  Diffuse osteopenia and osteoporosis is noted on x-rays.  Likely secondary to chemotherapy and radiation therapy for aggressive breast cancer.  DEXA sca order found to be canceled, no results noted  Patient notes she has history of osteogenesis imperfecta. only noted once on previous charts for surgery.  - Consider discussing bisphosphonate therapy with patient, possibly outpatient    Closed fracture of shaft of fibula  S/p closed fibular fixation with screws (in conjunction  on 4/8.  - Tylenol as needed mild pain  - Oxycodone 5 mg to 10 mg for moderate pain  - IV Dilaudid for severe pain  - Address DC to rehab/SNF tomorrow, recommended per Ortho and PT/OT.    Core Measures:  Fluids: PO  Lines: PIV  Abx: s/p 2 post-op doses of Ancef  Diet: Regular diet   PPX: Lovenox    DISPO: Monitoring overnight, addressing placement tomorrow.       CODE STATUS: Full code      Olga Macedo D.O.  PGY-1  [facility]

## 2023-04-08 NOTE — THERAPY
"Physical Therapy   Initial Evaluation     Patient Name: Addie Orellana  Age:  57 y.o., Sex:  female  Medical Record #: [id number]  Today's Date: 4/8/2023     Precautions  Precautions: Fall Risk;Non Weight Bearing Left Upper Extremity;Weight Bearing As Tolerated Left Lower Extremity  Comments: offloading boot LLE; LUE ROMAT    Assessment  Patient is 57 y.o. female that presented to acute after GLF with resulting L tib/fib and L humerus fractures. She is s/p IMN of L tibia and ORIF of L humerus. She presented to PT with pain, impaired balance and coordination, functional weakness, and decreased activity tolerance that are limiting her ability to safely perform mobility at [facility]. She mobilized as detailed below; she was significantly limited by pain and was yelling out throughout mobility. She required increased time for therapeutic activity due to pain. Provided education regarding pain management strategies, progression of functional mobility and benefits of OOB activity, importance of LUE ROMAT, and weight bearing precautions; patient verbalized understanding but would likely benefit from reinforcement. At current level recommend placement. Will follow.    Plan    Physical Therapy Initial Treatment Plan   Treatment Plan : Bed Mobility, Equipment, Gait Training, Manual Therapy, Neuro Re-Education / Balance, Self Care / Home Evaluation, Therapeutic Activities, Therapeutic Exercise  Treatment Frequency: 4 Times per Week  Duration: Until Therapy Goals Met    DC Equipment Recommendations: Unable to determine at this time  Discharge Recommendations: Recommend post-acute placement for additional physical therapy services prior to discharge home       Subjective    \"My arm hurts!\"     Objective       04/08/23 1115   Charge Group   PT Evaluation PT Evaluation High   Total Time Spent   PT Total Time Yes   PT Evaluation Time Spent (Mins) 25   PT Therapeutic Activities Time Spent (Mins) 15   PT Total Time Spent (Calculated) 40 " "  Initial Contact Note    Initial Contact Note Order Received and Verified, Physical Therapy Evaluation in Progress with Full Report to Follow.   Precautions   Precautions Fall Risk;Non Weight Bearing Left Upper Extremity;Weight Bearing As Tolerated Left Lower Extremity   Comments offloading boot LLE; LUE ROMAT   Vitals   O2 (LPM) 0   O2 Delivery Device None - Room Air   Vitals Comments maintained SpO2 > 90% during assessment   Pain 0 - 10 Group   Location Arm   Location Orientation Left   Therapist Pain Assessment During Activity;Nurse Notified   Prior Living Situation   Prior Services Intermittent Physical Support for ADL Per Family   Housing / Facility 2 Story House   Steps Into Home 2   Steps In Home   (FOS, patient does not normally use)   Equipment Owned None   Lives with - Patient's Self Care Capacity Child Less than 18 Years of Age;Adult Children;Parents;Sibling   Comments Patient reported she lives with mom, brother, adult son, and 3 foster children (14 mo, 12 year old, 14 year old). She typically requires assistance to go up stairs into house but does not typically use AD. Plan to obtain ramp to enter and recliner for sleeping.   Prior Level of Functional Mobility   Bed Mobility Independent   Transfer Status Independent   Ambulation Independent   Ambulation Distance   (community)   Assistive Devices Used None   Stairs Required Assist   Comments Patient reported she normally does \"everything\"   History of Falls   History of Falls Yes  (reason for admit)   Cognition    Cognition / Consciousness X   Comments distracted by and yelling out in pain   Active ROM Upper Body   Comments LUE functional use limited by pain   Active ROM Lower Body    Comments patient unable to move LLE OOB (in heavy offloading boot) but functional for sitting EOB and standing   Strength Lower Body   Comments grossly 3+/5 BLE; LLE limited by pain   Lower Body Muscle Tone   Lower Body Muscle Tone  WDL   Balance Assessment   Sitting " Balance (Static) Fair   Sitting Balance (Dynamic) Fair   Standing Balance (Static) Poor -   Standing Balance (Dynamic) Trace  (unable to weight shift)   Weight Shift Sitting Poor   Weight Shift Standing Absent   Comments no overt LOB sitting EOB. unable to maintain standing   Bed Mobility    Supine to Sit Moderate Assist  (min A for LLE OOB, mod A for trunk to upight)   Sit to Supine Maximal Assist   Scooting Contact Guard Assist   Comments with bed features and rail   Gait Analysis   Gait Level Of Assist Unable to Participate   Weight Bearing Status NWB LUE, WBAT LLE   Vision Deficits Impacting Mobility NT   Functional Mobility   Sit to Stand Minimal Assist  (x2 person)   Bed, Chair, Wheelchair Transfer Unable to Participate   How much difficulty does the patient currently have...   Turning over in bed (including adjusting bedclothes, sheets and blankets)? 1   Sitting down on and standing up from a chair with arms (e.g., wheelchair, bedside commode, etc.) 1   Moving from lying on back to sitting on the side of the bed? 1   How much help from another person does the patient currently need...   Moving to and from a bed to a chair (including a wheelchair)? 1   Need to walk in a hospital room? 1   Climbing 3-5 steps with a railing? 1   6 clicks Mobility Score 6   Patient / Family Goals    Patient / Family Goal #1 return to PLOF   Short Term Goals    Short Term Goal # 1 Patient will move supine <> sitting EOB with min A within 6tx in order to get in/out of bed (patient reported plan to obtain recliner for home)   Short Term Goal # 2 Patient will move sitting <> standing with LRAD and CGA within 6tx in order to initiate transfers and gait   Short Term Goal # 3 Patient will ambulate 50ft with LRAD and CGA within 6tx in order to access environment   Education Group   Education Provided Role of Physical Therapist;Weight Bearing Precautions   Role of Physical Therapist Patient Response Patient;Acceptance;Explanation;Verbal  Demonstration   Weight Bearing Precautions Patient Response Patient;Acceptance;Explanation;Verbal Demonstration;Action Demonstration   Physical Therapy Initial Treatment Plan    Treatment Plan  Bed Mobility;Equipment;Gait Training;Manual Therapy;Neuro Re-Education / Balance;Self Care / Home Evaluation;Therapeutic Activities;Therapeutic Exercise   Treatment Frequency 4 Times per Week   Duration Until Therapy Goals Met   Problem List    Problems Pain;Impaired Bed Mobility;Impaired Transfers;Impaired Ambulation;Functional ROM Deficit;Functional Strength Deficit;Impaired Balance;Impaired Coordination;Decreased Activity Tolerance   Anticipated Discharge Equipment and Recommendations   DC Equipment Recommendations Unable to determine at this time   Discharge Recommendations Recommend post-acute placement for additional physical therapy services prior to discharge home   Interdisciplinary Plan of Care Collaboration   IDT Collaboration with  Nursing;Occupational Therapist   Patient Position at End of Therapy In Bed;Bed Alarm On;Call Light within Reach;Tray Table within Reach;Phone within Reach   Collaboration Comments RN aware of visit, response   Session Information   Date / Session Number  4/8-1 (1/4, 4/14)

## 2023-04-08 NOTE — OP REPORT
DATE OF SERVICE:  04/07/2023     PREOPERATIVE DIAGNOSES:  1.  Left comminuted displaced tibia and fibula fractures.  2.  Left comminuted displaced humeral shaft fracture.     POSTOPERATIVE DIAGNOSES:  1.  Left comminuted displaced tibia and fibula fractures.  2.  Left comminuted displaced humeral shaft fracture.     PROCEDURE PERFORMED:  1.  Open treatment with intramedullary nailing, left tibia shaft fracture.  2.  Closed treatment with manipulation, left fibular shaft fracture.  3.  Open treatment with internal fixation, left humeral shaft fracture.     SURGEON:  Joselito Claire MD     ANESTHESIOLOGIST:  Miki Carson MD     ANESTHESIA:  General.     ESTIMATED BLOOD LOSS:  400 mL total.     IMPLANTS:  1.  Thorndale T2 alpha tibial intramedullary nail measuring 11x330 mm with total   of five 5.0 mm advanced locking screws.  2.  Thorndale 12-hole long proximal humerus locking plate with combination of   locking and nonlocking screws.     INDICATIONS FOR PROCEDURE:  The patient is 57 years old.  She has a suspected   history of osteoporosis.  She slipped and had a fall, sustained a left   comminuted displaced distal third tibia shaft fracture and a fibular shaft   fracture around retained deep implants of the fibula.  She also sustained a   left comminuted significantly displaced humeral shaft fracture.  She was   evaluated for admission to the hospitalist service.  I was asked to consult to   provide treatment recommendations.  I recommended surgical management for   both of her injuries after discussing risks, benefits and alternatives to   surgery as well as specific risks of surgery.  She wished to proceed with   surgery as outlined above.     DESCRIPTION OF PROCEDURE:  The patient was met in the preoperative holding   area.  Her surgical site was signed.  Her consent was confirmed to be   accurate.  She was taken back to the operating room and general anesthesia was   induced.  She was positioned in supine  position on the lateral side table.  A   bump was placed under her left buttock.  The left lower extremity was   provisionally cleansed with isopropyl alcohol as well as left upper extremity,   which were both then prepped and draped in the usual sterile fashion.  A   formal timeout was performed to confirm patient's correct name, correct   surgical sites, correct procedure and correct laterality.  I incised the skin   proximal to the patella anteriorly with a scalpel down through skin.    Dissection was carried sharply through the quadriceps tendon and I inserted   the protective trocar for the suprapatellar instrumentation for the Mando   tibial nail and inserted a guide pin in appropriate position.  She had very   poor bone quality.  I then used the entry reamer while protecting the   patellofemoral joint and entered the proximal tibia and placed a bent on a   ball-tipped guide ambreen down to the fracture site traction and slight internal   rotation were performed to reduce the fracture and what I felt was acceptable   alignment, confirmed on AP and lateral fluoroscopic imaging despite the area   of comminution.  I was then able to ream past the fracture down to the distal   tibia with ultimately a 12 mm reamer and selected an 11x330 mm tibial   intramedullary nail, inserted it down past the fracture site over the   ball-tipped guide ambreen.  It did end up relatively medial and anteriorly in the   distal segment, but overall the alignment was acceptable likely due to her   poor metaphyseal bone quality.  I then placed three interlocking screws   distally using perfect Blackfeet technique and 2 interlocking screws proximally   using the insertion instrumentation.  I removed all insertion instrumentation   and final fluoroscopic imaging confirmed overall acceptable alignment of the   fracture and acceptable position of the implants.  Wounds were thoroughly   irrigated with normal saline.  I repaired the quadriceps tendon  and deep   subcutaneous layers with 0 Vicryl and the skin edges with staples.  I then   turned my attention to the arm, extended deltopectoral approach was performed   with a scalpel down through skin.  Dissection was carried with Bovie cautery   through subcutaneous tissue, identified the cephalic vein.  Unfortunately, a   branch was torn during blunt dissection and required ligation of the cephalic   vein with 0 Vicryl suture.  I then dissected down through the deltopectoral   interval proximally and release some portion of the deltoid insertion distally   to expose the fracture site.  There was significant comminution both through   the proximal third extending down to the middle third of the humeral shaft   laterally and medially.  Blunt dissection was carried to the posterior aspect   of the humerus, so as to protect the traversing radial nerve.  A Jaimes elevator   was used to dissect directly down on to the anterolateral distal humerus and   I confirmed the radial nerve was not in danger at the level of bone.  I was   then able to position a 12-hole Crestline long proximal humerus locking plate   with appropriate position proximally and pinned it into place.  I was able to   place it on the bone distally and then reduce the fracture based on the main   oblique fracture fragment and what I felt was overall acceptable alignment   despite the significant zone of comminution.  I then affixed distally with   bicortical nonlocking screw and proximally with several unicortical locking   screws and then several locking screws distally to span the zone of   comminution using a bridge plating technique.  Final fluoroscopic imaging   confirmed overall acceptable alignment of the fracture and acceptable position   of the implants.  Wounds were thoroughly irrigated with normal saline.  I   repaired the portion of the released deltoid tendon insertion over the plate   and repaired the biceps fascia, subcutaneous layers with 0  Vicryl, 2-0 Vicryl   and skin edges with staples.  Wounds were thoroughly cleansed, dried and   sterile silver impregnated Aquacel dressing was applied to the arm.  Left   lower extremity had Xeroform, 4x4's, Sof-Rol and a well-padded compressive   dressing from foot to thigh.  The bump was then removed.  She did have quite a   bit of external rotation bilaterally to the lower extremities, but her foot   progression angle appeared symmetric bilaterally.  She was then transferred   onto the [facility], woken up from anesthesia and taken to postanesthesia care   unit in stable condition.     PLAN:  1.  The patient will be readmitted to the hospitalist service postop.  2.  I feel she can weightbear as tolerated to left lower extremity with boot   for comfort.  3.  She can perform shoulder range of motion and elbow range of motion left   upper extremity, but should be nonweightbearing to left upper extremity for 6   weeks postop depending on the rate of healing.  4.  She will need Ancef for 2 doses postop for infection prophylaxis.  5.  She will be started on DVT chemoprophylaxis tomorrow morning if otherwise   clinically appropriate and should continue SCDs in the meantime.  6.  Ultimately, she should follow up with me 2 weeks postop for routine wound   check and staple removal as well as x-rays, two views of the left tibia and   fibula and 2 views of left humerus.        ______________________________  MD AURE Hart/CRYSTAL/VERA    DD:  04/07/2023 16:47  DT:  04/07/2023 17:50    Job#:  [id number]

## 2023-04-08 NOTE — PROGRESS NOTES
Orthopaedic Progress Note    Interval changes:  Patient doing well post op  LUE and LLE dressings CDI  Cleared for DC to SNF by ortho pending medicine clearance    ROS - Patient denies any new issues.  Pain well controlled.    /75   Pulse (!) 101   Temp 37 °C (98.6 °F) (Temporal)   Resp 15   Ht 1.524 m (5')   Wt 104 kg (230 lb)   SpO2 90%       Patient seen and examined  No acute distress  Breathing non labored  RRR  LUE in sling dressing CDI, DNVI, moves all fingers, cap refill <2 sec.  LLE in cam boot, dressings CDI, DNVI, moves all toes, cap refill <2.      Recent Labs     04/07/23  1230 04/08/23  0045   WBC 9.8 9.2   RBC 4.05* 3.57*   HEMOGLOBIN 11.4* 9.9*   HEMATOCRIT 35.8* 32.3*   MCV 88.4 90.5   MCH 28.1 27.7   MCHC 31.8* 30.7*   RDW 46.4 46.3   PLATELETCT 145* 128*   MPV 10.1 10.2       Active Hospital Problems    Diagnosis     Displaced fracture of proximal end of humerus [S42.209A]     Fracture of distal end of tibia [S82.309A]     Osteoporosis with current pathological fracture [M80.00XA]     Closed fracture of shaft of fibula [S82.409A]     Malignant neoplasm of breast (HCC) [C50.919]        Assessment/Plan:  Patient doing well post op  LUE and LLE dressings CDI  Cleared for DC to SNF by ortho pending medicine clearance  POD#1 S/P   1.  Open treatment with intramedullary nailing, left tibia shaft fracture.  2.  Closed treatment with manipulation, left fibular shaft fracture.  3.  Open treatment with internal fixation, left humeral shaft fracture.  Wt bearing status - WBAT LLE in boot, NWB LUE  Wound care/Drains - Dressings to be changed every other day by nursing  Future Procedures - none planned   Lovenox: Start 4/8, Duration-until ambulatory > 150'  Sutures/Staples out- 14-21 days post operatively. Removal will by by ortho mid levels only.  PT/OT-initiated  Antibiotics: Perioperative completed  DVT Prophylaxis- TEDS/SCDs/Foot pumps  Stevens-none  Case Coordination for Discharge Planning  - Disposition SNF

## 2023-04-08 NOTE — PROGRESS NOTES
Patient transferred from PACU, lethargic, but A&Ox4. Brother and sister are at bedside. Patient reports pain; was medicated at 1743 with 10 mg of oxycodone. Patient and family updated on plan of care; all verbalized understanding. Bed is locked and in lowest position. Call light is within reach.

## 2023-04-08 NOTE — THERAPY
Occupational Therapy   Initial Evaluation     Patient Name: Addie Orellana  Age:  57 y.o., Sex:  female  Medical Record #: [id number]  Today's Date: 4/8/2023     Precautions  Precautions: Fall Risk, Non Weight Bearing Left Upper Extremity, Weight Bearing As Tolerated Left Lower Extremity  Comments: offloading boot LLE; LUE ROMAT    Assessment    Patient is 57 y.o. female admitted after a GLF causing L tib/fib and L humerus fx. Pt now s/p IMN of L tibia and ORIF L humerus. Other pertinent medical history includes osteoporosis and breast cancer. Pt seen for OT evaluation and treatment. Pt required max A to don/doff R sock and max A to don/doff sling. Pt lives with her mother, brother, adult son, and three foster children (aged 4 mo, 12 years, 14 years). Pt reported they can assist as needed. Pt educated regarding the importance of gentle ROM within comfort for L UE, sling wear/care, positioning of L UE in supine, the role of OT, and WB status. Pt current functional performance limited by pain, impaired activity tolerance, impaired balance, and limited functional use of L UE. Pt will benefit from skilled OT while admitted to acute care.     Plan    Occupational Therapy Initial Treatment Plan   Treatment Interventions: Self Care / Activities of Daily Living, Adaptive Equipment, Neuro Re-Education / Balance, Therapeutic Exercises, Therapeutic Activity  Treatment Frequency: 4 Times per Week  Duration: Until Therapy Goals Met    DC Equipment Recommendations: Unable to determine at this time  Discharge Recommendations: Recommend post-acute placement for additional occupational therapy services prior to discharge home      Objective     04/08/23 1114   Prior Living Situation   Prior Services Intermittent Physical Support for ADL Per Family   Housing / Facility 2 Story House   Steps Into Home 2   Steps In Home   (FOS, pt normally stays on first floor)   Bathroom Set up Walk In Shower;Built-In Shower Chair   Equipment Owned  None   Lives with - Patient's Self Care Capacity Child Less than 18 Years of Age;Adult Children;Parents;Sibling   Comments Pt lives with her mom, brother, adult son, and three foster children (4 months old, 12 year old, and 14 year old). Pt normally stays on the first floor of the house.   Prior Level of ADL Function   Self Feeding Independent   Grooming / Hygiene Independent   Bathing Independent   Dressing Independent   Toileting Independent   Prior Level of IADL Function   Medication Management Independent   Laundry Independent   Kitchen Mobility Independent   Finances Independent   Home Management Independent   Shopping Independent   Prior Level Of Mobility Independent Without Device in Community;Independent Without Device in Home   Driving / Transportation Driving Independent   History of Falls   History of Falls Yes   Date of Last Fall   (reason for admission)   Precautions   Precautions Fall Risk;Non Weight Bearing Left Upper Extremity;Weight Bearing As Tolerated Left Lower Extremity   Comments offloading boot LLE, LUE ROMAT, sling for comfort   Vitals   Vitals Comments SpO2 >90 throughout, HR up to mid 130s   Pain   Pain Scales 0 to 10 Scale    Pain 0 - 10 Group   Therapist Pain Assessment During Activity;Nurse Notified  (not rated, agreeable to eval, pain in L UE primarily, but L LE w/ standing)   Non Verbal Descriptors   Non Verbal Scale  Calm   Cognition    Cognition / Consciousness X   Comments Pleasant and cooperative, pain limited   Active ROM Upper Body   Dominant Hand Right   Comments L UE shoulder flexion limited by pain, elbow/wrist, digits WFL; R UE WFL all joints   Strength Upper Body   Upper Body Strength    (R UE WFL, L UE  WFL, other strength NT due to pain and NWB)   Sensation Upper Body   Upper Extremity Sensation  X   Comments numbnes to dorsal side of hand on L UE, R UE WFL   Upper Body Muscle Tone   Upper Body Muscle Tone  WDL   Coordination Upper Body   Comments WFL, not formally  assessed   Balance Assessment   Sitting Balance (Static) Fair   Sitting Balance (Dynamic) Fair   Standing Balance (Static) Fair -   Standing Balance (Dynamic) Trace +   Weight Shift Sitting Poor   Weight Shift Standing Absent   Comments w/ HHA   Bed Mobility    Supine to Sit Moderate Assist   Sit to Supine Maximal Assist   Scooting Contact Guard Assist   Comments HOB elevated, use of rail   ADL Assessment   Upper Body Dressing Maximal Assist  (don/doff sling)   Lower Body Dressing Maximal Assist  (don/doff R sock)   Functional Mobility   Sit to Stand Minimal Assist   Bed, Chair, Wheelchair Transfer Unable to Participate   Mobility EOB>stand x1>bed   Comments w/ HHA x2   Visual Perception   Visual Perception  Not Tested   Activity Tolerance   Sitting in Chair NT, refused due to pain   Sitting Edge of Bed >10 min   Standing <1-2 min   Comments limited by pain and weakness   Patient / Family Goals   Patient / Family Goal #1 to have less pain   Short Term Goals   Short Term Goal # 1 Pt will perform UB dressing w/ min A   Short Term Goal # 2 Pt will perform LB dressing w/ min A   Short Term Goal # 3 Pt will perform ADL transfer w/ supv   Short Term Goal # 4 Pt will perform g/h w/ supv   Education Group   Education Provided Role of Occupational Therapist;Activities of Daily Living;Weight Bearing Precautions;Joint Protection;Brace Wear and Care   Role of Occupational Therapist Patient Response Patient;Acceptance;Explanation;Verbal Demonstration   Joint Protection Patient Response Patient;Acceptance;Demonstration;Explanation;Verbal Demonstration;Action Demonstration   Brace Wear & Care Patient Response Patient;Acceptance;Demonstration;Explanation;Verbal Demonstration;Action Demonstration;Reinforcement Needed   ADL Patient Response Patient;Acceptance;Explanation;Demonstration;Verbal Demonstration;Action Demonstration   Weight Bearing Precautions Patient Response Patient;Acceptance;Demonstration;Explanation;Verbal  Demonstration;Action Demonstration   Occupational Therapy Initial Treatment Plan    Treatment Interventions Self Care / Activities of Daily Living;Adaptive Equipment;Neuro Re-Education / Balance;Therapeutic Exercises;Therapeutic Activity   Treatment Frequency 4 Times per Week   Duration Until Therapy Goals Met   Problem List   Problem List Decreased Active Daily Living Skills;Decreased Homemaking Skills;Decreased Upper Extremity Strength Left;Decreased Upper Extremity AROM Left;Decreased Upper Extremity PROM Left;Decreased Functional Mobility;Decreased Activity Tolerance;Impaired Sensation Left Upper Extremity;Impaired Postural Control / Balance;Limited Knowledge of Post Op Precautions

## 2023-04-08 NOTE — CARE PLAN
Problem: Pain - Standard  Goal: Alleviation of pain or a reduction in pain to the patient’s comfort goal  Outcome: Progressing     Problem: Knowledge Deficit - Standard  Goal: Patient and family/care givers will demonstrate understanding of plan of care, disease process/condition, diagnostic tests and medications  Outcome: Progressing   The patient is Stable - Low risk of patient condition declining or worsening    Shift Goals  Clinical Goals: Pain<4/10, SpO2>90%  Patient Goals: pain control, rest  Family Goals: rest, pain control    Progress made toward(s) clinical / shift goals: Pain<4/10 with minimal pharmacological interventions, SpO2>90% with minimal O2 support    Patient is not progressing towards the following goals:

## 2023-04-09 LAB
ALBUMIN SERPL BCP-MCNC: 2.9 G/DL (ref 3.2–4.9)
ALBUMIN/GLOB SERPL: 1.1 G/DL
ALP SERPL-CCNC: 61 U/L (ref 30–99)
ALT SERPL-CCNC: 7 U/L (ref 2–50)
ANION GAP SERPL CALC-SCNC: 9 MMOL/L (ref 7–16)
AST SERPL-CCNC: 17 U/L (ref 12–45)
BILIRUB SERPL-MCNC: 0.4 MG/DL (ref 0.1–1.5)
BUN SERPL-MCNC: 11 MG/DL (ref 8–22)
CALCIUM ALBUM COR SERPL-MCNC: 8.7 MG/DL (ref 8.5–10.5)
CALCIUM SERPL-MCNC: 7.8 MG/DL (ref 8.5–10.5)
CHLORIDE SERPL-SCNC: 104 MMOL/L (ref 96–112)
CO2 SERPL-SCNC: 26 MMOL/L (ref 20–33)
CREAT SERPL-MCNC: 0.71 MG/DL (ref 0.5–1.4)
ERYTHROCYTE [DISTWIDTH] IN BLOOD BY AUTOMATED COUNT: 47.5 FL (ref 35.9–50)
GFR SERPLBLD CREATININE-BSD FMLA CKD-EPI: 98 ML/MIN/1.73 M 2
GLOBULIN SER CALC-MCNC: 2.7 G/DL (ref 1.9–3.5)
GLUCOSE SERPL-MCNC: 121 MG/DL (ref 65–99)
HCT VFR BLD AUTO: 26.5 % (ref 37–47)
HGB BLD-MCNC: 8.2 G/DL (ref 12–16)
MCH RBC QN AUTO: 28.3 PG (ref 27–33)
MCHC RBC AUTO-ENTMCNC: 30.9 G/DL (ref 33.6–35)
MCV RBC AUTO: 91.4 FL (ref 81.4–97.8)
PLATELET # BLD AUTO: 130 K/UL (ref 164–446)
PMV BLD AUTO: 9.5 FL (ref 9–12.9)
POTASSIUM SERPL-SCNC: 4 MMOL/L (ref 3.6–5.5)
PROT SERPL-MCNC: 5.6 G/DL (ref 6–8.2)
RBC # BLD AUTO: 2.9 M/UL (ref 4.2–5.4)
SODIUM SERPL-SCNC: 139 MMOL/L (ref 135–145)
WBC # BLD AUTO: 6.9 K/UL (ref 4.8–10.8)

## 2023-04-09 PROCEDURE — 700111 HCHG RX REV CODE 636 W/ 250 OVERRIDE (IP)

## 2023-04-09 PROCEDURE — 80053 COMPREHEN METABOLIC PANEL: CPT

## 2023-04-09 PROCEDURE — 770001 HCHG ROOM/CARE - MED/SURG/GYN PRIV*

## 2023-04-09 PROCEDURE — A9270 NON-COVERED ITEM OR SERVICE: HCPCS | Performed by: STUDENT IN AN ORGANIZED HEALTH CARE EDUCATION/TRAINING PROGRAM

## 2023-04-09 PROCEDURE — 99232 SBSQ HOSP IP/OBS MODERATE 35: CPT | Mod: GC | Performed by: FAMILY MEDICINE

## 2023-04-09 PROCEDURE — 36415 COLL VENOUS BLD VENIPUNCTURE: CPT

## 2023-04-09 PROCEDURE — 85027 COMPLETE CBC AUTOMATED: CPT

## 2023-04-09 PROCEDURE — A9270 NON-COVERED ITEM OR SERVICE: HCPCS

## 2023-04-09 PROCEDURE — 700102 HCHG RX REV CODE 250 W/ 637 OVERRIDE(OP)

## 2023-04-09 PROCEDURE — 700102 HCHG RX REV CODE 250 W/ 637 OVERRIDE(OP): Performed by: STUDENT IN AN ORGANIZED HEALTH CARE EDUCATION/TRAINING PROGRAM

## 2023-04-09 RX ORDER — M-VIT,TX,IRON,MINS/CALC/FOLIC 27MG-0.4MG
1 TABLET ORAL
Status: DISCONTINUED | OUTPATIENT
Start: 2023-04-09 | End: 2023-04-13 | Stop reason: HOSPADM

## 2023-04-09 RX ORDER — CYCLOBENZAPRINE HCL 10 MG
10 TABLET ORAL 2 TIMES DAILY PRN
Status: DISCONTINUED | OUTPATIENT
Start: 2023-04-09 | End: 2023-04-13 | Stop reason: HOSPADM

## 2023-04-09 RX ADMIN — OXYCODONE 5 MG: 5 TABLET ORAL at 09:13

## 2023-04-09 RX ADMIN — SENNOSIDES AND DOCUSATE SODIUM 2 TABLET: 50; 8.6 TABLET ORAL at 18:34

## 2023-04-09 RX ADMIN — ENOXAPARIN SODIUM 30 MG: 30 INJECTION SUBCUTANEOUS at 18:34

## 2023-04-09 RX ADMIN — MULTIPLE VITAMINS W/ MINERALS TAB 1 TABLET: TAB at 14:02

## 2023-04-09 RX ADMIN — SENNOSIDES AND DOCUSATE SODIUM 2 TABLET: 50; 8.6 TABLET ORAL at 05:17

## 2023-04-09 RX ADMIN — ENOXAPARIN SODIUM 30 MG: 30 INJECTION SUBCUTANEOUS at 05:17

## 2023-04-09 RX ADMIN — POLYETHYLENE GLYCOL 3350 1 PACKET: 17 POWDER, FOR SOLUTION ORAL at 05:17

## 2023-04-09 RX ADMIN — CYCLOBENZAPRINE 10 MG: 10 TABLET, FILM COATED ORAL at 14:02

## 2023-04-09 RX ADMIN — OXYCODONE 5 MG: 5 TABLET ORAL at 02:39

## 2023-04-09 ASSESSMENT — PAIN DESCRIPTION - PAIN TYPE
TYPE: SURGICAL PAIN

## 2023-04-09 NOTE — CARE PLAN
The patient is Stable - Low risk of patient condition declining or worsening    Shift Goals  Clinical Goals: Pain control  Patient Goals: Comfort, rest  Family Goals: not present    Progress made toward(s) clinical / shift goals:      Problem: Pain - Standard  Goal: Alleviation of pain or a reduction in pain to the patient’s comfort goal  Outcome: Progressing     Problem: Knowledge Deficit - Standard  Goal: Patient and family/care givers will demonstrate understanding of plan of care, disease process/condition, diagnostic tests and medications  Outcome: Progressing     Problem: Skin Integrity  Goal: Skin integrity is maintained or improved  Outcome: Progressing     Pt has pain r/t fall and 2 surgeries. Pt reports good pain control and ability to sleep after prn pain meds given. Pt agreeable to repositioning and have a waffle overlay placed underneath her to relieve pressure points. Pt reports an increase in comfort after waffle is placed. Pt expresses an understanding of her needs and treatment plan. She is agreeable to treating pain so that she can work with PT/OT. Pt asks appropriate questions.     Patient is not progressing towards the following goals:

## 2023-04-09 NOTE — DIETARY
NUTRITION SERVICES: BMI - Pt with BMI >40 (=Body mass index is 44.92 kg/m².), Class III obesity. Weight loss counseling not appropriate in acute care setting. RECOMMEND - If appropriate at DC please refer to outpatient nutrition services for weight management.

## 2023-04-09 NOTE — PROGRESS NOTES
[facility] [place] FAMILY MEDICINE PROGRESS NOTE        Attending:   Maritza Méndez MD    Resident:   Olga Macedo D.O.    PATIENT:   Addie Orellana; [id number]; 1965    ID:   Addie Orellana is a 57 y.o. female with PMHx breast cancer s/p 6 years in remission, obesity who presented after a GLF.  Admitted for L humerus, fibular, tibial fractures s/p intramedullary tibia and humerus ambreen placements 4/7.     SUBJECTIVE:   Pt is now POD 2 insertions of intramedullary rods to L tibia and humerus by ortho.  Cleared from ortho standpoint for SNF.    Ortho recs: Weightbearing okay with left lower extremity, range of motion with left upper extremities without weightbearing for the next 6 weeks.  Follow-up Ortho 2 weeks postop for wound check, staple removal and x-rays.    On encounter, patient noted is her pain is moderately controlled.  She feels stable if she does not move her left leg or arm.  The numbness in her left hand has decreased but feels swollen at this time.  She is aware she will likely need to go to a facility for recovery.  She is somewhat sad because her son is currently in the ICU and would like to visit him.      OBJECTIVE:  Vitals:    04/09/23 1013 04/09/23 1040 04/09/23 1113 04/09/23 1200   BP:       Pulse:       Resp:       Temp:       TempSrc:       SpO2: (!) 82% 89% (!) 86% 95%   Weight:       Height:           Intake/Output Summary (Last 24 hours) at 4/8/2023 0643  Last data filed at 4/7/2023 1800  Gross per 24 hour   Intake 3850 ml   Output 500 ml   Net 3350 ml       PHYSICAL EXAM:  General: resting in bed, no acute distress but in mild pain  HEENT: NC/AT. EOMI.   Cardiovascular: RRR without murmurs. Normal capillary refill   Respiratory: CTAB  Abdomen: soft, nontender, nondistended, no masses  EXT: Left arm with bandage over it and significant swelling over left arm proximal to elbow, left leg in Ortho boot with Ace bandage over knee.  Skin: No erythema/lesions other than presumed to be under Ortho  bandages.  Neuro: Non-focal    LABS:  Recent Labs     04/07/23  1230 04/08/23  0045 04/09/23  0303   WBC 9.8 9.2 6.9   RBC 4.05* 3.57* 2.90*   HEMOGLOBIN 11.4* 9.9* 8.2*   HEMATOCRIT 35.8* 32.3* 26.5*   MCV 88.4 90.5 91.4   MCH 28.1 27.7 28.3   RDW 46.4 46.3 47.5   PLATELETCT 145* 128* 130*   MPV 10.1 10.2 9.5   NEUTSPOLYS  --  89.60*  --    LYMPHOCYTES  --  5.60*  --    MONOCYTES  --  4.00  --    EOSINOPHILS  --  0.00  --    BASOPHILS  --  0.10  --      Recent Labs     04/07/23  1230 04/08/23 0045 04/09/23  0303   SODIUM 139 133* 139   POTASSIUM 4.2 4.8 4.0   CHLORIDE 105 102 104   CO2 22 22 26   BUN 10 12 11   CREATININE 0.58 0.71 0.71   CALCIUM 8.1* 8.0* 7.8*   ALBUMIN  --  3.0* 2.9*     Estimated GFR/CRCL = Estimated Creatinine Clearance: 93.9 mL/min (by C-G formula based on SCr of 0.71 mg/dL).  Recent Labs     04/07/23  1230 04/08/23 0045 04/09/23  0303   GLUCOSE 136* 198* 121*     Recent Labs     04/08/23  0045 04/09/23  0303   ASTSGOT 15 17   ALTSGPT 12 7   TBILIRUBIN 0.3 0.4   ALKPHOSPHAT 70 61   GLOBULIN 2.8 2.7             No results for input(s): INR, APTT, FIBRINOGEN in the last 72 hours.    Invalid input(s): DIMER      IMAGING:  DX-TIBIA AND FIBULA LEFT   Final Result      Portable fluoroscopy as described.      DX-HUMERUS 2+ LEFT   Final Result      Portable fluoroscopy as described.      CT-ANKLE W/O PLUS RECONS LEFT   Final Result      1.  Comminuted and mildly displaced distal tibial diaphyseal fracture without evidence of articular extension.   2.  Comminuted and mildly displaced mid/distal fibular shaft fracture.   3.  Osteopenia.   4.  Skin thickening and mild subcutaneous edema of the visualized lower extremity. This may reflect inflammation or scar.      DX-FOOT-COMPLETE 3+ RIGHT   Final Result         1.  No radiographic evidence of acute injury.      2.  Osteoporosis.      3.  Previous mid and hindfoot fusion.      DX-FEMUR-2+ LEFT   Final Result      1.  Marked osteopenia without evidence  of displaced fracture.   2.  Moderate degenerative changes of the left hip joint.      DX-KNEE 2- LEFT   Final Result      1.  Marked osteopenia.   2.  Remote appearing deformity of the proximal fibular shaft. Acute on chronic injury not excluded.   3.  Possible small joint effusion.   4.  Tricompartmental degenerative changes.      DX-HUMERUS 2+ LEFT   Final Result      1.  Comminuted and displaced proximal humeral shaft fracture.   2.  Decreased osseous mineralization.      DX-TIBIA AND FIBULA LEFT   Final Result      1.  Osteopenia.      2.  Spiral fracture of the left distal tibia in near anatomic alignment.      3.  Comminuted fracture of mid shaft of left fibula in near-anatomic alignment.      4.  Oblique fractures of the left proximal fibula in near-anatomic alignment.      DX-PORTABLE FLUORO > 1 HOUR    (Results Pending)       MEDS:  Current Facility-Administered Medications   Medication Last Admin    therapeutic multivitamin-minerals (THERAGRAN-M) tablet 1 Tablet 1 Tablet at 04/09/23 1402    cyclobenzaprine (Flexeril) tablet 10 mg 10 mg at 04/09/23 1402    enoxaparin (Lovenox) inj 30 mg 30 mg at 04/09/23 0517    senna-docusate (PERICOLACE or SENOKOT S) 8.6-50 MG per tablet 2 Tablet 2 Tablet at 04/09/23 0517    And    polyethylene glycol/lytes (MIRALAX) PACKET 1 Packet 1 Packet at 04/09/23 0517    And    magnesium hydroxide (MILK OF MAGNESIA) suspension 30 mL      And    bisacodyl (DULCOLAX) suppository 10 mg      acetaminophen (Tylenol) tablet 650 mg      Pharmacy Consult Request ...Pain Management Review 1 Each      oxyCODONE immediate-release (ROXICODONE) tablet 2.5 mg      Or    oxyCODONE immediate-release (ROXICODONE) tablet 5 mg 5 mg at 04/09/23 0913    Or    HYDROmorphone (Dilaudid) injection 0.25 mg      ondansetron (ZOFRAN) syringe/vial injection 4 mg      ondansetron (ZOFRAN ODT) dispertab 4 mg      promethazine (PHENERGAN) tablet 12.5-25 mg      promethazine (PHENERGAN) suppository 12.5-25 mg       prochlorperazine (COMPAZINE) injection 5-10 mg         ASSESSMENT/PLAN:    Malignant neoplasm of breast (HCC)  History of breast cancer. ER/AK positive Her2 kamala negative, poorly differentiated, high-grade.  Per chart review-patient has stated she is in remission.  No current oncology notes are on record.  - Discuss further with patient after surgery    Displaced fracture of proximal end of humerus  S/p open intramedullary ambreen of left humerus.  DC to SNF per Ortho, PT/OT recommend DC to postacute rehab for continued therapy before discharge home.  - Tylenol as needed mild pain  - Oxycodone 5 mg to 10 mg for moderate pain  - IV Dilaudid for severe pain  -PMR consult to assess if pt is good candidate and would best benefit from post-acute rehab over SNF.  Pt has good support system at home.      Fracture of distal end of tibia  S/p intramedullary ambreen of L tibia on 4/8.  Pt noted R leg cramps, likely secondary to decreased mobility in bed, states she hasn't been taking her multivitamin  - Tylenol as needed mild pain  - Oxycodone 5 mg to 10 mg for moderate pain  - IV Dilaudid for severe pain  - Awaiting PMR recs.    -Start Multivitamin  -Start cyclobenzaprine to address leg cramps BID PRN    Osteoporosis with current pathological fracture  Diffuse osteopenia and osteoporosis is noted on x-rays.  Likely exacerbated by chemotherapy and radiation therapy for aggressive breast cancer.  DEXA scan order found to be canceled, no results noted  Patient notes she has history of osteogenesis imperfecta. only noted once on previous charts per search.  - Consider discussing bisphosphonate therapy with patient, possibly outpatient    Closed fracture of shaft of fibula  S/p closed fibular fixation with screws (in conjunction with L tibial intramedullary ambreen placement on 4/8).  - Tylenol as needed mild pain  - Oxycodone 5 mg to 10 mg for moderate pain  - IV Dilaudid for severe pain  - Pending PMR consult    Core Measures:  Fluids:  PO  Lines: PIV  Abx: None - s/p 2 post-op doses of Ancef  Diet: Regular diet   PPX: Lovenox (restarted post-op)    DISPO: Pending PMR recommendations for acute rehab versus SNF      CODE STATUS: Full code      Olga Macedo D.O.  PGY-1  [facility]

## 2023-04-09 NOTE — DISCHARGE PLANNING
Care Transition Team Assessment    Met with patient at bedside, discussed PLOF and support. Lives in [address] [facility] with her mother, 3 adopted children aged 24,17 and 14 and 3 foster children; 3 months, 12 and 15 years old. Indep PLOF. Discussed acute rehab and snf, provided rehab and snf choices to patient. She would like [facility] as she lives in [place] and gets most of her care at [facility]. Referral sent to [facility]. CM will continue to monitor.     Information Source  Orientation Level: Oriented X4  Information Given By: Patient  Who is responsible for making decisions for patient? : Patient    Readmission Evaluation  Is this a readmission?: No    Elopement Risk  Legal Hold: No  Ambulatory or Self Mobile in Wheelchair: No-Not an Elopement Risk  Disoriented: No  Psychiatric Symptoms: None  History of Wandering: No  Elopement this Admit: No  Vocalizing Wanting to Leave: No  Displays Behaviors, Body Language Wanting to Leave: No-Not at Risk for Elopement  Elopement Risk: Not at Risk for Elopement    Interdisciplinary Discharge Planning  Lives with - Patient's Self Care Capacity:  (Mother, adult son, adopted children 17, 14 and 24 years old and 3 foster children ages 3months, 12 and 15.)  Patient or legal guardian wants to designate a caregiver: No  Support Systems: Family Member(s)  Housing / Facility: 2 Story House  Able to Return to Previous ADL's: Future Time w/Therapy  Mobility Issues: No  Prior Services: Intermittent Physical Support for ADL Per Service  Durable Medical Equipment:  (None)    Discharge Preparedness  What is your plan after discharge?:  (Rehab)  What are your discharge supports?: Child, Parent  Prior Functional Level: Independent with Activities of Daily Living  Difficulity with ADLs: None  Difficulity with IADLs: None    Functional Assesment  Prior Functional Level: Independent with Activities of Daily Living    Domestic Abuse  Have you ever been the victim of abuse or violence?:  No    Anticipated Discharge Information  Discharge Disposition: Disch to IP rehab facility or distinct part unit (62)

## 2023-04-09 NOTE — DISCHARGE PLANNING
Received choice form @: [id number]  Agency/Facility name: [facility]  Sent referral per choice form @: [phone]

## 2023-04-10 ENCOUNTER — APPOINTMENT (OUTPATIENT)
Dept: RADIOLOGY | Facility: MEDICAL CENTER | Age: 58
DRG: 493 | End: 2023-04-10
Payer: MEDICARE

## 2023-04-10 LAB
ANION GAP SERPL CALC-SCNC: 8 MMOL/L (ref 7–16)
BUN SERPL-MCNC: 11 MG/DL (ref 8–22)
CALCIUM SERPL-MCNC: 7.9 MG/DL (ref 8.5–10.5)
CHLORIDE SERPL-SCNC: 102 MMOL/L (ref 96–112)
CO2 SERPL-SCNC: 29 MMOL/L (ref 20–33)
CREAT SERPL-MCNC: 0.57 MG/DL (ref 0.5–1.4)
ERYTHROCYTE [DISTWIDTH] IN BLOOD BY AUTOMATED COUNT: 48.1 FL (ref 35.9–50)
GFR SERPLBLD CREATININE-BSD FMLA CKD-EPI: 105 ML/MIN/1.73 M 2
GLUCOSE SERPL-MCNC: 105 MG/DL (ref 65–99)
HCT VFR BLD AUTO: 25.2 % (ref 37–47)
HGB BLD-MCNC: 7.8 G/DL (ref 12–16)
MCH RBC QN AUTO: 27.9 PG (ref 27–33)
MCHC RBC AUTO-ENTMCNC: 31 G/DL (ref 33.6–35)
MCV RBC AUTO: 90 FL (ref 81.4–97.8)
PLATELET # BLD AUTO: 128 K/UL (ref 164–446)
PMV BLD AUTO: 9.6 FL (ref 9–12.9)
POTASSIUM SERPL-SCNC: 4 MMOL/L (ref 3.6–5.5)
RBC # BLD AUTO: 2.8 M/UL (ref 4.2–5.4)
SODIUM SERPL-SCNC: 139 MMOL/L (ref 135–145)
WBC # BLD AUTO: 5.4 K/UL (ref 4.8–10.8)

## 2023-04-10 PROCEDURE — 99231 SBSQ HOSP IP/OBS SF/LOW 25: CPT | Mod: GC | Performed by: FAMILY MEDICINE

## 2023-04-10 PROCEDURE — 36415 COLL VENOUS BLD VENIPUNCTURE: CPT

## 2023-04-10 PROCEDURE — 700111 HCHG RX REV CODE 636 W/ 250 OVERRIDE (IP): Performed by: STUDENT IN AN ORGANIZED HEALTH CARE EDUCATION/TRAINING PROGRAM

## 2023-04-10 PROCEDURE — 97535 SELF CARE MNGMENT TRAINING: CPT

## 2023-04-10 PROCEDURE — 700111 HCHG RX REV CODE 636 W/ 250 OVERRIDE (IP)

## 2023-04-10 PROCEDURE — 97530 THERAPEUTIC ACTIVITIES: CPT

## 2023-04-10 PROCEDURE — 99222 1ST HOSP IP/OBS MODERATE 55: CPT | Performed by: PHYSICAL MEDICINE & REHABILITATION

## 2023-04-10 PROCEDURE — 93971 EXTREMITY STUDY: CPT | Mod: LT

## 2023-04-10 PROCEDURE — A9270 NON-COVERED ITEM OR SERVICE: HCPCS

## 2023-04-10 PROCEDURE — 80048 BASIC METABOLIC PNL TOTAL CA: CPT

## 2023-04-10 PROCEDURE — 700102 HCHG RX REV CODE 250 W/ 637 OVERRIDE(OP): Performed by: STUDENT IN AN ORGANIZED HEALTH CARE EDUCATION/TRAINING PROGRAM

## 2023-04-10 PROCEDURE — A9270 NON-COVERED ITEM OR SERVICE: HCPCS | Performed by: STUDENT IN AN ORGANIZED HEALTH CARE EDUCATION/TRAINING PROGRAM

## 2023-04-10 PROCEDURE — 85027 COMPLETE CBC AUTOMATED: CPT

## 2023-04-10 PROCEDURE — 94760 N-INVAS EAR/PLS OXIMETRY 1: CPT

## 2023-04-10 PROCEDURE — 770001 HCHG ROOM/CARE - MED/SURG/GYN PRIV*

## 2023-04-10 PROCEDURE — 700102 HCHG RX REV CODE 250 W/ 637 OVERRIDE(OP)

## 2023-04-10 RX ORDER — HYDROMORPHONE HYDROCHLORIDE 1 MG/ML
0.25 INJECTION, SOLUTION INTRAMUSCULAR; INTRAVENOUS; SUBCUTANEOUS
Status: DISCONTINUED | OUTPATIENT
Start: 2023-04-10 | End: 2023-04-12

## 2023-04-10 RX ORDER — OXYCODONE HYDROCHLORIDE 5 MG/1
5 TABLET ORAL
Status: DISCONTINUED | OUTPATIENT
Start: 2023-04-10 | End: 2023-04-13 | Stop reason: HOSPADM

## 2023-04-10 RX ORDER — OXYCODONE HYDROCHLORIDE 10 MG/1
10 TABLET ORAL
Status: DISCONTINUED | OUTPATIENT
Start: 2023-04-10 | End: 2023-04-13 | Stop reason: HOSPADM

## 2023-04-10 RX ADMIN — SENNOSIDES AND DOCUSATE SODIUM 2 TABLET: 50; 8.6 TABLET ORAL at 05:10

## 2023-04-10 RX ADMIN — OXYCODONE 5 MG: 5 TABLET ORAL at 11:29

## 2023-04-10 RX ADMIN — ENOXAPARIN SODIUM 30 MG: 30 INJECTION SUBCUTANEOUS at 05:10

## 2023-04-10 RX ADMIN — POLYETHYLENE GLYCOL 3350 1 PACKET: 17 POWDER, FOR SOLUTION ORAL at 05:10

## 2023-04-10 RX ADMIN — HYDROMORPHONE HYDROCHLORIDE 0.25 MG: 1 INJECTION, SOLUTION INTRAMUSCULAR; INTRAVENOUS; SUBCUTANEOUS at 11:54

## 2023-04-10 RX ADMIN — ENOXAPARIN SODIUM 30 MG: 30 INJECTION SUBCUTANEOUS at 17:29

## 2023-04-10 RX ADMIN — SENNOSIDES AND DOCUSATE SODIUM 2 TABLET: 50; 8.6 TABLET ORAL at 17:29

## 2023-04-10 RX ADMIN — MULTIPLE VITAMINS W/ MINERALS TAB 1 TABLET: TAB at 11:29

## 2023-04-10 ASSESSMENT — COGNITIVE AND FUNCTIONAL STATUS - GENERAL
DRESSING REGULAR LOWER BODY CLOTHING: A LOT
SUGGESTED CMS G CODE MODIFIER DAILY ACTIVITY: CK
TURNING FROM BACK TO SIDE WHILE IN FLAT BAD: UNABLE
HELP NEEDED FOR BATHING: A LOT
SUGGESTED CMS G CODE MODIFIER MOBILITY: CN
MOVING TO AND FROM BED TO CHAIR: UNABLE
DAILY ACTIVITIY SCORE: 15
MOVING FROM LYING ON BACK TO SITTING ON SIDE OF FLAT BED: UNABLE
DRESSING REGULAR UPPER BODY CLOTHING: A LOT
PERSONAL GROOMING: A LITTLE
TOILETING: A LOT
STANDING UP FROM CHAIR USING ARMS: TOTAL
MOBILITY SCORE: 6
CLIMB 3 TO 5 STEPS WITH RAILING: TOTAL
WALKING IN HOSPITAL ROOM: TOTAL

## 2023-04-10 ASSESSMENT — PAIN DESCRIPTION - PAIN TYPE
TYPE: SURGICAL PAIN

## 2023-04-10 ASSESSMENT — GAIT ASSESSMENTS: GAIT LEVEL OF ASSIST: UNABLE TO PARTICIPATE

## 2023-04-10 ASSESSMENT — FIBROSIS 4 INDEX: FIB4 SCORE: 2.91

## 2023-04-10 NOTE — PROGRESS NOTES
Report received. Assumed care. Pt in bed laying down. A/O x4. VSS. Responds appropriately. Denies pain, SOB. Assessment complete. Discussed POC, , pt verbalizes understanding. Explained importance of calling before getting OOB. Call light and belongings within reach. Bed alarm on. Bed in the lowest position. Treaded socks in place. Hourly rounding in progress. Will continue to monitor .     Left are swollen, dressing clean dry intact. Sling is under arm but not connected to neck piece.     Left leg, elevated on pillows, wrapped in ace bandage and in a walking boot, clean dry intact.

## 2023-04-10 NOTE — CARE PLAN
The patient is Stable - Low risk of patient condition declining or worsening    Shift Goals  Clinical Goals: mobility; pain control  Patient Goals: pain control; comfort with mobility  Family Goals: not present    Progress made toward(s) clinical / shift goals:    Problem: Pain - Standard  Goal: Alleviation of pain or a reduction in pain to the patient’s comfort goal  Outcome: Progressing     Problem: Knowledge Deficit - Standard  Goal: Patient and family/care givers will demonstrate understanding of plan of care, disease process/condition, diagnostic tests and medications  Outcome: Progressing     Problem: Skin Integrity  Goal: Skin integrity is maintained or improved  Outcome: Progressing       Patient is not progressing towards the following goals:

## 2023-04-10 NOTE — PROGRESS NOTES
[facility] [place] [facility] MEDICINE PROGRESS NOTE     Attending:   Maritza Méndez MD    Resident:   Kisha Fairbanks MD PGY-1    PATIENT:   Addie Orellana; [id number]; 1965    ID:   Addie Orellana is a 57 y.o. female with PMHx breast cancer s/p 6 years in remission, obesity who presented after a GLF.  Admitted for L humerus, fibular, tibial fractures s/p intramedullary tibia and humerus ambreen placements 4/7.    SUBJECTIVE:   No acute events overnight, resting comfortably in bed in no acute distress.  Patient reports the swelling in her left arm seems to be the same as yesterday and pain rated a 7/10 with movement.  Patient does not experience any pain if she remains still. Current pain regiment controlling pain. Patient is able to move her left leg a couple inches off the bed but is limited due to weight of the brace when she experiences a slight twinge every once in a while.  Patient reports Ace wrap and brace have not been removed since surgery.  Patient tolerating p.o. without any nausea or vomiting.  And is voiding and stooling normally.    OBJECTIVE:  Vitals:    04/09/23 1200 04/09/23 1458 04/09/23 1951 04/10/23 0400   BP:  124/83 (!) 132/92 (!) 129/91   Pulse:  (!) 109 (!) 114 92   Resp:  19 18 17   Temp:  37.2 °C (99 °F)  36.7 °C (98.1 °F)   TempSrc:  Temporal Temporal Temporal   SpO2: 95% 97% 98% 95%   Weight:       Height:           Intake/Output Summary (Last 24 hours) at 4/10/2023 0718  Last data filed at 4/10/2023 0400  Gross per 24 hour   Intake 150 ml   Output 1500 ml   Net -1350 ml       PHYSICAL EXAM:   General: No acute distress, afebrile, resting comfortably, conversational   HEENT: NC/AT. EOMI.   Cardiovascular: RRR without murmurs, rubs, heaves. Normal capillary refill   Respiratory: CTAB, no tachypnea or retractions   Abdomen: normal bowel sounds, soft, nontender, nondistended, no masses, no organomegaly   EXT:  Left arm with bandage over incision site with significant swelling, left leg in Ortho boot with  Ace bandage over knee. EXT warm with 2 pulses  Skin: No erythema/lesions   Neuro: Non-focal, alert and orientated     LABS:  Recent Labs     04/08/23  0045 04/09/23  0303 04/10/23  0343   WBC 9.2 6.9 5.4   RBC 3.57* 2.90* 2.80*   HEMOGLOBIN 9.9* 8.2* 7.8*   HEMATOCRIT 32.3* 26.5* 25.2*   MCV 90.5 91.4 90.0   MCH 27.7 28.3 27.9   RDW 46.3 47.5 48.1   PLATELETCT 128* 130* 128*   MPV 10.2 9.5 9.6   NEUTSPOLYS 89.60*  --   --    LYMPHOCYTES 5.60*  --   --    MONOCYTES 4.00  --   --    EOSINOPHILS 0.00  --   --    BASOPHILS 0.10  --   --      Recent Labs     04/08/23  0045 04/09/23  0303 04/10/23  0343   SODIUM 133* 139 139   POTASSIUM 4.8 4.0 4.0   CHLORIDE 102 104 102   CO2 22 26 29   BUN 12 11 11   CREATININE 0.71 0.71 0.57   CALCIUM 8.0* 7.8* 7.9*   ALBUMIN 3.0* 2.9*  --      Estimated GFR/CRCL = Estimated Creatinine Clearance: 117 mL/min (by C-G formula based on SCr of 0.57 mg/dL).  Recent Labs     04/08/23  0045 04/09/23  0303 04/10/23  0343   GLUCOSE 198* 121* 105*     Recent Labs     04/08/23 0045 04/09/23  0303   ASTSGOT 15 17   ALTSGPT 12 7   TBILIRUBIN 0.3 0.4   ALKPHOSPHAT 70 61   GLOBULIN 2.8 2.7           No results for input(s): INR, APTT, FIBRINOGEN in the last 72 hours.    Invalid input(s): DIMER    MICROBIOLOGY: Blood subsequent  Blood Culture   Date Value Ref Range Status   01/02/2019 No growth after 5 days of incubation.  Final      Check with the patient about all of the thingsIMAGING:   DX-TIBIA AND FIBULA LEFT   Final Result      Portable fluoroscopy as described.      DX-HUMERUS 2+ LEFT   Final Result      Portable fluoroscopy as described.      CT-ANKLE W/O PLUS RECONS LEFT   Final Result      1.  Comminuted and mildly displaced distal tibial diaphyseal fracture without evidence of articular extension.   2.  Comminuted and mildly displaced mid/distal fibular shaft fracture.   3.  Osteopenia.   4.  Skin thickening and mild subcutaneous edema of the visualized lower extremity. This may reflect  inflammation or scar.      DX-FOOT-COMPLETE 3+ RIGHT   Final Result         1.  No radiographic evidence of acute injury.      2.  Osteoporosis.      3.  Previous mid and hindfoot fusion.      DX-FEMUR-2+ LEFT   Final Result      1.  Marked osteopenia without evidence of displaced fracture.   2.  Moderate degenerative changes of the left hip joint.      DX-KNEE 2- LEFT   Final Result      1.  Marked osteopenia.   2.  Remote appearing deformity of the proximal fibular shaft. Acute on chronic injury not excluded.   3.  Possible small joint effusion.   4.  Tricompartmental degenerative changes.      DX-HUMERUS 2+ LEFT   Final Result      1.  Comminuted and displaced proximal humeral shaft fracture.   2.  Decreased osseous mineralization.      DX-TIBIA AND FIBULA LEFT   Final Result      1.  Osteopenia.      2.  Spiral fracture of the left distal tibia in near anatomic alignment.      3.  Comminuted fracture of mid shaft of left fibula in near-anatomic alignment.      4.  Oblique fractures of the left proximal fibula in near-anatomic alignment.      DX-PORTABLE FLUORO > 1 HOUR    (Results Pending)     CULTURES:   Results       ** No results found for the last 168 hours. **          MEDS:  Current Facility-Administered Medications   Medication Last Admin    therapeutic multivitamin-minerals (THERAGRAN-M) tablet 1 Tablet 1 Tablet at 04/09/23 1402    cyclobenzaprine (Flexeril) tablet 10 mg 10 mg at 04/09/23 1402    enoxaparin (Lovenox) inj 30 mg 30 mg at 04/10/23 0510    senna-docusate (PERICOLACE or SENOKOT S) 8.6-50 MG per tablet 2 Tablet 2 Tablet at 04/10/23 0510    And    polyethylene glycol/lytes (MIRALAX) PACKET 1 Packet 1 Packet at 04/10/23 0510    And    magnesium hydroxide (MILK OF MAGNESIA) suspension 30 mL      And    bisacodyl (DULCOLAX) suppository 10 mg      acetaminophen (Tylenol) tablet 650 mg      Pharmacy Consult Request ...Pain Management Review 1 Each      oxyCODONE immediate-release (ROXICODONE) tablet  2.5 mg      Or    oxyCODONE immediate-release (ROXICODONE) tablet 5 mg 5 mg at 04/09/23 0913    Or    HYDROmorphone (Dilaudid) injection 0.25 mg      ondansetron (ZOFRAN) syringe/vial injection 4 mg      ondansetron (ZOFRAN ODT) dispertab 4 mg      promethazine (PHENERGAN) tablet 12.5-25 mg      promethazine (PHENERGAN) suppository 12.5-25 mg      prochlorperazine (COMPAZINE) injection 5-10 mg         ASSESSMENT/PLAN: Addie Orellana is a 57 y.o. female with PMHx breast cancer s/p 6 years in remission, obesity who presented after a GLF.  Admitted for L humerus, fibular, tibial fractures s/p intramedullary tibia and humerus ambreen placements 4/7.     * Displaced fracture of proximal end of humerus- (present on admission)  Assessment & Plan  S/p open intramedullary ambreen of left humerus.  DC to SNF per Ortho, PT/OT recommend DC to postacute rehab for continued therapy before discharge home.  - Tylenol as needed mild pain  - Oxycodone 5 mg q3hrs for moderate pain PRN, 10 mg q3hrs for severe pain PRN  - IV Dilaudid for severe pain, IF oxycodone does not relieve severe pain  - PMR consult,  not a candidate for IPR per patient preference.   - Plan for SNF placement when medically cleared and bed is available   - Patient having extensive swelling in her left upper extremity with increased pain we will order left upper extremity ultrasound    Closed fracture of shaft of fibula  Assessment & Plan  S/p closed fibular fixation with screws (in conjunction with L tibial intramedullary ambreen placement on 4/8).  - Tylenol as needed mild pain  - Oxycodone 5 mg q3hrs for moderate pain PRN, 10 mg q3hrs for severe pain PRN  - IV Dilaudid for severe pain, IF oxycodone does not relieve severe pain  - PMR consult,  not a candidate for IPR per patient preference.   - Plan for SNF placement when medically cleared and bed is available       Osteoporosis with current pathological fracture  Assessment & Plan  Diffuse osteopenia and osteoporosis is  noted on x-rays.  Likely exacerbated by chemotherapy and radiation therapy for aggressive breast cancer.  DEXA scan order found to be canceled, no results noted  Patient notes she has history of osteogenesis imperfecta. only noted once on previous charts per search.  - Consider discussing bisphosphonate therapy with patient, possibly outpatient    Fracture of distal end of tibia  Assessment & Plan  S/p intramedullary ambreen of L tibia on 4/8.  Pt noted R leg cramps, likely secondary to decreased mobility in bed, states she hasn't been taking her multivitamin  - Tylenol as needed mild pain  - Oxycodone 5 mg q3hrs for moderate pain PRN, 10 mg q3hrs for severe pain PRN  - IV Dilaudid for severe pain, IF oxycodone does not relieve severe pain  - PMR consult,  not a candidate for IPR per patient preference.   - Plan for SNF placement when medically cleared and bed is available   - Continue Multivitamin  - Continue cyclobenzaprine to address leg cramps BID PRN    Malignant neoplasm of breast (HCC)  Assessment & Plan  History of breast cancer. ER/NM positive Her2 kamala negative, poorly differentiated, high-grade.  Per chart review-patient has stated she is in remission.  No current oncology notes are on record.  - f/u outpatient       Core Measures:   Fluids: none  Lines: PIV, right forearm  Abx: none  DVT prophylaxis: Lovenox  Code Status: FULL CODE    Disposition: Inpatient for pain control, imaging studies pending, EMELYN working on SNF placement    Kisha Fairbanks MD   PGY-1 Family Medicine Resident   [facility]

## 2023-04-10 NOTE — CONSULTS
Physical Medicine and Rehabilitation Consultation          Date of initial consultation: 4/10/2023  Consulting provider: Olga Macedo D.O.  Reason for consultation: assess for acute inpatient rehab appropriateness  LOS: 3 Day(s)    Chief complaint: Left arm and leg fracture     HPI: The patient is a 57 y.o. right hand dominant female with a past medical history of breast cancer 6 years in remission, obesity, osteogenesis imperfecta;  who presented on 4/7/2023  7:50 AM with ground-level fall and left arm and leg pain.  Initial work-up found distal tibia fracture, midshaft fibula fracture, and displaced proximal humerus fracture.  Patient was brought to the OR on 4/7/2023 for left tibia intramedullary nailing and ORIF left humerus.    The patient currently reports pain and exhaustion. She reports good family support and plans to install a ramp at the backdoor and to move her room to the first floor. We discussed IPR and therapy and she would prefer a slower and longer approach to her recovery. She states there is a SNF in [place] near her home, [facility] and [facility]. She would like to go there.     ROS  Pertinent positives are mentioned in the HPI, all others reviewed and are negative.    Social Hx:  2 SH  2 CHALINO  With: Mom, brother, adult son, 3 foster children ages 14 months, 12 years old, 14 years old.  Patient requires assistance to go up stairs into her house at baseline.  Patient has discussed obtaining a ramp to enter her home and a recliner for sleeping.    THERAPY:  Restrictions: NWB L UE, WBAT LLE  PT: Functional mobility   4/8: Min assist from 2 persons for sit to stand, unable to participate in transfer or gait    OT: ADLs  4/8: Max assist dressing    SLP:   None    IMAGING:  CT left ankle 4/7/23  1.  Comminuted and mildly displaced distal tibial diaphyseal fracture without evidence of articular extension.  2.  Comminuted and mildly displaced mid/distal fibular  shaft fracture.  3.  Osteopenia.  4.  Skin thickening and mild subcutaneous edema of the visualized lower extremity. This may reflect inflammation or scar.      PROCEDURES:  Joselito Claire MD 4/7/23  1.  Open treatment with intramedullary nailing, left tibia shaft fracture.  2.  Closed treatment with manipulation, left fibular shaft fracture.  3.  Open treatment with internal fixation, left humeral shaft fracture.       PMH:  Past Medical History:   Diagnosis Date    Breast cancer, stage 3 (HCC)     Cancer (HCC) 2018    stage 3 breast, chemo 2019    Dental disorder     Partial upper and lower    Hemorrhagic disorder (HCC)     easily bruises    Pneumonia     first of week of her chemo-months ago       PSH:  Past Surgical History:   Procedure Laterality Date    TIBIA NAILING INTRAMEDULLARY Left 4/7/2023    Procedure: INSERTION, INTRAMEDULLARY HENNY, TIBIA;  Surgeon: Joselito Claire M.D.;  Location: [facility];  Service: Orthopedics    HUMERUS NAILING INTRAMEDULLARY Left 4/7/2023    Procedure: INSERTION, INTRAMEDULLARY HENNY, HUMERUS;  Surgeon: Joselito Claire M.D.;  Location: [facility];  Service: Orthopedics    MASTECTOMY Bilateral 3/13/2019    Procedure: MASTECTOMY- SIMPLE  ;  Surgeon: Elena Grossman M.D.;  Location: [facility];  Service: General    NODE BIOPSY SENTINEL Left 3/13/2019    Procedure: LEFT AXILLARY SENTINAL LYMPH NODE BIOPSY;  Surgeon: Elena Grossman M.D.;  Location: [facility];  Service: General    AXILLARY NODE DISSECTION Left 3/13/2019    Procedure: AXILLARY NODE DISSECTION ;  Surgeon: Elena Grossman M.D.;  Location: [facility];  Service: General    NODE BIOPSY Left 3/13/2019    Procedure: LYMPH NODE INJECTION;  Surgeon: Elena Grossman M.D.;  Location: [facility];  Service: General    ANKLE FUSION      GASTRIC BYPASS LAPAROSCOPIC      OTHER ABDOMINAL SURGERY      luanne    OTHER ORTHOPEDIC SURGERY Bilateral     foot surgery  to repair arches       FHX:  Family History   Problem Relation Age of Onset    Cancer Paternal Aunt         lung cancer    Cancer Paternal Grandfather         lung cancer       Medications:  Current Facility-Administered Medications   Medication Dose    therapeutic multivitamin-minerals (THERAGRAN-M) tablet 1 Tablet  1 Tablet    cyclobenzaprine (Flexeril) tablet 10 mg  10 mg    enoxaparin (Lovenox) inj 30 mg  30 mg    senna-docusate (PERICOLACE or SENOKOT S) 8.6-50 MG per tablet 2 Tablet  2 Tablet    And    polyethylene glycol/lytes (MIRALAX) PACKET 1 Packet  1 Packet    And    magnesium hydroxide (MILK OF MAGNESIA) suspension 30 mL  30 mL    And    bisacodyl (DULCOLAX) suppository 10 mg  10 mg    acetaminophen (Tylenol) tablet 650 mg  650 mg    Pharmacy Consult Request ...Pain Management Review 1 Each  1 Each    oxyCODONE immediate-release (ROXICODONE) tablet 2.5 mg  2.5 mg    Or    oxyCODONE immediate-release (ROXICODONE) tablet 5 mg  5 mg    Or    HYDROmorphone (Dilaudid) injection 0.25 mg  0.25 mg    ondansetron (ZOFRAN) syringe/vial injection 4 mg  4 mg    ondansetron (ZOFRAN ODT) dispertab 4 mg  4 mg    promethazine (PHENERGAN) tablet 12.5-25 mg  12.5-25 mg    promethazine (PHENERGAN) suppository 12.5-25 mg  12.5-25 mg    prochlorperazine (COMPAZINE) injection 5-10 mg  5-10 mg       Allergies:  No Known Allergies      Physical Exam:  Vitals: /76   Pulse (!) 116   Temp 36.4 °C (97.5 °F) (Temporal)   Resp 17   Ht 1.524 m (5')   Wt 104 kg (230 lb)   SpO2 94%   Gen: NAD  Head: NC/AT  Eyes/ Nose/ Mouth: moist mucous membranes  Cardio: RRR, good distal perfusion, warm extremities  Pulm: normal respiratory effort, no cyanosis   Abd: Soft NTND, negative borborygmi   Ext: No peripheral edema. No calf tenderness. No clubbing.    Mental status:  A&Ox4 (person, place, date, situation) answers questions appropriately follows commands  Speech: fluent, no aphasia or dysarthria    Motor:      Upper Extremity   Myotome R L   Shoulder flexion C5 5 3/5*   Elbow flexion C5 5 3/5*   Wrist extension C6 5 3/5*   Elbow extension C7 5 3/5*   Finger flexion C8 5 3/5*   Finger abduction T1 5 3/5*     Lower Extremity Myotome R L   Hip flexion L2 5 1/5   Knee extension L3 5 2/5   Ankle dorsiflexion L4 5 boot   Toe extension L5 5 boot   Ankle plantarflexion S1 5 boot     [*]= visually tested due to WB restrictions     Sensory:   intact to light touch through out    Labs: Reviewed and significant for   Recent Labs     04/08/23  0045 04/09/23  0303 04/10/23  0343   RBC 3.57* 2.90* 2.80*   HEMOGLOBIN 9.9* 8.2* 7.8*   HEMATOCRIT 32.3* 26.5* 25.2*   PLATELETCT 128* 130* 128*     Recent Labs     04/08/23  0045 04/09/23  0303 04/10/23  0343   SODIUM 133* 139 139   POTASSIUM 4.8 4.0 4.0   CHLORIDE 102 104 102   CO2 22 26 29   GLUCOSE 198* 121* 105*   BUN 12 11 11   CREATININE 0.71 0.71 0.57   CALCIUM 8.0* 7.8* 7.9*     Recent Results (from the past 24 hour(s))   CBC WITHOUT DIFFERENTIAL    Collection Time: 04/10/23  3:43 AM   Result Value Ref Range    WBC 5.4 4.8 - 10.8 K/uL    RBC 2.80 (L) 4.20 - 5.40 M/uL    Hemoglobin 7.8 (L) 12.0 - 16.0 g/dL    Hematocrit 25.2 (L) 37.0 - 47.0 %    MCV 90.0 81.4 - 97.8 fL    MCH 27.9 27.0 - 33.0 pg    MCHC 31.0 (L) 33.6 - 35.0 g/dL    RDW 48.1 35.9 - 50.0 fL    Platelet Count 128 (L) 164 - 446 K/uL    MPV 9.6 9.0 - 12.9 fL   Basic Metabolic Panel    Collection Time: 04/10/23  3:43 AM   Result Value Ref Range    Sodium 139 135 - 145 mmol/L    Potassium 4.0 3.6 - 5.5 mmol/L    Chloride 102 96 - 112 mmol/L    Co2 29 20 - 33 mmol/L    Glucose 105 (H) 65 - 99 mg/dL    Bun 11 8 - 22 mg/dL    Creatinine 0.57 0.50 - 1.40 mg/dL    Calcium 7.9 (L) 8.5 - 10.5 mg/dL    Anion Gap 8.0 7.0 - 16.0   ESTIMATED GFR    Collection Time: 04/10/23  3:43 AM   Result Value Ref Range    GFR (CKD-EPI) 105 >60 mL/min/1.73 m 2         ASSESSMENT:  Patient is a 57 y.o. female admitted with left humerus fracture and left tibia fracture  now s/p ORIF left humerus (NWB) and IMN repair left tibia (WBAT)     Rehabilitation: Impaired ADLs and mobility  Not a candidate for IPR per patient preference     All cases are subject to administrative review and recommendations may change    Disposition recommendations:  - Recommend SNF placement which will provide therapy at a pace more inline with patients wishes. She has poor bone quality and will benefit from a protracted recovery with consistent therapy. This will also allow time for her family to install a ramp and get her set up on the first floor of her home.   -PMR will sign off, please reconsult or reach out via Voalte if further evaluation or medical management is requested    Medical Complexity:    Polytrauma: left humerus fracture, left tibia and left fibula  - History of osteogenesis imperfecta and chemo exposure, causing weakened bones and leading to pathologic fractures   - Left humerus fracture s/p ORIF by Dr. Claire on 4/7, NWB LUE   - Left tibia/fibula fracture, s/p IMN repair by Dr. Claire on 4/7, WBAT LUE  - Continue PT/OT while in house   - Plan for SNF placement when medically cleared and bed is available     Acute blood loss anemia   - Hgb 7.8 today   - Primary team monitoring   - Etiology is from surgery    DVT PPX: Lovenox       Thank you for allowing us to participate in the care of this patient.     Patient was seen for >60 minutes on unit/floor of which > 50% of time was spent on counseling and coordination of care regarding the above, including prognosis, risk reduction, benefits of treatment, and options for next stage of care.    Ravin Hayden, DO   Physical Medicine and Rehabilitation     Please note that this dictation was created using voice recognition software. I have made every reasonable attempt to correct obvious errors, but there may be errors of grammar and possibly content that I did not discover before finalizing the note.

## 2023-04-10 NOTE — DISCHARGE PLANNING
Agency/Facility Name: [facility]  Spoke To: Reny  Outcome: Has questions for CM re; what facility Pt is discharging to, DPA let CM know and she is following up.

## 2023-04-10 NOTE — THERAPY
Occupational Therapy  Daily Treatment     Patient Name: Addie Orellana  Age:  57 y.o., Sex:  female  Medical Record #: [id number]  Today's Date: 4/10/2023     Precautions  Precautions: Fall Risk, Non Weight Bearing Left Upper Extremity, Weight Bearing As Tolerated Left Lower Extremity, CAM Boot  Comments: CAM boot  LLE, LUE ROMAT, sling for comfort    Assessment    Pt seen for OT treatment. Pt required min A to brush teeth while sitting, SBA to brush hair while seated, and max A to don/doff socks while seated. Pt was  sitting up in a chair for >3 hours prior to session and required total A for transfer back to bed. Pt reported that she typically more heavily uses L UE for pushing up from surfaces to stand. Pt educated  regarding continued AROM within tolerance for L UE and the role of OT. Pt noted to spontaneously use L UE more frequently for light ADL tasks throughout session. Pt current functional performance limited by impaired activity tolerance, pain, limited use of L UE for functional tasks, and weakness. Pt will continue to benefit from skilled OT while admitted to acute care.     Plan    Treatment Plan Status: Continue Current Treatment Plan  Type of Treatment: Self Care / Activities of Daily Living, Adaptive Equipment, Neuro Re-Education / Balance, Therapeutic Exercises, Therapeutic Activity  Treatment Frequency: 4 Times per Week  Treatment Duration: Until Therapy Goals Met    DC Equipment Recommendations: Unable to determine at this time  Discharge Recommendations: Recommend post-acute placement for additional occupational therapy services prior to discharge home     Objective     04/10/23 1103   Vitals   Pulse (!) 116   Pulse Oximetry 94 %   O2 (LPM) 0.5   O2 Delivery Device Silicone Nasal Cannula   Pain   Pain Scales 0 to 10 Scale    Pain 0 - 10 Group   Therapist Pain Assessment During Activity;Nurse Notified  (not rated, spasms in R LE, pain to L UE and L LE)   Non Verbal Descriptors   Non Verbal Scale   Calm   Cognition    Cognition / Consciousness WDL   Comments Alert, pleasant, and cooperative, limited by pain   Balance   Sitting Balance (Static) Fair +   Sitting Balance (Dynamic) Fair   Weight Shift Sitting Poor   Skilled Intervention Verbal Cuing;Tactile Cuing;Sequencing;Facilitation   Comments Pt unable to stand after spending multiple hours up in the chair prior to session.   Bed Mobility    Comments in chair prior for multiple hours, required  total A with large slideboard and x3 assist to return to supine   Activities of Daily Living   Grooming Minimal Assist;Seated  (brushed teeth with min A, combed hair w/ supv)   Lower Body Dressing Maximal Assist  (don/doff R sock)   Skilled Intervention Verbal Cuing;Facilitation;Sequencing   Functional Mobility   Sit to Stand Unable to Participate   Bed, Chair, Wheelchair Transfer Unable to Participate   Transfer Method Slide Board   Mobility attempted to stand multiple times, trialed lino, however pt unable to tolerate bending knee far enough for appropriate use, pt was transferred back to bed using large SB and x3 person edna   Comments limited by pain and fatigue, pt spent multiple hours in chair prior to session   Visual Perception   Visual Perception  Not Tested   Activity Tolerance   Sitting in Chair up to chair prior, 3+ hours   Sitting Edge of Bed NT   Standing NT   Comments limited by fatigue and pain   Patient / Family Goals   Patient / Family Goal #1 to have less pain   Goal #1 Outcome Progressing as expected   Short Term Goals   Short Term Goal # 1 Pt will perform UB dressing w/ min A   Goal Outcome # 1 Goal not met   Short Term Goal # 2 Pt will perform LB dressing w/ min A   Goal Outcome # 2 Progressing slower than expected   Short Term Goal # 3 Pt will perform ADL transfer w/ supv   Goal Outcome # 3 Progressing slower than expected   Short Term Goal # 4 Pt will perform g/h w/ supv   Goal Outcome # 4 Progressing as expected   Education Group    Education Provided Role of Occupational Therapist;Activities of Daily Living;Upper Extremity Range of Motion;Brace Wear and Care   Role of Occupational Therapist Patient Response Patient;Acceptance;Explanation;Verbal Demonstration   Joint Protection Patient Response Patient;Acceptance;Explanation;Demonstration;Verbal Demonstration;Action Demonstration   Upper Ext ROM Patient Response Patient;Acceptance;Explanation;Demonstration   ADL Patient Response Patient;Acceptance;Explanation;Demonstration;Verbal Demonstration;Action Demonstration   Weight Bearing Precautions Patient Response Patient;Acceptance;Explanation;Demonstration;Verbal Demonstration;Action Demonstration   Occupational Therapy Treatment Plan    O.T. Treatment Plan Continue Current Treatment Plan   Treatment Interventions Self Care / Activities of Daily Living;Adaptive Equipment;Neuro Re-Education / Balance;Therapeutic Exercises;Therapeutic Activity   Treatment Frequency 4 Times per Week   Duration Until Therapy Goals Met

## 2023-04-10 NOTE — PROGRESS NOTES
Orthopaedic Progress Note    Interval changes:  Patient doing well    LUE and LLE dressings CDI  Cleared for DC to SNF by ortho pending medicine clearance    ROS - Patient denies any new issues.  Pain well controlled.    /83   Pulse (!) 109   Temp 37.2 °C (99 °F) (Temporal)   Resp 19   Ht 1.524 m (5')   Wt 104 kg (230 lb)   SpO2 97%       Patient seen and examined  No acute distress  Breathing non labored  RRR  LUE in sling dressing CDI, DNVI, moves all fingers, cap refill <2 sec.  LLE in cam boot, dressings CDI, DNVI, moves all toes, cap refill <2.      Recent Labs     04/07/23  1230 04/08/23  0045 04/09/23  0303   WBC 9.8 9.2 6.9   RBC 4.05* 3.57* 2.90*   HEMOGLOBIN 11.4* 9.9* 8.2*   HEMATOCRIT 35.8* 32.3* 26.5*   MCV 88.4 90.5 91.4   MCH 28.1 27.7 28.3   MCHC 31.8* 30.7* 30.9*   RDW 46.4 46.3 47.5   PLATELETCT 145* 128* 130*   MPV 10.1 10.2 9.5       Active Hospital Problems    Diagnosis     Displaced fracture of proximal end of humerus [S42.209A]     Fracture of distal end of tibia [S82.309A]     Osteoporosis with current pathological fracture [M80.00XA]     Closed fracture of shaft of fibula [S82.409A]        Assessment/Plan:  Patient doing well    LUE and LLE dressings CDI  Cleared for DC to SNF by ortho pending medicine clearance  POD#2 S/P   1.  Open treatment with intramedullary nailing, left tibia shaft fracture.  2.  Closed treatment with manipulation, left fibular shaft fracture.  3.  Open treatment with internal fixation, left humeral shaft fracture.  Wt bearing status - WBAT LLE in boot, NWB LUE  Wound care/Drains - Dressings to be changed every other day by nursing  Future Procedures - none planned   Lovenox: Start 4/8, Duration-until ambulatory > 150'  Sutures/Staples out- 14-21 days post operatively. Removal will be by ortho mid levels only.  PT/OT-initiated  Antibiotics: Perioperative completed  DVT Prophylaxis- TEDS/SCDs/Foot pumps  Stevens-none  Case Coordination for Discharge  Planning - Disposition SNF

## 2023-04-10 NOTE — PROGRESS NOTES
Orthopaedic Progress Note    Interval changes:  Patient doing well    LUE and LLE dressings CDI  Cleared for DC to SNF by ortho pending medicine clearance    ROS - Patient denies any new issues.  Pain well controlled.    /76   Pulse (!) 116   Temp 36.4 °C (97.5 °F) (Temporal)   Resp 17   Ht 1.524 m (5')   Wt 104 kg (230 lb)   SpO2 94%       Patient seen and examined  No acute distress  Breathing non labored  RRR  LUE in sling dressing CDI, DNVI, moves all fingers, cap refill <2 sec.  LLE in cam boot, dressings CDI, DNVI, moves all toes, cap refill <2.      Recent Labs     04/08/23  0045 04/09/23  0303 04/10/23  0343   WBC 9.2 6.9 5.4   RBC 3.57* 2.90* 2.80*   HEMOGLOBIN 9.9* 8.2* 7.8*   HEMATOCRIT 32.3* 26.5* 25.2*   MCV 90.5 91.4 90.0   MCH 27.7 28.3 27.9   MCHC 30.7* 30.9* 31.0*   RDW 46.3 47.5 48.1   PLATELETCT 128* 130* 128*   MPV 10.2 9.5 9.6       Active Hospital Problems    Diagnosis     Displaced fracture of proximal end of humerus [S42.209A]     Fracture of distal end of tibia [S82.309A]     Osteoporosis with current pathological fracture [M80.00XA]     Closed fracture of shaft of fibula [S82.409A]        Assessment/Plan:  Patient doing well    LUE and LLE dressings CDI  Cleared for DC to SNF by ortho pending medicine clearance  POD#3 S/P   1.  Open treatment with intramedullary nailing, left tibia shaft fracture.  2.  Closed treatment with manipulation, left fibular shaft fracture.  3.  Open treatment with internal fixation, left humeral shaft fracture.  Wt bearing status - WBAT LLE in boot, NWB LUE  Wound care/Drains - Dressings to be changed every other day by nursing  Future Procedures - none planned   Lovenox: Start 4/8, Duration-until ambulatory > 150'  Sutures/Staples out- 14-21 days post operatively. Removal will be by ortho mid levels only.  PT/OT-initiated  Antibiotics: Perioperative completed  DVT Prophylaxis- TEDS/SCDs/Foot pumps  Stevens-none  Case Coordination for Discharge  Planning - Disposition SNF

## 2023-04-10 NOTE — DISCHARGE PLANNING
[facility] Transitional Care Coordination    Referral from:  [facility] Provider on Facesheet:MCR  Potential Rehab Diagnosis: humerus, tib /fib FX's    Chart review indicates patient may have on going medical management and may have therapy needs to possibly meet inpatient rehab facility criteria with the goal of returning to community.    D/C support: Mother/ sister will need to verify     Physiatry consultation forwarded  per protocol.          Thank you for the referral.

## 2023-04-10 NOTE — THERAPY
Physical Therapy   Daily Treatment     Patient Name: Addie Orellana  Age:  57 y.o., Sex:  female  Medical Record #: [id number]  Today's Date: 4/10/2023     Precautions  Precautions: Fall Risk;Non Weight Bearing Left Lower Extremity;Weight Bearing As Tolerated Left Lower Extremity;CAM Boot    Assessment    Pt seen for PT today, pt had limited activity tolerance during therapy as she had been up in a chair for a few hours. Pt able to participated in LE exercises at chair level. Pt unable to perform sit to stand  due to low chair, muscle fatigue and cramping as well as limited functional use of LUE and LLE. Pt eventually assisted by OT and RN vis slide board BTB. Pt will benefit from continued inpatient as well as post acute PT piror to home  Pt states family is getting a ramp set up for home access and will be available to assist following rehab.     Plan    Treatment Plan Status: Continue Current Treatment Plan  Type of Treatment: Bed Mobility, Equipment, Gait Training, Manual Therapy, Neuro Re-Education / Balance, Self Care / Home Evaluation, Therapeutic Activities, Therapeutic Exercise  Treatment Frequency: 4 Times per Week  Treatment Duration: Until Therapy Goals Met    DC Equipment Recommendations: Unable to determine at this time  Discharge Recommendations: Recommend post-acute placement for additional physical therapy services prior to discharge home         Objective       04/10/23 1045   Pain 0 - 10 Group   Location Leg   Location Orientation Left;Right;Mid   Description Aching;Cramping;Sharp   Therapist Pain Assessment During Activity;Nurse Notified   Sitting Lower Body Exercises   Sitting Lower Body Exercises Yes   Ankle Pumps 1 set of 10   Long Arc Quad 1 set of 10   Comments limited by pain, cramping RLE   Other Treatments   Other Treatments Provided attempted sit to stands x 2. Unable to clear buttocks from chair.   Balance   Sitting Balance (Static) Fair +   Sitting Balance (Dynamic) Fair   Weight  Shift Sitting Poor   Skilled Intervention Verbal Cuing;Tactile Cuing;Sequencing   Comments pt unable to perform sit to stand. unable to test standing balance.   Bed Mobility    Comments pt up in chair pre and post session, unable to transfer this am, awaiting Gisell Hammonds   Gait Analysis   Gait Level Of Assist Unable to Participate   Weight Bearing Status NWB LUE, WBAT LLE   Functional Mobility   Sit to Stand Unable to Participate   Bed, Chair, Wheelchair Transfer Unable to Participate   Comments Per OT, pt had to use slide board with assist of 3 .   How much difficulty does the patient currently have...   Turning over in bed (including adjusting bedclothes, sheets and blankets)? 1   Sitting down on and standing up from a chair with arms (e.g., wheelchair, bedside commode, etc.) 1   Moving from lying on back to sitting on the side of the bed? 1   How much help from another person does the patient currently need...   Moving to and from a bed to a chair (including a wheelchair)? 1   Need to walk in a hospital room? 1   Climbing 3-5 steps with a railing? 1   6 clicks Mobility Score 6   Activity Tolerance   Sitting in Chair 3 hrs.   Comments Pt very fatigued prior to PT session as she had been up in a chair for a few hours.   Short Term Goals    Short Term Goal # 1 Patient will move supine <> sitting EOB with min A within 6tx in order to get in/out of bed (patient reported plan to obtain recliner for home)   Goal Outcome # 1 Progressing slower than expected   Short Term Goal # 2 Patient will move sitting <> standing with LRAD and CGA within 6tx in order to initiate transfers and gait   Goal Outcome # 2 Progressing slower than expected   Short Term Goal # 3 Patient will ambulate 50ft with LRAD and CGA within 6tx in order to access environment   Goal Outcome # 3 Goal not met   Education Group   Role of Physical Therapist Patient Response Patient;Acceptance;Explanation;Verbal Demonstration   Weight Bearing Precautions  Patient Response Patient;Acceptance;Explanation;Verbal Demonstration;Action Demonstration   Brace Wear & Care Patient Response Patient;Acceptance;Demonstration;Explanation;Verbal Demonstration;Action Demonstration;Reinforcement Needed

## 2023-04-11 PROBLEM — D64.9 LOW HEMOGLOBIN AND LOW HEMATOCRIT: Status: ACTIVE | Noted: 2023-04-11

## 2023-04-11 PROCEDURE — 99231 SBSQ HOSP IP/OBS SF/LOW 25: CPT | Mod: GC | Performed by: FAMILY MEDICINE

## 2023-04-11 PROCEDURE — 770001 HCHG ROOM/CARE - MED/SURG/GYN PRIV*

## 2023-04-11 PROCEDURE — 700111 HCHG RX REV CODE 636 W/ 250 OVERRIDE (IP)

## 2023-04-11 PROCEDURE — 700102 HCHG RX REV CODE 250 W/ 637 OVERRIDE(OP)

## 2023-04-11 PROCEDURE — A9270 NON-COVERED ITEM OR SERVICE: HCPCS

## 2023-04-11 RX ADMIN — SENNOSIDES AND DOCUSATE SODIUM 2 TABLET: 50; 8.6 TABLET ORAL at 04:32

## 2023-04-11 RX ADMIN — OXYCODONE 5 MG: 5 TABLET ORAL at 17:03

## 2023-04-11 RX ADMIN — OXYCODONE HYDROCHLORIDE 10 MG: 10 TABLET ORAL at 00:38

## 2023-04-11 RX ADMIN — ENOXAPARIN SODIUM 30 MG: 30 INJECTION SUBCUTANEOUS at 04:32

## 2023-04-11 RX ADMIN — MULTIPLE VITAMINS W/ MINERALS TAB 1 TABLET: TAB at 13:30

## 2023-04-11 RX ADMIN — ENOXAPARIN SODIUM 30 MG: 30 INJECTION SUBCUTANEOUS at 17:03

## 2023-04-11 ASSESSMENT — PAIN DESCRIPTION - PAIN TYPE
TYPE: SURGICAL PAIN

## 2023-04-11 NOTE — DISCHARGE PLANNING
Case Management Discharge Planning    Admission Date: 4/7/2023  GMLOS: 4  ALOS: 4    6-Clicks ADL Score: 15  6-Clicks Mobility Score: 6  PT and/or OT Eval ordered: Yes  Post-acute Referrals Ordered: Yes  Post-acute Choice Obtained: Yes  Has referral(s) been sent to post-acute provider:  Yes      Anticipated Discharge Dispo: Discharge Disposition: Disch to IP rehab facility or distinct part unit (62)    DME Needed: No    Action(s) Taken: Updated Provider/Nurse on Discharge Plan, Choice obtained, and Referral(s) sent    Escalations Completed: None    Medically Clear: No    Next Steps: Physiatrist eval recommending SNF. [facility] has declined. Spoke to pt, she agrees with plan for SNF rather that IRF. Will refer to all local SNFs, first choice is [facility].    Barriers to Discharge: Medical clearance and Pending Placement    Is the patient up for discharge tomorrow: No

## 2023-04-11 NOTE — ASSESSMENT & PLAN NOTE
And POD 5, H/H has shown steady decline from 11.4/35.8 to 7.8/25.2.  Patient having no symptoms of low hemoglobin no large areas of ecchymosis.   -Repeat CBC improved 8.5/27.5  -Continue to monitor

## 2023-04-11 NOTE — PROGRESS NOTES
[facility] [place] [facility] PROGRESS NOTE     Attending:   Maritza Méndez MD    Resident:   Kisha Fairbanks MD PGY-1    PATIENT:   Addie Orellana; [id number]; 1965    ID:   Addie Orellana is a 57 y.o. female with PMHx breast cancer s/p 6 years in remission, obesity who presented after a GLF.  Admitted for L humerus, fibular, tibial fractures s/p intramedullary tibia and humerus ambreen placements 4/7.    SUBJECTIVE:   No acute events overnight, is feeling much better and pain is reduced.  She reports increased swelling in her hand but ice seems to be helping.  Tolerating p.o. without any nausea or vomiting and voiding and stooling normally.  Working with PT and OT but sometimes gets snappy because moving is painful.  She has been working with SW for SNF placement.     OBJECTIVE:  Vitals:    04/10/23 1400 04/10/23 1509 04/10/23 2000 04/11/23 0356   BP:  113/76 131/81 101/66   Pulse:  (!) 115 (!) 110 94   Resp:  18 17 16   Temp:  37 °C (98.6 °F) 37 °C (98.6 °F) 36.8 °C (98.2 °F)   TempSrc:  Temporal Temporal Temporal   SpO2:  98% 95% 97%   Weight: 105 kg (231 lb 0.7 oz)      Height:           Intake/Output Summary (Last 24 hours) at 4/11/2023 0613  Last data filed at 4/11/2023 0356  Gross per 24 hour   Intake 240 ml   Output 800 ml   Net -560 ml     PHYSICAL EXAM:   General: No acute distress, afebrile, resting comfortably, conversational   HEENT: NC/AT. EOMI.   Cardiovascular: RRR without murmurs, rubs, heaves. Normal capillary refill   Respiratory: CTAB, no tachypnea or retractions   Abdomen: normal bowel sounds, soft, nontender, nondistended, no masses, no organomegaly   EXT:  GIL,  Left arm with bandage over incision site with significant swelling, left leg in Ortho boot with Ace bandage over knee. EXT warm with 2 + pulses.  Skin: No erythema/lesions   Neuro: Non-focal, alert and orientated       LABS:  Recent Labs     04/09/23  0303 04/10/23  0343   WBC 6.9 5.4   RBC 2.90* 2.80*   HEMOGLOBIN 8.2* 7.8*   HEMATOCRIT  26.5* 25.2*   MCV 91.4 90.0   MCH 28.3 27.9   RDW 47.5 48.1   PLATELETCT 130* 128*   MPV 9.5 9.6     Recent Labs     04/09/23  0303 04/10/23  0343   SODIUM 139 139   POTASSIUM 4.0 4.0   CHLORIDE 104 102   CO2 26 29   BUN 11 11   CREATININE 0.71 0.57   CALCIUM 7.8* 7.9*   ALBUMIN 2.9*  --      Estimated GFR/CRCL = Estimated Creatinine Clearance: 117.7 mL/min (by C-G formula based on SCr of 0.57 mg/dL).  Recent Labs     04/09/23  0303 04/10/23  0343   GLUCOSE 121* 105*     Recent Labs     04/09/23  0303   ASTSGOT 17   ALTSGPT 7   TBILIRUBIN 0.4   ALKPHOSPHAT 61   GLOBULIN 2.7             No results for input(s): INR, APTT, FIBRINOGEN in the last 72 hours.    Invalid input(s): DIMER    MICROBIOLOGY:   Blood Culture   Date Value Ref Range Status   01/02/2019 No growth after 5 days of incubation.  Final        IMAGING:   US-EXTREMITY VENOUS UPPER UNILAT LEFT   Final Result      DX-PORTABLE FLUORO > 1 HOUR   Final Result      Portable fluoroscopy utilized for 1 minute 31 seconds.         INTERPRETING LOCATION: [address] [place] [address], [id number]      DX-TIBIA AND FIBULA LEFT   Final Result      Portable fluoroscopy as described.      DX-HUMERUS 2+ LEFT   Final Result      Portable fluoroscopy as described.      CT-ANKLE W/O PLUS RECONS LEFT   Final Result      1.  Comminuted and mildly displaced distal tibial diaphyseal fracture without evidence of articular extension.   2.  Comminuted and mildly displaced mid/distal fibular shaft fracture.   3.  Osteopenia.   4.  Skin thickening and mild subcutaneous edema of the visualized lower extremity. This may reflect inflammation or scar.      DX-FOOT-COMPLETE 3+ RIGHT   Final Result         1.  No radiographic evidence of acute injury.      2.  Osteoporosis.      3.  Previous mid and hindfoot fusion.      DX-FEMUR-2+ LEFT   Final Result      1.  Marked osteopenia without evidence of displaced fracture.   2.  Moderate degenerative changes of the left hip joint.      DX-KNEE 2- LEFT    Final Result      1.  Marked osteopenia.   2.  Remote appearing deformity of the proximal fibular shaft. Acute on chronic injury not excluded.   3.  Possible small joint effusion.   4.  Tricompartmental degenerative changes.      DX-HUMERUS 2+ LEFT   Final Result      1.  Comminuted and displaced proximal humeral shaft fracture.   2.  Decreased osseous mineralization.      DX-TIBIA AND FIBULA LEFT   Final Result      1.  Osteopenia.      2.  Spiral fracture of the left distal tibia in near anatomic alignment.      3.  Comminuted fracture of mid shaft of left fibula in near-anatomic alignment.      4.  Oblique fractures of the left proximal fibula in near-anatomic alignment.          CULTURES:   Results       ** No results found for the last 168 hours. **            MEDS:  Current Facility-Administered Medications   Medication Last Admin    oxyCODONE immediate-release (ROXICODONE) tablet 5 mg      Or    oxyCODONE immediate release (ROXICODONE) tablet 10 mg 10 mg at 04/11/23 0038    Or    HYDROmorphone (Dilaudid) injection 0.25 mg      therapeutic multivitamin-minerals (THERAGRAN-M) tablet 1 Tablet 1 Tablet at 04/10/23 1129    cyclobenzaprine (Flexeril) tablet 10 mg 10 mg at 04/09/23 1402    enoxaparin (Lovenox) inj 30 mg 30 mg at 04/11/23 0432    senna-docusate (PERICOLACE or SENOKOT S) 8.6-50 MG per tablet 2 Tablet 2 Tablet at 04/11/23 0432    And    polyethylene glycol/lytes (MIRALAX) PACKET 1 Packet 1 Packet at 04/10/23 0510    And    magnesium hydroxide (MILK OF MAGNESIA) suspension 30 mL      And    bisacodyl (DULCOLAX) suppository 10 mg      acetaminophen (Tylenol) tablet 650 mg      Pharmacy Consult Request ...Pain Management Review 1 Each      ondansetron (ZOFRAN) syringe/vial injection 4 mg      ondansetron (ZOFRAN ODT) dispertab 4 mg      promethazine (PHENERGAN) tablet 12.5-25 mg      promethazine (PHENERGAN) suppository 12.5-25 mg      prochlorperazine (COMPAZINE) injection 5-10 mg          ASSESSMENT/PLAN: Addie Orellana is a 57 y.o. female with PMHx breast cancer s/p 6 years in remission, obesity who presented after a GLF.  Admitted for L humerus, fibular, tibial fractures s/p intramedullary tibia and humerus ambreen placements 4/7.    * Displaced fracture of proximal end of humerus- (present on admission)  Assessment & Plan  S/p open intramedullary ambreen of left humerus.  DC to SNF per Ortho, PT/OT recommend DC to postacute rehab for continued therapy before discharge home.  - Tylenol as needed mild pain  - Oxycodone 5 mg q3hrs for moderate pain PRN, 10 mg q3hrs for severe pain PRN  - IV Dilaudid for severe pain, IF oxycodone does not relieve severe pain  - PMR consult,  not a candidate for IPR per patient preference.   - Plan for SNF placement when medically cleared and bed is available   - Patient having extensive swelling in her left upper extremity with increased pain. Ordered left upper extremity ultrasound, NO DVT.    Low hemoglobin and low hematocrit  Assessment & Plan  And POD 4, H/H has shown steady decline from 11.4/35.8 to 7.8/25.2.  Patient having no symptoms of low hemoglobin no large areas of ecchymosis.   -Repeat CBC AM  -Continue to monitor    Closed fracture of shaft of fibula  Assessment & Plan  S/p closed fibular fixation with screws (in conjunction with L tibial intramedullary ambreen placement on 4/8).  - Tylenol as needed mild pain  - Oxycodone 5 mg q3hrs for moderate pain PRN, 10 mg q3hrs for severe pain PRN  - IV Dilaudid for severe pain, IF oxycodone does not relieve severe pain  - PMR consult,  not a candidate for IPR per patient preference.   - Plan for SNF placement when medically cleared and bed is available       Osteoporosis with current pathological fracture  Assessment & Plan  Diffuse osteopenia and osteoporosis is noted on x-rays.  Likely exacerbated by chemotherapy and radiation therapy for aggressive breast cancer.  DEXA scan order found to be canceled, no results  noted  Patient notes she has history of osteogenesis imperfecta. only noted once on previous charts per search.  - Consider discussing bisphosphonate therapy with patient, possibly outpatient    Fracture of distal end of tibia  Assessment & Plan  S/p intramedullary ambreen of L tibia on 4/8.  Pt noted R leg cramps, likely secondary to decreased mobility in bed, states she hasn't been taking her multivitamin  - Tylenol as needed mild pain  - Oxycodone 5 mg q3hrs for moderate pain PRN, 10 mg q3hrs for severe pain PRN  - IV Dilaudid for severe pain, IF oxycodone does not relieve severe pain  - PMR consult,  not a candidate for IPR per patient preference.   - Plan for SNF placement when medically cleared and bed is available   - Continue Multivitamin  - Continue cyclobenzaprine to address leg cramps BID PRN    Malignant neoplasm of breast (HCC)  Assessment & Plan  History of breast cancer. ER/ND positive Her2 kamala negative, poorly differentiated, high-grade.  Per chart review-patient has stated she is in remission.  No current oncology notes are on record.  - f/u outpatient     Core Measures:   Fluids: none  Lines: PIV, right forearm  Abx: none  DVT prophylaxis: Lovenox  Code Status: FULL CODE    Disposition: Patient is medically clear, pending SNF placement.    Kisha Fairbanks MD   PGY-1 Family Medicine Resident   [facility]

## 2023-04-11 NOTE — DISCHARGE PLANNING
Received Choice form @: 1212  Agency/Facility Name: [facility]  Referral sent per Choice form @: [phone]  Agency/Facility Name: [facility]  Spoke To: Ana  Outcome: DPA has been informed Pt is will be on pending status pending updates due to being  max assist     DPA informed CM

## 2023-04-11 NOTE — CARE PLAN
The patient is Stable - Low risk of patient condition declining or worsening    Shift Goals  Clinical Goals: mobility; pain control  Patient Goals: mobility; pain control  Family Goals: not present    Progress made toward(s) clinical / shift goals:    Problem: Pain - Standard  Goal: Alleviation of pain or a reduction in pain to the patient’s comfort goal  Outcome: Progressing     Problem: Knowledge Deficit - Standard  Goal: Patient and family/care givers will demonstrate understanding of plan of care, disease process/condition, diagnostic tests and medications  Outcome: Progressing     Problem: Skin Integrity  Goal: Skin integrity is maintained or improved  Outcome: Progressing     Problem: Fall Risk  Goal: Patient will remain free from falls  Outcome: Progressing       Patient is not progressing towards the following goals:

## 2023-04-11 NOTE — CARE PLAN
The patient is Stable - Low risk of patient condition declining or worsening    Shift Goals  Clinical Goals: pain control, safety, BM  Patient Goals: BM, pain control  Family Goals: RALPH    Progress made toward(s) clinical / shift goals:      Problem: Pain - Standard  Goal: Alleviation of pain or a reduction in pain to the patient’s comfort goal  Outcome: Progressing     Problem: Knowledge Deficit - Standard  Goal: Patient and family/care givers will demonstrate understanding of plan of care, disease process/condition, diagnostic tests and medications  Outcome: Progressing     Problem: Skin Integrity  Goal: Skin integrity is maintained or improved  Outcome: Progressing       Patient is not progressing towards the following goals:

## 2023-04-11 NOTE — PROGRESS NOTES
Orthopaedic Progress Note    Interval changes:  Patient doing well    LUE and LLE dressings CDI  LUE dressing removed by [facility] team- replaced by ortho PA after being left open to air.  Cleared for DC to SNF by ortho pending medicine clearance    ROS - Patient denies any new issues.  Pain well controlled.    /62   Pulse 89   Temp 36.2 °C (97.2 °F) (Temporal)   Resp 15   Ht 1.524 m (5')   Wt 105 kg (231 lb 0.7 oz)   SpO2 95%       Patient seen and examined  No acute distress  Breathing non labored  RRR  LUE in sling dressing CDI, DNVI, moves all fingers, cap refill <2 sec.  LLE in cam boot, dressings changed, no concerns noted with incisions, DNVI, moves all toes, cap refill <2.      Recent Labs     04/09/23  0303 04/10/23  0343   WBC 6.9 5.4   RBC 2.90* 2.80*   HEMOGLOBIN 8.2* 7.8*   HEMATOCRIT 26.5* 25.2*   MCV 91.4 90.0   MCH 28.3 27.9   MCHC 30.9* 31.0*   RDW 47.5 48.1   PLATELETCT 130* 128*   MPV 9.5 9.6       Active Hospital Problems    Diagnosis     Low hemoglobin and low hematocrit [D64.9]     Displaced fracture of proximal end of humerus [S42.209A]     Fracture of distal end of tibia [S82.309A]     Osteoporosis with current pathological fracture [M80.00XA]     Closed fracture of shaft of fibula [S82.409A]        Assessment/Plan:  Patient doing well    LUE and LLE dressings CDI  LUE dressing removed by [facility] team- replaced by ortho PA after being left open to air.  Cleared for DC to SNF by ortho pending medicine clearance  POD#4 S/P   1.  Open treatment with intramedullary nailing, left tibia shaft fracture.  2.  Closed treatment with manipulation, left fibular shaft fracture.  3.  Open treatment with internal fixation, left humeral shaft fracture.  Wt bearing status - WBAT LLE in boot, NWB LUE  Wound care/Drains - Dressings to be changed every other day by nursing  Future Procedures - none planned   Lovenox: Start 4/8, Duration-until ambulatory > 150'  Sutures/Staples out- 14-21 days post  operatively. Removal will be by ortho mid levels only.  PT/OT-initiated  Antibiotics: Perioperative completed  DVT Prophylaxis- TEDS/SCDs/Foot pumps  Stevens-none  Case Coordination for Discharge Planning - Disposition SNF

## 2023-04-12 LAB
BASOPHILS # BLD AUTO: 0.5 % (ref 0–1.8)
BASOPHILS # BLD: 0.03 K/UL (ref 0–0.12)
EOSINOPHIL # BLD AUTO: 0.08 K/UL (ref 0–0.51)
EOSINOPHIL NFR BLD: 1.3 % (ref 0–6.9)
ERYTHROCYTE [DISTWIDTH] IN BLOOD BY AUTOMATED COUNT: 49.2 FL (ref 35.9–50)
HCT VFR BLD AUTO: 27.5 % (ref 37–47)
HGB BLD-MCNC: 8.5 G/DL (ref 12–16)
IMM GRANULOCYTES # BLD AUTO: 0.05 K/UL (ref 0–0.11)
IMM GRANULOCYTES NFR BLD AUTO: 0.8 % (ref 0–0.9)
LYMPHOCYTES # BLD AUTO: 0.92 K/UL (ref 1–4.8)
LYMPHOCYTES NFR BLD: 15.5 % (ref 22–41)
MCH RBC QN AUTO: 27.8 PG (ref 27–33)
MCHC RBC AUTO-ENTMCNC: 30.9 G/DL (ref 33.6–35)
MCV RBC AUTO: 89.9 FL (ref 81.4–97.8)
MONOCYTES # BLD AUTO: 0.69 K/UL (ref 0–0.85)
MONOCYTES NFR BLD AUTO: 11.6 % (ref 0–13.4)
NEUTROPHILS # BLD AUTO: 4.16 K/UL (ref 2–7.15)
NEUTROPHILS NFR BLD: 70.3 % (ref 44–72)
NRBC # BLD AUTO: 0.02 K/UL
NRBC BLD-RTO: 0.3 /100 WBC
PLATELET # BLD AUTO: 169 K/UL (ref 164–446)
PMV BLD AUTO: 9.8 FL (ref 9–12.9)
RBC # BLD AUTO: 3.06 M/UL (ref 4.2–5.4)
WBC # BLD AUTO: 5.9 K/UL (ref 4.8–10.8)

## 2023-04-12 PROCEDURE — 700102 HCHG RX REV CODE 250 W/ 637 OVERRIDE(OP)

## 2023-04-12 PROCEDURE — 36415 COLL VENOUS BLD VENIPUNCTURE: CPT

## 2023-04-12 PROCEDURE — 97530 THERAPEUTIC ACTIVITIES: CPT

## 2023-04-12 PROCEDURE — A9270 NON-COVERED ITEM OR SERVICE: HCPCS

## 2023-04-12 PROCEDURE — 85025 COMPLETE CBC W/AUTO DIFF WBC: CPT

## 2023-04-12 PROCEDURE — 700111 HCHG RX REV CODE 636 W/ 250 OVERRIDE (IP)

## 2023-04-12 PROCEDURE — 99231 SBSQ HOSP IP/OBS SF/LOW 25: CPT | Mod: GC | Performed by: FAMILY MEDICINE

## 2023-04-12 PROCEDURE — 770001 HCHG ROOM/CARE - MED/SURG/GYN PRIV*

## 2023-04-12 RX ORDER — ONDANSETRON 4 MG/1
4 TABLET, ORALLY DISINTEGRATING ORAL EVERY 4 HOURS PRN
Qty: 10 TABLET | Refills: 0 | Status: SHIPPED | OUTPATIENT
Start: 2023-04-12 | End: 2023-04-21

## 2023-04-12 RX ORDER — OXYCODONE HYDROCHLORIDE 10 MG/1
10 TABLET ORAL EVERY 6 HOURS PRN
Qty: 40 TABLET | Refills: 0 | Status: SHIPPED | OUTPATIENT
Start: 2023-04-12 | End: 2023-04-13 | Stop reason: SDUPTHER

## 2023-04-12 RX ADMIN — OXYCODONE HYDROCHLORIDE 10 MG: 10 TABLET ORAL at 12:44

## 2023-04-12 RX ADMIN — OXYCODONE HYDROCHLORIDE 10 MG: 10 TABLET ORAL at 00:24

## 2023-04-12 RX ADMIN — SENNOSIDES AND DOCUSATE SODIUM 2 TABLET: 50; 8.6 TABLET ORAL at 17:48

## 2023-04-12 RX ADMIN — ENOXAPARIN SODIUM 30 MG: 30 INJECTION SUBCUTANEOUS at 04:33

## 2023-04-12 RX ADMIN — ENOXAPARIN SODIUM 30 MG: 30 INJECTION SUBCUTANEOUS at 17:48

## 2023-04-12 RX ADMIN — OXYCODONE 5 MG: 5 TABLET ORAL at 21:41

## 2023-04-12 RX ADMIN — MULTIPLE VITAMINS W/ MINERALS TAB 1 TABLET: TAB at 11:18

## 2023-04-12 ASSESSMENT — PAIN DESCRIPTION - PAIN TYPE
TYPE: SURGICAL PAIN

## 2023-04-12 ASSESSMENT — LIFESTYLE VARIABLES
TOTAL SCORE: 0
CONSUMPTION TOTAL: NEGATIVE
AVERAGE NUMBER OF DAYS PER WEEK YOU HAVE A DRINK CONTAINING ALCOHOL: 0
EVER FELT BAD OR GUILTY ABOUT YOUR DRINKING: NO
EVER HAD A DRINK FIRST THING IN THE MORNING TO STEADY YOUR NERVES TO GET RID OF A HANGOVER: NO
ON A TYPICAL DAY WHEN YOU DRINK ALCOHOL HOW MANY DRINKS DO YOU HAVE: 0
HAVE PEOPLE ANNOYED YOU BY CRITICIZING YOUR DRINKING: NO
HAVE YOU EVER FELT YOU SHOULD CUT DOWN ON YOUR DRINKING: NO
DOES PATIENT WANT TO STOP DRINKING: NO
HOW MANY TIMES IN THE PAST YEAR HAVE YOU HAD 5 OR MORE DRINKS IN A DAY: 0
TOTAL SCORE: 0
TOTAL SCORE: 0
ALCOHOL_USE: NO

## 2023-04-12 ASSESSMENT — COGNITIVE AND FUNCTIONAL STATUS - GENERAL
MOVING FROM LYING ON BACK TO SITTING ON SIDE OF FLAT BED: UNABLE
TURNING FROM BACK TO SIDE WHILE IN FLAT BAD: UNABLE
WALKING IN HOSPITAL ROOM: A LITTLE
MOVING TO AND FROM BED TO CHAIR: UNABLE
SUGGESTED CMS G CODE MODIFIER MOBILITY: CL
STANDING UP FROM CHAIR USING ARMS: A LITTLE
MOBILITY SCORE: 11
CLIMB 3 TO 5 STEPS WITH RAILING: A LOT

## 2023-04-12 ASSESSMENT — GAIT ASSESSMENTS: GAIT LEVEL OF ASSIST: UNABLE TO PARTICIPATE

## 2023-04-12 ASSESSMENT — FIBROSIS 4 INDEX: FIB4 SCORE: 2.21

## 2023-04-12 ASSESSMENT — PATIENT HEALTH QUESTIONNAIRE - PHQ9
2. FEELING DOWN, DEPRESSED, IRRITABLE, OR HOPELESS: NOT AT ALL
1. LITTLE INTEREST OR PLEASURE IN DOING THINGS: NOT AT ALL
SUM OF ALL RESPONSES TO PHQ9 QUESTIONS 1 AND 2: 0

## 2023-04-12 NOTE — CARE PLAN
The patient is Stable - Low risk of patient condition declining or worsening    Shift Goals  Clinical Goals: Pain control  Patient Goals: Pain control  Family Goals: not present    Progress made toward(s) clinical / shift goals:  Pain control per MAR    Problem: Pain - Standard  Goal: Alleviation of pain or a reduction in pain to the patient’s comfort goal  Outcome: Progressing     Problem: Knowledge Deficit - Standard  Goal: Patient and family/care givers will demonstrate understanding of plan of care, disease process/condition, diagnostic tests and medications  Outcome: Progressing       Patient is not progressing towards the following goals:

## 2023-04-12 NOTE — PROGRESS NOTES
[facility] PROGRESS NOTE     Attending:   Maritza Méndez MD    Resident:   Ksiha Fairbanks MD PGY-1    PATIENT:   Addie Orellana; [id number]; 1965    ID:   Addie Orellana is a 57 y.o. female with PMHx breast cancer s/p 6 years in remission, obesity who presented after a GLF.  Admitted for L humerus, fibular, tibial fractures s/p intramedullary tibia and humerus ambreen placements 4/7.    SUBJECTIVE:   No acute events overnight, patient resting comfortably in bed in no acute distress.  Patient complained of some throbbing and aching in her left leg and arm overnight but controlled with pain management.  Patient reports swelling in left arm has gone down.  Tolerating p.o. without any nausea or vomiting.  Voiding and stooling normally.    OBJECTIVE:  Vitals:    04/11/23 0800 04/11/23 1500 04/11/23 2000 04/12/23 0400   BP: 105/62 112/58 111/72 113/75   Pulse: 89 85 100 91   Resp: 15 16 16 16   Temp: 36.2 °C (97.2 °F) 36.7 °C (98 °F) 37 °C (98.6 °F) 36.7 °C (98.1 °F)   TempSrc: Temporal Temporal Temporal Temporal   SpO2: 95% 96% 95% 93%   Weight:       Height:           Intake/Output Summary (Last 24 hours) at 4/11/2023 0613  Last data filed at 4/11/2023 0356  Gross per 24 hour   Intake 240 ml   Output 800 ml   Net -560 ml     PHYSICAL EXAM:   General: No acute distress, afebrile, resting comfortably, conversational   HEENT: NC/AT. EOMI.   Cardiovascular: RRR without murmurs, rubs, heaves. Normal capillary refill   Respiratory: CTAB, no tachypnea or retractions   Abdomen: normal bowel sounds, soft, nontender, nondistended, no masses, no organomegaly   EXT:  GIL,  Left arm with bandage over incision site with reduced swelling, left leg with bandage over incision site CDI  warm with 2 + pulses.  Skin: No erythema/lesions   Neuro: Non-focal, alert and orientated     LABS:  Recent Labs     04/10/23  0343 04/12/23  0243   WBC 5.4 5.9   RBC 2.80* 3.06*   HEMOGLOBIN 7.8* 8.5*   HEMATOCRIT 25.2* 27.5*   MCV 90.0  89.9   MCH 27.9 27.8   RDW 48.1 49.2   PLATELETCT 128* 169   MPV 9.6 9.8   NEUTSPOLYS  --  70.30   LYMPHOCYTES  --  15.50*   MONOCYTES  --  11.60   EOSINOPHILS  --  1.30   BASOPHILS  --  0.50     Recent Labs     04/10/23  0343   SODIUM 139   POTASSIUM 4.0   CHLORIDE 102   CO2 29   BUN 11   CREATININE 0.57   CALCIUM 7.9*     Estimated GFR/CRCL = Estimated Creatinine Clearance: 117.7 mL/min (by C-G formula based on SCr of 0.57 mg/dL).  Recent Labs     04/10/23  0343   GLUCOSE 105*                   No results for input(s): INR, APTT, FIBRINOGEN in the last 72 hours.    Invalid input(s): DIMER    MICROBIOLOGY:   Blood Culture   Date Value Ref Range Status   01/02/2019 No growth after 5 days of incubation.  Final        IMAGING:   US-EXTREMITY VENOUS UPPER UNILAT LEFT   Final Result      DX-PORTABLE FLUORO > 1 HOUR   Final Result      Portable fluoroscopy utilized for 1 minute 31 seconds.         INTERPRETING LOCATION: [address] [place] [address], [facility]      DX-TIBIA AND FIBULA LEFT   Final Result      Portable fluoroscopy as described.      DX-HUMERUS 2+ LEFT   Final Result      Portable fluoroscopy as described.      CT-ANKLE W/O PLUS RECONS LEFT   Final Result      1.  Comminuted and mildly displaced distal tibial diaphyseal fracture without evidence of articular extension.   2.  Comminuted and mildly displaced mid/distal fibular shaft fracture.   3.  Osteopenia.   4.  Skin thickening and mild subcutaneous edema of the visualized lower extremity. This may reflect inflammation or scar.      DX-FOOT-COMPLETE 3+ RIGHT   Final Result         1.  No radiographic evidence of acute injury.      2.  Osteoporosis.      3.  Previous mid and hindfoot fusion.      DX-FEMUR-2+ LEFT   Final Result      1.  Marked osteopenia without evidence of displaced fracture.   2.  Moderate degenerative changes of the left hip joint.      DX-KNEE 2- LEFT   Final Result      1.  Marked osteopenia.   2.  Remote appearing deformity of the proximal  fibular shaft. Acute on chronic injury not excluded.   3.  Possible small joint effusion.   4.  Tricompartmental degenerative changes.      DX-HUMERUS 2+ LEFT   Final Result      1.  Comminuted and displaced proximal humeral shaft fracture.   2.  Decreased osseous mineralization.      DX-TIBIA AND FIBULA LEFT   Final Result      1.  Osteopenia.      2.  Spiral fracture of the left distal tibia in near anatomic alignment.      3.  Comminuted fracture of mid shaft of left fibula in near-anatomic alignment.      4.  Oblique fractures of the left proximal fibula in near-anatomic alignment.          CULTURES:   Results       ** No results found for the last 168 hours. **            MEDS:  Current Facility-Administered Medications   Medication Last Admin    oxyCODONE immediate-release (ROXICODONE) tablet 5 mg 5 mg at 04/11/23 1703    Or    oxyCODONE immediate release (ROXICODONE) tablet 10 mg 10 mg at 04/12/23 0024    Or    HYDROmorphone (Dilaudid) injection 0.25 mg      therapeutic multivitamin-minerals (THERAGRAN-M) tablet 1 Tablet 1 Tablet at 04/11/23 1330    cyclobenzaprine (Flexeril) tablet 10 mg 10 mg at 04/09/23 1402    enoxaparin (Lovenox) inj 30 mg 30 mg at 04/12/23 0433    senna-docusate (PERICOLACE or SENOKOT S) 8.6-50 MG per tablet 2 Tablet 2 Tablet at 04/11/23 0432    And    polyethylene glycol/lytes (MIRALAX) PACKET 1 Packet 1 Packet at 04/10/23 0510    And    magnesium hydroxide (MILK OF MAGNESIA) suspension 30 mL      And    bisacodyl (DULCOLAX) suppository 10 mg      acetaminophen (Tylenol) tablet 650 mg      Pharmacy Consult Request ...Pain Management Review 1 Each      ondansetron (ZOFRAN) syringe/vial injection 4 mg      ondansetron (ZOFRAN ODT) dispertab 4 mg      promethazine (PHENERGAN) tablet 12.5-25 mg      promethazine (PHENERGAN) suppository 12.5-25 mg      prochlorperazine (COMPAZINE) injection 5-10 mg         ASSESSMENT/PLAN: Addie Orellana is a 57 y.o. female with PMHx breast cancer s/p 6  years in remission, obesity who presented after a GLF.  Admitted for L humerus, fibular, tibial fractures s/p intramedullary tibia and humerus ambreen placements 4/7.    * Displaced fracture of proximal end of humerus- (present on admission)  Assessment & Plan  S/p open intramedullary ambreen of left humerus.  DC to SNF per Ortho, PT/OT recommend DC to postacute rehab for continued therapy before discharge home.  - Tylenol as needed mild pain  - Oxycodone 5 mg q3hrs for moderate pain PRN, 10 mg q3hrs for severe pain PRN  - IV Dilaudid for severe pain, IF oxycodone does not relieve severe pain  - PMR consult,  not a candidate for IPR per patient preference.   - Plan for SNF placement when medically cleared and bed is available   - Patient having extensive swelling in her left upper extremity with increased pain. Ordered left upper extremity ultrasound, NO DVT.    Low hemoglobin and low hematocrit  Assessment & Plan  And POD 5, H/H has shown steady decline from 11.4/35.8 to 7.8/25.2.  Patient having no symptoms of low hemoglobin no large areas of ecchymosis.   -Repeat CBC improved 8.5/27.5  -Continue to monitor    Closed fracture of shaft of fibula  Assessment & Plan  S/p closed fibular fixation with screws (in conjunction with L tibial intramedullary ambreen placement on 4/8).  - Tylenol as needed mild pain  - Oxycodone 5 mg q3hrs for moderate pain PRN, 10 mg q3hrs for severe pain PRN  - IV Dilaudid for severe pain, IF oxycodone does not relieve severe pain  - PMR consult,  not a candidate for IPR per patient preference.   - Plan for SNF placement when medically cleared and bed is available       Osteoporosis with current pathological fracture  Assessment & Plan  Diffuse osteopenia and osteoporosis is noted on x-rays.  Likely exacerbated by chemotherapy and radiation therapy for aggressive breast cancer.  DEXA scan order found to be canceled, no results noted  Patient notes she has history of osteogenesis imperfecta. only noted  once on previous charts per search.  - Bisphosphonate therapy not recommended until 2 weeks postop, will need to f/u outpatient    Fracture of distal end of tibia  Assessment & Plan  S/p intramedullary ambreen of L tibia on 4/8.  Pt noted R leg cramps, likely secondary to decreased mobility in bed, states she hasn't been taking her multivitamin  - Tylenol as needed mild pain  - Oxycodone 5 mg q3hrs for moderate pain PRN, 10 mg q3hrs for severe pain PRN  - IV Dilaudid for severe pain, IF oxycodone does not relieve severe pain  - PMR consult,  not a candidate for IPR per patient preference.   - Plan for SNF placement when medically cleared and bed is available   - Continue Multivitamin  - Continue cyclobenzaprine to address leg cramps BID PRN    Malignant neoplasm of breast (HCC)  Assessment & Plan  History of breast cancer. ER/AR positive Her2 kamala negative, poorly differentiated, high-grade.  Per chart review-patient has stated she is in remission.  No current oncology notes are on record.  - f/u outpatient     Core Measures:   Fluids: none  Lines: PIV, right forearm  Abx: none  DVT prophylaxis: Lovenox  Code Status: FULL CODE    Disposition: Patient is medically clear, pending SNF placement.    Kisha Fairbanks MD   PGY-1 Family Medicine Resident   [facility]

## 2023-04-12 NOTE — THERAPY
Physical Therapy   Daily Treatment     Patient Name: Addie Orellana  Age:  57 y.o., Sex:  female  Medical Record #: [id number]  Today's Date: 4/12/2023    Precautions: Fall Risk;Non Weight Bearing Left Upper Extremity;Weight Bearing As Tolerated Left Lower Extremity;CAM Boot  Comments: ROMAT LUE    Assessment  Pt motivated to participate. Demos good adherence to LUE NWB. Pt able to complete STS with mod A for lift off, min A for balance once standing with HW. Pt able to take steps from EOB to chair with min A. Encouraged up to chair for all meals and as tolerated. PT will follow.     Plan  Continue Current Treatment Plan  Type of Treatment: Bed Mobility, Equipment, Gait Training, Manual Therapy, Neuro Re-Education / Balance, Self Care / Home Evaluation, Therapeutic Activities, Therapeutic Exercise  Treatment Frequency: 4 Times per Week  Treatment Duration: Until Therapy Goals Met    DC Equipment Recommendations: Unable to determine at this time  Discharge Recommendations: Recommend post-acute placement for additional physical therapy services prior to discharge home     04/12/23 1414   Cognition    Level of Consciousness Alert   Comments highly motivated   Balance   Sitting Balance (Static) Fair +   Sitting Balance (Dynamic) Fair   Standing Balance (Static) Fair -   Standing Balance (Dynamic) Poor +   Weight Shift Sitting Fair   Weight Shift Standing Fair   Skilled Intervention Verbal Cuing;Postural Facilitation   Comments standing with hemiwalker   Bed Mobility    Supine to Sit Minimal Assist   Scooting Minimal Assist   Skilled Intervention Verbal Cuing;Sequencing   Comments good initiation, min A pulling on therapist for trunk to upright fully   Gait Analysis   Gait Level Of Assist Unable to Participate   Weight Bearing Status NWB LUE, WBAT LLE in CAM boot   Functional Mobility   Sit to Stand Moderate Assist   Bed, Chair, Wheelchair Transfer Minimal Assist   Transfer Method Stand Step   Mobility EOB > chair with  hemiwalker   Skilled Intervention Verbal Cuing;Sequencing;Postural Facilitation   Comments STS x2 to HW, seated rest. cues for hand placement with good carryover, is able to take steps with min A for stability and HW management   Short Term Goals    Short Term Goal # 1 Patient will move supine <> sitting EOB with min A within 6tx in order to get in/out of bed (patient reported plan to obtain recliner for home)   Goal Outcome # 1 Progressing as expected   Short Term Goal # 2 Patient will move sitting <> standing with LRAD and CGA within 6tx in order to initiate transfers and gait   Goal Outcome # 2 Progressing as expected   Short Term Goal # 3 Patient will ambulate 50ft with LRAD and CGA within 6tx in order to access environment   Goal Outcome # 3 Progressing as expected

## 2023-04-12 NOTE — CARE PLAN
Problem: Pain - Standard  Goal: Alleviation of pain or a reduction in pain to the patient’s comfort goal  Outcome: Progressing     Problem: Knowledge Deficit - Standard  Goal: Patient and family/care givers will demonstrate understanding of plan of care, disease process/condition, diagnostic tests and medications  Outcome: Progressing       The patient is Stable - Low risk of patient condition declining or worsening    Shift Goals  Clinical Goals: Pain control  Patient Goals: Pain control  Family Goals: not present    Progress made toward(s) clinical / shift goals:  Taught pt 0-10 pain scale and non-pharmacological method of pain management, encouraged to verbalize when in pain. Administered PRN pain meds as needed.      Patient is not progressing towards the following goals:

## 2023-04-12 NOTE — PROGRESS NOTES
Orthopaedic PA Progress Note    Interval changes:  Patient doing well    Minimal serosanguineous drainage to LUE dressing- keep dressing in place  LLE dressings CDI  Cleared for DC to SNF by ortho pending medicine clearance    ROS - Patient denies any new issues.  Pain well controlled.    /74   Pulse 85   Temp 36.7 °C (98 °F) (Temporal)   Resp 18   Ht 1.524 m (5')   Wt 105 kg (231 lb 0.7 oz)   SpO2 94%       Patient seen and examined  No acute distress  Breathing non labored  RRR  LUE in sling. Dressing with minimal serosanguineous drainage, DNVI, moves all fingers, cap refill <2 sec.  LLE in without boot in bed due to discomfort, dressings CDI, DNVI, moves all toes, cap refill <2.      Recent Labs     04/10/23  0343 04/12/23  0243   WBC 5.4 5.9   RBC 2.80* 3.06*   HEMOGLOBIN 7.8* 8.5*   HEMATOCRIT 25.2* 27.5*   MCV 90.0 89.9   MCH 27.9 27.8   MCHC 31.0* 30.9*   RDW 48.1 49.2   PLATELETCT 128* 169   MPV 9.6 9.8         Active Hospital Problems    Diagnosis     Low hemoglobin and low hematocrit [D64.9]     Displaced fracture of proximal end of humerus [S42.209A]     Fracture of distal end of tibia [S82.309A]     Osteoporosis with current pathological fracture [M80.00XA]     Closed fracture of shaft of fibula [S82.409A]        Assessment/Plan:  Patient doing well    Minimal serosanguineous drainage to LUE dressing- keep dressing in place  LLE dressings CDI  Cleared for DC to SNF by ortho pending medicine clearance    POD#5 S/P   1.  Open treatment with intramedullary nailing, left tibia shaft fracture.  2.  Closed treatment with manipulation, left fibular shaft fracture.  3.  Open treatment with internal fixation, left humeral shaft fracture.    Wt bearing status - WBAT LLE in boot, NWB LUE  Wound care/Drains - LUE dressing to be left in place, LLE dressings may be changed every other day by nursing  Future Procedures - none planned   Lovenox: Start 4/8, Duration-until ambulatory >  150'  Sutures/Staples out- 14-21 days post operatively. Removal will be by ortho mid levels only.  PT/OT-initiated  Antibiotics: Perioperative completed  DVT Prophylaxis- TEDS/SCDs/Foot pumps  Stevens-none  Case Coordination for Discharge Planning - Disposition SNF

## 2023-04-12 NOTE — DISCHARGE SUMMARY
"[facility] Medicine Discharge Summary  Date: 4/12/2023 / Time: 1:41 PM     Patient:  Addie Orellana - 57 y.o. female    PMD: Iliana Barbosa M.D.    CONSULTANTS: Orthopedic surgery      Hospital Day # Hospital Day: 6    Date of Admit: 4/7/2023    Date of Discharge: 04/12/23    DISCHARGE SUMMARY:   Brief HPI:  \"Addie Orellana is a 57 y.o. female with PMHx breast cancer s/p 6 years in remission, obesity who presented after a GLF.  Notes state that patient slipped and fell this morning.  She did not have loss of consciousness.  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)    Cardiomegaly    Preventative health care    Disorder of scalp    Livedo reticularis    Lymphedema of upper extremity    Malignant neoplasm of breast (HCC)    Varicose veins    Swelling of upper extremity    Degeneration of intervertebral disc of cervical region    Rotator cuff tendinitis    Peripheral venous insufficiency    Morbid (severe) obesity due to excess calories (HCC)    Mass of breast    Thrombocytopenia (HCC)    Displaced fracture of proximal end of humerus    Fracture of distal end of tibia    Osteoporosis with current pathological fracture    Closed fracture of shaft of fibula    Low hemoglobin and low hematocrit         Hospital Course:   The patient was admitted after a ground-level fall.  The patient was found to have a displaced left proximal humerus fracture, spiral fracture of her left distal tibia, and left fibular fracture.  Orthopedic surgery was consulted from the emergency room.  She underwent an open treatment with intramedullary nailing of her left tibia shaft fracture, closed " treatment with manipulation of her left fibular shaft fracture, and open treatment with internal fixation of her left humeral shaft fracture.  Patient underwent physical therapy and Occupational Therapy evaluation thereafter and was deemed appropriate to be discharged to skilled nursing facility.  The patient is encouraged to follow-up with her primary care physician as soon as her schedule allows to discuss her hospital visit and discuss osteoporosis prevention versus medication management.  Additionally, she is encouraged to follow-up with [facility] orthopedics for wound care.    Procedures:  Open treatment with intramedullary nailing, left tibia shaft fracture.  Closed treatment with manipulation, left fibular shaft fracture.  Open treatment with internal fixation, left humeral shaft fracture.     Significant Imaging Findings:  US-EXTREMITY VENOUS UPPER UNILAT LEFT   Final Result      DX-PORTABLE FLUORO > 1 HOUR   Final Result      Portable fluoroscopy utilized for 1 minute 31 seconds.         INTERPRETING LOCATION: [address] [place] [address], [id number]      DX-TIBIA AND FIBULA LEFT   Final Result      Portable fluoroscopy as described.      DX-HUMERUS 2+ LEFT   Final Result      Portable fluoroscopy as described.      CT-ANKLE W/O PLUS RECONS LEFT   Final Result      1.  Comminuted and mildly displaced distal tibial diaphyseal fracture without evidence of articular extension.   2.  Comminuted and mildly displaced mid/distal fibular shaft fracture.   3.  Osteopenia.   4.  Skin thickening and mild subcutaneous edema of the visualized lower extremity. This may reflect inflammation or scar.      DX-FOOT-COMPLETE 3+ RIGHT   Final Result         1.  No radiographic evidence of acute injury.      2.  Osteoporosis.      3.  Previous mid and hindfoot fusion.      DX-FEMUR-2+ LEFT   Final Result      1.  Marked osteopenia without evidence of displaced fracture.   2.  Moderate degenerative changes of the left hip joint.       DX-KNEE 2- LEFT   Final Result      1.  Marked osteopenia.   2.  Remote appearing deformity of the proximal fibular shaft. Acute on chronic injury not excluded.   3.  Possible small joint effusion.   4.  Tricompartmental degenerative changes.      DX-HUMERUS 2+ LEFT   Final Result      1.  Comminuted and displaced proximal humeral shaft fracture.   2.  Decreased osseous mineralization.      DX-TIBIA AND FIBULA LEFT   Final Result      1.  Osteopenia.      2.  Spiral fracture of the left distal tibia in near anatomic alignment.      3.  Comminuted fracture of mid shaft of left fibula in near-anatomic alignment.      4.  Oblique fractures of the left proximal fibula in near-anatomic alignment.          Significant Laboratory Findings:  Results for orders placed or performed during the hospital encounter of 04/07/23   CBC Without Differential   Result Value Ref Range    WBC 9.8 4.8 - 10.8 K/uL    RBC 4.05 (L) 4.20 - 5.40 M/uL    Hemoglobin 11.4 (L) 12.0 - 16.0 g/dL    Hematocrit 35.8 (L) 37.0 - 47.0 %    MCV 88.4 81.4 - 97.8 fL    MCH 28.1 27.0 - 33.0 pg    MCHC 31.8 (L) 33.6 - 35.0 g/dL    RDW 46.4 35.9 - 50.0 fL    Platelet Count 145 (L) 164 - 446 K/uL    MPV 10.1 9.0 - 12.9 fL   Basic Metabolic Panel   Result Value Ref Range    Sodium 139 135 - 145 mmol/L    Potassium 4.2 3.6 - 5.5 mmol/L    Chloride 105 96 - 112 mmol/L    Co2 22 20 - 33 mmol/L    Glucose 136 (H) 65 - 99 mg/dL    Bun 10 8 - 22 mg/dL    Creatinine 0.58 0.50 - 1.40 mg/dL    Calcium 8.1 (L) 8.5 - 10.5 mg/dL    Anion Gap 12.0 7.0 - 16.0   ESTIMATED GFR   Result Value Ref Range    GFR (CKD-EPI) 104 >60 mL/min/1.73 m 2   Comp Metabolic Panel (CMP)   Result Value Ref Range    Sodium 133 (L) 135 - 145 mmol/L    Potassium 4.8 3.6 - 5.5 mmol/L    Chloride 102 96 - 112 mmol/L    Co2 22 20 - 33 mmol/L    Anion Gap 9.0 7.0 - 16.0    Glucose 198 (H) 65 - 99 mg/dL    Bun 12 8 - 22 mg/dL    Creatinine 0.71 0.50 - 1.40 mg/dL    Calcium 8.0 (L) 8.5 - 10.5 mg/dL     AST(SGOT) 15 12 - 45 U/L    ALT(SGPT) 12 2 - 50 U/L    Alkaline Phosphatase 70 30 - 99 U/L    Total Bilirubin 0.3 0.1 - 1.5 mg/dL    Albumin 3.0 (L) 3.2 - 4.9 g/dL    Total Protein 5.8 (L) 6.0 - 8.2 g/dL    Globulin 2.8 1.9 - 3.5 g/dL    A-G Ratio 1.1 g/dL   CBC with Differential   Result Value Ref Range    WBC 9.2 4.8 - 10.8 K/uL    RBC 3.57 (L) 4.20 - 5.40 M/uL    Hemoglobin 9.9 (L) 12.0 - 16.0 g/dL    Hematocrit 32.3 (L) 37.0 - 47.0 %    MCV 90.5 81.4 - 97.8 fL    MCH 27.7 27.0 - 33.0 pg    MCHC 30.7 (L) 33.6 - 35.0 g/dL    RDW 46.3 35.9 - 50.0 fL    Platelet Count 128 (L) 164 - 446 K/uL    MPV 10.2 9.0 - 12.9 fL    Neutrophils-Polys 89.60 (H) 44.00 - 72.00 %    Lymphocytes 5.60 (L) 22.00 - 41.00 %    Monocytes 4.00 0.00 - 13.40 %    Eosinophils 0.00 0.00 - 6.90 %    Basophils 0.10 0.00 - 1.80 %    Immature Granulocytes 0.70 0.00 - 0.90 %    Nucleated RBC 0.00 /100 WBC    Neutrophils (Absolute) 8.26 (H) 2.00 - 7.15 K/uL    Lymphs (Absolute) 0.52 (L) 1.00 - 4.80 K/uL    Monos (Absolute) 0.37 0.00 - 0.85 K/uL    Eos (Absolute) 0.00 0.00 - 0.51 K/uL    Baso (Absolute) 0.01 0.00 - 0.12 K/uL    Immature Granulocytes (abs) 0.06 0.00 - 0.11 K/uL    NRBC (Absolute) 0.00 K/uL   ESTIMATED GFR   Result Value Ref Range    GFR (CKD-EPI) 98 >60 mL/min/1.73 m 2   CORRECTED CALCIUM   Result Value Ref Range    Correct Calcium 8.8 8.5 - 10.5 mg/dL   CBC WITHOUT DIFFERENTIAL   Result Value Ref Range    WBC 6.9 4.8 - 10.8 K/uL    RBC 2.90 (L) 4.20 - 5.40 M/uL    Hemoglobin 8.2 (L) 12.0 - 16.0 g/dL    Hematocrit 26.5 (L) 37.0 - 47.0 %    MCV 91.4 81.4 - 97.8 fL    MCH 28.3 27.0 - 33.0 pg    MCHC 30.9 (L) 33.6 - 35.0 g/dL    RDW 47.5 35.9 - 50.0 fL    Platelet Count 130 (L) 164 - 446 K/uL    MPV 9.5 9.0 - 12.9 fL   Comp Metabolic Panel   Result Value Ref Range    Sodium 139 135 - 145 mmol/L    Potassium 4.0 3.6 - 5.5 mmol/L    Chloride 104 96 - 112 mmol/L    Co2 26 20 - 33 mmol/L    Anion Gap 9.0 7.0 - 16.0    Glucose 121 (H) 65 - 99  mg/dL    Bun 11 8 - 22 mg/dL    Creatinine 0.71 0.50 - 1.40 mg/dL    Calcium 7.8 (L) 8.5 - 10.5 mg/dL    AST(SGOT) 17 12 - 45 U/L    ALT(SGPT) 7 2 - 50 U/L    Alkaline Phosphatase 61 30 - 99 U/L    Total Bilirubin 0.4 0.1 - 1.5 mg/dL    Albumin 2.9 (L) 3.2 - 4.9 g/dL    Total Protein 5.6 (L) 6.0 - 8.2 g/dL    Globulin 2.7 1.9 - 3.5 g/dL    A-G Ratio 1.1 g/dL   CORRECTED CALCIUM   Result Value Ref Range    Correct Calcium 8.7 8.5 - 10.5 mg/dL   ESTIMATED GFR   Result Value Ref Range    GFR (CKD-EPI) 98 >60 mL/min/1.73 m 2   CBC WITHOUT DIFFERENTIAL   Result Value Ref Range    WBC 5.4 4.8 - 10.8 K/uL    RBC 2.80 (L) 4.20 - 5.40 M/uL    Hemoglobin 7.8 (L) 12.0 - 16.0 g/dL    Hematocrit 25.2 (L) 37.0 - 47.0 %    MCV 90.0 81.4 - 97.8 fL    MCH 27.9 27.0 - 33.0 pg    MCHC 31.0 (L) 33.6 - 35.0 g/dL    RDW 48.1 35.9 - 50.0 fL    Platelet Count 128 (L) 164 - 446 K/uL    MPV 9.6 9.0 - 12.9 fL   Basic Metabolic Panel   Result Value Ref Range    Sodium 139 135 - 145 mmol/L    Potassium 4.0 3.6 - 5.5 mmol/L    Chloride 102 96 - 112 mmol/L    Co2 29 20 - 33 mmol/L    Glucose 105 (H) 65 - 99 mg/dL    Bun 11 8 - 22 mg/dL    Creatinine 0.57 0.50 - 1.40 mg/dL    Calcium 7.9 (L) 8.5 - 10.5 mg/dL    Anion Gap 8.0 7.0 - 16.0   ESTIMATED GFR   Result Value Ref Range    GFR (CKD-EPI) 105 >60 mL/min/1.73 m 2   CBC WITH DIFFERENTIAL   Result Value Ref Range    WBC 5.9 4.8 - 10.8 K/uL    RBC 3.06 (L) 4.20 - 5.40 M/uL    Hemoglobin 8.5 (L) 12.0 - 16.0 g/dL    Hematocrit 27.5 (L) 37.0 - 47.0 %    MCV 89.9 81.4 - 97.8 fL    MCH 27.8 27.0 - 33.0 pg    MCHC 30.9 (L) 33.6 - 35.0 g/dL    RDW 49.2 35.9 - 50.0 fL    Platelet Count 169 164 - 446 K/uL    MPV 9.8 9.0 - 12.9 fL    Neutrophils-Polys 70.30 44.00 - 72.00 %    Lymphocytes 15.50 (L) 22.00 - 41.00 %    Monocytes 11.60 0.00 - 13.40 %    Eosinophils 1.30 0.00 - 6.90 %    Basophils 0.50 0.00 - 1.80 %    Immature Granulocytes 0.80 0.00 - 0.90 %    Nucleated RBC 0.30 /100 WBC    Neutrophils  (Absolute) 4.16 2.00 - 7.15 K/uL    Lymphs (Absolute) 0.92 (L) 1.00 - 4.80 K/uL    Monos (Absolute) 0.69 0.00 - 0.85 K/uL    Eos (Absolute) 0.08 0.00 - 0.51 K/uL    Baso (Absolute) 0.03 0.00 - 0.12 K/uL    Immature Granulocytes (abs) 0.05 0.00 - 0.11 K/uL    NRBC (Absolute) 0.02 K/uL   ECG   Result Value Ref Range    Report       [facility]    Test Date:  2023  Pt Name:    SISSY JUNE                Department: [facility]  MRN:        [id number]                      Room:       T331  Gender:     Female                       Technician: FAISAL  :        [id number]                   Requested By:ELOY SANTAMRAIA  Order #:    [id number]                    Reading MD: Tan Patel MD    Measurements  Intervals                                Axis  Rate:       116                          P:          41  KS:         121                          QRS:        -30  QRSD:       79                           T:          22  QT:         389  QTc:        541    Interpretive Statements  SINUS TACHYCARDIA  ATRIAL PREMATURE COMPLEXES  PROBABLE LEFT ATRIAL ENLARGEMENT  ABNORMAL R-WAVE PROGRESSION, LATE TRANSITION  LVH WITH SECONDARY REPOLARIZATION ABNORMALITY  Electronically Signed On 2023 6:45:23 PDT by Tan Patel MD           Disposition:  Discharge to: [facility]    Follow Up:  With LUZ Velazco to discuss hospital stay   Sutures/Staples out- 14-21 days post operatively. Removal will be by ortho mid levels only at [facility]    Discharge  Medications:      Medication List        START taking these medications        Instructions   ondansetron 4 MG Tbdp  Commonly known as: ZOFRAN ODT   Take 1 Tablet by mouth every four hours as needed for Nausea/Vomiting  Dose: 4 mg     oxyCODONE immediate release 10 MG immediate release tablet  Commonly known as: ROXICODONE   Take 1 Tablet by mouth every 6 hours as needed for Severe Pain or Moderate Pain for up to 10 days.  Dose: 10 mg            CONTINUE taking  these medications        Instructions   acetaminophen 500 MG Tabs  Commonly known as: TYLENOL   Take 500-1,000 mg by mouth every 6 hours as needed for Mild Pain.  Dose: 500-1,000 mg     CALCIUM PO   Take 1 Tablet by mouth every evening.  Dose: 1 Tablet     Non Formulary Request   Take 4 Tablets by mouth every morning. Focus Factor Supplement  Dose: 4 Tablet     One-A-Day Within Tabs   Take 1 Tablet by mouth every evening.  Dose: 1 Tablet     Tylenol PM Extra Strength  MG Tabs  Generic drug: diphenhydrAMINE-APAP (sleep)   Take 2 Tablets by mouth at bedtime as needed (SLEEP).  Dose: 2 Tablet               CC: Iliana Barbosa M.D.

## 2023-04-12 NOTE — DISCHARGE SUMMARY
[facility] DISCHARGE SUMMARY     PATIENT ID:  Name:             Addie Orellana   YOB: 1965  Age:                 57 y.o.  female   MRN:               [id number]  Address:         [address] [place] [id number]  Phone:            [phone] (home)    ADMISSION DATE:   4/7/2023    DISCHARGE DATE:   4/13/2023    DISCHARGE DIAGNOSES:   Problem Noted   Low Hemoglobin and Low Hematocrit 4/11/2023   Displaced Fracture of Proximal End of Humerus 4/7/2023   Fracture of Distal End of Tibia 4/7/2023   Osteoporosis With Current Pathological Fracture 4/7/2023   Closed Fracture of Shaft of Fibula 4/7/2023   Malignant Neoplasm of Breast (Hcc) 6/19/2018       ATTENDING PHYSICIAN:   Maritza Méndez MD    RESIDENT:   Kisha Fairbanks MD     CONSULTANTS:    Orthopedic Surgery    PROCEDURES:    1.  Open treatment with intramedullary nailing, left tibia shaft fracture.  2.  Closed treatment with manipulation, left fibular shaft fracture.  3.  Open treatment with internal fixation, left humeral shaft fracture.  4.   Insertion, intra medullary ambreen, tibia  5.   Insertion, intramedullary ambreen, humerus      IMAGING:   US-EXTREMITY VENOUS UPPER UNILAT LEFT   Final Result      DX-PORTABLE FLUORO > 1 HOUR   Final Result      Portable fluoroscopy utilized for 1 minute 31 seconds.         INTERPRETING LOCATION: [address] [place] [address], [facility]      DX-TIBIA AND FIBULA LEFT   Final Result      Portable fluoroscopy as described.      DX-HUMERUS 2+ LEFT   Final Result      Portable fluoroscopy as described.      CT-ANKLE W/O PLUS RECONS LEFT   Final Result      1.  Comminuted and mildly displaced distal tibial diaphyseal fracture without evidence of articular extension.   2.  Comminuted and mildly displaced mid/distal fibular shaft fracture.   3.  Osteopenia.   4.  Skin thickening and mild subcutaneous edema of the visualized lower extremity. This may reflect inflammation or scar.      DX-FOOT-COMPLETE 3+ RIGHT  "  Final Result         1.  No radiographic evidence of acute injury.      2.  Osteoporosis.      3.  Previous mid and hindfoot fusion.      DX-FEMUR-2+ LEFT   Final Result      1.  Marked osteopenia without evidence of displaced fracture.   2.  Moderate degenerative changes of the left hip joint.      DX-KNEE 2- LEFT   Final Result      1.  Marked osteopenia.   2.  Remote appearing deformity of the proximal fibular shaft. Acute on chronic injury not excluded.   3.  Possible small joint effusion.   4.  Tricompartmental degenerative changes.      DX-HUMERUS 2+ LEFT   Final Result      1.  Comminuted and displaced proximal humeral shaft fracture.   2.  Decreased osseous mineralization.      DX-TIBIA AND FIBULA LEFT   Final Result      1.  Osteopenia.      2.  Spiral fracture of the left distal tibia in near anatomic alignment.      3.  Comminuted fracture of mid shaft of left fibula in near-anatomic alignment.      4.  Oblique fractures of the left proximal fibula in near-anatomic alignment.        LABS:  Recent Labs     04/10/23  0343 04/12/23  0243   WBC 5.4 5.9   RBC 2.80* 3.06*   HEMOGLOBIN 7.8* 8.5*   HEMATOCRIT 25.2* 27.5*   MCV 90.0 89.9   MCH 27.9 27.8   RDW 48.1 49.2   PLATELETCT 128* 169   MPV 9.6 9.8   NEUTSPOLYS  --  70.30   LYMPHOCYTES  --  15.50*   MONOCYTES  --  11.60   EOSINOPHILS  --  1.30   BASOPHILS  --  0.50     Recent Labs     04/10/23  0343   SODIUM 139   POTASSIUM 4.0   CHLORIDE 102   CO2 29   GLUCOSE 105*   BUN 11     Lab Results   Component Value Date/Time    CHOLSTRLTOT 148 06/11/2020 07:28 AM    LDL 76 06/11/2020 07:28 AM    HDL 47 06/11/2020 07:28 AM    TRIGLYCERIDE 123 06/11/2020 07:28 AM       No results found for: TROPONINI, CKMB  No results found for: TROPONINI, CKMB       HOSPITAL COURSE:   Per HPI:  \"Addie Orellana is a 57 y.o. female with PMHx breast cancer s/p 6 years in remission, obesity who presented after a GLF.  Notes state that patient slipped and fell this morning.  She did not " "have loss of consciousness.  However, she presented with left arm pain and left leg pain.     ERCourse:  Vitals: Blood pressure 179/105; heart rate 89; pulse ox 99% on room air  CT ankle: Distal tibial fracture  X-ray: Diffuse osteopenia noted.  Displaced proximal humeral fracture; spiral fracture of distal tibia; midshaft left fibular fracture     Ortho was consulted from the emergency room and patient was immediately taken to the OR for interventions.\"    Floor Course:  Patient tolerated surgery well without complication and was transferred to the floor.  Patient started on pain management and given 2 doses of Ancef.  DVT prophylaxis with Lovenox started 24 hours postop, will continue until ambulatory.  Patient's left arm became significantly swollen, Ultrasound showed no DVT.  PT/OT recommended acute rehab facility.  PMR consulted, denied patient based on her request.  Hemoglobin level declining over several days, likely due to surgery.  Repeat CBC showed increase in H/H.  Patient with history of osteo imperfecta, bisphosphonate contraindicated until 2 weeks postop and patient will follow-up with her PCP.  Patient has history of breast cancer and is currently in remission, not currently seeing an oncologist will follow-up outpatient.  Orthopedic surgery cleared patient for discharge to skilled nursing facility.  Patient excepted to [place] for rehabilitation.  Patient is medically clear for discharge to [facility].    DISCHARGE CONDITION:    Stable    DISPOSITION:   [facility]    DISCHARGE MEDICATIONS:      Medication List        ASK your doctor about these medications        Instructions   acetaminophen 500 MG Tabs  Commonly known as: TYLENOL   Take 500-1,000 mg by mouth every 6 hours as needed for Mild Pain.  Dose: 500-1,000 mg     CALCIUM PO   Take 1 Tablet by mouth every evening.  Dose: 1 Tablet     Non Formulary Request   Take 4 Tablets by mouth every morning. Focus Factor Supplement  Dose: 4 " Tablet     One-A-Day Within Tabs  Ask about: Which instructions should I use?   Take 1 Tablet by mouth every evening.  Dose: 1 Tablet     Tylenol PM Extra Strength  MG Tabs  Generic drug: diphenhydrAMINE-APAP (sleep)   Take 2 Tablets by mouth at bedtime as needed (SLEEP).  Dose: 2 Tablet               ACTIVITY:   Normal Activity as Tolerated.    DIET:   Healthy    DISCHARGE INSTRUCTIONS AND FOLLOW UP:  Patient is medically stable for discharge and will be discharged to [facility]    Follow Up:   Orthopedics 2 weeks postop for wound check, staple removal and Xrays.   -Weightbearing okay with left lower extremity in boot, range of motion with left upper extremities without weightbearing for the next 6 weeks.    Discharge Instructions:   Patient was instructed to return the ER in the event of worsening symptoms including but not limited to fever, chills, pain out of proportion to injury, excessive bruising, numbness, weakness or any other major concerns. Patient understands that failure to do so may indicate worsening of her medical condition(s) and result in adverse clinical outcomes including fatality. We have counseled the patient on the importance of compliance and the patient has agreed to proceed with all medical recommendations and follow up plan indicated above. The patient understands that the failure to do so may result in result in adverse clinical outcomes including fatality.     CC:   Iliana Barbosa M.D.

## 2023-04-13 VITALS
OXYGEN SATURATION: 95 % | DIASTOLIC BLOOD PRESSURE: 62 MMHG | SYSTOLIC BLOOD PRESSURE: 105 MMHG | RESPIRATION RATE: 16 BRPM | TEMPERATURE: 97.5 F | HEART RATE: 82 BPM | BODY MASS INDEX: 45.36 KG/M2 | HEIGHT: 60 IN | WEIGHT: 231.04 LBS

## 2023-04-13 PROBLEM — C50.919 MALIGNANT NEOPLASM OF BREAST (HCC): Status: RESOLVED | Noted: 2018-06-19 | Resolved: 2023-04-13

## 2023-04-13 PROBLEM — C50.912 LEFT BREAST CANCER WITH T3 TUMOR, >5 CM IN GREATEST DIMENSION (HCC): Status: RESOLVED | Noted: 2018-09-12 | Resolved: 2023-04-13

## 2023-04-13 PROBLEM — D64.9 LOW HEMOGLOBIN AND LOW HEMATOCRIT: Status: RESOLVED | Noted: 2023-04-11 | Resolved: 2023-04-13

## 2023-04-13 LAB — EKG IMPRESSION: NORMAL

## 2023-04-13 PROCEDURE — 700111 HCHG RX REV CODE 636 W/ 250 OVERRIDE (IP)

## 2023-04-13 PROCEDURE — 93005 ELECTROCARDIOGRAM TRACING: CPT | Performed by: STUDENT IN AN ORGANIZED HEALTH CARE EDUCATION/TRAINING PROGRAM

## 2023-04-13 PROCEDURE — A9270 NON-COVERED ITEM OR SERVICE: HCPCS

## 2023-04-13 PROCEDURE — 700102 HCHG RX REV CODE 250 W/ 637 OVERRIDE(OP)

## 2023-04-13 PROCEDURE — 93010 ELECTROCARDIOGRAM REPORT: CPT | Performed by: INTERNAL MEDICINE

## 2023-04-13 PROCEDURE — 99238 HOSP IP/OBS DSCHRG MGMT 30/<: CPT | Mod: GC | Performed by: FAMILY MEDICINE

## 2023-04-13 RX ORDER — OXYCODONE HYDROCHLORIDE 10 MG/1
10 TABLET ORAL EVERY 6 HOURS PRN
Qty: 40 TABLET | Refills: 0 | Status: ON HOLD | OUTPATIENT
Start: 2023-04-13 | End: 2023-04-26

## 2023-04-13 RX ADMIN — OXYCODONE 5 MG: 5 TABLET ORAL at 16:14

## 2023-04-13 RX ADMIN — MULTIPLE VITAMINS W/ MINERALS TAB 1 TABLET: TAB at 11:50

## 2023-04-13 RX ADMIN — ENOXAPARIN SODIUM 30 MG: 30 INJECTION SUBCUTANEOUS at 04:29

## 2023-04-13 ASSESSMENT — PAIN DESCRIPTION - PAIN TYPE
TYPE: SURGICAL PAIN

## 2023-04-13 NOTE — DISCHARGE PLANNING
0830  Agency/Facility Name: [facility]   Outcome: DPA left a VM regarding insurance auth, DPA awaiting call back.

## 2023-04-13 NOTE — PROGRESS NOTES
57yoF with left displaced tibia/fibula fxs s/p IMN and left displaced humerus fracture s/p ORIF 4/6.      S: Doing okay, arm hurts more than the leg.  Ambulating with boot.  Pending rehab/SNF placement    O:    Vitals:    04/13/23 0800   BP: 101/65   Pulse: 86   Resp: 16   Temp: 37 °C (98.6 °F)   SpO2: 96%     Exam:  General-NAD, alert and oriented, following commands  LUE-dressing c/d/I, NVI distally  LLE-dressing c/d/I, flexing/extending toes, BCR in toes    A: 57yoF with left displaced tibia/fibula fxs s/p IMN and left displaced humerus fracture s/p ORIF 4/6.      Recs:  --WBAT LLE in boot when OOB  --NWB LUE with sling for comfort, okay for shoulder/elbow ROM as tolerated  --continue lovenox and SCDs while inpatient  --fu 2 weeks postop

## 2023-04-13 NOTE — PROGRESS NOTES
[facility]GRESS [facility]     Attending:   Dr. Maritza Méndez    Resident:   Claudia Macario MD    PATIENT:   Addie Orellana; [id number]; 1965    Addie Orellana is a 57 y.o. female with PMHx breast cancer s/p 6 years in remission, obesity who presented after a GLF.  Admitted for L humerus, fibular, tibial fractures s/p intramedullary tibia and humerus ambreen placements 4/7.    SUBJECTIVE:   No acute events overnight. She reports doing well overall.     OBJECTIVE:  Vitals:    04/12/23 2141 04/13/23 0000 04/13/23 0500 04/13/23 0800   BP:  112/79 97/62 101/65   Pulse: (!) 110 (!) 109 91 86   Resp:  18 16 16   Temp:  36.9 °C (98.4 °F) 36.3 °C (97.4 °F) 37 °C (98.6 °F)   TempSrc:  Temporal Temporal Temporal   SpO2:  91% 95% 96%   Weight:       Height:           Intake/Output Summary (Last 24 hours) at 4/13/2023 0700  Last data filed at 4/12/2023 0900  Gross per 24 hour   Intake --   Output 1000 ml   Net -1000 ml       PHYSICAL EXAM:   General: No acute distress, afebrile, resting comfortably, conversational   HEENT: NC/AT. EOMI.   Cardiovascular: RRR without murmurs, rubs, heaves. Normal capillary refill   Respiratory: CTAB, no tachypnea or retractions   Abdomen: normal bowel sounds, soft, nontender, nondistended, no masses, no organomegaly   EXT:  GIL, decreased edema of LUE compared to yesterday. Surgical dressings present on LUE and LLE.  Skin: No erythema/lesions. Aged contusions present on left arm.   Neuro: Non-focal, alert and orientated       LABS:  Recent Labs     04/12/23  0243   WBC 5.9   RBC 3.06*   HEMOGLOBIN 8.5*   HEMATOCRIT 27.5*   MCV 89.9   MCH 27.8   RDW 49.2   PLATELETCT 169   MPV 9.8   NEUTSPOLYS 70.30   LYMPHOCYTES 15.50*   MONOCYTES 11.60   EOSINOPHILS 1.30   BASOPHILS 0.50     No results for input(s): SODIUM, POTASSIUM, CHLORIDE, CO2, BUN, CREATININE, CALCIUM, MAGNESIUM, PHOSPHORUS, ALBUMIN in the last 72 hours.  Estimated GFR/CRCL = Estimated Creatinine Clearance: 116.3 mL/min (by C-G  formula based on SCr of 0.57 mg/dL).  No results for input(s): GLUCOSE, POCGLUCOSE in the last 72 hours.              No results for input(s): INR, APTT, FIBRINOGEN in the last 72 hours.    Invalid input(s): DIMER    MICROBIOLOGY:   Blood Culture   Date Value Ref Range Status   01/02/2019 No growth after 5 days of incubation.  Final        IMAGING:   US-EXTREMITY VENOUS UPPER UNILAT LEFT   Final Result      DX-PORTABLE FLUORO > 1 HOUR   Final Result      Portable fluoroscopy utilized for 1 minute 31 seconds.         INTERPRETING LOCATION: [address] [place] [address], [id number]      DX-TIBIA AND FIBULA LEFT   Final Result      Portable fluoroscopy as described.      DX-HUMERUS 2+ LEFT   Final Result      Portable fluoroscopy as described.      CT-ANKLE W/O PLUS RECONS LEFT   Final Result      1.  Comminuted and mildly displaced distal tibial diaphyseal fracture without evidence of articular extension.   2.  Comminuted and mildly displaced mid/distal fibular shaft fracture.   3.  Osteopenia.   4.  Skin thickening and mild subcutaneous edema of the visualized lower extremity. This may reflect inflammation or scar.      DX-FOOT-COMPLETE 3+ RIGHT   Final Result         1.  No radiographic evidence of acute injury.      2.  Osteoporosis.      3.  Previous mid and hindfoot fusion.      DX-FEMUR-2+ LEFT   Final Result      1.  Marked osteopenia without evidence of displaced fracture.   2.  Moderate degenerative changes of the left hip joint.      DX-KNEE 2- LEFT   Final Result      1.  Marked osteopenia.   2.  Remote appearing deformity of the proximal fibular shaft. Acute on chronic injury not excluded.   3.  Possible small joint effusion.   4.  Tricompartmental degenerative changes.      DX-HUMERUS 2+ LEFT   Final Result      1.  Comminuted and displaced proximal humeral shaft fracture.   2.  Decreased osseous mineralization.      DX-TIBIA AND FIBULA LEFT   Final Result      1.  Osteopenia.      2.  Spiral fracture of the left  distal tibia in near anatomic alignment.      3.  Comminuted fracture of mid shaft of left fibula in near-anatomic alignment.      4.  Oblique fractures of the left proximal fibula in near-anatomic alignment.          CULTURES:   Results       ** No results found for the last 168 hours. **            MEDS:  Current Facility-Administered Medications   Medication Last Admin    oxyCODONE immediate-release (ROXICODONE) tablet 5 mg 5 mg at 04/12/23 2141    Or    oxyCODONE immediate release (ROXICODONE) tablet 10 mg 10 mg at 04/12/23 1244    therapeutic multivitamin-minerals (THERAGRAN-M) tablet 1 Tablet 1 Tablet at 04/13/23 1150    cyclobenzaprine (Flexeril) tablet 10 mg 10 mg at 04/09/23 1402    enoxaparin (Lovenox) inj 30 mg 30 mg at 04/13/23 0429    senna-docusate (PERICOLACE or SENOKOT S) 8.6-50 MG per tablet 2 Tablet 2 Tablet at 04/12/23 1748    And    polyethylene glycol/lytes (MIRALAX) PACKET 1 Packet 1 Packet at 04/10/23 0510    And    magnesium hydroxide (MILK OF MAGNESIA) suspension 30 mL      And    bisacodyl (DULCOLAX) suppository 10 mg      acetaminophen (Tylenol) tablet 650 mg      Pharmacy Consult Request ...Pain Management Review 1 Each      ondansetron (ZOFRAN) syringe/vial injection 4 mg      ondansetron (ZOFRAN ODT) dispertab 4 mg      promethazine (PHENERGAN) tablet 12.5-25 mg      promethazine (PHENERGAN) suppository 12.5-25 mg      prochlorperazine (COMPAZINE) injection 5-10 mg         ASSESSMENT/PLAN: Addie Orellana is a 57 y.o. female with PMHx breast cancer s/p 6 years in remission, obesity who presented after a GLF.  Admitted for L humerus, fibular, tibial fractures s/p intramedullary tibia and humerus ambreen placements 4/7.    * Displaced fracture of proximal end of humerus- (present on admission)  Assessment & Plan  POD#6 S/P   1.  Open treatment with intramedullary nailing, left tibia shaft fracture.  2.  Closed treatment with manipulation, left fibular shaft fracture.  3.  Open treatment with  internal fixation, left humeral shaft fracture    - Tylenol and oxycodone as needed mild pain    Closed fracture of shaft of fibula  Assessment & Plan  -See #Displaced fracture of proximal end of humerus      Osteoporosis with current pathological fracture  Assessment & Plan  - Bisphosphonate therapy not recommended until 2 weeks postop, will need to f/u outpatient    Fracture of distal end of tibia  Assessment & Plan  -See #Displaced fracture of proximal end of humerus      Core Measures:   Fluids: None  Lines: None  Abx: None  DVT prophylaxis: Lovenox  Code Status: Full    Disposition: Medically cleared for discharge    Claudia Macario MD   PGY-2 Family Medicine Resident   [facility]

## 2023-04-13 NOTE — DISCHARGE PLANNING
Case Management Discharge Planning    Admission Date: 4/7/2023  GMLOS: 4  ALOS: 6    6-Clicks ADL Score: 15  6-Clicks Mobility Score: 11  PT and/or OT Eval ordered: Yes  Post-acute Referrals Ordered: Yes  Post-acute Choice Obtained: Yes  Has referral(s) been sent to post-acute provider:  Yes      Anticipated Discharge Dispo: Discharge Disposition: Disch to IP rehab facility or distinct part unit (62)    DME Needed: No    Action(s) Taken: Updated Provider/Nurse on Discharge Plan    Escalations Completed: None    Medically Clear: Yes    Next Steps: Insurance has auth'd SNF. Transportation scheduled for 0053=5525 today via [employer]. Pt aware of plan and agrees.     Barriers to Discharge: None    Is the patient up for discharge tomorrow: No, D/C today.

## 2023-04-13 NOTE — CARE PLAN
Problem: Pain - Standard  Goal: Alleviation of pain or a reduction in pain to the patient’s comfort goal  Outcome: Progressing     Problem: Knowledge Deficit - Standard  Goal: Patient and family/care givers will demonstrate understanding of plan of care, disease process/condition, diagnostic tests and medications  Outcome: Progressing       The patient is Watcher - Medium risk of patient condition declining or worsening    Shift Goals  Clinical Goals: Pain control  Patient Goals: Pain control  Family Goals: not present    Progress made toward(s) clinical / shift goals:  Taught pt 0-10 pain scale and non-pharmacological method of pain management, encouraged to verbalize when in pain. Administered PRN pain meds as needed.      2221 Page sent out to [facility] on call physician.     2228 Notified Dr. Nielsen regarding pt's heart rate of 132. RN rechecked heart rate 110 and medicated for pain. Pt denies any shortness of breath, chest pain, dizziness, and / or lighheadedness. Continue with current treatment.     Patient is not progressing towards the following goals:

## 2023-04-13 NOTE — PROGRESS NOTES
Orthopaedic PA Progress Note    Interval changes:  Patient doing well    LUE dressing to remain in place until postop appt  LLE dressings CDI  Cleared for DC to SNF by ortho pending authorization and acceptance    ROS - Patient denies any new issues.  Pain well controlled.    /65   Pulse 86   Temp 37 °C (98.6 °F) (Temporal)   Resp 16   Ht 1.524 m (5')   Wt 105 kg (231 lb 0.7 oz)   SpO2 96%       Patient seen and examined  No acute distress  Breathing non labored  RRR  LUE in sling. Dressing with minimal serosanguineous drainage, DNVI, moves all fingers, cap refill <2 sec.  LLE in without boot in bed due to discomfort, dressings CDI, DNVI, moves all toes, cap refill <2.      Recent Labs     04/12/23  0243   WBC 5.9   RBC 3.06*   HEMOGLOBIN 8.5*   HEMATOCRIT 27.5*   MCV 89.9   MCH 27.8   MCHC 30.9*   RDW 49.2   PLATELETCT 169   MPV 9.8         Active Hospital Problems    Diagnosis     Displaced fracture of proximal end of humerus [S42.209A]     Fracture of distal end of tibia [S82.309A]     Osteoporosis with current pathological fracture [M80.00XA]     Closed fracture of shaft of fibula [S82.409A]        Assessment/Plan:  Patient doing well    LUE dressing to remain in place until postop appt  LLE dressings CDI  WBAT LLE in boot   Cleared for DC to SNF by ortho pending authorization and acceptance    POD#6 S/P   1.  Open treatment with intramedullary nailing, left tibia shaft fracture.  2.  Closed treatment with manipulation, left fibular shaft fracture.  3.  Open treatment with internal fixation, left humeral shaft fracture.    Wt bearing status - WBAT LLE in boot, NWB LUE  Wound care/Drains - LUE dressing to be left in place, LLE dressings may be changed every other day by nursing  Future Procedures - none planned   Lovenox: Start 4/8, Duration-until ambulatory > 150'  Sutures/Staples out- 14-21 days post operatively. Removal will be by ortho mid levels only.  PT/OT-initiated  Antibiotics:  Perioperative completed  DVT Prophylaxis- TEDS/SCDs/Foot pumps  Stevens-none  Case Coordination for Discharge Planning - Disposition SNF

## 2023-04-13 NOTE — DISCHARGE PLANNING
Representative from [facility] here and states they have auth and will be setting up transport.

## 2023-04-21 ENCOUNTER — HOSPITAL ENCOUNTER (INPATIENT)
Facility: MEDICAL CENTER | Age: 58
LOS: 5 days | DRG: 176 | End: 2023-04-26
Attending: EMERGENCY MEDICINE | Admitting: FAMILY MEDICINE
Payer: MEDICARE

## 2023-04-21 ENCOUNTER — APPOINTMENT (OUTPATIENT)
Dept: RADIOLOGY | Facility: MEDICAL CENTER | Age: 58
DRG: 176 | End: 2023-04-21
Attending: EMERGENCY MEDICINE
Payer: MEDICARE

## 2023-04-21 DIAGNOSIS — E66.01 MORBID (SEVERE) OBESITY DUE TO EXCESS CALORIES (HCC): ICD-10-CM

## 2023-04-21 DIAGNOSIS — N39.0 ACUTE UTI: ICD-10-CM

## 2023-04-21 DIAGNOSIS — A41.9 SEPSIS, DUE TO UNSPECIFIED ORGANISM, UNSPECIFIED WHETHER ACUTE ORGAN DYSFUNCTION PRESENT (HCC): ICD-10-CM

## 2023-04-21 DIAGNOSIS — I26.99 OTHER PULMONARY EMBOLISM WITHOUT ACUTE COR PULMONALE, UNSPECIFIED CHRONICITY (HCC): ICD-10-CM

## 2023-04-21 PROBLEM — N13.2 HYDRONEPHROSIS WITH URINARY OBSTRUCTION DUE TO RENAL CALCULUS: Status: ACTIVE | Noted: 2023-04-21

## 2023-04-21 PROBLEM — R31.9 URINARY TRACT INFECTION WITH HEMATURIA: Status: ACTIVE | Noted: 2023-04-21

## 2023-04-21 PROBLEM — Q78.0 OSTEOGENESIS IMPERFECTA: Status: ACTIVE | Noted: 2023-04-21

## 2023-04-21 LAB
ALBUMIN SERPL BCP-MCNC: 3 G/DL (ref 3.2–4.9)
ALBUMIN/GLOB SERPL: 0.9 G/DL
ALP SERPL-CCNC: 126 U/L (ref 30–99)
ALT SERPL-CCNC: 34 U/L (ref 2–50)
ANION GAP SERPL CALC-SCNC: 13 MMOL/L (ref 7–16)
APPEARANCE UR: CLEAR
APTT PPP: 32.6 SEC (ref 24.7–36)
AST SERPL-CCNC: 31 U/L (ref 12–45)
BACTERIA #/AREA URNS HPF: ABNORMAL /HPF
BASOPHILS # BLD AUTO: 0.4 % (ref 0–1.8)
BASOPHILS # BLD: 0.05 K/UL (ref 0–0.12)
BILIRUB SERPL-MCNC: 0.7 MG/DL (ref 0.1–1.5)
BILIRUB UR QL STRIP.AUTO: NEGATIVE
BUN SERPL-MCNC: 13 MG/DL (ref 8–22)
CALCIUM ALBUM COR SERPL-MCNC: 9.1 MG/DL (ref 8.5–10.5)
CALCIUM SERPL-MCNC: 8.3 MG/DL (ref 8.5–10.5)
CHLORIDE SERPL-SCNC: 101 MMOL/L (ref 96–112)
CO2 SERPL-SCNC: 22 MMOL/L (ref 20–33)
COLOR UR: YELLOW
CREAT SERPL-MCNC: 0.74 MG/DL (ref 0.5–1.4)
EOSINOPHIL # BLD AUTO: 0.01 K/UL (ref 0–0.51)
EOSINOPHIL NFR BLD: 0.1 % (ref 0–6.9)
EPI CELLS #/AREA URNS HPF: ABNORMAL /HPF
ERYTHROCYTE [DISTWIDTH] IN BLOOD BY AUTOMATED COUNT: 51.5 FL (ref 35.9–50)
FLUAV RNA SPEC QL NAA+PROBE: NEGATIVE
FLUBV RNA SPEC QL NAA+PROBE: NEGATIVE
GFR SERPLBLD CREATININE-BSD FMLA CKD-EPI: 93 ML/MIN/1.73 M 2
GLOBULIN SER CALC-MCNC: 3.4 G/DL (ref 1.9–3.5)
GLUCOSE SERPL-MCNC: 159 MG/DL (ref 65–99)
GLUCOSE UR STRIP.AUTO-MCNC: NEGATIVE MG/DL
HCT VFR BLD AUTO: 29.3 % (ref 37–47)
HGB BLD-MCNC: 9 G/DL (ref 12–16)
IMM GRANULOCYTES # BLD AUTO: 0.1 K/UL (ref 0–0.11)
IMM GRANULOCYTES NFR BLD AUTO: 0.9 % (ref 0–0.9)
INR PPP: 1.15 (ref 0.87–1.13)
KETONES UR STRIP.AUTO-MCNC: NEGATIVE MG/DL
LACTATE SERPL-SCNC: 0.9 MMOL/L (ref 0.5–2)
LACTATE SERPL-SCNC: 2 MMOL/L (ref 0.5–2)
LEUKOCYTE ESTERASE UR QL STRIP.AUTO: ABNORMAL
LYMPHOCYTES # BLD AUTO: 0.51 K/UL (ref 1–4.8)
LYMPHOCYTES NFR BLD: 4.6 % (ref 22–41)
MCH RBC QN AUTO: 26.9 PG (ref 27–33)
MCHC RBC AUTO-ENTMCNC: 30.7 G/DL (ref 33.6–35)
MCV RBC AUTO: 87.5 FL (ref 81.4–97.8)
MICRO URNS: ABNORMAL
MONOCYTES # BLD AUTO: 0.82 K/UL (ref 0–0.85)
MONOCYTES NFR BLD AUTO: 7.3 % (ref 0–13.4)
NEUTROPHILS # BLD AUTO: 9.69 K/UL (ref 2–7.15)
NEUTROPHILS NFR BLD: 86.7 % (ref 44–72)
NITRITE UR QL STRIP.AUTO: POSITIVE
NRBC # BLD AUTO: 0 K/UL
NRBC BLD-RTO: 0 /100 WBC
NT-PROBNP SERPL IA-MCNC: 953 PG/ML (ref 0–125)
PH UR STRIP.AUTO: 6 [PH] (ref 5–8)
PLATELET # BLD AUTO: 232 K/UL (ref 164–446)
PMV BLD AUTO: 9.9 FL (ref 9–12.9)
POTASSIUM SERPL-SCNC: 4.2 MMOL/L (ref 3.6–5.5)
PROT SERPL-MCNC: 6.4 G/DL (ref 6–8.2)
PROT UR QL STRIP: 30 MG/DL
PROTHROMBIN TIME: 14.5 SEC (ref 12–14.6)
RBC # BLD AUTO: 3.35 M/UL (ref 4.2–5.4)
RBC # URNS HPF: ABNORMAL /HPF
RBC UR QL AUTO: ABNORMAL
RSV RNA SPEC QL NAA+PROBE: NEGATIVE
SARS-COV-2 RNA RESP QL NAA+PROBE: NOTDETECTED
SODIUM SERPL-SCNC: 136 MMOL/L (ref 135–145)
SP GR UR STRIP.AUTO: 1.02
SPECIMEN SOURCE: NORMAL
TROPONIN T SERPL-MCNC: 21 NG/L (ref 6–19)
UFH PPP CHRO-ACNC: <0.1 IU/ML
UROBILINOGEN UR STRIP.AUTO-MCNC: 1 MG/DL
WBC # BLD AUTO: 11.2 K/UL (ref 4.8–10.8)
WBC #/AREA URNS HPF: ABNORMAL /HPF

## 2023-04-21 PROCEDURE — 71275 CT ANGIOGRAPHY CHEST: CPT

## 2023-04-21 PROCEDURE — 85730 THROMBOPLASTIN TIME PARTIAL: CPT

## 2023-04-21 PROCEDURE — 85610 PROTHROMBIN TIME: CPT

## 2023-04-21 PROCEDURE — 85520 HEPARIN ASSAY: CPT

## 2023-04-21 PROCEDURE — 700117 HCHG RX CONTRAST REV CODE 255: Performed by: EMERGENCY MEDICINE

## 2023-04-21 PROCEDURE — 85025 COMPLETE CBC W/AUTO DIFF WBC: CPT

## 2023-04-21 PROCEDURE — 81001 URINALYSIS AUTO W/SCOPE: CPT

## 2023-04-21 PROCEDURE — 0241U HCHG SARS-COV-2 COVID-19 NFCT DS RESP RNA 4 TRGT MIC: CPT

## 2023-04-21 PROCEDURE — 700105 HCHG RX REV CODE 258: Performed by: EMERGENCY MEDICINE

## 2023-04-21 PROCEDURE — 96375 TX/PRO/DX INJ NEW DRUG ADDON: CPT

## 2023-04-21 PROCEDURE — 700105 HCHG RX REV CODE 258: Performed by: FAMILY MEDICINE

## 2023-04-21 PROCEDURE — C9803 HOPD COVID-19 SPEC COLLECT: HCPCS | Performed by: EMERGENCY MEDICINE

## 2023-04-21 PROCEDURE — 36415 COLL VENOUS BLD VENIPUNCTURE: CPT

## 2023-04-21 PROCEDURE — 99285 EMERGENCY DEPT VISIT HI MDM: CPT

## 2023-04-21 PROCEDURE — 96365 THER/PROPH/DIAG IV INF INIT: CPT

## 2023-04-21 PROCEDURE — 87040 BLOOD CULTURE FOR BACTERIA: CPT

## 2023-04-21 PROCEDURE — 99222 1ST HOSP IP/OBS MODERATE 55: CPT | Mod: GC | Performed by: FAMILY MEDICINE

## 2023-04-21 PROCEDURE — 80053 COMPREHEN METABOLIC PANEL: CPT

## 2023-04-21 PROCEDURE — 74176 CT ABD & PELVIS W/O CONTRAST: CPT

## 2023-04-21 PROCEDURE — 87186 SC STD MICRODIL/AGAR DIL: CPT | Mod: 91

## 2023-04-21 PROCEDURE — 71045 X-RAY EXAM CHEST 1 VIEW: CPT

## 2023-04-21 PROCEDURE — 83880 ASSAY OF NATRIURETIC PEPTIDE: CPT

## 2023-04-21 PROCEDURE — 87077 CULTURE AEROBIC IDENTIFY: CPT | Mod: 91

## 2023-04-21 PROCEDURE — 84484 ASSAY OF TROPONIN QUANT: CPT

## 2023-04-21 PROCEDURE — A9270 NON-COVERED ITEM OR SERVICE: HCPCS | Performed by: EMERGENCY MEDICINE

## 2023-04-21 PROCEDURE — 83605 ASSAY OF LACTIC ACID: CPT

## 2023-04-21 PROCEDURE — 87086 URINE CULTURE/COLONY COUNT: CPT

## 2023-04-21 PROCEDURE — 770020 HCHG ROOM/CARE - TELE (206)

## 2023-04-21 PROCEDURE — 700111 HCHG RX REV CODE 636 W/ 250 OVERRIDE (IP): Performed by: EMERGENCY MEDICINE

## 2023-04-21 PROCEDURE — 700102 HCHG RX REV CODE 250 W/ 637 OVERRIDE(OP): Performed by: EMERGENCY MEDICINE

## 2023-04-21 RX ORDER — HEPARIN SODIUM 1000 [USP'U]/ML
40 INJECTION, SOLUTION INTRAVENOUS; SUBCUTANEOUS PRN
Status: DISCONTINUED | OUTPATIENT
Start: 2023-04-21 | End: 2023-04-25

## 2023-04-21 RX ORDER — M-VIT,TX,IRON,MINS/CALC/FOLIC 27MG-0.4MG
1 TABLET ORAL EVERY EVENING
COMMUNITY

## 2023-04-21 RX ORDER — HEPARIN SODIUM 5000 [USP'U]/100ML
0-38 INJECTION, SOLUTION INTRAVENOUS CONTINUOUS
Status: DISCONTINUED | OUTPATIENT
Start: 2023-04-21 | End: 2023-04-25

## 2023-04-21 RX ORDER — HEPARIN SODIUM 5000 [USP'U]/100ML
0-30 INJECTION, SOLUTION INTRAVENOUS CONTINUOUS
Status: DISCONTINUED | OUTPATIENT
Start: 2023-04-21 | End: 2023-04-21

## 2023-04-21 RX ORDER — TAMSULOSIN HYDROCHLORIDE 0.4 MG/1
0.4 CAPSULE ORAL
Status: DISCONTINUED | OUTPATIENT
Start: 2023-04-21 | End: 2023-04-22

## 2023-04-21 RX ORDER — ONDANSETRON 4 MG/1
4 TABLET, ORALLY DISINTEGRATING ORAL EVERY 4 HOURS PRN
Status: DISCONTINUED | OUTPATIENT
Start: 2023-04-21 | End: 2023-04-26 | Stop reason: HOSPADM

## 2023-04-21 RX ORDER — ACETAMINOPHEN 325 MG/1
650 TABLET ORAL ONCE
Status: COMPLETED | OUTPATIENT
Start: 2023-04-21 | End: 2023-04-21

## 2023-04-21 RX ORDER — ASPIRIN 81 MG
1 TABLET, DELAYED RELEASE (ENTERIC COATED) ORAL EVERY EVENING
COMMUNITY

## 2023-04-21 RX ORDER — AZITHROMYCIN 250 MG/1
500 TABLET, FILM COATED ORAL ONCE
Status: COMPLETED | OUTPATIENT
Start: 2023-04-21 | End: 2023-04-21

## 2023-04-21 RX ORDER — PROMETHAZINE HYDROCHLORIDE 25 MG/1
12.5-25 SUPPOSITORY RECTAL EVERY 4 HOURS PRN
Status: DISCONTINUED | OUTPATIENT
Start: 2023-04-21 | End: 2023-04-26 | Stop reason: HOSPADM

## 2023-04-21 RX ORDER — ACETAMINOPHEN 325 MG/1
650 TABLET ORAL EVERY 6 HOURS PRN
Status: DISCONTINUED | OUTPATIENT
Start: 2023-04-21 | End: 2023-04-22

## 2023-04-21 RX ORDER — PROMETHAZINE HYDROCHLORIDE 25 MG/1
12.5-25 TABLET ORAL EVERY 4 HOURS PRN
Status: DISCONTINUED | OUTPATIENT
Start: 2023-04-21 | End: 2023-04-26 | Stop reason: HOSPADM

## 2023-04-21 RX ORDER — OXYCODONE HYDROCHLORIDE 10 MG/1
10 TABLET ORAL EVERY 6 HOURS PRN
Status: DISCONTINUED | OUTPATIENT
Start: 2023-04-21 | End: 2023-04-26 | Stop reason: HOSPADM

## 2023-04-21 RX ORDER — HEPARIN SODIUM 1000 [USP'U]/ML
80 INJECTION, SOLUTION INTRAVENOUS; SUBCUTANEOUS ONCE
Status: COMPLETED | OUTPATIENT
Start: 2023-04-21 | End: 2023-04-21

## 2023-04-21 RX ORDER — ACETAMINOPHEN 325 MG/1
650 TABLET ORAL EVERY 6 HOURS PRN
COMMUNITY

## 2023-04-21 RX ORDER — ACETAMINOPHEN 325 MG/1
650 TABLET ORAL EVERY 6 HOURS PRN
Status: DISCONTINUED | OUTPATIENT
Start: 2023-04-21 | End: 2023-04-21

## 2023-04-21 RX ORDER — ONDANSETRON 2 MG/ML
4 INJECTION INTRAMUSCULAR; INTRAVENOUS EVERY 4 HOURS PRN
Status: DISCONTINUED | OUTPATIENT
Start: 2023-04-21 | End: 2023-04-26 | Stop reason: HOSPADM

## 2023-04-21 RX ORDER — SODIUM CHLORIDE, SODIUM LACTATE, POTASSIUM CHLORIDE, CALCIUM CHLORIDE 600; 310; 30; 20 MG/100ML; MG/100ML; MG/100ML; MG/100ML
INJECTION, SOLUTION INTRAVENOUS CONTINUOUS
Status: DISCONTINUED | OUTPATIENT
Start: 2023-04-21 | End: 2023-04-22

## 2023-04-21 RX ORDER — PROCHLORPERAZINE EDISYLATE 5 MG/ML
5-10 INJECTION INTRAMUSCULAR; INTRAVENOUS EVERY 4 HOURS PRN
Status: DISCONTINUED | OUTPATIENT
Start: 2023-04-21 | End: 2023-04-26 | Stop reason: HOSPADM

## 2023-04-21 RX ORDER — SODIUM CHLORIDE, SODIUM LACTATE, POTASSIUM CHLORIDE, AND CALCIUM CHLORIDE .6; .31; .03; .02 G/100ML; G/100ML; G/100ML; G/100ML
30 INJECTION, SOLUTION INTRAVENOUS ONCE
Status: COMPLETED | OUTPATIENT
Start: 2023-04-21 | End: 2023-04-21

## 2023-04-21 RX ORDER — HEPARIN SODIUM 1000 [USP'U]/ML
40 INJECTION, SOLUTION INTRAVENOUS; SUBCUTANEOUS PRN
Status: DISCONTINUED | OUTPATIENT
Start: 2023-04-21 | End: 2023-04-21

## 2023-04-21 RX ADMIN — ACETAMINOPHEN 650 MG: 325 TABLET, FILM COATED ORAL at 20:23

## 2023-04-21 RX ADMIN — HEPARIN SODIUM 5400 UNITS: 1000 INJECTION, SOLUTION INTRAVENOUS; SUBCUTANEOUS at 21:45

## 2023-04-21 RX ADMIN — SODIUM CHLORIDE, POTASSIUM CHLORIDE, SODIUM LACTATE AND CALCIUM CHLORIDE 1365 ML: 600; 310; 30; 20 INJECTION, SOLUTION INTRAVENOUS at 20:23

## 2023-04-21 RX ADMIN — SODIUM CHLORIDE, POTASSIUM CHLORIDE, SODIUM LACTATE AND CALCIUM CHLORIDE: 600; 310; 30; 20 INJECTION, SOLUTION INTRAVENOUS at 22:39

## 2023-04-21 RX ADMIN — AZITHROMYCIN MONOHYDRATE 500 MG: 250 TABLET ORAL at 20:41

## 2023-04-21 RX ADMIN — IOHEXOL 53 ML: 350 INJECTION, SOLUTION INTRAVENOUS at 21:17

## 2023-04-21 RX ADMIN — HEPARIN SODIUM 18 UNITS/KG/HR: 5000 INJECTION, SOLUTION INTRAVENOUS at 21:57

## 2023-04-21 RX ADMIN — AMPICILLIN AND SULBACTAM 3 G: 1; 2 INJECTION, POWDER, FOR SOLUTION INTRAMUSCULAR; INTRAVENOUS at 20:41

## 2023-04-21 ASSESSMENT — FIBROSIS 4 INDEX: FIB4 SCORE: 2.24

## 2023-04-21 ASSESSMENT — PAIN DESCRIPTION - DESCRIPTORS: DESCRIPTORS: SORE

## 2023-04-22 ENCOUNTER — APPOINTMENT (OUTPATIENT)
Dept: RADIOLOGY | Facility: MEDICAL CENTER | Age: 58
DRG: 176 | End: 2023-04-22
Attending: STUDENT IN AN ORGANIZED HEALTH CARE EDUCATION/TRAINING PROGRAM
Payer: MEDICARE

## 2023-04-22 LAB
ALBUMIN SERPL BCP-MCNC: 2.9 G/DL (ref 3.2–4.9)
ALBUMIN/GLOB SERPL: 0.9 G/DL
ALP SERPL-CCNC: 121 U/L (ref 30–99)
ALT SERPL-CCNC: 30 U/L (ref 2–50)
ANION GAP SERPL CALC-SCNC: 14 MMOL/L (ref 7–16)
AST SERPL-CCNC: 27 U/L (ref 12–45)
BASOPHILS # BLD AUTO: 0.1 % (ref 0–1.8)
BASOPHILS # BLD: 0.01 K/UL (ref 0–0.12)
BILIRUB SERPL-MCNC: 0.9 MG/DL (ref 0.1–1.5)
BUN SERPL-MCNC: 11 MG/DL (ref 8–22)
CALCIUM ALBUM COR SERPL-MCNC: 9 MG/DL (ref 8.5–10.5)
CALCIUM SERPL-MCNC: 8.1 MG/DL (ref 8.5–10.5)
CHLORIDE SERPL-SCNC: 100 MMOL/L (ref 96–112)
CO2 SERPL-SCNC: 21 MMOL/L (ref 20–33)
CREAT SERPL-MCNC: 0.62 MG/DL (ref 0.5–1.4)
EOSINOPHIL # BLD AUTO: 0 K/UL (ref 0–0.51)
EOSINOPHIL NFR BLD: 0 % (ref 0–6.9)
ERYTHROCYTE [DISTWIDTH] IN BLOOD BY AUTOMATED COUNT: 51.9 FL (ref 35.9–50)
GFR SERPLBLD CREATININE-BSD FMLA CKD-EPI: 103 ML/MIN/1.73 M 2
GLOBULIN SER CALC-MCNC: 3.3 G/DL (ref 1.9–3.5)
GLUCOSE SERPL-MCNC: 130 MG/DL (ref 65–99)
HCT VFR BLD AUTO: 29.2 % (ref 37–47)
HGB BLD-MCNC: 8.9 G/DL (ref 12–16)
IMM GRANULOCYTES # BLD AUTO: 0.11 K/UL (ref 0–0.11)
IMM GRANULOCYTES NFR BLD AUTO: 0.9 % (ref 0–0.9)
LACTATE SERPL-SCNC: 1.7 MMOL/L (ref 0.5–2)
LYMPHOCYTES # BLD AUTO: 0.43 K/UL (ref 1–4.8)
LYMPHOCYTES NFR BLD: 3.6 % (ref 22–41)
MCH RBC QN AUTO: 27 PG (ref 27–33)
MCHC RBC AUTO-ENTMCNC: 30.5 G/DL (ref 33.6–35)
MCV RBC AUTO: 88.5 FL (ref 81.4–97.8)
MONOCYTES # BLD AUTO: 0.77 K/UL (ref 0–0.85)
MONOCYTES NFR BLD AUTO: 6.4 % (ref 0–13.4)
NEUTROPHILS # BLD AUTO: 10.79 K/UL (ref 2–7.15)
NEUTROPHILS NFR BLD: 89 % (ref 44–72)
NRBC # BLD AUTO: 0 K/UL
NRBC BLD-RTO: 0 /100 WBC
PLATELET # BLD AUTO: 193 K/UL (ref 164–446)
PMV BLD AUTO: 10 FL (ref 9–12.9)
POTASSIUM SERPL-SCNC: 3.7 MMOL/L (ref 3.6–5.5)
PROT SERPL-MCNC: 6.2 G/DL (ref 6–8.2)
RBC # BLD AUTO: 3.3 M/UL (ref 4.2–5.4)
SODIUM SERPL-SCNC: 135 MMOL/L (ref 135–145)
UFH PPP CHRO-ACNC: 0.13 IU/ML
UFH PPP CHRO-ACNC: 0.17 IU/ML
UFH PPP CHRO-ACNC: <0.1 IU/ML
WBC # BLD AUTO: 12.1 K/UL (ref 4.8–10.8)

## 2023-04-22 PROCEDURE — 83605 ASSAY OF LACTIC ACID: CPT

## 2023-04-22 PROCEDURE — 36415 COLL VENOUS BLD VENIPUNCTURE: CPT

## 2023-04-22 PROCEDURE — 700111 HCHG RX REV CODE 636 W/ 250 OVERRIDE (IP): Performed by: FAMILY MEDICINE

## 2023-04-22 PROCEDURE — 85025 COMPLETE CBC W/AUTO DIFF WBC: CPT

## 2023-04-22 PROCEDURE — 74176 CT ABD & PELVIS W/O CONTRAST: CPT

## 2023-04-22 PROCEDURE — 700105 HCHG RX REV CODE 258: Performed by: STUDENT IN AN ORGANIZED HEALTH CARE EDUCATION/TRAINING PROGRAM

## 2023-04-22 PROCEDURE — 99232 SBSQ HOSP IP/OBS MODERATE 35: CPT | Mod: GC | Performed by: FAMILY MEDICINE

## 2023-04-22 PROCEDURE — 700105 HCHG RX REV CODE 258: Performed by: FAMILY MEDICINE

## 2023-04-22 PROCEDURE — 770020 HCHG ROOM/CARE - TELE (206)

## 2023-04-22 PROCEDURE — 700102 HCHG RX REV CODE 250 W/ 637 OVERRIDE(OP): Performed by: STUDENT IN AN ORGANIZED HEALTH CARE EDUCATION/TRAINING PROGRAM

## 2023-04-22 PROCEDURE — 700102 HCHG RX REV CODE 250 W/ 637 OVERRIDE(OP)

## 2023-04-22 PROCEDURE — 700101 HCHG RX REV CODE 250

## 2023-04-22 PROCEDURE — 700111 HCHG RX REV CODE 636 W/ 250 OVERRIDE (IP): Performed by: STUDENT IN AN ORGANIZED HEALTH CARE EDUCATION/TRAINING PROGRAM

## 2023-04-22 PROCEDURE — A9270 NON-COVERED ITEM OR SERVICE: HCPCS | Performed by: STUDENT IN AN ORGANIZED HEALTH CARE EDUCATION/TRAINING PROGRAM

## 2023-04-22 PROCEDURE — 700111 HCHG RX REV CODE 636 W/ 250 OVERRIDE (IP)

## 2023-04-22 PROCEDURE — 80053 COMPREHEN METABOLIC PANEL: CPT

## 2023-04-22 PROCEDURE — A9270 NON-COVERED ITEM OR SERVICE: HCPCS

## 2023-04-22 PROCEDURE — 85520 HEPARIN ASSAY: CPT | Mod: 91

## 2023-04-22 RX ORDER — ACETAMINOPHEN 500 MG
1000 TABLET ORAL EVERY 6 HOURS PRN
Status: DISCONTINUED | OUTPATIENT
Start: 2023-04-22 | End: 2023-04-26 | Stop reason: HOSPADM

## 2023-04-22 RX ORDER — SODIUM CHLORIDE 9 MG/ML
INJECTION, SOLUTION INTRAVENOUS CONTINUOUS
Status: DISCONTINUED | OUTPATIENT
Start: 2023-04-22 | End: 2023-04-24

## 2023-04-22 RX ADMIN — SODIUM CHLORIDE, POTASSIUM CHLORIDE, SODIUM LACTATE AND CALCIUM CHLORIDE: 600; 310; 30; 20 INJECTION, SOLUTION INTRAVENOUS at 06:35

## 2023-04-22 RX ADMIN — HEPARIN SODIUM 2700 UNITS: 1000 INJECTION, SOLUTION INTRAVENOUS; SUBCUTANEOUS at 07:21

## 2023-04-22 RX ADMIN — SODIUM CHLORIDE: 9 INJECTION, SOLUTION INTRAVENOUS at 16:22

## 2023-04-22 RX ADMIN — SODIUM CHLORIDE, POTASSIUM CHLORIDE, SODIUM LACTATE AND CALCIUM CHLORIDE: 600; 310; 30; 20 INJECTION, SOLUTION INTRAVENOUS at 00:33

## 2023-04-22 RX ADMIN — OXYCODONE HYDROCHLORIDE 10 MG: 10 TABLET ORAL at 07:22

## 2023-04-22 RX ADMIN — ACETAMINOPHEN 1000 MG: 500 TABLET, FILM COATED ORAL at 21:56

## 2023-04-22 RX ADMIN — OXYCODONE HYDROCHLORIDE 10 MG: 10 TABLET ORAL at 12:45

## 2023-04-22 RX ADMIN — HEPARIN SODIUM 26 UNITS/KG/HR: 5000 INJECTION, SOLUTION INTRAVENOUS at 15:52

## 2023-04-22 RX ADMIN — HEPARIN SODIUM 2700 UNITS: 1000 INJECTION, SOLUTION INTRAVENOUS; SUBCUTANEOUS at 14:04

## 2023-04-22 RX ADMIN — CEFTRIAXONE SODIUM 1000 MG: 10 INJECTION, POWDER, FOR SOLUTION INTRAVENOUS at 06:18

## 2023-04-22 RX ADMIN — HEPARIN SODIUM 2700 UNITS: 1000 INJECTION, SOLUTION INTRAVENOUS; SUBCUTANEOUS at 20:00

## 2023-04-22 RX ADMIN — CEFTRIAXONE SODIUM 2000 MG: 10 INJECTION, POWDER, FOR SOLUTION INTRAVENOUS at 18:00

## 2023-04-22 RX ADMIN — OXYCODONE HYDROCHLORIDE 10 MG: 10 TABLET ORAL at 00:46

## 2023-04-22 RX ADMIN — OXYCODONE HYDROCHLORIDE 10 MG: 10 TABLET ORAL at 21:56

## 2023-04-22 ASSESSMENT — ENCOUNTER SYMPTOMS
FEVER: 0
WHEEZING: 0
FLANK PAIN: 0
CHILLS: 0
COUGH: 0
NAUSEA: 0
BACK PAIN: 1
VOMITING: 0
PALPITATIONS: 0
SHORTNESS OF BREATH: 0

## 2023-04-22 ASSESSMENT — PATIENT HEALTH QUESTIONNAIRE - PHQ9
2. FEELING DOWN, DEPRESSED, IRRITABLE, OR HOPELESS: NOT AT ALL
1. LITTLE INTEREST OR PLEASURE IN DOING THINGS: NOT AT ALL
1. LITTLE INTEREST OR PLEASURE IN DOING THINGS: NOT AT ALL
2. FEELING DOWN, DEPRESSED, IRRITABLE, OR HOPELESS: NOT AT ALL
SUM OF ALL RESPONSES TO PHQ9 QUESTIONS 1 AND 2: 0
SUM OF ALL RESPONSES TO PHQ9 QUESTIONS 1 AND 2: 0

## 2023-04-22 ASSESSMENT — COGNITIVE AND FUNCTIONAL STATUS - GENERAL
MOVING FROM LYING ON BACK TO SITTING ON SIDE OF FLAT BED: A LOT
DRESSING REGULAR LOWER BODY CLOTHING: A LOT
MOBILITY SCORE: 13
TOILETING: A LOT
DAILY ACTIVITIY SCORE: 16
SUGGESTED CMS G CODE MODIFIER MOBILITY: CL
MOVING TO AND FROM BED TO CHAIR: A LOT
DRESSING REGULAR UPPER BODY CLOTHING: A LITTLE
STANDING UP FROM CHAIR USING ARMS: A LOT
HELP NEEDED FOR BATHING: A LOT
CLIMB 3 TO 5 STEPS WITH RAILING: A LOT
PERSONAL GROOMING: A LITTLE
WALKING IN HOSPITAL ROOM: A LOT
TURNING FROM BACK TO SIDE WHILE IN FLAT BAD: A LITTLE
SUGGESTED CMS G CODE MODIFIER DAILY ACTIVITY: CK

## 2023-04-22 ASSESSMENT — FIBROSIS 4 INDEX
FIB4 SCORE: 1.35
FIB4 SCORE: 1.51

## 2023-04-22 ASSESSMENT — PAIN DESCRIPTION - PAIN TYPE
TYPE: ACUTE PAIN

## 2023-04-22 NOTE — THERAPY
Physical Therapy Contact Note    Patient Name: Addie Orellana  Age:  58 y.o., Sex:  female  Medical Record #: [id number]  Today's Date: 4/22/2023    PT consult received. Pt receiving Heparin drip, however their Xa value is still below therapeutic range of 0.3-0.7. Will hold PT eval at this time and follow up as pt is appropriate to participate.    Rogers Muller, PT, DPT

## 2023-04-22 NOTE — ED TRIAGE NOTES
Chief Complaint   Patient presents with    Arm Pain     Pt is coming from [facility], c/o Left arm pain, pt had GLF landing on her left side sustaining a left humerus fx and tib fib fx on 4/7/23 Hx of osteogenesis imperfecta, pt presents with fever, tachycardia, tachypnea.        BIB [facility] from [facility] rehab center for above complaint of left arm pain.   Pt has been changed into hospital gown, cardiac monitoring and spO2 applied.       Sepsis protocols placed, Labs and blood cultures drawn.       /56   Pulse (!) 125   Temp (!) 38.8 °C (101.8 °F) (Temporal)   Resp (!) 27   Ht 1.524 m (5')   Wt 102 kg (225 lb)   SpO2 90%   BMI 43.94 kg/m²

## 2023-04-22 NOTE — PROGRESS NOTES
This note is intended for the purposes of medical student education and feedback only.   Please refer to the documentation by this patient's assigned medical practitioner for details of care and plans.    Medical Student Progress Note  Note Author: Trevor Ramsey, Student  Date: 4/22/2023    Date of Admission: 4/21/2023  Primary Team: A  Attending: Dr. Shahla Grossman  Senior Resident: Dr. Claudia Macario  Medical Student: Trevor Ramsey, MS3    ID/CC: Addie Orellana is a 58 y.o. female with a PMH of OI and recent hospitalization for multiple fractures s/p L arm and L leg  surgical repair who was admitted on 4/21/2023 with fever and tachycardia from [facility].    INTERVAL UPDATE FOR 4/22/2023  No acute complaints overnight. Slept well.    Admitted for fever. No fever today or overnight.    Patient complains of mild R rib pain and tailbone pain today. Ranked 3/10.    Patient reports no BM for the past 7-8 days despite a regular diet/appetite and passing gas.    Was given clonidine earlier this morning and described herself as loopy.    CTA of chest 4/21 showed BL pulmonary edema. L kidney was noted to have hydronephrosis and calculi. Patient denies SOB, CP, arrhythmia, tachycardia, abdominal pain, dysuria at this time. CTAP ordered by dense contrast within the BL urinary outflow tract limits evaluation for stones.    Review of Systems  Review of Systems   Constitutional:  Negative for chills, fever and malaise/fatigue.   Respiratory:  Negative for cough, shortness of breath and wheezing.    Cardiovascular:  Positive for leg swelling. Negative for chest pain and palpitations.        + BL ankle erythema   Gastrointestinal:  Negative for nausea and vomiting.   Genitourinary:  Negative for dysuria, flank pain and hematuria.   Musculoskeletal:  Positive for back pain.       Previous Interval Events      OBJECTIVE   Physical Exam:  /60   Pulse 90   Temp 37.7 °C (99.9 °F) (Temporal)   Resp 18   Ht 1.524 m  (5')   Wt 106 kg (234 lb)   SpO2 100%     Intake/Output Summary (Last 24 hours) at 4/22/2023 0752  Last data filed at 4/22/2023 0100  Gross per 24 hour   Intake 120 ml   Output --   Net 120 ml       General: Female, appears stated age, appears to be in no acute distress. Lying comfortably on bed.  HEENT: Normocephalic, atraumatic. EOM grossly intact.  Cardio: Normal S1 and S2. Regular and rhythm. Slightly tachycardic. No murmurs, rubs, or gallops.  Pulmonary: Lungs are clear to auscultation bilaterally. No wheezes, rales, or rhonchi.  Abdomen: Normoactive bowel sounds. Abdomen is soft and nondistended. No masses. No tenderness to palpation in all four quadrants.   MSK: No LE edema. BL ankle erythema. Warm to touch.    Lab Results:  Recent Labs     04/21/23 1958 04/22/23 0041   WBC 11.2* 12.1*   RBC 3.35* 3.30*   HEMOGLOBIN 9.0* 8.9*   HEMATOCRIT 29.3* 29.2*   MCV 87.5 88.5   MCH 26.9* 27.0   RDW 51.5* 51.9*   PLATELETCT 232 193   MPV 9.9 10.0   NEUTSPOLYS 86.70* 89.00*   LYMPHOCYTES 4.60* 3.60*   MONOCYTES 7.30 6.40   EOSINOPHILS 0.10 0.00   BASOPHILS 0.40 0.10     Recent Labs     04/21/23 1958 04/22/23 0041   SODIUM 136 135   POTASSIUM 4.2 3.7   CHLORIDE 101 100   CO2 22 21   BUN 13 11   CREATININE 0.74 0.62   CALCIUM 8.3* 8.1*   ALBUMIN 3.0* 2.9*     Estimated GFR/CRCL = Estimated Creatinine Clearance: 107.5 mL/min (by C-G formula based on SCr of 0.62 mg/dL).  Recent Labs     04/21/23 1958 04/22/23 0041   GLUCOSE 159* 130*     Recent Labs     04/21/23 1958 04/22/23 0041   ASTSGOT 31 27   ALTSGPT 34 30   TBILIRUBIN 0.7 0.9   ALKPHOSPHAT 126* 121*   GLOBULIN 3.4 3.3   INR 1.15*  --              Recent Labs     04/21/23 1958   INR 1.15*   APTT 32.6       Microbiology Results:  Results       Procedure Component Value Units Date/Time    Blood Culture [[id number]] Collected: 04/21/23 1958    Order Status: Completed Specimen: Blood from Peripheral Updated: 04/22/23 0740     Significant Indicator NEG     Source  "BLD     Site PERIPHERAL     Culture Result No Growth  Note: Blood cultures are incubated for 5 days and  are monitored continuously.Positive blood cultures  are called to the RN and reported as soon as  they are identified.      Narrative:      Per Hospital Policy: Only change Specimen Src: to \"Line\" if  specified by physician order.  No site indicated    Blood Culture [[id number]] Collected: 04/21/23 2023    Order Status: Completed Specimen: Blood from Peripheral Updated: 04/22/23 0740     Significant Indicator NEG     Source BLD     Site PERIPHERAL     Culture Result No Growth  Note: Blood cultures are incubated for 5 days and  are monitored continuously.Positive blood cultures  are called to the RN and reported as soon as  they are identified.      Narrative:      Per Hospital Policy: Only change Specimen Src: to \"Line\" if  specified by physician order.  Right Hand    CoV-2, Flu A/B, And RSV by PCR ([employer]) [[id number]] Collected: 04/21/23 2118    Order Status: Completed Specimen: Respirate Updated: 04/21/23 2309     Influenza virus A RNA Negative     Influenza virus B, PCR Negative     RSV, PCR Negative     SARS-CoV-2 by PCR NotDetected     Comment: [facility] providers: PLEASE REFER TO DE-ESCALATION AND RETESTING PROTOCOL  on [email or web address]    **The [employer] GeneXpert Xpress SARS-CoV-2 RT-PCR Test has been made  available for use under the Emergency Use Authorization (EUA) only.          SARS-CoV-2 Source NP Swab    Urinalysis [[id number]]  (Abnormal) Collected: 04/21/23 2118    Order Status: Completed Specimen: Urine Updated: 04/21/23 2230     Color Yellow     Character Clear     Specific Gravity 1.020     Ph 6.0     Glucose Negative mg/dL      Ketones Negative mg/dL      Protein 30 mg/dL      Bilirubin Negative     Urobilinogen, Urine 1.0     Nitrite Positive     Leukocyte Esterase Moderate     Occult Blood Moderate     Micro Urine Req Microscopic    Narrative:      Indication for culture:->Evaluation for sepsis " without a  clear source of infection    Urine Culture (New) [[id number]] Collected: 23    Order Status: Sent Specimen: Urine Updated: 23    Narrative:      Indication for culture:->Evaluation for sepsis without a  clear source of infection            Imaging Results:  CT-RENAL COLIC EVALUATION(A/P W/O)   Final Result         1.  Mild left hydronephrosis and hydroureter with associated hazy left perinephric fat stranding. Dense contrast within the bilateral urinary outflow tract is seen presenting evaluation for ureteral and renal calculi.   2.  Small hiatal hernia   3.  Diverticulosis   4.  Fat-containing umbilical hernia   5.  Hepatomegaly      CT-CTA CHEST PULMONARY ARTERY W/ RECONS   Final Result         1.  Bilateral pulmonary emboli as described. No radiographic evidence of right heart strain.   2.  Left nephrolithiasis with hydronephrosis and hydroureter and obstructive appearance of the left kidney.   3.  Small to moderate size hiatal hernia      These findings were discussed with the patient's clinician, Mao Paz, on 2023 9:25 PM.      DX-CHEST-PORTABLE (1 VIEW)   Final Result         1.  Interstitial edema and/or infiltrates.   2.  Cardiomegaly          EKG  Results for orders placed or performed during the hospital encounter of 23   ECG   Result Value Ref Range    Report       [facility]    Test Date:  2023  Pt Name:    SISSY JUNE                Department: RST1  MRN:        [id number]                      Room:       [facility]  Gender:     Female                       Technician: FGJ  :        1965                   Requested By:ELOY SANTAMARIA  Order #:    [id number]                    Reading MD: Tan Patel MD    Measurements  Intervals                                Axis  Rate:       116                          P:          41  OH:         121                          QRS:        -30  QRSD:       79                           T:          22  QT:          389  QTc:        541    Interpretive Statements  SINUS TACHYCARDIA  ATRIAL PREMATURE COMPLEXES  PROBABLE LEFT ATRIAL ENLARGEMENT  ABNORMAL R-WAVE PROGRESSION, LATE TRANSITION  LVH WITH SECONDARY REPOLARIZATION ABNORMALITY  Electronically Signed On 2023 6:45:23 PDT by Tan Patel MD     EKG   Result Value Ref Range    Report       [facility]    Test Date:  2023  Pt Name:    SISSY JUNE                Department: 131  MRN:        [id number]                      Room:       T3  Gender:     Female                       Technician: TXM  :        1965                   Requested By:MAYRA NEVES  Order #:    [id number]                    Reading MD: Eliu Berry MD    Measurements  Intervals                                Axis  Rate:       105                          P:          45  OK:         122                          QRS:        -22  QRSD:       79                           T:          188  QT:         330  QTc:        437    Interpretive Statements  Sinus tachycardia  LVH with secondary repolarization abnormality  Compared to ECG 2023 12:32:20  Atrial premature complex(es) no longer present  Electronically Signed On 2023 13:54:55 PDT by Eliu Berry MD         Current Medications    Current Facility-Administered Medications:     lactated ringers infusion, , Intravenous, Continuous, Shahla Grossman M.D., Last Rate: 125 mL/hr at 23 0635, New Bag at 23 0635    ondansetron (ZOFRAN) syringe/vial injection 4 mg, 4 mg, Intravenous, Q4HRS PRN, Shahla Grossman M.D.    ondansetron (ZOFRAN ODT) dispertab 4 mg, 4 mg, Oral, Q4HRS PRN, Shahla Grossman M.D.    promethazine (PHENERGAN) tablet 12.5-25 mg, 12.5-25 mg, Oral, Q4HRS PRN, Shahla Grossman M.D.    promethazine (PHENERGAN) suppository 12.5-25 mg, 12.5-25 mg, Rectal, Q4HRS PRN, Shahla Grossman M.D.    prochlorperazine (COMPAZINE) injection 5-10 mg, 5-10 mg, Intravenous, Q4HRS PRN,  Shahla Grossman M.D.    heparin infusion 25,000 units in 500 mL 0.45% NACL, 0-30 Units/kg/hr (Adjusted), Intravenous, Continuous, Shahla Grossman M.D., Last Rate: 30 mL/hr at 04/22/23 0724, 22 Units/kg/hr at 04/22/23 0724    heparin injection 2,700 Units, 40 Units/kg (Adjusted), Intravenous, PRN, Shahla Grossman M.D., 2,700 Units at 04/22/23 0721    oxyCODONE immediate release (ROXICODONE) tablet 10 mg, 10 mg, Oral, Q6HRS PRN, Shine Hensley M.D., 10 mg at 04/22/23 0722    acetaminophen (Tylenol) tablet 650 mg, 650 mg, Oral, Q6HRS PRN, Shine Hensley M.D.    cefTRIAXone (Rocephin) syringe 1,000 mg, 1,000 mg, Intravenous, Q24HRS, Shine Hensley M.D., 1,000 mg at 04/22/23 0618    tamsulosin (FLOMAX) capsule 0.4 mg, 0.4 mg, Oral, AFTER BREAKFAST, Shine Hensley M.D.    ASSESSMENT/PLAN  ID/CC: Addie Orellana is a 58 y.o. female with a PMH of OI and recent hospitalization for multiple fractures s/p L arm and L leg  surgical repair who was admitted on 4/21/2023 with fever and tachycardia from [facility].    # Pulmonary embolism  - Per CTA chest and pulmonary artery - Bilateral pulmonary emboli present. No radiographic evidence of right heart strain.  - Patient denies CP, SOB, LE swelling. VS unremarkable. ECG w/o heart strain. BL ankle warmth and erythema noted.  - Supplemental O2 prn.  - Continue heparin drip.  - Pending echo.  - NPO pending urology consult.    # osteogenesis imperfecta  - increased risk of fractures    # UTI with hematuria  # fever  # nephrolithiasis  # hydronephrosis with urinary obstruction  - Fever likely secondary to UTI or nephrolithiasis.  - CTA chest showed - Left nephrolithiasis with hydronephrosis and hydroureter and obstructive appearance of the left kidney.  - CT colic - Dense contrast within the bilateral urinary outflow tract limits evaluation for stones.  - NPO Pending urology consult  - Following urine Cx.  - Negative for flank pain, dysuria, abdominal  pain.  - Continue tamsulosin.  - Ceftriaxone qd.  - CTM BMP, CBC.  - LR at 125    # closed fracture of shaft of fibula  #fracture of distal end of tibia  # displaced fracture of proximal end of humerus  - s/p surgical repair. [facility] before admission.  - Patient reports improving pain and mobility.  - Refer to PT/OT.    #back pain  # R rib pain  - located near tailbone  - graded 3/10  - tylenol, aspirin prn if pain intolerable.      PROPHYLAXIS  Fluids: LR  Lines: PIV  Abx: Ceftriaxone, Unasyn and one dose azithromycin given in ED.  Diet: NPO pending possible urology recommendations  PPX: SCD's and heparin drip  DISPO: Inpatient

## 2023-04-22 NOTE — ASSESSMENT & PLAN NOTE
- Likely source of fever  - Possibly complicated by obstructing kidney stone    Plan:  - Renal colic CT pending. Will consider urology consult tonight pending results  - Will switch pt to Ceftriaxone from Unasyn   - LR at 125 running  - Will follow urine culture

## 2023-04-22 NOTE — ED NOTES
Med rec completed per Continuity of Care Document sent with patient from [facility].  Allergies reviewed per CCD.  No outpatient antibiotic usage noted on CCD.

## 2023-04-22 NOTE — ASSESSMENT & PLAN NOTE
- Room air for greater than 24 hours with adequate oxygen saturation   - Continue heparin drip, will consider transition to p.o. anticoagulation tomorrow

## 2023-04-22 NOTE — ED PROVIDER NOTES
ER Provider Note    Scribed for Mao Paz M.d. by Bernard Smith. 4/21/2023  7:51 PM    Primary Care Provider: Iliana Barbosa M.D.    CHIEF COMPLAINT  Chief Complaint   Patient presents with    Arm Pain     Pt is coming from [facility], c/o Left arm pain, pt had GLF landing on her left side sustaining a left humerus fx and tib fib fx on 4/7/23 Hx of osteogenesis imperfecta, pt presents with fever, tachycardia, tachypnea.      EXTERNAL RECORDS REVIEWED  Inpatient Notes indicate the patient had a normal echocardiogram in 2018.    HPI/ROS  LIMITATION TO HISTORY   Select: : None  OUTSIDE HISTORIAN(S):  EMS regarding on scene information and interventions en route as detailed below.    Addie Orellana is a 58 y.o. female with a history of osteogenesis imperfecta who presents to the ED via EMS for evaluation of left arm pain onset 4/7/23 secondary to a ground level fall. The patient states she also has fever, but denies any shortness of breath, nausea, vomiting, or diarrhea. She describes being at [facility] since her fall, who sent her here due to her fever. Per nursing, the patient had low oxygen on room air. She does not typically use supplemental oxygen at home. She denies any history of blood clots, but endorses a history of gallbladder and kidney issues. Per EMS the patient was medicated with Lactated Ringers 300 mL prior to arrival.    PAST MEDICAL HISTORY  Past Medical History:   Diagnosis Date    Breast cancer, stage 3 (HCC)     Cancer (HCC) 2018    stage 3 breast, chemo 2019    Dental disorder     Partial upper and lower    Hemorrhagic disorder (HCC)     easily bruises    Osteogenesis imperfecta     Pneumonia     first of week of her chemo-months ago     SURGICAL HISTORY  Past Surgical History:   Procedure Laterality Date    TIBIA NAILING INTRAMEDULLARY Left 4/7/2023    Procedure: INSERTION, INTRAMEDULLARY HENNY, TIBIA;  Surgeon: Joselito Claire M.D.;  Location: SURGERY [facility];   Service: Orthopedics    HUMERUS NAILING INTRAMEDULLARY Left 4/7/2023    Procedure: INSERTION, INTRAMEDULLARY HENNY, HUMERUS;  Surgeon: Joselito Claire M.D.;  Location: SURGERY [facility];  Service: Orthopedics    MASTECTOMY Bilateral 3/13/2019    Procedure: MASTECTOMY- SIMPLE  ;  Surgeon: Elena Grossman M.D.;  Location: [facility];  Service: General    NODE BIOPSY SENTINEL Left 3/13/2019    Procedure: LEFT AXILLARY SENTINAL LYMPH NODE BIOPSY;  Surgeon: Elena Grossman M.D.;  Location: SURGERY [facility];  Service: General    AXILLARY NODE DISSECTION Left 3/13/2019    Procedure: AXILLARY NODE DISSECTION ;  Surgeon: Elena Grossman M.D.;  Location: SURGERY [facility];  Service: General    NODE BIOPSY Left 3/13/2019    Procedure: LYMPH NODE INJECTION;  Surgeon: Elena Grossman M.D.;  Location: [facility];  Service: General    ANKLE FUSION      GASTRIC BYPASS LAPAROSCOPIC      OTHER ABDOMINAL SURGERY      luanne    OTHER ORTHOPEDIC SURGERY Bilateral     foot surgery to repair arches     FAMILY HISTORY  Family History   Problem Relation Age of Onset    Cancer Paternal Aunt         lung cancer    Cancer Paternal Grandfather         lung cancer     SOCIAL HISTORY   reports that she has never smoked. She has never used smokeless tobacco. She reports that she does not currently use alcohol after a past usage of about 0.6 oz per week. She reports that she does not use drugs.    CURRENT MEDICATIONS  Previous Medications    ACETAMINOPHEN (TYLENOL) 325 MG TAB    Take 650 mg by mouth every 6 hours as needed for Mild Pain or Fever. 2 tablets = 650 mg.    CALCIUM CARB-CHOLECALCIFEROL (CALCIUM 600+D3) 600-5 MG-MCG TAB    Take 1 Tablet by mouth every evening.    OXYCODONE IMMEDIATE RELEASE (ROXICODONE) 10 MG IMMEDIATE RELEASE TABLET    Take 1 Tablet by mouth every 6 hours as needed for Severe Pain or Moderate Pain for up to 10 days.    THERAPEUTIC MULTIVITAMIN-MINERALS (THERAGRAN-M) TAB    Take  1 Tablet by mouth every evening.       ALLERGIES  Patient has no known allergies.    PHYSICAL EXAM  /56   Pulse (!) 125   Temp (!) 38.8 °C (101.8 °F) (Temporal)   Resp (!) 27   Ht 1.524 m (5')   Wt 102 kg (225 lb)   SpO2 90%   BMI 43.94 kg/m²   Constitutional: Well developed, Well nourished, No acute distress, Non-toxic appearance.   HENT: Normocephalic, Atraumatic, mucous membranes moist, no erythema, exudates, swelling, or masses, nares patent  Eyes: nonicteric  Neck: Supple, no meningismus  Lymphatic: No lymphadenopathy noted.   Cardiovascular: Tachycardic, Regular rhythm, no gallops rubs or murmurs  Lungs: Lung sounds diminished bilaterally.    Abdomen: Soft and nontender throughout  Skin: Warm, Dry  Genitalia: Deferred  Rectal: Deferred  Extremities: Wounds clean dry and intact. Ecchymosis to the forearm with a wound. No erythema or drainage.  Neurologic: Alert, appropriate, follows commands, moving all extremities, normal speech   Psychiatric: Affect normal    DIAGNOSTIC STUDIES    Labs:   Results for orders placed or performed during the hospital encounter of 04/21/23   Lactic acid (lactate)   Result Value Ref Range    Lactic Acid 2.0 0.5 - 2.0 mmol/L   CBC With Differential   Result Value Ref Range    WBC 11.2 (H) 4.8 - 10.8 K/uL    RBC 3.35 (L) 4.20 - 5.40 M/uL    Hemoglobin 9.0 (L) 12.0 - 16.0 g/dL    Hematocrit 29.3 (L) 37.0 - 47.0 %    MCV 87.5 81.4 - 97.8 fL    MCH 26.9 (L) 27.0 - 33.0 pg    MCHC 30.7 (L) 33.6 - 35.0 g/dL    RDW 51.5 (H) 35.9 - 50.0 fL    Platelet Count 232 164 - 446 K/uL    MPV 9.9 9.0 - 12.9 fL    Neutrophils-Polys 86.70 (H) 44.00 - 72.00 %    Lymphocytes 4.60 (L) 22.00 - 41.00 %    Monocytes 7.30 0.00 - 13.40 %    Eosinophils 0.10 0.00 - 6.90 %    Basophils 0.40 0.00 - 1.80 %    Immature Granulocytes 0.90 0.00 - 0.90 %    Nucleated RBC 0.00 /100 WBC    Neutrophils (Absolute) 9.69 (H) 2.00 - 7.15 K/uL    Lymphs (Absolute) 0.51 (L) 1.00 - 4.80 K/uL    Monos (Absolute) 0.82  0.00 - 0.85 K/uL    Eos (Absolute) 0.01 0.00 - 0.51 K/uL    Baso (Absolute) 0.05 0.00 - 0.12 K/uL    Immature Granulocytes (abs) 0.10 0.00 - 0.11 K/uL    NRBC (Absolute) 0.00 K/uL   Comp Metabolic Panel   Result Value Ref Range    Sodium 136 135 - 145 mmol/L    Potassium 4.2 3.6 - 5.5 mmol/L    Chloride 101 96 - 112 mmol/L    Co2 22 20 - 33 mmol/L    Anion Gap 13.0 7.0 - 16.0    Glucose 159 (H) 65 - 99 mg/dL    Bun 13 8 - 22 mg/dL    Creatinine 0.74 0.50 - 1.40 mg/dL    Calcium 8.3 (L) 8.5 - 10.5 mg/dL    AST(SGOT) 31 12 - 45 U/L    ALT(SGPT) 34 2 - 50 U/L    Alkaline Phosphatase 126 (H) 30 - 99 U/L    Total Bilirubin 0.7 0.1 - 1.5 mg/dL    Albumin 3.0 (L) 3.2 - 4.9 g/dL    Total Protein 6.4 6.0 - 8.2 g/dL    Globulin 3.4 1.9 - 3.5 g/dL    A-G Ratio 0.9 g/dL   Urinalysis    Specimen: Urine   Result Value Ref Range    Micro Urine Req Microscopic    CORRECTED CALCIUM   Result Value Ref Range    Correct Calcium 9.1 8.5 - 10.5 mg/dL   ESTIMATED GFR   Result Value Ref Range    GFR (CKD-EPI) 93 >60 mL/min/1.73 m 2   proBrain Natriuretic Peptide, NT   Result Value Ref Range    NT-proBNP 953 (H) 0 - 125 pg/mL   TROPONIN   Result Value Ref Range    Troponin T 21 (H) 6 - 19 ng/L     Radiology:   The attending emergency physician has independently interpreted the diagnostic imaging associated with this visit and am waiting the final reading from the radiologist.   Radiologist interpretation:   CT-CTA CHEST PULMONARY ARTERY W/ RECONS   Final Result         1.  Bilateral pulmonary emboli as described. No radiographic evidence of right heart strain.   2.  Left nephrolithiasis with hydronephrosis and hydroureter and obstructive appearance of the left kidney.   3.  Small to moderate size hiatal hernia      These findings were discussed with the patient's clinician, Mao Paz, on 4/21/2023 9:25 PM.      DX-CHEST-PORTABLE (1 VIEW)   Final Result         1.  Interstitial edema and/or infiltrates.   2.  Cardiomegaly      CT-RENAL COLIC  EVALUATION(A/P W/O)    (Results Pending)     COURSE & MEDICAL DECISION MAKING     ED Observation Status? Yes; I am placing the patient in to an observation status due to a diagnostic uncertainty as well as therapeutic intensity. Patient placed in observation status at 7:58 PM, 4/21/2023.     Observation plan is as follows: Observe pending work-up for fever and borderline hypoxia    Upon Reevaluation, the patient's condition has: not improved; and will be escalated to hospitalization.    Patient discharged from ED Observation status at 9:39 PM (Time) 4/21/2023 (Date).     INITIAL ASSESSMENT, COURSE AND PLAN  Care Narrative: This is a 58-year-old female who presents with likely sepsis, possibly from a urinary source.  Patient additionally has evidence of bilateral PEs but no right-sided heart strain.  She is not a candidate for tPA certainly because of her recent surgery but also because there does not appear to be evidence of right heart strain.  The patient has a temperature of 1015 here, she did have hydronephrosis on the left and does have evidence of UTI.  Given the recent contrast for her PE study it is difficult to discern if there is a stone causing hydronephrosis-her images may need to be repeated after the contrast is gone.  At this point she will be covered with antibiotics and was admitted to [facility] medicine.  Patient was heparinized for the PEs.    7:50 PM - Patient was evaluated at bedside. Ordered for CT-CTA Chest Pulmonary Artery w/, DX-Chest, Lactic Acid, CBC w/diff, CMP, UA, Urine Culture, and Blood Culture to evaluate. The patient will be medicated with Tylenol 650 mg PO, Unasyn 3 g in Ns 100 mL IV, Azithromycin 500 mg PO, and LR infusion for her symptoms. Patient verbalizes understanding and support with my plan of care.      8:23 PM - CT-CTA Chest Pulmonary Artery indicates interstitial edema and/or infiltrates. Ordered for Troponin, proBNP, and CoV-2 Flu A/B and RSV PCR to evaluate. The  patient will be medicated with Unasyn 3 g in  mL IV and Azithrymycin 500 mg PO  for her symptoms. Patient was reevaluated at bedside. Discussed lab and radiology results with the patient and informed them that she may have pneumonia at this time, and we will initiate antibiotics. She is requesting a straight catheterization to obtain a urine sample. Patient verbalizes understanding and agreement to this plan of care.      9:26 PM - I discussed the patient's case and the above findings with Dr. Rebolledo (Radiology) who indicates the patient has bilateral pulmonary embolisms, and she has multiple renal stones which may be obstructive noted on CT. The patient will be medicated with Heparin injection 2700 units for her symptoms. Ordered for CT-Renal Colic Evaluation w/o, aPTT, PTT, Heparin Xa, and Urine Microscopic to evaluate. Patient was reevaluated at bedside. Discussed lab and radiology results with the patient and informed them of these findings. I informed the patient of my plan to admit today given the patient's current presentation and diagnostic study results. The patient was given an opportunity to ask questions. Patient verbalizes understanding and support with my plan for admission. Paged Hospitalist.    9:39 PM - I discussed the patient's case and the above findings with [facility] (Hospitalist) who agrees to evaluate the patient for hospitalization.      ADDITIONAL PROBLEM LIST      DISPOSITION AND DISCUSSIONS  I have discussed management of the patient with the following physicians and THALIA's:  Dr. Rebolledo (Radiology) and [facility] (Hospitalist).    Discussion of management with other [facility] or appropriate source(s): Radiologist regarding critical radiological results.        DISPOSITION:  Patient will be hospitalized by [facility] (Hospitalist) in guarded condition.     FINAL DIANGOSIS  1. Other pulmonary embolism without acute cor pulmonale, unspecified chronicity (HCC)    2.  Sepsis, due to unspecified organism, unspecified whether acute organ dysfunction present (HCC)    3. Acute UTI         Bernard SORIA (Scribe), am scribing for, and in the presence of, Mao Paz M.D..    Electronically signed by: Bernard Smith (Scribe), 4/21/2023    Mao SORIA M.D. personally performed the services described in this documentation, as scribed by Bernard Smith in my presence, and it is both accurate and complete.     The note accurately reflects work and decisions made by me.  Mao Paz M.D.  4/21/2023  11:06 PM

## 2023-04-22 NOTE — ASSESSMENT & PLAN NOTE
- Urine cultures revealed e. Coli and blood cultures revealed gram negative rods with susceptibilities  - Continue C3 2g BID and start levofloxacin 750 mg daily for 7 days  - DC IVF

## 2023-04-22 NOTE — PROGRESS NOTES
[facility] [place]     Attending:   Dr. Shahla Grossman    Resident:   Claudia Macario MD    PATIENT:   Addie Orellana; [id number]; 1965    58 y.o. female admitted for bilateral pulmonary embolism requiring heparin drip and non obstructing left nephrolithiasis. Urology consulted and no stent indicated. Continue BID rocephin.    SUBJECTIVE:   No acute events overnight. She denied chest pain and respiratory distress. She has not had a bowel movement but has been passing gas. She has been urinating well. She denied abdominal pain.     OBJECTIVE:  Vitals:    04/22/23 0600 04/22/23 0700 04/22/23 0708 04/22/23 1126   BP:   100/60 95/57   Pulse: 97 100 90 93   Resp:   18 18   Temp:   37.7 °C (99.9 °F) (!) 38.1 °C (100.6 °F)   TempSrc:   Temporal Temporal   SpO2: 100% 99% 100% 99%   Weight:       Height:           Intake/Output Summary (Last 24 hours) at 4/22/2023 0810  Last data filed at 4/22/2023 0100  Gross per 24 hour   Intake 120 ml   Output --   Net 120 ml       PHYSICAL EXAM:  General: Mild to moderate acute distress, afebrile, resting comfortably, conversational   HEENT: NC/AT. EOMI.   Cardiovascular: RRR without murmurs, rubs, heaves. Normal capillary refill   Respiratory: CTAB, no tachypnea or retractions   Abdomen: normal bowel sounds, soft, nontender, nondistended, no masses, no organomegaly   EXT:  GIL, no edema  Skin: No erythema/lesions   Neuro: Non-focal, alert and orientated     LABS:  Recent Labs     04/21/23 1958 04/22/23  0041   WBC 11.2* 12.1*   RBC 3.35* 3.30*   HEMOGLOBIN 9.0* 8.9*   HEMATOCRIT 29.3* 29.2*   MCV 87.5 88.5   MCH 26.9* 27.0   RDW 51.5* 51.9*   PLATELETCT 232 193   MPV 9.9 10.0   NEUTSPOLYS 86.70* 89.00*   LYMPHOCYTES 4.60* 3.60*   MONOCYTES 7.30 6.40   EOSINOPHILS 0.10 0.00   BASOPHILS 0.40 0.10     Recent Labs     04/21/23 1958 04/22/23  0041   SODIUM 136 135   POTASSIUM 4.2 3.7   CHLORIDE 101 100   CO2 22 21   BUN 13 11   CREATININE 0.74 0.62   CALCIUM 8.3* 8.1*    ALBUMIN 3.0* 2.9*     Estimated GFR/CRCL = Estimated Creatinine Clearance: 107.5 mL/min (by C-G formula based on SCr of 0.62 mg/dL).  Recent Labs     04/21/23 1958 04/22/23  0041   GLUCOSE 159* 130*     Recent Labs     04/21/23 1958 04/22/23  0041   ASTSGOT 31 27   ALTSGPT 34 30   TBILIRUBIN 0.7 0.9   ALKPHOSPHAT 126* 121*   GLOBULIN 3.4 3.3   INR 1.15*  --              Recent Labs     04/21/23 1958   INR 1.15*   APTT 32.6       MICROBIOLOGY:   Blood Culture   Date Value Ref Range Status   01/02/2019 No growth after 5 days of incubation.  Final        IMAGING:   CT-RENAL COLIC EVALUATION(A/P W/O)   Final Result      1.  Nonobstructing left nephrolithiasis. No left hydronephrosis.   2.  Residual left perinephric stranding, nonspecific.   3.  Hepatic steatosis.   4.  Cholecystectomy and gastric bypass.      CT-RENAL COLIC EVALUATION(A/P W/O)   Final Result         1.  Mild left hydronephrosis and hydroureter with associated hazy left perinephric fat stranding. Dense contrast within the bilateral urinary outflow tract is seen presenting evaluation for ureteral and renal calculi.   2.  Small hiatal hernia   3.  Diverticulosis   4.  Fat-containing umbilical hernia   5.  Hepatomegaly      CT-CTA CHEST PULMONARY ARTERY W/ RECONS   Final Result         1.  Bilateral pulmonary emboli as described. No radiographic evidence of right heart strain.   2.  Left nephrolithiasis with hydronephrosis and hydroureter and obstructive appearance of the left kidney.   3.  Small to moderate size hiatal hernia      These findings were discussed with the patient's clinician, Mao Paz, on 4/21/2023 9:25 PM.      DX-CHEST-PORTABLE (1 VIEW)   Final Result         1.  Interstitial edema and/or infiltrates.   2.  Cardiomegaly      EC-ECHOCARDIOGRAM COMPLETE W/O CONT    (Results Pending)       CULTURES:   Results       Procedure Component Value Units Date/Time    Urine Culture (New) [[id number]]  (Abnormal) Collected: 04/21/23 2118    Order  "Status: Completed Specimen: Urine Updated: 04/22/23 1427     Significant Indicator POS     Source UR     Site -     Culture Result -      Escherichia coli  >100,000 cfu/mL      Narrative:      Indication for culture:->Evaluation for sepsis without a  clear source of infection  Indication for culture:->Evaluation for sepsis without a    Blood Culture [[id number]]  (Abnormal) Collected: 04/21/23 2023    Order Status: Completed Specimen: Blood from Peripheral Updated: 04/22/23 1343     Significant Indicator POS     Source BLD     Site PERIPHERAL     Culture Result Growth detected by Bactec instrument. 04/22/2023  13:42  Gram Stain: Gram negative rods.      Narrative:      Per Hospital Policy: Only change Specimen Src: to \"Line\" if  specified by physician order.  Right Hand    Blood Culture [[id number]] Collected: 04/21/23 1958    Order Status: Completed Specimen: Blood from Peripheral Updated: 04/22/23 0740     Significant Indicator NEG     Source BLD     Site PERIPHERAL     Culture Result No Growth  Note: Blood cultures are incubated for 5 days and  are monitored continuously.Positive blood cultures  are called to the RN and reported as soon as  they are identified.      Narrative:      Per Hospital Policy: Only change Specimen Src: to \"Line\" if  specified by physician order.  No site indicated    CoV-2, Flu A/B, And RSV by PCR (Exam18id) [[id number]] Collected: 04/21/23 2118    Order Status: Completed Specimen: Respirate Updated: 04/21/23 2309     Influenza virus A RNA Negative     Influenza virus B, PCR Negative     RSV, PCR Negative     SARS-CoV-2 by PCR NotDetected     Comment: [facility] providers: PLEASE REFER TO DE-ESCALATION AND RETESTING PROTOCOL  on [email or web address]    **The [employer] GeneXpert Xpress SARS-CoV-2 RT-PCR Test has been made  available for use under the Emergency Use Authorization (EUA) only.          SARS-CoV-2 Source NP Swab    Urinalysis [[id number]]  (Abnormal) Collected: 04/21/23 2118    Order Status: " Completed Specimen: Urine Updated: 04/21/23 2230     Color Yellow     Character Clear     Specific Gravity 1.020     Ph 6.0     Glucose Negative mg/dL      Ketones Negative mg/dL      Protein 30 mg/dL      Bilirubin Negative     Urobilinogen, Urine 1.0     Nitrite Positive     Leukocyte Esterase Moderate     Occult Blood Moderate     Micro Urine Req Microscopic    Narrative:      Indication for culture:->Evaluation for sepsis without a  clear source of infection            MEDS:  Current Facility-Administered Medications   Medication Last Admin    cefTRIAXone (Rocephin) syringe 2,000 mg      NS infusion      ondansetron (ZOFRAN) syringe/vial injection 4 mg      ondansetron (ZOFRAN ODT) dispertab 4 mg      promethazine (PHENERGAN) tablet 12.5-25 mg      promethazine (PHENERGAN) suppository 12.5-25 mg      prochlorperazine (COMPAZINE) injection 5-10 mg      heparin infusion 25,000 units in 500 mL 0.45% NACL 26 Units/kg/hr at 04/22/23 1552    heparin injection 2,700 Units 2,700 Units at 04/22/23 1404    oxyCODONE immediate release (ROXICODONE) tablet 10 mg 10 mg at 04/22/23 1245    acetaminophen (Tylenol) tablet 650 mg      tamsulosin (FLOMAX) capsule 0.4 mg         ASSESSMENT/PLAN: 58 y.o. female admitted for bilateral pulmonary embolism requiring heparin drip and non obstructing left nephrolithiasis. Urology consulted and no stent indicated. Continue BID rocephin.    * Pulmonary embolism on right (HCC)- (present on admission)  Assessment & Plan  - Supplemental O2 as needed  - Will continue heparin drip tonight and reassess appropriateness to transition to oral agent in AM    Urinary tract infection with hematuria  Assessment & Plan  - Urine cultures revealed e. Coli and blood cultures revealed gram negative rods as of @ 15:51  - Susceptibilities pending. Will review upon return.  - Increased C3 to 2g BID  - CBC in AM    Hydronephrosis with urinary obstruction due to renal calculus  Assessment & Plan  - Urology  consulted. Repeat CT renal colic revealed no obstructing stones. Urology recommended continued antibiotics.   - Will continue C3    Osteogenesis imperfecta  Assessment & Plan  - Increased risk of fractures    Closed fracture of shaft of fibula- (present on admission)  Assessment & Plan  - PT/OT     Fracture of distal end of tibia- (present on admission)  Assessment & Plan  - PT/OT    Displaced fracture of proximal end of humerus- (present on admission)  Assessment & Plan  - PT/OT    Core Measures:   Fluids: 150 mL/hr  Lines: IVF  Abx: C3 BID  DVT prophylaxis: Heparin  Code Status: Full    Disposition: Inpatient for anticoagulation    Claudia Macario MD   PGY-2 Family Medicine Resident   [facility]

## 2023-04-22 NOTE — H&P
[facility] MEDICINE HISTORY AND PHYSICAL     PATIENT ID:  NAME:  Addie Orellana  MRN:               [id number]  YOB: 1965    Date of Admission: 4/21/2023     Attending: Shahla Grossman M.d.  Primary Care Physician:  Iliana Barbosa M.D.    CC:    Chief Complaint   Patient presents with    Arm Pain     Pt is coming from [facility], c/o Left arm pain, pt had GLF landing on her left side sustaining a left humerus fx and tib fib fx on 4/7/23 Hx of osteogenesis imperfecta, pt presents with fever, tachycardia, tachypnea.        HPI: Addie Orellana is a 58 y.o. female with a history of osteogenesis imperfecta and a recent hospitalization for multiple fractures in her left arm and left leg now status post surgical repair who presented with a fever and fast heart rate from [facility].  The patient reports that today she initiated fever.  She was given multiple antipyretics to try and bring the fever down when that did not work the staff brought her into the emergency department.  The patient denies any increased pain in her bilateral arms bilateral legs.  She denies any shortness of breath, chest pain, pain radiating into her arms or legs, diaphoreses, dysuria, flank pain, blood in her urine, or any real acute change from her baseline.  She does report chronic low back pain from her scoliosis and chronic pain in her left arm and left leg since the surgery.  She does report this has been getting better.      ERCourse:  A CTA of the chest was performed which demonstrated clots in the right distal pulmonary artery as well as subsegmental clots in the left lower lobe.  Heparin drip was started.  Incidentally the patient's left kidney was noted to have hydronephrosis as well as calculi.  A CT renal colic was ordered to further evaluate.  This is still pending.      REVIEW OF SYSTEMS:   Ten systems reviewed and were negative except as noted in the HPI.                PAST MEDICAL HISTORY:  Past Medical  History:   Diagnosis Date    Breast cancer, stage 3 (HCC)     Cancer (HCC) 2018    stage 3 breast, chemo 2019    Dental disorder     Partial upper and lower    Hemorrhagic disorder (HCC)     easily bruises    Osteogenesis imperfecta     Pneumonia     first of week of her chemo-months ago       PAST SURGICAL HISTORY:  Past Surgical History:   Procedure Laterality Date    TIBIA NAILING INTRAMEDULLARY Left 4/7/2023    Procedure: INSERTION, INTRAMEDULLARY HENNY, TIBIA;  Surgeon: Joselito Claire M.D.;  Location: SURGERY [facility];  Service: Orthopedics    HUMERUS NAILING INTRAMEDULLARY Left 4/7/2023    Procedure: INSERTION, INTRAMEDULLARY HENNY, HUMERUS;  Surgeon: Joselito Claire M.D.;  Location: SURGERY [facility];  Service: Orthopedics    MASTECTOMY Bilateral 3/13/2019    Procedure: MASTECTOMY- SIMPLE  ;  Surgeon: Elena Grossman M.D.;  Location: [facility];  Service: General    NODE BIOPSY SENTINEL Left 3/13/2019    Procedure: LEFT AXILLARY SENTINAL LYMPH NODE BIOPSY;  Surgeon: Elena Grossman M.D.;  Location: [facility];  Service: General    AXILLARY NODE DISSECTION Left 3/13/2019    Procedure: AXILLARY NODE DISSECTION ;  Surgeon: Elena Grossman M.D.;  Location: [facility];  Service: General    NODE BIOPSY Left 3/13/2019    Procedure: LYMPH NODE INJECTION;  Surgeon: Elena Grossman M.D.;  Location: [facility];  Service: General    ANKLE FUSION      GASTRIC BYPASS LAPAROSCOPIC      OTHER ABDOMINAL SURGERY      luanne    OTHER ORTHOPEDIC SURGERY Bilateral     foot surgery to repair arches       FAMILY HISTORY:  Family History   Problem Relation Age of Onset    Cancer Paternal Aunt         lung cancer    Cancer Paternal Grandfather         lung cancer       SOCIAL HISTORY:   Social History     Socioeconomic History    Marital status: Single     Spouse name: Not on file    Number of children: Not on file    Years of education: Not on file    Highest education  level: Not on file   Occupational History    Not on file   Tobacco Use    Smoking status: Never    Smokeless tobacco: Never   Vaping Use    Vaping Use: Never used   Substance and Sexual Activity    Alcohol use: Not Currently     Alcohol/week: 0.6 oz     Types: 1 Shots of liquor per week     Comment: daily    Drug use: No    Sexual activity: Not on file   Other Topics Concern    Not on file   Social History Narrative    Not on file     Social Determinants of Health     Financial Resource Strain: Not on file   Food Insecurity: Not on file   Transportation Needs: Not on file   Physical Activity: Not on file   Stress: Not on file   Social Connections: Not on file   Intimate Partner Violence: Not on file   Housing Stability: Not on file       DIET:   Orders Placed This Encounter   Procedures    Diet NPO Restrict to: Sips with Medications     Standing Status:   Standing     Number of Occurrences:   1     Order Specific Question:   Diet NPO Restrict to:     Answer:   Sips with Medications [3]       ALLERGIES:  No Known Allergies    OUTPATIENT MEDICATIONS:    Current Facility-Administered Medications:     lactated ringers infusion, , Intravenous, Continuous, Shahla Grossman M.D., Last Rate: 125 mL/hr at 04/21/23 2239, New Bag at 04/21/23 2239    ondansetron (ZOFRAN) syringe/vial injection 4 mg, 4 mg, Intravenous, Q4HRS PRN, Shahla Grossman M.D.    ondansetron (ZOFRAN ODT) dispertab 4 mg, 4 mg, Oral, Q4HRS PRN, Shahla Grossman M.D.    promethazine (PHENERGAN) tablet 12.5-25 mg, 12.5-25 mg, Oral, Q4HRS PRN, Shahla Grossman M.D.    promethazine (PHENERGAN) suppository 12.5-25 mg, 12.5-25 mg, Rectal, Q4HRS PRN, Shahla Grossman M.D.    prochlorperazine (COMPAZINE) injection 5-10 mg, 5-10 mg, Intravenous, Q4HRS PRN, Shahla Grossman M.D.    heparin infusion 25,000 units in 500 mL 0.45% NACL, 0-30 Units/kg/hr (Adjusted), Intravenous, Continuous, Shahla Grossman M.D.    heparin injection 2,700  Units, 40 Units/kg (Adjusted), Intravenous, PRN, Shahla Grossman M.D.    oxyCODONE immediate release (ROXICODONE) tablet 10 mg, 10 mg, Oral, Q6HRS PRN, Shine Hensley M.D.    acetaminophen (Tylenol) tablet 650 mg, 650 mg, Oral, Q6HRS PRN, Shine Hensley M.D.    [START ON 2023] cefTRIAXone (Rocephin) syringe 1,000 mg, 1,000 mg, Intravenous, Q24HRS, Shine Hensley M.D.    tamsulosin (FLOMAX) capsule 0.4 mg, 0.4 mg, Oral, AFTER BREAKFAST, Shine Hensley M.D.    Current Outpatient Medications:     acetaminophen (TYLENOL) 325 MG Tab, Take 650 mg by mouth every 6 hours as needed for Mild Pain or Fever. 2 tablets = 650 mg., Disp: , Rfl:     Calcium Carb-Cholecalciferol (CALCIUM 600+D3) 600-5 MG-MCG Tab, Take 1 Tablet by mouth every evening., Disp: , Rfl:     therapeutic multivitamin-minerals (THERAGRAN-M) Tab, Take 1 Tablet by mouth every evening., Disp: , Rfl:     oxyCODONE immediate release (ROXICODONE) 10 MG immediate release tablet, Take 1 Tablet by mouth every 6 hours as needed for Severe Pain or Moderate Pain for up to 10 days., Disp: 40 Tablet, Rfl: 0    PHYSICAL EXAM:  Vitals:    23 2230 23 2300 23 2315 23 2320   BP:  101/61 117/78    Pulse:  (!) 109 (!) 105 (!) 115   Resp:  (!) 24 (!) 24 (!) 35   Temp: (!) 38.6 °C (101.5 °F)   37.8 °C (100 °F)   TempSrc: Oral   Temporal   SpO2:    98%   Weight:       Height:       , Temp (24hrs), Av.4 °C (101.1 °F), Min:37.8 °C (100 °F), Max:38.8 °C (101.8 °F)  , Pulse Oximetry: 98 %, O2 Delivery Device: None - Room Air    General: Pt resting in NAD, cooperative   Skin:  Pink, warm and dry.  No rashes  HEENT: NC/AT. PERRL. EOMI. MMM. No nasal discharge. Oropharynx nonerythematous without exudate/plaques  Neck:  Supple without lymphadenopathy or rigidity.  Lungs:  Symmetrical.  CTAB with no adventitious breath sounds.  Good air movement   Cardiovascular:  Normal S1/S2, RRR without M/R/G.  Back: No CVA tenderness.  Abdomen:  BS+,  Soft, NT/ND. No masses noted.  Extremities:  Full range of motion. Surgical site on left arm without erythema or increased warmth, 2+ pulses in all extremities. No C/C/E   Spine:  Straight without vertebral anomalies.  CNS:  A&Ox4, follows commands, Strength 5/5 in all extremities.         LAB TESTS:   Admission on 04/21/2023   Component Date Value Ref Range Status    Lactic Acid 04/21/2023 2.0  0.5 - 2.0 mmol/L Final    WBC 04/21/2023 11.2 (H)  4.8 - 10.8 K/uL Final    RBC 04/21/2023 3.35 (L)  4.20 - 5.40 M/uL Final    Hemoglobin 04/21/2023 9.0 (L)  12.0 - 16.0 g/dL Final    Hematocrit 04/21/2023 29.3 (L)  37.0 - 47.0 % Final    MCV 04/21/2023 87.5  81.4 - 97.8 fL Final    MCH 04/21/2023 26.9 (L)  27.0 - 33.0 pg Final    MCHC 04/21/2023 30.7 (L)  33.6 - 35.0 g/dL Final    RDW 04/21/2023 51.5 (H)  35.9 - 50.0 fL Final    Platelet Count 04/21/2023 232  164 - 446 K/uL Final    MPV 04/21/2023 9.9  9.0 - 12.9 fL Final    Neutrophils-Polys 04/21/2023 86.70 (H)  44.00 - 72.00 % Final    Lymphocytes 04/21/2023 4.60 (L)  22.00 - 41.00 % Final    Monocytes 04/21/2023 7.30  0.00 - 13.40 % Final    Eosinophils 04/21/2023 0.10  0.00 - 6.90 % Final    Basophils 04/21/2023 0.40  0.00 - 1.80 % Final    Immature Granulocytes 04/21/2023 0.90  0.00 - 0.90 % Final    Nucleated RBC 04/21/2023 0.00  /100 WBC Final    Neutrophils (Absolute) 04/21/2023 9.69 (H)  2.00 - 7.15 K/uL Final    Includes immature neutrophils, if present.    Lymphs (Absolute) 04/21/2023 0.51 (L)  1.00 - 4.80 K/uL Final    Monos (Absolute) 04/21/2023 0.82  0.00 - 0.85 K/uL Final    Eos (Absolute) 04/21/2023 0.01  0.00 - 0.51 K/uL Final    Baso (Absolute) 04/21/2023 0.05  0.00 - 0.12 K/uL Final    Immature Granulocytes (abs) 04/21/2023 0.10  0.00 - 0.11 K/uL Final    NRBC (Absolute) 04/21/2023 0.00  K/uL Final    Sodium 04/21/2023 136  135 - 145 mmol/L Final    Potassium 04/21/2023 4.2  3.6 - 5.5 mmol/L Final    Chloride 04/21/2023 101  96 - 112 mmol/L Final    Co2  04/21/2023 22  20 - 33 mmol/L Final    Anion Gap 04/21/2023 13.0  7.0 - 16.0 Final    Glucose 04/21/2023 159 (H)  65 - 99 mg/dL Final    Bun 04/21/2023 13  8 - 22 mg/dL Final    Creatinine 04/21/2023 0.74  0.50 - 1.40 mg/dL Final    Calcium 04/21/2023 8.3 (L)  8.5 - 10.5 mg/dL Final    AST(SGOT) 04/21/2023 31  12 - 45 U/L Final    ALT(SGPT) 04/21/2023 34  2 - 50 U/L Final    Alkaline Phosphatase 04/21/2023 126 (H)  30 - 99 U/L Final    Total Bilirubin 04/21/2023 0.7  0.1 - 1.5 mg/dL Final    Albumin 04/21/2023 3.0 (L)  3.2 - 4.9 g/dL Final    Total Protein 04/21/2023 6.4  6.0 - 8.2 g/dL Final    Globulin 04/21/2023 3.4  1.9 - 3.5 g/dL Final    A-G Ratio 04/21/2023 0.9  g/dL Final    Color 04/21/2023 Yellow   Final    Character 04/21/2023 Clear   Final    Specific Gravity 04/21/2023 1.020  <1.035 Final    Ph 04/21/2023 6.0  5.0 - 8.0 Final    Glucose 04/21/2023 Negative  Negative mg/dL Final    Ketones 04/21/2023 Negative  Negative mg/dL Final    Protein 04/21/2023 30 (A)  Negative mg/dL Final    Bilirubin 04/21/2023 Negative  Negative Final    Urobilinogen, Urine 04/21/2023 1.0  Negative Final    Nitrite 04/21/2023 Positive (A)  Negative Final    Leukocyte Esterase 04/21/2023 Moderate (A)  Negative Final    Occult Blood 04/21/2023 Moderate (A)  Negative Final    Micro Urine Req 04/21/2023 Microscopic   Final    SARS-CoV-2 Source 04/21/2023 NP Swab   Final    Correct Calcium 04/21/2023 9.1  8.5 - 10.5 mg/dL Final    GFR (CKD-EPI) 04/21/2023 93  >60 mL/min/1.73 m 2 Final    Comment: Estimated Glomerular Filtration Rate is calculated using  race neutral CKD-EPI 2021 equation per NKF-ASN recommendations.      NT-proBNP 04/21/2023 953 (H)  0 - 125 pg/mL Final    Troponin T 04/21/2023 21 (H)  6 - 19 ng/L Final    Comment: Biotin intake of greater than 5 mg per day may interfere with  troponin levels, causing false low values.    The Ultra High Sensitivity Troponin T test has a reference range  for positive troponins that  follows the recommendation of ACC for  the 99th percentile reference population.      APTT 04/21/2023 32.6  24.7 - 36.0 sec Final    PT 04/21/2023 14.5  12.0 - 14.6 sec Final    INR 04/21/2023 1.15 (H)  0.87 - 1.13 Final    Comment: INR - Non-therapeutic Reference Range: 0.87-1.13  INR - Therapeutic Reference Range: 2.0-4.0      Heparin Xa (UFH) 04/21/2023 <0.10  IU/mL Final    WBC 04/21/2023  (A)  /hpf Final    Comment: Female  <12 Yr 0-2  >12 Yr 0-5  Male   None      RBC 04/21/2023 10-20 (A)  /hpf Final    Comment: Female  >12 Yr 0-2  Male   None      Bacteria 04/21/2023 Many (A)  None /hpf Final    Epithelial Cells 04/21/2023 Rare  /hpf Final        CULTURES:   Results       Procedure Component Value Units Date/Time    CoV-2, Flu A/B, And RSV by PCR (View and Chew) [[id number]] Collected: 04/21/23 2118    Order Status: Completed Specimen: Respirate Updated: 04/21/23 2309     Influenza virus A RNA Negative     Influenza virus B, PCR Negative     RSV, PCR Negative     SARS-CoV-2 by PCR NotDetected     Comment: [facility] providers: PLEASE REFER TO DE-ESCALATION AND RETESTING PROTOCOL  on [email or web address]    **The View and Chew GeneXpert Xpress SARS-CoV-2 RT-PCR Test has been made  available for use under the Emergency Use Authorization (EUA) only.          SARS-CoV-2 Source NP Swab    Urinalysis [[id number]]  (Abnormal) Collected: 04/21/23 2118    Order Status: Completed Specimen: Urine Updated: 04/21/23 2230     Color Yellow     Character Clear     Specific Gravity 1.020     Ph 6.0     Glucose Negative mg/dL      Ketones Negative mg/dL      Protein 30 mg/dL      Bilirubin Negative     Urobilinogen, Urine 1.0     Nitrite Positive     Leukocyte Esterase Moderate     Occult Blood Moderate     Micro Urine Req Microscopic    Narrative:      Indication for culture:->Evaluation for sepsis without a  clear source of infection    Urine Culture (New) [[id number]] Collected: 04/21/23 2118    Order Status: Sent Specimen: Urine Updated:  "04/21/23 2131    Narrative:      Indication for culture:->Evaluation for sepsis without a  clear source of infection    Blood Culture [[id number]] Collected: 04/21/23 2023    Order Status: Sent Specimen: Blood from Peripheral Updated: 04/21/23 2117    Narrative:      Per Hospital Policy: Only change Specimen Src: to \"Line\" if  specified by physician order.    Blood Culture [[id number]] Collected: 04/21/23 1958    Order Status: Sent Specimen: Blood from Peripheral Updated: 04/21/23 2019    Narrative:      Per Hospital Policy: Only change Specimen Src: to \"Line\" if  specified by physician order.            IMAGES:  CT-RENAL COLIC EVALUATION(A/P W/O)   Final Result         1.  Mild left hydronephrosis and hydroureter with associated hazy left perinephric fat stranding. Dense contrast within the bilateral urinary outflow tract is seen presenting evaluation for ureteral and renal calculi.   2.  Small hiatal hernia   3.  Diverticulosis   4.  Fat-containing umbilical hernia   5.  Hepatomegaly      CT-CTA CHEST PULMONARY ARTERY W/ RECONS   Final Result         1.  Bilateral pulmonary emboli as described. No radiographic evidence of right heart strain.   2.  Left nephrolithiasis with hydronephrosis and hydroureter and obstructive appearance of the left kidney.   3.  Small to moderate size hiatal hernia      These findings were discussed with the patient's clinician, Mao Paz, on 4/21/2023 9:25 PM.      DX-CHEST-PORTABLE (1 VIEW)   Final Result         1.  Interstitial edema and/or infiltrates.   2.  Cardiomegaly          CONSULTS:   Urology in AM    ASSESSMENT/PLAN: 58 y.o. female admitted for    * Pulmonary embolism on right (HCC)- (present on admission)  Assessment & Plan  - Pt without CP, SOB, swelling or new onset pain in extremities  - Normotensive, tachycardic. Eupneic. Hemodynamically stable. ECG without evidence of right heart strain   - Distal right main pulmonary arterial filling defect seen extending into right " middle and lower lobe segmental branches. Left subsegmental emboli noted    Plan:  - Supplemental O2 as needed  - Heparin drip pending discussion with urology.  - Will consider escalation to IMCU/ICU if patient clinical picture changes.  - Admit to telemetry  - NPO for now pending CT renal.    Osteogenesis imperfecta  Assessment & Plan  Increased risk of fractures    Urinary tract infection with hematuria  Assessment & Plan  - Likely source of fever  - Possibly complicated by obstructing kidney stone    Plan:  - Renal colic CT pending. Will consider urology consult tonight pending results  - Will switch pt to Ceftriaxone from Unasyn   - LR at 125 running  - Will follow urine culture    Hydronephrosis with urinary obstruction due to renal calculus  Assessment & Plan  - Initially seen on CTA chest  - Pt asymptomatic. Denies juliet blood in urine. No flank pain. No dysuria  - Pt with fever. Received Unasyn in ED  - Cr wnl. Electrolytes wnl.  - UA with hematuria. Consistent with UTI  - CT renal colic with contrast throughout ureter. Also with evidence of mild hydronephrosis and hydroureter. Unable to visualize stones in read.    Plan:    - The patient does not have any flank pain, no ROXY noted on labs, no obstructing stone seen on CT renal colic evaluation and contrast was seen throughout the entirety of the ureters.  She also has a heparin drip for pulmonary emboli that would delay any surgical intervention. As such, will defer consulting urology till the AM.   - Tamsulosin started  - Strain all urine  - Ceftriaxone 1 gram q24  - Repeat BMP and CBC pending  - LR at 125    Closed fracture of shaft of fibula- (present on admission)  Assessment & Plan  - S/p Surgical repair. Recently hospitalized for this. Still in rehab  - PT/OT consulted. Appreciate the recs and help.    Fracture of distal end of tibia- (present on admission)  Assessment & Plan  - S/p Surgical repair. Recently hospitalized for this. Still in rehab  -  PT/OT consulted. Appreciate the recs and help.    Displaced fracture of proximal end of humerus- (present on admission)  Assessment & Plan  - S/p Surgical repair. Recently hospitalized for this. Still in rehab  - PT/OT consulted. Appreciate the recs and help.        Core Measures:  Fluids: LR  Lines: PIV  Abx: Ceftriaxone, Unasyn and one dose azithromycin given in ED.  Diet: NPO pending possible urology recommendations  PPX: SCD's and heparin drip  DISPO: Inpatient    CODE STATUS: Full        Shine Hensley MD  PGY2  [facility]

## 2023-04-22 NOTE — ASSESSMENT & PLAN NOTE
- Urology consulted. Repeat CT renal colic revealed no obstructing stones. Urology recommended continued antibiotics.   - Continue C3

## 2023-04-22 NOTE — CONSULTS
UROLOGY Consult Note:    RADHA Louis  Date & Time note created:    4/22/2023   4:18 PM     Referring MD:  Dr. Grossman    Patient ID:   Name:             Addie Orellana   YOB: 1965  Age:                 58 y.o.  female   MRN:               [id number]                                                             Reason for Consult:      Nephrolithiasis   Left Pyelonephritis    History of Present Illness:    This is a pleasant 58 y.o. F here in for a PE but has been having intermittent fevers. Currently on Heparin. CT angiogram for PE demonstrated nephrolithiasis and possible hydronephrosis vs pyelonephritis. Repeat Ct A/P w/o demonstrating nonobstructing kidney stones and left pyelonephritis. WBC 12.1, on Rocephin. Ucx + E coli, pending sensitivities. 1 of 2 blood cultures drawn 4/21 are + for gram negative rods, the other NGTD. Patient denies any history of stone passage or need for surgical intervention for kidney stones. Denies GH, but has been having intermittent left flank pain.     Review of Systems:      Constitutional: Reports fevers, chills  Genitourinary: Denies hematuria, dysuria or frequency. Reports left flank pain    All other systems were reviewed and are negative (AMA/CMS criteria)                Past Medical History:   Past Medical History:   Diagnosis Date    Breast cancer, stage 3 (HCC)     Cancer (HCC) 2018    stage 3 breast, chemo 2019    Dental disorder     Partial upper and lower    Hemorrhagic disorder (HCC)     easily bruises    Osteogenesis imperfecta     Pneumonia     first of week of her chemo-months ago     Active Hospital Problems    Diagnosis     Pulmonary embolism on right (HCC) [I26.99]     Hydronephrosis with urinary obstruction due to renal calculus [N13.2]     Urinary tract infection with hematuria [N39.0, R31.9]     Osteogenesis imperfecta [Q78.0]     Closed fracture of shaft of fibula [S82.409A]     Displaced fracture of proximal end of humerus  [S42.209A]     Fracture of distal end of tibia [S82.309A]        Past Surgical History:  Past Surgical History:   Procedure Laterality Date    TIBIA NAILING INTRAMEDULLARY Left 4/7/2023    Procedure: INSERTION, INTRAMEDULLARY HENNY, TIBIA;  Surgeon: Joselito Claire M.D.;  Location: [facility];  Service: Orthopedics    HUMERUS NAILING INTRAMEDULLARY Left 4/7/2023    Procedure: INSERTION, INTRAMEDULLARY HENNY, HUMERUS;  Surgeon: Joselito Claire M.D.;  Location: [facility];  Service: Orthopedics    MASTECTOMY Bilateral 3/13/2019    Procedure: MASTECTOMY- SIMPLE  ;  Surgeon: Elena Grossman M.D.;  Location: [facility];  Service: General    NODE BIOPSY SENTINEL Left 3/13/2019    Procedure: LEFT AXILLARY SENTINAL LYMPH NODE BIOPSY;  Surgeon: Elena Grossman M.D.;  Location: [facility];  Service: General    AXILLARY NODE DISSECTION Left 3/13/2019    Procedure: AXILLARY NODE DISSECTION ;  Surgeon: Elena Grossman M.D.;  Location: [facility];  Service: General    NODE BIOPSY Left 3/13/2019    Procedure: LYMPH NODE INJECTION;  Surgeon: Elena Grossman M.D.;  Location: [facility];  Service: General    ANKLE FUSION      GASTRIC BYPASS LAPAROSCOPIC      OTHER ABDOMINAL SURGERY      luanne    OTHER ORTHOPEDIC SURGERY Bilateral     foot surgery to repair arches       [facility] Medications:    Current Facility-Administered Medications:     cefTRIAXone (Rocephin) syringe 2,000 mg, 2,000 mg, Intravenous, Q12HRS, Claudia Macario M.D.    NS infusion, , Intravenous, Continuous, Claudia Macario M.D.    ondansetron (ZOFRAN) syringe/vial injection 4 mg, 4 mg, Intravenous, Q4HRS PRN, Shahla Grossman M.D.    ondansetron (ZOFRAN ODT) dispertab 4 mg, 4 mg, Oral, Q4HRS PRN, Shahla Grossman M.D.    promethazine (PHENERGAN) tablet 12.5-25 mg, 12.5-25 mg, Oral, Q4HRS PRN, Shahla Grossman M.D.    promethazine (PHENERGAN) suppository 12.5-25 mg, 12.5-25 mg,  Rectal, Q4HRS PRN, Shahla Grossman M.D.    prochlorperazine (COMPAZINE) injection 5-10 mg, 5-10 mg, Intravenous, Q4HRS PRN, Shahla Grossman M.D.    heparin infusion 25,000 units in 500 mL 0.45% NACL, 0-30 Units/kg/hr (Adjusted), Intravenous, Continuous, Shahla Grossman M.D., Last Rate: 35.4 mL/hr at 04/22/23 1552, 26 Units/kg/hr at 04/22/23 1552    heparin injection 2,700 Units, 40 Units/kg (Adjusted), Intravenous, PRN, Shahla Grossman M.D., 2,700 Units at 04/22/23 1404    oxyCODONE immediate release (ROXICODONE) tablet 10 mg, 10 mg, Oral, Q6HRS PRN, Shine Hensley M.D., 10 mg at 04/22/23 1245    acetaminophen (Tylenol) tablet 650 mg, 650 mg, Oral, Q6HRS PRN, Shine Hensley M.D.    tamsulosin (FLOMAX) capsule 0.4 mg, 0.4 mg, Oral, AFTER BREAKFAST, Shine Hensley M.D.    Current Outpatient Medications:  Medications Prior to Admission   Medication Sig Dispense Refill Last Dose    acetaminophen (TYLENOL) 325 MG Tab Take 650 mg by mouth every 6 hours as needed for Mild Pain or Fever. 2 tablets = 650 mg.   4/20/2023 at 2223    Calcium Carb-Cholecalciferol (CALCIUM 600+D3) 600-5 MG-MCG Tab Take 1 Tablet by mouth every evening.   4/21/2023 at 1652    therapeutic multivitamin-minerals (THERAGRAN-M) Tab Take 1 Tablet by mouth every evening.   4/21/2023 at 1652    oxyCODONE immediate release (ROXICODONE) 10 MG immediate release tablet Take 1 Tablet by mouth every 6 hours as needed for Severe Pain or Moderate Pain for up to 10 days. 40 Tablet 0 4/21/2023 at 1652       Medication Allergy:  No Known Allergies    Family History:  Family History   Problem Relation Age of Onset    Cancer Paternal Aunt         lung cancer    Cancer Paternal Grandfather         lung cancer       Social History:  Social History     Socioeconomic History    Marital status: Single     Spouse name: Not on file    Number of children: Not on file    Years of education: Not on file    Highest education level: Not on file    Occupational History    Not on file   Tobacco Use    Smoking status: Never    Smokeless tobacco: Never   Vaping Use    Vaping Use: Never used   Substance and Sexual Activity    Alcohol use: Not Currently     Alcohol/week: 0.6 oz     Types: 1 Shots of liquor per week     Comment: daily    Drug use: No    Sexual activity: Not on file   Other Topics Concern    Not on file   Social History Narrative    Not on file     Social Determinants of Health     Financial Resource Strain: Not on file   Food Insecurity: Not on file   Transportation Needs: Not on file   Physical Activity: Not on file   Stress: Not on file   Social Connections: Not on file   Intimate Partner Violence: Not on file   Housing Stability: Not on file         Physical Exam:  Vitals/ General Appearance:   Weight/BMI: Body mass index is 45.7 kg/m².  BP 93/62   Pulse 89   Temp 37.4 °C (99.3 °F) (Temporal)   Resp 18   Ht 1.524 m (5')   Wt 106 kg (234 lb)   SpO2 98%   Vitals:    04/22/23 0700 04/22/23 0708 04/22/23 1126 04/22/23 1606   BP:  100/60 95/57 93/62   Pulse: 100 90 93 89   Resp:  18 18 18   Temp:  37.7 °C (99.9 °F) (!) 38.1 °C (100.6 °F) 37.4 °C (99.3 °F)   TempSrc:  Temporal Temporal Temporal   SpO2: 99% 100% 99% 98%   Weight:       Height:         Oxygen Therapy:  Pulse Oximetry: 98 %, O2 (LPM): 2, O2 Delivery Device: Silicone Nasal Cannula    Constitutional:   Well developed, Well nourished, No acute distress, mildly diaphoretic, obese  HENMT:  Normocephalic, Atraumatic.  Eyes:  EOMI, Conjunctiva normal, No discharge.  Neck:  Normal range of motion.  Lungs:  Normal effort.   : -CVAT, on Purewic catheter, dark yellow urine draining   Skin: Warm, Dry, No erythema, No rash, no induration. Surgical incision left shoulder.   Neurologic: Alert & oriented x 3, No focal deficits noted.  Psychiatric: Affect normal, Judgment normal, Mood normal.      MDM (Data Review):     Records reviewed and summarized in current documentation    Lab Data  Review:  Recent Results (from the past 24 hour(s))   Lactic acid (lactate)    Collection Time: 04/21/23  7:58 PM   Result Value Ref Range    Lactic Acid 2.0 0.5 - 2.0 mmol/L   CBC With Differential    Collection Time: 04/21/23  7:58 PM   Result Value Ref Range    WBC 11.2 (H) 4.8 - 10.8 K/uL    RBC 3.35 (L) 4.20 - 5.40 M/uL    Hemoglobin 9.0 (L) 12.0 - 16.0 g/dL    Hematocrit 29.3 (L) 37.0 - 47.0 %    MCV 87.5 81.4 - 97.8 fL    MCH 26.9 (L) 27.0 - 33.0 pg    MCHC 30.7 (L) 33.6 - 35.0 g/dL    RDW 51.5 (H) 35.9 - 50.0 fL    Platelet Count 232 164 - 446 K/uL    MPV 9.9 9.0 - 12.9 fL    Neutrophils-Polys 86.70 (H) 44.00 - 72.00 %    Lymphocytes 4.60 (L) 22.00 - 41.00 %    Monocytes 7.30 0.00 - 13.40 %    Eosinophils 0.10 0.00 - 6.90 %    Basophils 0.40 0.00 - 1.80 %    Immature Granulocytes 0.90 0.00 - 0.90 %    Nucleated RBC 0.00 /100 WBC    Neutrophils (Absolute) 9.69 (H) 2.00 - 7.15 K/uL    Lymphs (Absolute) 0.51 (L) 1.00 - 4.80 K/uL    Monos (Absolute) 0.82 0.00 - 0.85 K/uL    Eos (Absolute) 0.01 0.00 - 0.51 K/uL    Baso (Absolute) 0.05 0.00 - 0.12 K/uL    Immature Granulocytes (abs) 0.10 0.00 - 0.11 K/uL    NRBC (Absolute) 0.00 K/uL   Comp Metabolic Panel    Collection Time: 04/21/23  7:58 PM   Result Value Ref Range    Sodium 136 135 - 145 mmol/L    Potassium 4.2 3.6 - 5.5 mmol/L    Chloride 101 96 - 112 mmol/L    Co2 22 20 - 33 mmol/L    Anion Gap 13.0 7.0 - 16.0    Glucose 159 (H) 65 - 99 mg/dL    Bun 13 8 - 22 mg/dL    Creatinine 0.74 0.50 - 1.40 mg/dL    Calcium 8.3 (L) 8.5 - 10.5 mg/dL    AST(SGOT) 31 12 - 45 U/L    ALT(SGPT) 34 2 - 50 U/L    Alkaline Phosphatase 126 (H) 30 - 99 U/L    Total Bilirubin 0.7 0.1 - 1.5 mg/dL    Albumin 3.0 (L) 3.2 - 4.9 g/dL    Total Protein 6.4 6.0 - 8.2 g/dL    Globulin 3.4 1.9 - 3.5 g/dL    A-G Ratio 0.9 g/dL   Blood Culture    Collection Time: 04/21/23  7:58 PM    Specimen: Peripheral; Blood   Result Value Ref Range    Significant Indicator NEG     Source BLD     Site  PERIPHERAL     Culture Result       No Growth  Note: Blood cultures are incubated for 5 days and  are monitored continuously.Positive blood cultures  are called to the RN and reported as soon as  they are identified.     CORRECTED CALCIUM    Collection Time: 04/21/23  7:58 PM   Result Value Ref Range    Correct Calcium 9.1 8.5 - 10.5 mg/dL   ESTIMATED GFR    Collection Time: 04/21/23  7:58 PM   Result Value Ref Range    GFR (CKD-EPI) 93 >60 mL/min/1.73 m 2   proBrain Natriuretic Peptide, NT    Collection Time: 04/21/23  7:58 PM   Result Value Ref Range    NT-proBNP 953 (H) 0 - 125 pg/mL   TROPONIN    Collection Time: 04/21/23  7:58 PM   Result Value Ref Range    Troponin T 21 (H) 6 - 19 ng/L   aPTT    Collection Time: 04/21/23  7:58 PM   Result Value Ref Range    APTT 32.6 24.7 - 36.0 sec   Prothrombin Time    Collection Time: 04/21/23  7:58 PM   Result Value Ref Range    PT 14.5 12.0 - 14.6 sec    INR 1.15 (H) 0.87 - 1.13   Heparin Xa (Unfractionated)    Collection Time: 04/21/23  7:58 PM   Result Value Ref Range    Heparin Xa (UFH) <0.10 IU/mL   Blood Culture    Collection Time: 04/21/23  8:23 PM    Specimen: Peripheral; Blood   Result Value Ref Range    Significant Indicator POS (POS)     Source BLD     Site PERIPHERAL     Culture Result (A)      Growth detected by Bactec instrument. 04/22/2023  13:42  Gram Stain: Gram negative rods.     Urinalysis    Collection Time: 04/21/23  9:18 PM    Specimen: Urine   Result Value Ref Range    Color Yellow     Character Clear     Specific Gravity 1.020 <1.035    Ph 6.0 5.0 - 8.0    Glucose Negative Negative mg/dL    Ketones Negative Negative mg/dL    Protein 30 (A) Negative mg/dL    Bilirubin Negative Negative    Urobilinogen, Urine 1.0 Negative    Nitrite Positive (A) Negative    Leukocyte Esterase Moderate (A) Negative    Occult Blood Moderate (A) Negative    Micro Urine Req Microscopic    Urine Culture (New)    Collection Time: 04/21/23  9:18 PM    Specimen: Urine    Result Value Ref Range    Significant Indicator POS (POS)     Source UR     Site -     Culture Result - (A)     Culture Result Escherichia coli  >100,000 cfu/mL   (A)    CoV-2, Flu A/B, And RSV by PCR ([employer])    Collection Time: 04/21/23  9:18 PM    Specimen: Respirate   Result Value Ref Range    Influenza virus A RNA Negative Negative    Influenza virus B, PCR Negative Negative    RSV, PCR Negative Negative    SARS-CoV-2 by PCR NotDetected     SARS-CoV-2 Source NP Swab    URINE MICROSCOPIC (W/UA)    Collection Time: 04/21/23  9:18 PM   Result Value Ref Range    WBC  (A) /hpf    RBC 10-20 (A) /hpf    Bacteria Many (A) None /hpf    Epithelial Cells Rare /hpf   Lactic acid (lactate): Repeat if initial lactic acid result is greater than 2    Collection Time: 04/21/23 10:50 PM   Result Value Ref Range    Lactic Acid 0.9 0.5 - 2.0 mmol/L   Lactic acid (lactate): Repeat if initial lactic acid result is greater than 2    Collection Time: 04/22/23 12:41 AM   Result Value Ref Range    Lactic Acid 1.7 0.5 - 2.0 mmol/L   CBC with Differential    Collection Time: 04/22/23 12:41 AM   Result Value Ref Range    WBC 12.1 (H) 4.8 - 10.8 K/uL    RBC 3.30 (L) 4.20 - 5.40 M/uL    Hemoglobin 8.9 (L) 12.0 - 16.0 g/dL    Hematocrit 29.2 (L) 37.0 - 47.0 %    MCV 88.5 81.4 - 97.8 fL    MCH 27.0 27.0 - 33.0 pg    MCHC 30.5 (L) 33.6 - 35.0 g/dL    RDW 51.9 (H) 35.9 - 50.0 fL    Platelet Count 193 164 - 446 K/uL    MPV 10.0 9.0 - 12.9 fL    Neutrophils-Polys 89.00 (H) 44.00 - 72.00 %    Lymphocytes 3.60 (L) 22.00 - 41.00 %    Monocytes 6.40 0.00 - 13.40 %    Eosinophils 0.00 0.00 - 6.90 %    Basophils 0.10 0.00 - 1.80 %    Immature Granulocytes 0.90 0.00 - 0.90 %    Nucleated RBC 0.00 /100 WBC    Neutrophils (Absolute) 10.79 (H) 2.00 - 7.15 K/uL    Lymphs (Absolute) 0.43 (L) 1.00 - 4.80 K/uL    Monos (Absolute) 0.77 0.00 - 0.85 K/uL    Eos (Absolute) 0.00 0.00 - 0.51 K/uL    Baso (Absolute) 0.01 0.00 - 0.12 K/uL    Immature  Granulocytes (abs) 0.11 0.00 - 0.11 K/uL    NRBC (Absolute) 0.00 K/uL   Comp Metabolic Panel (CMP)    Collection Time: 04/22/23 12:41 AM   Result Value Ref Range    Sodium 135 135 - 145 mmol/L    Potassium 3.7 3.6 - 5.5 mmol/L    Chloride 100 96 - 112 mmol/L    Co2 21 20 - 33 mmol/L    Anion Gap 14.0 7.0 - 16.0    Glucose 130 (H) 65 - 99 mg/dL    Bun 11 8 - 22 mg/dL    Creatinine 0.62 0.50 - 1.40 mg/dL    Calcium 8.1 (L) 8.5 - 10.5 mg/dL    AST(SGOT) 27 12 - 45 U/L    ALT(SGPT) 30 2 - 50 U/L    Alkaline Phosphatase 121 (H) 30 - 99 U/L    Total Bilirubin 0.9 0.1 - 1.5 mg/dL    Albumin 2.9 (L) 3.2 - 4.9 g/dL    Total Protein 6.2 6.0 - 8.2 g/dL    Globulin 3.3 1.9 - 3.5 g/dL    A-G Ratio 0.9 g/dL   CORRECTED CALCIUM    Collection Time: 04/22/23 12:41 AM   Result Value Ref Range    Correct Calcium 9.0 8.5 - 10.5 mg/dL   ESTIMATED GFR    Collection Time: 04/22/23 12:41 AM   Result Value Ref Range    GFR (CKD-EPI) 103 >60 mL/min/1.73 m 2   Heparin Anti-Xa    Collection Time: 04/22/23  5:03 AM   Result Value Ref Range    Heparin Xa (UFH) <0.10 IU/mL   Heparin Anti-Xa    Collection Time: 04/22/23 12:50 PM   Result Value Ref Range    Heparin Xa (UFH) 0.13 IU/mL       Imaging/Procedures Review:    Reviewed    MDM (Assessment and Plan):     Left Pyelonephritis   UTI  Nephrolithiasis     Plan:    -Can stop tamsulosin   -Continue abx per hospital team, pending cultures  -Monitor WBC  -No plans for surgical intervention at this time  -Urology to follow, appreciate the consult    Case discussed with patient, nurse, and Urology-Dr. Davis, who has directed this plan of care.         Park Bonilla PA-C  [facility] [place]

## 2023-04-23 ENCOUNTER — APPOINTMENT (OUTPATIENT)
Dept: CARDIOLOGY | Facility: MEDICAL CENTER | Age: 58
DRG: 176 | End: 2023-04-23
Attending: STUDENT IN AN ORGANIZED HEALTH CARE EDUCATION/TRAINING PROGRAM
Payer: MEDICARE

## 2023-04-23 LAB
BACTERIA UR CULT: ABNORMAL
BACTERIA UR CULT: ABNORMAL
LV EJECT FRACT  99904: 55
SIGNIFICANT IND 70042: ABNORMAL
SITE SITE: ABNORMAL
SOURCE SOURCE: ABNORMAL
UFH PPP CHRO-ACNC: 0.14 IU/ML
UFH PPP CHRO-ACNC: 0.28 IU/ML
UFH PPP CHRO-ACNC: 0.29 IU/ML

## 2023-04-23 PROCEDURE — 770020 HCHG ROOM/CARE - TELE (206)

## 2023-04-23 PROCEDURE — 700102 HCHG RX REV CODE 250 W/ 637 OVERRIDE(OP)

## 2023-04-23 PROCEDURE — 700111 HCHG RX REV CODE 636 W/ 250 OVERRIDE (IP): Performed by: FAMILY MEDICINE

## 2023-04-23 PROCEDURE — 700111 HCHG RX REV CODE 636 W/ 250 OVERRIDE (IP)

## 2023-04-23 PROCEDURE — 93306 TTE W/DOPPLER COMPLETE: CPT

## 2023-04-23 PROCEDURE — 700105 HCHG RX REV CODE 258: Performed by: STUDENT IN AN ORGANIZED HEALTH CARE EDUCATION/TRAINING PROGRAM

## 2023-04-23 PROCEDURE — 700111 HCHG RX REV CODE 636 W/ 250 OVERRIDE (IP): Performed by: STUDENT IN AN ORGANIZED HEALTH CARE EDUCATION/TRAINING PROGRAM

## 2023-04-23 PROCEDURE — 99232 SBSQ HOSP IP/OBS MODERATE 35: CPT | Mod: GC | Performed by: FAMILY MEDICINE

## 2023-04-23 PROCEDURE — A9270 NON-COVERED ITEM OR SERVICE: HCPCS

## 2023-04-23 PROCEDURE — 700117 HCHG RX CONTRAST REV CODE 255: Performed by: STUDENT IN AN ORGANIZED HEALTH CARE EDUCATION/TRAINING PROGRAM

## 2023-04-23 PROCEDURE — 93306 TTE W/DOPPLER COMPLETE: CPT | Mod: 26 | Performed by: INTERNAL MEDICINE

## 2023-04-23 PROCEDURE — 36415 COLL VENOUS BLD VENIPUNCTURE: CPT

## 2023-04-23 PROCEDURE — 85520 HEPARIN ASSAY: CPT

## 2023-04-23 RX ORDER — NITROFURANTOIN 25; 75 MG/1; MG/1
100 CAPSULE ORAL 2 TIMES DAILY WITH MEALS
Status: DISCONTINUED | OUTPATIENT
Start: 2023-04-23 | End: 2023-04-23

## 2023-04-23 RX ORDER — LEVOFLOXACIN 750 MG/1
750 TABLET, FILM COATED ORAL DAILY
Status: DISCONTINUED | OUTPATIENT
Start: 2023-04-24 | End: 2023-04-26 | Stop reason: HOSPADM

## 2023-04-23 RX ADMIN — HEPARIN SODIUM 32 UNITS/KG/HR: 5000 INJECTION, SOLUTION INTRAVENOUS at 07:05

## 2023-04-23 RX ADMIN — HEPARIN SODIUM 34 UNITS/KG/HR: 5000 INJECTION, SOLUTION INTRAVENOUS at 13:17

## 2023-04-23 RX ADMIN — OXYCODONE HYDROCHLORIDE 10 MG: 10 TABLET ORAL at 20:26

## 2023-04-23 RX ADMIN — HEPARIN SODIUM 36 UNITS/KG/HR: 5000 INJECTION, SOLUTION INTRAVENOUS at 22:14

## 2023-04-23 RX ADMIN — OXYCODONE HYDROCHLORIDE 10 MG: 10 TABLET ORAL at 11:52

## 2023-04-23 RX ADMIN — SODIUM CHLORIDE: 9 INJECTION, SOLUTION INTRAVENOUS at 13:21

## 2023-04-23 RX ADMIN — HUMAN ALBUMIN MICROSPHERES AND PERFLUTREN 3 ML: 10; .22 INJECTION, SOLUTION INTRAVENOUS at 13:15

## 2023-04-23 RX ADMIN — HEPARIN SODIUM 2700 UNITS: 1000 INJECTION, SOLUTION INTRAVENOUS; SUBCUTANEOUS at 21:41

## 2023-04-23 RX ADMIN — CEFTRIAXONE SODIUM 2000 MG: 10 INJECTION, POWDER, FOR SOLUTION INTRAVENOUS at 06:00

## 2023-04-23 RX ADMIN — HEPARIN SODIUM 2700 UNITS: 1000 INJECTION, SOLUTION INTRAVENOUS; SUBCUTANEOUS at 13:15

## 2023-04-23 RX ADMIN — HEPARIN SODIUM 34 UNITS/KG/HR: 5000 INJECTION, SOLUTION INTRAVENOUS at 18:28

## 2023-04-23 RX ADMIN — HEPARIN SODIUM 2700 UNITS: 1000 INJECTION, SOLUTION INTRAVENOUS; SUBCUTANEOUS at 05:53

## 2023-04-23 ASSESSMENT — PATIENT HEALTH QUESTIONNAIRE - PHQ9
1. LITTLE INTEREST OR PLEASURE IN DOING THINGS: NOT AT ALL
SUM OF ALL RESPONSES TO PHQ9 QUESTIONS 1 AND 2: 0
2. FEELING DOWN, DEPRESSED, IRRITABLE, OR HOPELESS: NOT AT ALL

## 2023-04-23 ASSESSMENT — PAIN DESCRIPTION - PAIN TYPE
TYPE: ACUTE PAIN

## 2023-04-23 ASSESSMENT — ENCOUNTER SYMPTOMS
FEVER: 0
FLANK PAIN: 0
BACK PAIN: 0
CHILLS: 0
SHORTNESS OF BREATH: 1

## 2023-04-23 NOTE — DISCHARGE PLANNING
10/07/22 1520   Encounter Summary   Encounter Overview/Reason  Initial Encounter   Service Provided For: Patient   Referral/Consult From: Other (comment)  (Daniel Garcia)   Support System Spouse; Family members; [employer]   Last Encounter    (es 10/7)   Complexity of Encounter Moderate   Begin Time 1440   End Time  1455   Total Time Calculated 15 min   Assessment/Intervention/Outcome   Assessment Calm;Coping   Intervention Active listening;Prayer (assurance of)/[place]   Outcome Coping;Receptive   Plan and Referrals   Plan/Referrals Other (Comment)  (as needed) Care Transition Team Assessment    RN MANNY spoke with patient at bedside. Role of CM explained. Demographic info verified. Recent hospitalization 4/7-4/13/23 and discharged to [facility] for short term rehab. Verbalizes she is willing to return to rehab should therapy recommend. Otherwise, patient normally lives with mother and adult children in [place]. Patient does not utilize any DME. CM will continue to follow for appropriate d/c needs/disposition.    Information Source  Orientation Level: Oriented X4  Information Given By: Patient  Who is responsible for making decisions for patient? : Patient    Elopement Risk  Legal Hold: No  Ambulatory or Self Mobile in Wheelchair: No-Not an Elopement Risk  Elopement Risk: Not at Risk for Elopement    Interdisciplinary Discharge Planning  Lives with - Patient's Self Care Capacity: Parents, Adult Children  Support Systems: Parent, Children  Housing / Facility: [address] [place] [address]  Name of Care Facility: [facility]  Do You Take your Prescribed Medications Regularly: Yes  Mobility Issues: Yes  Prior Services: Other (Comments) ([facility] for STR)  Assistance Needed: Yes  Durable Medical Equipment: Not Applicable    Discharge Preparedness  What is your plan after discharge?: Skilled nursing facility  Prior Functional Level: Needs Assist with Activities of Daily Living  Difficulity with ADLs: Walking    Functional Assesment  Prior Functional Level: Needs Assist with Activities of Daily Living    Vision / Hearing Impairment  Right Eye Vision: Impaired, Wears Glasses  Left Eye Vision: Impaired, Wears Glasses    Domestic Abuse  Have you ever been the victim of abuse or violence?: No    Anticipated Discharge Information  Discharge Disposition: D/T to SNF with Medicare cert in anticipation of skilled care (03)

## 2023-04-23 NOTE — THERAPY
Physical Therapy Contact Note    Patient Name: Addie Orellana  Age:  58 y.o., Sex:  female  Medical Record #: [id number]  Today's Date: 4/23/2023    PT nathalie attempted.  Pt reports her CAM boot is still at [facility], and her dtr will be picking it up tomorrow to bring to the hospital.  Will hold until CAM boot is available to allow for most accurate assessment of pt's function.     Lang Quintanilla, PT, DPT [id number]

## 2023-04-23 NOTE — PROGRESS NOTES
[facility] FAMILY MEDICINE PROGRESS NOTE     Attending:   Dr. Shahla Grossman    Resident:   Kisha Fairbanks MD    PATIENT:   Addie Orellana; [id number]; 1965    ID:   58 y.o. female admitted for bilateral pulmonary embolism requiring heparin drip and non obstructing left nephrolithiasis. Urology consulted and no stent indicated. Continue BID rocephin.    SUBJECTIVE:   No acute events overnight, patient resting comfortably in bed in no acute distress.  Patient denies any fever, chills, shortness of breath, pain with inspiration, chest pain or dysuria.  Left arm and left leg continue to be swollen from surgery due to fractures.  Tolerating p.o. without any nausea or vomiting.  Voiding and stooling normally.    OBJECTIVE:  Vitals:    04/22/23 1606 04/22/23 1944 04/22/23 2357 04/23/23 0333   BP: 93/62 (!) 86/50 (!) 86/52 90/55   Pulse: 89 94 88 74   Resp: 18 18 18 20   Temp: 37.4 °C (99.3 °F) 37.2 °C (99 °F) 36.4 °C (97.6 °F) 36.4 °C (97.5 °F)   TempSrc: Temporal Temporal Temporal Temporal   SpO2: 98% 99% 99% 95%   Weight:  108 kg (239 lb)     Height:           Intake/Output Summary (Last 24 hours) at 4/22/2023 0810  Last data filed at 4/22/2023 0100  Gross per 24 hour   Intake 120 ml   Output --   Net 120 ml       PHYSICAL EXAM:  General: Mild to moderate acute distress, afebrile, resting comfortably, conversational   HEENT: NC/AT. EOMI.   Cardiovascular: RRR without murmurs, rubs, heaves. Normal capillary refill   Respiratory: CTAB, no tachypnea or retractions   Abdomen: normal bowel sounds, soft, nontender, nondistended, no masses, no organomegaly   EXT:  GIL, left upper extremity 1+ pitting edema from hand extending to shoulder, left lower extremity 1+ pitting edema from toes to mid thigh.    Skin: No erythema/lesions   Neuro: Non-focal, alert and orientated     LABS:  Recent Labs     04/21/23 1958 04/22/23  0041   WBC 11.2* 12.1*   RBC 3.35* 3.30*   HEMOGLOBIN 9.0* 8.9*   HEMATOCRIT 29.3* 29.2*   MCV 87.5  88.5   MCH 26.9* 27.0   RDW 51.5* 51.9*   PLATELETCT 232 193   MPV 9.9 10.0   NEUTSPOLYS 86.70* 89.00*   LYMPHOCYTES 4.60* 3.60*   MONOCYTES 7.30 6.40   EOSINOPHILS 0.10 0.00   BASOPHILS 0.40 0.10     Recent Labs     04/21/23 1958 04/22/23  0041   SODIUM 136 135   POTASSIUM 4.2 3.7   CHLORIDE 101 100   CO2 22 21   BUN 13 11   CREATININE 0.74 0.62   CALCIUM 8.3* 8.1*   ALBUMIN 3.0* 2.9*     Estimated GFR/CRCL = Estimated Creatinine Clearance: 108.7 mL/min (by C-G formula based on SCr of 0.62 mg/dL).  Recent Labs     04/21/23 1958 04/22/23 0041   GLUCOSE 159* 130*     Recent Labs     04/21/23 1958 04/22/23 0041   ASTSGOT 31 27   ALTSGPT 34 30   TBILIRUBIN 0.7 0.9   ALKPHOSPHAT 126* 121*   GLOBULIN 3.4 3.3   INR 1.15*  --              Recent Labs     04/21/23 1958   INR 1.15*   APTT 32.6       MICROBIOLOGY:   Blood Culture   Date Value Ref Range Status   01/02/2019 No growth after 5 days of incubation.  Final        IMAGING:   CT-RENAL COLIC EVALUATION(A/P W/O)   Final Result      1.  Nonobstructing left nephrolithiasis. No left hydronephrosis.   2.  Residual left perinephric stranding, nonspecific.   3.  Hepatic steatosis.   4.  Cholecystectomy and gastric bypass.      CT-RENAL COLIC EVALUATION(A/P W/O)   Final Result         1.  Mild left hydronephrosis and hydroureter with associated hazy left perinephric fat stranding. Dense contrast within the bilateral urinary outflow tract is seen presenting evaluation for ureteral and renal calculi.   2.  Small hiatal hernia   3.  Diverticulosis   4.  Fat-containing umbilical hernia   5.  Hepatomegaly      CT-CTA CHEST PULMONARY ARTERY W/ RECONS   Final Result         1.  Bilateral pulmonary emboli as described. No radiographic evidence of right heart strain.   2.  Left nephrolithiasis with hydronephrosis and hydroureter and obstructive appearance of the left kidney.   3.  Small to moderate size hiatal hernia      These findings were discussed with the patient's  "clinician, Mao Paz, on 4/21/2023 9:25 PM.      DX-CHEST-PORTABLE (1 VIEW)   Final Result         1.  Interstitial edema and/or infiltrates.   2.  Cardiomegaly      EC-ECHOCARDIOGRAM COMPLETE W/O CONT    (Results Pending)       CULTURES:   Results       Procedure Component Value Units Date/Time    Urine Culture (New) [[id number]]  (Abnormal) Collected: 04/21/23 2118    Order Status: Completed Specimen: Urine Updated: 04/22/23 1427     Significant Indicator POS     Source UR     Site -     Culture Result -      Escherichia coli  >100,000 cfu/mL      Narrative:      Indication for culture:->Evaluation for sepsis without a  clear source of infection  Indication for culture:->Evaluation for sepsis without a    Blood Culture [[id number]]  (Abnormal) Collected: 04/21/23 2023    Order Status: Completed Specimen: Blood from Peripheral Updated: 04/22/23 1343     Significant Indicator POS     Source BLD     Site PERIPHERAL     Culture Result Growth detected by Bactec instrument. 04/22/2023  13:42  Gram Stain: Gram negative rods.      Narrative:      Per Hospital Policy: Only change Specimen Src: to \"Line\" if  specified by physician order.  Right Hand    Blood Culture [[id number]] Collected: 04/21/23 1958    Order Status: Completed Specimen: Blood from Peripheral Updated: 04/22/23 0740     Significant Indicator NEG     Source BLD     Site PERIPHERAL     Culture Result No Growth  Note: Blood cultures are incubated for 5 days and  are monitored continuously.Positive blood cultures  are called to the RN and reported as soon as  they are identified.      Narrative:      Per Hospital Policy: Only change Specimen Src: to \"Line\" if  specified by physician order.  No site indicated    CoV-2, Flu A/B, And RSV by PCR ([facility]) [[id number]] Collected: 04/21/23 2118    Order Status: Completed Specimen: Respirate Updated: 04/21/23 2309     Influenza virus A RNA Negative     Influenza virus B, PCR Negative     RSV, PCR Negative     SARS-CoV-2 " by PCR NotDetected     Comment: [facility] providers: PLEASE REFER TO DE-ESCALATION AND RETESTING PROTOCOL  on [email or web address]    **The [employer] GeneXpert Xpress SARS-CoV-2 RT-PCR Test has been made  available for use under the Emergency Use Authorization (EUA) only.          SARS-CoV-2 Source NP Swab    Urinalysis [[id number]]  (Abnormal) Collected: 04/21/23 2118    Order Status: Completed Specimen: Urine Updated: 04/21/23 2230     Color Yellow     Character Clear     Specific Gravity 1.020     Ph 6.0     Glucose Negative mg/dL      Ketones Negative mg/dL      Protein 30 mg/dL      Bilirubin Negative     Urobilinogen, Urine 1.0     Nitrite Positive     Leukocyte Esterase Moderate     Occult Blood Moderate     Micro Urine Req Microscopic    Narrative:      Indication for culture:->Evaluation for sepsis without a  clear source of infection            MEDS:  Current Facility-Administered Medications   Medication Last Admin    cefTRIAXone (Rocephin) syringe 2,000 mg 2,000 mg at 04/22/23 1800    NS infusion New Bag at 04/22/23 1622    acetaminophen (TYLENOL) tablet 1,000 mg 1,000 mg at 04/22/23 2156    ondansetron (ZOFRAN) syringe/vial injection 4 mg      ondansetron (ZOFRAN ODT) dispertab 4 mg      promethazine (PHENERGAN) tablet 12.5-25 mg      promethazine (PHENERGAN) suppository 12.5-25 mg      prochlorperazine (COMPAZINE) injection 5-10 mg      heparin infusion 25,000 units in 500 mL 0.45% NACL 32 Units/kg/hr at 04/23/23 0527    heparin injection 2,700 Units 2,700 Units at 04/23/23 0553    oxyCODONE immediate release (ROXICODONE) tablet 10 mg 10 mg at 04/22/23 2156       ASSESSMENT/PLAN: 58 y.o. female admitted for bilateral pulmonary embolism requiring heparin drip and non obstructing left nephrolithiasis. Urology consulted and no stent indicated. Continue BID rocephin.    * Pulmonary embolism on right (HCC)- (present on admission)  Assessment & Plan  - Supplemental O2, wean as tolerated to RA  - Continue heparin drip  until patient no longer needs supplemental oxygen and transition to oral agent    Osteogenesis imperfecta  Assessment & Plan  - Increased risk of fractures    Urinary tract infection with hematuria  Assessment & Plan  - Urine cultures revealed e. Coli and blood cultures revealed gram negative rods as of @ 15:51  - Susceptibilities pending. Will review upon return.  - Continue C3 2g BID      Hydronephrosis with urinary obstruction due to renal calculus  Assessment & Plan  - Urology consulted. Repeat CT renal colic revealed no obstructing stones. Urology recommended continued antibiotics.   - Continue C3    Closed fracture of shaft of fibula- (present on admission)  Assessment & Plan  - PT/OT     Fracture of distal end of tibia- (present on admission)  Assessment & Plan  - PT/OT    Displaced fracture of proximal end of humerus- (present on admission)  Assessment & Plan  - PT/OT    Core Measures:   Fluids: 150 mL/hr  Lines: IVF  Abx: C3 BID  DVT prophylaxis: Heparin drip  Code Status: Full    Disposition: Inpatient for anticoagulation for bilateral PE    Kisha Fairbanks MD   PGY-1 Family Medicine Resident   [facility]

## 2023-04-24 PROBLEM — R78.81 POSITIVE BLOOD CULTURE: Status: ACTIVE | Noted: 2023-04-24

## 2023-04-24 LAB
ABO + RH BLD: NORMAL
ABO GROUP BLD: NORMAL
ANION GAP SERPL CALC-SCNC: 8 MMOL/L (ref 7–16)
ANISOCYTOSIS BLD QL SMEAR: ABNORMAL
BACTERIA BLD CULT: ABNORMAL
BACTERIA BLD CULT: ABNORMAL
BARCODED ABORH UBTYP: 5100
BARCODED PRD CODE UBPRD: NORMAL
BARCODED UNIT NUM UBUNT: NORMAL
BASOPHILS # BLD AUTO: 0.5 % (ref 0–1.8)
BASOPHILS # BLD: 0.02 K/UL (ref 0–0.12)
BLD GP AB SCN SERPL QL: NORMAL
BUN SERPL-MCNC: 8 MG/DL (ref 8–22)
CALCIUM SERPL-MCNC: 7.7 MG/DL (ref 8.5–10.5)
CHLORIDE SERPL-SCNC: 107 MMOL/L (ref 96–112)
CO2 SERPL-SCNC: 21 MMOL/L (ref 20–33)
COMMENT 1642: NORMAL
COMPONENT R 8504R: NORMAL
CREAT SERPL-MCNC: 0.46 MG/DL (ref 0.5–1.4)
EOSINOPHIL # BLD AUTO: 0.09 K/UL (ref 0–0.51)
EOSINOPHIL NFR BLD: 2.1 % (ref 0–6.9)
ERYTHROCYTE [DISTWIDTH] IN BLOOD BY AUTOMATED COUNT: 50.3 FL (ref 35.9–50)
ERYTHROCYTE [DISTWIDTH] IN BLOOD BY AUTOMATED COUNT: 54.8 FL (ref 35.9–50)
GFR SERPLBLD CREATININE-BSD FMLA CKD-EPI: 110 ML/MIN/1.73 M 2
GLUCOSE SERPL-MCNC: 87 MG/DL (ref 65–99)
HCT VFR BLD AUTO: 22.4 % (ref 37–47)
HCT VFR BLD AUTO: 28.5 % (ref 37–47)
HGB BLD-MCNC: 6.7 G/DL (ref 12–16)
HGB BLD-MCNC: 8.6 G/DL (ref 12–16)
HYPOCHROMIA BLD QL SMEAR: ABNORMAL
IMM GRANULOCYTES # BLD AUTO: 0.03 K/UL (ref 0–0.11)
IMM GRANULOCYTES NFR BLD AUTO: 0.7 % (ref 0–0.9)
LYMPHOCYTES # BLD AUTO: 0.65 K/UL (ref 1–4.8)
LYMPHOCYTES NFR BLD: 14.9 % (ref 22–41)
MCH RBC QN AUTO: 26 PG (ref 27–33)
MCH RBC QN AUTO: 26.4 PG (ref 27–33)
MCHC RBC AUTO-ENTMCNC: 29.9 G/DL (ref 33.6–35)
MCHC RBC AUTO-ENTMCNC: 30.2 G/DL (ref 33.6–35)
MCV RBC AUTO: 86.1 FL (ref 81.4–97.8)
MCV RBC AUTO: 88.2 FL (ref 81.4–97.8)
MICROCYTES BLD QL SMEAR: ABNORMAL
MONOCYTES # BLD AUTO: 0.38 K/UL (ref 0–0.85)
MONOCYTES NFR BLD AUTO: 8.7 % (ref 0–13.4)
MORPHOLOGY BLD-IMP: NORMAL
NEUTROPHILS # BLD AUTO: 3.18 K/UL (ref 2–7.15)
NEUTROPHILS NFR BLD: 73.1 % (ref 44–72)
NRBC # BLD AUTO: 0 K/UL
NRBC BLD-RTO: 0 /100 WBC
PLATELET # BLD AUTO: 138 K/UL (ref 164–446)
PLATELET # BLD AUTO: 142 K/UL (ref 164–446)
PLATELET BLD QL SMEAR: NORMAL
PMV BLD AUTO: 10 FL (ref 9–12.9)
PMV BLD AUTO: 9.9 FL (ref 9–12.9)
POLYCHROMASIA BLD QL SMEAR: NORMAL
POTASSIUM SERPL-SCNC: 3.8 MMOL/L (ref 3.6–5.5)
PRODUCT TYPE UPROD: NORMAL
RBC # BLD AUTO: 2.54 M/UL (ref 4.2–5.4)
RBC # BLD AUTO: 3.31 M/UL (ref 4.2–5.4)
RBC BLD AUTO: PRESENT
RH BLD: NORMAL
SIGNIFICANT IND 70042: ABNORMAL
SITE SITE: ABNORMAL
SODIUM SERPL-SCNC: 136 MMOL/L (ref 135–145)
SOURCE SOURCE: ABNORMAL
UFH PPP CHRO-ACNC: 0.69 IU/ML
UFH PPP CHRO-ACNC: 0.75 IU/ML
UNIT STATUS USTAT: NORMAL
WBC # BLD AUTO: 4.1 K/UL (ref 4.8–10.8)
WBC # BLD AUTO: 4.4 K/UL (ref 4.8–10.8)

## 2023-04-24 PROCEDURE — 99232 SBSQ HOSP IP/OBS MODERATE 35: CPT | Mod: GC | Performed by: FAMILY MEDICINE

## 2023-04-24 PROCEDURE — 770020 HCHG ROOM/CARE - TELE (206)

## 2023-04-24 PROCEDURE — 86923 COMPATIBILITY TEST ELECTRIC: CPT

## 2023-04-24 PROCEDURE — 85025 COMPLETE CBC W/AUTO DIFF WBC: CPT

## 2023-04-24 PROCEDURE — 86850 RBC ANTIBODY SCREEN: CPT

## 2023-04-24 PROCEDURE — 700102 HCHG RX REV CODE 250 W/ 637 OVERRIDE(OP)

## 2023-04-24 PROCEDURE — 36430 TRANSFUSION BLD/BLD COMPNT: CPT

## 2023-04-24 PROCEDURE — 85520 HEPARIN ASSAY: CPT | Mod: 91

## 2023-04-24 PROCEDURE — 86901 BLOOD TYPING SEROLOGIC RH(D): CPT

## 2023-04-24 PROCEDURE — 80048 BASIC METABOLIC PNL TOTAL CA: CPT

## 2023-04-24 PROCEDURE — A9270 NON-COVERED ITEM OR SERVICE: HCPCS

## 2023-04-24 PROCEDURE — 30233N1 TRANSFUSION OF NONAUTOLOGOUS RED BLOOD CELLS INTO PERIPHERAL VEIN, PERCUTANEOUS APPROACH: ICD-10-PCS | Performed by: STUDENT IN AN ORGANIZED HEALTH CARE EDUCATION/TRAINING PROGRAM

## 2023-04-24 PROCEDURE — 700111 HCHG RX REV CODE 636 W/ 250 OVERRIDE (IP): Performed by: STUDENT IN AN ORGANIZED HEALTH CARE EDUCATION/TRAINING PROGRAM

## 2023-04-24 PROCEDURE — 87040 BLOOD CULTURE FOR BACTERIA: CPT

## 2023-04-24 PROCEDURE — 86900 BLOOD TYPING SEROLOGIC ABO: CPT

## 2023-04-24 PROCEDURE — P9016 RBC LEUKOCYTES REDUCED: HCPCS

## 2023-04-24 PROCEDURE — 36415 COLL VENOUS BLD VENIPUNCTURE: CPT

## 2023-04-24 PROCEDURE — 85027 COMPLETE CBC AUTOMATED: CPT

## 2023-04-24 RX ADMIN — HEPARIN SODIUM 34 UNITS/KG/HR: 5000 INJECTION, SOLUTION INTRAVENOUS at 04:59

## 2023-04-24 RX ADMIN — OXYCODONE HYDROCHLORIDE 10 MG: 10 TABLET ORAL at 20:22

## 2023-04-24 RX ADMIN — LEVOFLOXACIN 750 MG: 750 TABLET, FILM COATED ORAL at 05:23

## 2023-04-24 RX ADMIN — OXYCODONE HYDROCHLORIDE 10 MG: 10 TABLET ORAL at 02:54

## 2023-04-24 RX ADMIN — HEPARIN SODIUM 34 UNITS/KG/HR: 5000 INJECTION, SOLUTION INTRAVENOUS at 15:41

## 2023-04-24 ASSESSMENT — FIBROSIS 4 INDEX: FIB4 SCORE: 1.51

## 2023-04-24 NOTE — PROGRESS NOTES
[facility] FAMILY MEDICINE PROGRESS NOTE     Attending:   Yared Akins MD    Resident:   Kisha Fairbanks MD    PATIENT:   Addie Orellana; [id number]; 1965    ID:   58 y.o. female admitted for bilateral pulmonary embolism requiring heparin drip and non obstructing left nephrolithiasis. Urology consulted and no stent indicated. Continue BID rocephin.    SUBJECTIVE:   No acute events overnight, patient resting comfortably in bed in no acute distress.  Patient reports she is able to breathe without difficulty and no longer requiring supplemental oxygen.  Patient denies any fever, chills, lightheadedness, dizziness, fatigue, shortness of breath, pain with inspiration, chest pain or dysuria.  Left arm and left leg continue to be swollen from surgery due to fractures but has not worsened.  Tolerating p.o. without any nausea or vomiting.  Voiding and stooling normally.    OBJECTIVE:  Vitals:    04/23/23 1502 04/23/23 2000 04/24/23 0000 04/24/23 0400   BP: 102/52 105/55 99/64 (!) 89/63   Pulse: 100 (!) 113 84 73   Resp: 17 16 16 16   Temp: 36.7 °C (98.1 °F)  37.1 °C (98.8 °F) 36.9 °C (98.4 °F)   TempSrc: Temporal Temporal Temporal Temporal   SpO2: 90% 96% 96% 97%   Weight:    109 kg (241 lb)   Height:           Intake/Output Summary (Last 24 hours) at 4/22/2023 0810  Last data filed at 4/22/2023 0100  Gross per 24 hour   Intake 120 ml   Output --   Net 120 ml       PHYSICAL EXAM:  General: Mild to moderate acute distress, afebrile, resting comfortably, conversational   HEENT: NC/AT. EOMI.   Cardiovascular: RRR without murmurs, rubs, heaves. Normal capillary refill   Respiratory: CTAB, no tachypnea or retractions   Abdomen: normal bowel sounds, soft, nontender, nondistended, no masses, no organomegaly   EXT:  GIL, left upper extremity 1+ pitting edema from hand extending to shoulder, left lower extremity 1+ pitting edema from toes to mid thigh.    Skin: No erythema/lesions   Neuro: Non-focal, alert and orientated      LABS:  Recent Labs     04/21/23 1958 04/22/23  0041 04/24/23  0502 04/24/23  1255   WBC 11.2* 12.1* 4.4* 4.1*   RBC 3.35* 3.30* 2.54* 3.31*   HEMOGLOBIN 9.0* 8.9* 6.7* 8.6*   HEMATOCRIT 29.3* 29.2* 22.4* 28.5*   MCV 87.5 88.5 88.2 86.1   MCH 26.9* 27.0 26.4* 26.0*   RDW 51.5* 51.9* 50.3* 54.8*   PLATELETCT 232 193 142* 138*   MPV 9.9 10.0 10.0 9.9   NEUTSPOLYS 86.70* 89.00* 73.10*  --    LYMPHOCYTES 4.60* 3.60* 14.90*  --    MONOCYTES 7.30 6.40 8.70  --    EOSINOPHILS 0.10 0.00 2.10  --    BASOPHILS 0.40 0.10 0.50  --    RBCMORPHOLO  --   --  Present  --      Recent Labs     04/21/23 1958 04/22/23 0041 04/24/23  0421   SODIUM 136 135 136   POTASSIUM 4.2 3.7 3.8   CHLORIDE 101 100 107   CO2 22 21 21   BUN 13 11 8   CREATININE 0.74 0.62 0.46*   CALCIUM 8.3* 8.1* 7.7*   ALBUMIN 3.0* 2.9*  --      Estimated GFR/CRCL = Estimated Creatinine Clearance: 147.4 mL/min (A) (by C-G formula based on SCr of 0.46 mg/dL (L)).  Recent Labs     04/21/23 1958 04/22/23  0041 04/24/23  0421   GLUCOSE 159* 130* 87     Recent Labs     04/21/23 1958 04/22/23  0041   ASTSGOT 31 27   ALTSGPT 34 30   TBILIRUBIN 0.7 0.9   ALKPHOSPHAT 126* 121*   GLOBULIN 3.4 3.3   INR 1.15*  --              Recent Labs     04/21/23 1958   INR 1.15*   APTT 32.6       MICROBIOLOGY:   Blood Culture   Date Value Ref Range Status   01/02/2019 No growth after 5 days of incubation.  Final        IMAGING:   EC-ECHOCARDIOGRAM COMPLETE W/ CONT   Final Result      CT-RENAL COLIC EVALUATION(A/P W/O)   Final Result      1.  Nonobstructing left nephrolithiasis. No left hydronephrosis.   2.  Residual left perinephric stranding, nonspecific.   3.  Hepatic steatosis.   4.  Cholecystectomy and gastric bypass.      CT-RENAL COLIC EVALUATION(A/P W/O)   Final Result         1.  Mild left hydronephrosis and hydroureter with associated hazy left perinephric fat stranding. Dense contrast within the bilateral urinary outflow tract is seen presenting evaluation for ureteral  and renal calculi.   2.  Small hiatal hernia   3.  Diverticulosis   4.  Fat-containing umbilical hernia   5.  Hepatomegaly      CT-CTA CHEST PULMONARY ARTERY W/ RECONS   Final Result         1.  Bilateral pulmonary emboli as described. No radiographic evidence of right heart strain.   2.  Left nephrolithiasis with hydronephrosis and hydroureter and obstructive appearance of the left kidney.   3.  Small to moderate size hiatal hernia      These findings were discussed with the patient's clinician, Mao Paz, on 4/21/2023 9:25 PM.      DX-CHEST-PORTABLE (1 VIEW)   Final Result         1.  Interstitial edema and/or infiltrates.   2.  Cardiomegaly          CULTURES:   Results       Procedure Component Value Units Date/Time    BLOOD CULTURE [[id number]]     Order Status: Sent Specimen: Blood from Peripheral     BLOOD CULTURE [[id number]]     Order Status: Sent Specimen: Blood from Peripheral     Urine Culture (New) [[id number]]  (Abnormal)  (Susceptibility) Collected: 04/21/23 2118    Order Status: Completed Specimen: Urine Updated: 04/23/23 1003     Significant Indicator POS     Source UR     Site -     Culture Result Usual urogenital shawn 10-50,000 cfu/mL      Escherichia coli  >100,000 cfu/mL      Narrative:      Indication for culture:->Evaluation for sepsis without a  clear source of infection  Indication for culture:->Evaluation for sepsis without a    Susceptibility       Escherichia coli (1)       Antibiotic Interpretation Method Status    Ampicillin Sensitive LEON Final    Ceftriaxone Sensitive LEON Final    Cefazolin Sensitive LEON Final     Breakpoints when Cefazolin is used for therapy of infections  other than uncomplicated UTIs due to Enterobacterales are as  follows:  LEON and Interpretation:  <=2 S  4 I  >=8 R        Ciprofloxacin Sensitive LEON Final    Cefepime Sensitive LEON Final    Cefuroxime Sensitive LEON Final    Ampicillin/sulbactam Sensitive LEON Final    Tobramycin Sensitive LEON Final    Nitrofurantoin  "Sensitive LEON Final    Gentamicin Sensitive LEON Final    Levofloxacin Sensitive LEON Final    Minocycline Sensitive LEON Final    Pip/Tazobactam Sensitive LEON Final    Trimeth/Sulfa Sensitive LEON Final    Tigecycline Sensitive LEON Final                       Blood Culture [[id number]]  (Abnormal) Collected: 04/21/23 2023    Order Status: Completed Specimen: Blood from Peripheral Updated: 04/23/23 0951     Significant Indicator POS     Source BLD     Site PERIPHERAL     Culture Result Growth detected by Bactec instrument. 04/22/2023  13:42      Escherichia coli  Susceptibilities in progress      Narrative:      CALL  [place]  171 tel. [phone],  CALLED  171 tel. [phone] 04/22/2023, 13:47, RB PERF. RESULTS CALLED TO:  [id number] RN  Per Hospital Policy: Only change Specimen Src: to \"Line\" if  specified by physician order.  Right Hand    Blood Culture [[id number]] Collected: 04/21/23 1958    Order Status: Completed Specimen: Blood from Peripheral Updated: 04/22/23 0740     Significant Indicator NEG     Source BLD     Site PERIPHERAL     Culture Result No Growth  Note: Blood cultures are incubated for 5 days and  are monitored continuously.Positive blood cultures  are called to the RN and reported as soon as  they are identified.      Narrative:      Per Hospital Policy: Only change Specimen Src: to \"Line\" if  specified by physician order.  No site indicated    CoV-2, Flu A/B, And RSV by PCR ([facility]) [[id number]] Collected: 04/21/23 2118    Order Status: Completed Specimen: Respirate Updated: 04/21/23 2309     Influenza virus A RNA Negative     Influenza virus B, PCR Negative     RSV, PCR Negative     SARS-CoV-2 by PCR NotDetected     Comment: [facility] providers: PLEASE REFER TO DE-ESCALATION AND RETESTING PROTOCOL  on [email or web address]    **The [employer] GeneXpert Xpress SARS-CoV-2 RT-PCR Test has been made  available for use under the Emergency Use Authorization (EUA) only.          SARS-CoV-2 Source NP Swab    Urinalysis " [[id number]]  (Abnormal) Collected: 04/21/23 2118    Order Status: Completed Specimen: Urine Updated: 04/21/23 2230     Color Yellow     Character Clear     Specific Gravity 1.020     Ph 6.0     Glucose Negative mg/dL      Ketones Negative mg/dL      Protein 30 mg/dL      Bilirubin Negative     Urobilinogen, Urine 1.0     Nitrite Positive     Leukocyte Esterase Moderate     Occult Blood Moderate     Micro Urine Req Microscopic    Narrative:      Indication for culture:->Evaluation for sepsis without a  clear source of infection            MEDS:  Current Facility-Administered Medications   Medication Last Admin    levoFLOXacin (LEVAQUIN) tablet 750 mg 750 mg at 04/24/23 0523    NS infusion New Bag at 04/23/23 1321    acetaminophen (TYLENOL) tablet 1,000 mg 1,000 mg at 04/22/23 2156    ondansetron (ZOFRAN) syringe/vial injection 4 mg      ondansetron (ZOFRAN ODT) dispertab 4 mg      promethazine (PHENERGAN) tablet 12.5-25 mg      promethazine (PHENERGAN) suppository 12.5-25 mg      prochlorperazine (COMPAZINE) injection 5-10 mg      heparin infusion 25,000 units in 500 mL 0.45% NACL 34 Units/kg/hr at 04/24/23 0459    heparin injection 2,700 Units 2,700 Units at 04/23/23 2141    oxyCODONE immediate release (ROXICODONE) tablet 10 mg 10 mg at 04/24/23 0254       ASSESSMENT/PLAN: 58 y.o. female admitted for bilateral pulmonary embolism requiring heparin drip and non obstructing left nephrolithiasis. Urology consulted and no stent indicated. Continue BID rocephin.    * Pulmonary embolism on right (HCC)- (present on admission)  Assessment & Plan  - Room air for greater than 24 hours with adequate oxygen saturation   - Continue heparin drip, will consider transition to p.o. anticoagulation tomorrow     Osteogenesis imperfecta  Assessment & Plan  - Increased risk of fractures    Urinary tract infection with hematuria  Assessment & Plan  - Urine cultures revealed e. Coli and blood cultures revealed gram negative rods with  susceptibilities  - Continue C3 2g BID and start levofloxacin 750 mg daily for 7 days  - DC IVF    Positive Blood Culture   1/2 blood culture taken on 4/2021 positive for E. coli.  Urology suspects contamination and requested repeat blood culture.  -f/u blood culture taken 4/24 x2    Hydronephrosis with urinary obstruction due to renal calculus  Assessment & Plan  - Urology consulted. Repeat CT renal colic revealed no obstructing stones. Urology recommended continued antibiotics.   - Continue C3    Low hemoglobin  Assessment & Plan  4/24 hemoglobin 6.7, patient received 1U pRBCs. Repeat hemoglobin 8.6.  Patient asymptomatic with no identifiable source of bleed  - We will continue to monitor hemoglobin  - If hemoglobin falls below 7 will transfuse  - Consider GI consult    Closed fracture of shaft of fibula- (present on admission)  Assessment & Plan  - PT/OT     Fracture of distal end of tibia- (present on admission)  Assessment & Plan  - PT/OT    Displaced fracture of proximal end of humerus- (present on admission)  Assessment & Plan  - PT/OT    Core Measures:   Fluids: none  Lines: IVF  Abx: Levofloxacin  DVT prophylaxis: Heparin drip  Code Status: Full    Disposition: Inpatient for anticoagulation for bilateral PE, once clear for discharge patient will return to SNF.    Kisha Fairbanks MD   PGY-1 Family Medicine Resident   [facility]

## 2023-04-24 NOTE — DOCUMENTATION QUERY
[facility]                                                                       Query Response Note      PATIENT:               SISSY JUNE  ACCT #:                  [id number]  MRN:                     [id number]  :                      1965  ADMIT DATE:       2023 7:37 PM  DISCH DATE:          RESPONDING  PROVIDER #:        [id number]           QUERY TEXT:    Urosepsis is documented in the Medical Record in the PN .  There is not a valid ICD-10 code for urosepsis.  Please clarify in documentation the specific type of infection:    The patient's Clinical Indicators include:  Findings:  --Patient admitted for Right PE without acute cor pulmonale, UTI with hematuria and hydronephrosis with      urinary obstruction d/t renal calculus  --Per ER provider noted , ''She presents with likely sepsis, possibly from a urinary source'' stated  --Per PN , ''Urosepsis:  on ceftriaxone bid'' stated  --VS on admission :  T101.8 P125 104/56 90% RA  --Labs on admission :  wbc 11.2, absolute neutrophils 9.69, lactic acid 2.0  --UA from admission :  moderate occult blood, 30 protein, nitrite positive, moderate leukocyte esterase,      wbc 50, rbc 10-20, many bacteria  --Urine culture from :  E.coli++  --Blood cultures from :  negative to date    Treatment:  --Blood and urine cultures  --Urology consult  --Levaquin tablet 750mg oral daily  --IVF  --Rocephin IV every 12 hours (discontinued)  --Macrobid capsule 100mg oral bid    Risk Factors:  --UTI with hematuria  --Hydronephrosis with urinary obstruction d/t renal calculus  --Acute right PE    Thank you,  Mallorie Flood RN, BSN  Clinical   Connect via [employer]  Options provided:   -- Urinary tract infection   -- Sepsis due to a urinary tract infection, please provide SIRS criteria and clinical indicators to support sepsis   --  Other explanation, please specify   -- Unable to determine      Query created by: Mallorie Flood on 4/24/2023 8:35 AM    RESPONSE TEXT:    Urinary tract infection          Electronically signed by:  RITA CURTIS MD 4/24/2023 2:56 PM

## 2023-04-24 NOTE — DISCHARGE PLANNING
1219  Agency/Facility Name: [facility]   Spoke To: Ana   Outcome: Ana notified DPA that Pt is an RTA at [facility] and to notify Ana once Pt is getting close to medically cleared, as insurance auth may take 2-3 days to be obtained.

## 2023-04-24 NOTE — ASSESSMENT & PLAN NOTE
4/24 hemoglobin 6.7, patient received 1U pRBCs. Repeat hemoglobin 8.6.  Patient asymptomatic with no identifiable source of bleed  - We will continue to monitor hemoglobin  - If hemoglobin falls below 7 will transfuse  - Consider GI consult

## 2023-04-24 NOTE — ASSESSMENT & PLAN NOTE
1/2 blood culture taken on 4/2021 positive for E. coli.  Urology suspects contamination and requested repeat blood culture.  -f/u blood culture taken 4/24 x2

## 2023-04-24 NOTE — CARE PLAN
Problem: Pain - Standard  Goal: Alleviation of pain or a reduction in pain to the patient’s comfort goal  Outcome: Progressing     Problem: Knowledge Deficit - Standard  Goal: Patient and family/care givers will demonstrate understanding of plan of care, disease process/condition, diagnostic tests and medications  Outcome: Progressing   The patient is Stable - Low risk of patient condition declining or worsening    Shift Goals  Clinical Goals: heparin gtt therapeutic  Patient Goals: comfort, pain control    Progress made toward(s) clinical / shift goals:  pain managed w/ prn medication, pt. Understanding and asks questions regarding care    Patient is not progressing towards the following goals:

## 2023-04-24 NOTE — PROGRESS NOTES
Note to reader: this note follows the APSO format rather than the historical SOAP format. Assessment and plan located at the top of the note for ease of use.    Chief Complaint  58 y.o. year old female here with PE and Left pyelonephrosis, ucx + E coli.       Assessment/Plan  Interval History   Active Hospital Problems    Diagnosis     Pulmonary embolism on right (HCC) [I26.99]     Hydronephrosis with urinary obstruction due to renal calculus [N13.2]     Urinary tract infection with hematuria [N39.0, R31.9]     Osteogenesis imperfecta [Q78.0]     Closed fracture of shaft of fibula [S82.409A]     Displaced fracture of proximal end of humerus [S42.209A]     Fracture of distal end of tibia [S82.309A]      Plan:  -Continue abx per hospital team  -Will defer to hospitalist on repeat blood cultures, or consideration of contaminated specimen   -Monitor WBC  -Monitor for fevers, worsening flank pain  -Urology to sign off at this time, appreciate the consult                            patient seen and examined    4/23. Patient denies any left flank pain at this time. Denies fevers or chills. WBC 12.1, mildly elevated from yesterday. Cr 0.62. Ucx positive e coli, on Levaquin. 1 of 2 blood cultures + E coli, pending final. Patient eventually discharging to rehab facility once stable.     Disposition  Guarded     Case discussed with patient and Urology-Dr. Davis, who has directed this plan of care.     Review of Systems  Physical Exam   Review of Systems   Constitutional:  Negative for chills and fever.   Respiratory:  Positive for shortness of breath.    Genitourinary:  Negative for flank pain.   Musculoskeletal:  Negative for back pain.   All other systems reviewed and are negative.  Vitals:    04/23/23 0742 04/23/23 1000 04/23/23 1142 04/23/23 1502   BP: (!) 89/55  90/56 102/52   Pulse: 79  85 100   Resp: 16  16 17   Temp: 36.9 °C (98.4 °F)  36.9 °C (98.4 °F) 36.7 °C (98.1 °F)   TempSrc: Temporal  Temporal Temporal    SpO2: 100% 98% 92% 90%   Weight:       Height:         Physical Exam  Vitals and nursing note reviewed.   Constitutional:       Appearance: Normal appearance. She is obese.   HENT:      Head: Normocephalic and atraumatic.      Nose: Nose normal.   Eyes:      Conjunctiva/sclera: Conjunctivae normal.   Pulmonary:      Effort: Pulmonary effort is normal.      Comments: On O2 by NC  Skin:     General: Skin is warm and dry.   Neurological:      General: No focal deficit present.      Mental Status: She is alert and oriented to person, place, and time.   Psychiatric:         Mood and Affect: Mood normal.         Behavior: Behavior normal.         Thought Content: Thought content normal.         Judgment: Judgment normal.        Hematology Chemistry   Lab Results   Component Value Date/Time    WBC 12.1 (H) 04/22/2023 12:41 AM    HEMOGLOBIN 8.9 (L) 04/22/2023 12:41 AM    HEMATOCRIT 29.2 (L) 04/22/2023 12:41 AM    PLATELETCT 193 04/22/2023 12:41 AM     Lab Results   Component Value Date/Time    SODIUM 135 04/22/2023 12:41 AM    POTASSIUM 3.7 04/22/2023 12:41 AM    CHLORIDE 100 04/22/2023 12:41 AM    CO2 21 04/22/2023 12:41 AM    GLUCOSE 130 (H) 04/22/2023 12:41 AM    BUN 11 04/22/2023 12:41 AM    CREATININE 0.62 04/22/2023 12:41 AM         Labs not explicitly included in this progress note were reviewed by the author.   Radiology/imaging not explicitly included in this progress note was reviewed by the author.     Labs reviewed and Medications reviewed                    Park Bonilla PA-C  [facility]

## 2023-04-24 NOTE — PROGRESS NOTES
Yo Magalys Retana MD notified of critical hemoglobin 6.7 down from 8.9 yesterday @ 7220, no new orders received

## 2023-04-24 NOTE — THERAPY
Physical Therapy Contact Note    Patient Name: Addie Orellana  Age:  58 y.o., Sex:  female  Medical Record #: [id number]  Today's Date: 4/24/2023 04/24/23 0853   Total Time Spent   PT Total Time   (5 min)   Initial Contact Note    Initial Contact Note Order Received and Verified, Physical Therapy Evaluation in Progress with Full Report to Follow.   Interdisciplinary Plan of Care Collaboration   Collaboration Comments Patient still awaiting delivery of CAM boot; will initiate PT eval as able and appropriate once CAM boot available for mobility.   Session Information   Date / Session Number  4/22, 23, 24 - hold, awaiting CAM boot delivery  (EVAL)

## 2023-04-25 LAB
ERYTHROCYTE [DISTWIDTH] IN BLOOD BY AUTOMATED COUNT: 54.4 FL (ref 35.9–50)
HCT VFR BLD AUTO: 25.5 % (ref 37–47)
HGB BLD-MCNC: 8 G/DL (ref 12–16)
MCH RBC QN AUTO: 26.4 PG (ref 27–33)
MCHC RBC AUTO-ENTMCNC: 31.4 G/DL (ref 33.6–35)
MCV RBC AUTO: 84.2 FL (ref 81.4–97.8)
PLATELET # BLD AUTO: 142 K/UL (ref 164–446)
PMV BLD AUTO: 9.9 FL (ref 9–12.9)
RBC # BLD AUTO: 3.03 M/UL (ref 4.2–5.4)
UFH PPP CHRO-ACNC: 0.31 IU/ML
WBC # BLD AUTO: 3.9 K/UL (ref 4.8–10.8)

## 2023-04-25 PROCEDURE — A9270 NON-COVERED ITEM OR SERVICE: HCPCS

## 2023-04-25 PROCEDURE — 36415 COLL VENOUS BLD VENIPUNCTURE: CPT

## 2023-04-25 PROCEDURE — 700102 HCHG RX REV CODE 250 W/ 637 OVERRIDE(OP)

## 2023-04-25 PROCEDURE — 85520 HEPARIN ASSAY: CPT

## 2023-04-25 PROCEDURE — 700111 HCHG RX REV CODE 636 W/ 250 OVERRIDE (IP): Performed by: STUDENT IN AN ORGANIZED HEALTH CARE EDUCATION/TRAINING PROGRAM

## 2023-04-25 PROCEDURE — 99232 SBSQ HOSP IP/OBS MODERATE 35: CPT | Mod: GC | Performed by: FAMILY MEDICINE

## 2023-04-25 PROCEDURE — 770001 HCHG ROOM/CARE - MED/SURG/GYN PRIV*

## 2023-04-25 PROCEDURE — 85027 COMPLETE CBC AUTOMATED: CPT

## 2023-04-25 PROCEDURE — 97162 PT EVAL MOD COMPLEX 30 MIN: CPT

## 2023-04-25 PROCEDURE — 97535 SELF CARE MNGMENT TRAINING: CPT

## 2023-04-25 RX ADMIN — OXYCODONE HYDROCHLORIDE 10 MG: 10 TABLET ORAL at 20:22

## 2023-04-25 RX ADMIN — APIXABAN 10 MG: 5 TABLET, FILM COATED ORAL at 08:57

## 2023-04-25 RX ADMIN — APIXABAN 10 MG: 5 TABLET, FILM COATED ORAL at 17:38

## 2023-04-25 RX ADMIN — LEVOFLOXACIN 750 MG: 750 TABLET, FILM COATED ORAL at 05:49

## 2023-04-25 RX ADMIN — HEPARIN SODIUM 34 UNITS/KG/HR: 5000 INJECTION, SOLUTION INTRAVENOUS at 02:55

## 2023-04-25 RX ADMIN — OXYCODONE HYDROCHLORIDE 10 MG: 10 TABLET ORAL at 03:00

## 2023-04-25 RX ADMIN — OXYCODONE HYDROCHLORIDE 10 MG: 10 TABLET ORAL at 14:14

## 2023-04-25 ASSESSMENT — COGNITIVE AND FUNCTIONAL STATUS - GENERAL
TURNING FROM BACK TO SIDE WHILE IN FLAT BAD: UNABLE
MOVING TO AND FROM BED TO CHAIR: UNABLE
STANDING UP FROM CHAIR USING ARMS: TOTAL
MOBILITY SCORE: 6
CLIMB 3 TO 5 STEPS WITH RAILING: TOTAL
SUGGESTED CMS G CODE MODIFIER MOBILITY: CN
WALKING IN HOSPITAL ROOM: TOTAL
MOVING FROM LYING ON BACK TO SITTING ON SIDE OF FLAT BED: UNABLE

## 2023-04-25 ASSESSMENT — GAIT ASSESSMENTS: GAIT LEVEL OF ASSIST: UNABLE TO PARTICIPATE

## 2023-04-25 ASSESSMENT — FIBROSIS 4 INDEX: FIB4 SCORE: 2.05

## 2023-04-25 ASSESSMENT — PAIN DESCRIPTION - PAIN TYPE: TYPE: ACUTE PAIN

## 2023-04-25 NOTE — PROGRESS NOTES
[facility] PROGRESS NOTE     Attending:   Yared Akins MD    Resident:   Kisha Fairbanks MD    PATIENT:   Addie Orellana; [id number]; 1965    ID:   58 y.o. female admitted for bilateral pulmonary embolism requiring heparin drip and non obstructing left nephrolithiasis. Urology consulted and no stent indicated. Continue BID rocephin.    SUBJECTIVE:   No acute events overnight, patient resting comfortably in bed in no acute distress.  Patient continues to breathe without difficulty and no longer requiring supplemental oxygen.  Patient denies any fever, chills, lightheadedness, dizziness, fatigue, shortness of breath, pain with inspiration, chest pain or dysuria.  Left arm and left leg swelling decreased with elevation Tolerating p.o. without any nausea or vomiting.  Voiding and stooling normally.    OBJECTIVE:  Vitals:    04/24/23 1535 04/24/23 2045 04/25/23 0052 04/25/23 0336   BP: 113/71 117/88 108/80 107/79   Pulse: 93 89 93 81   Resp: 18 18 18 18   Temp: 37.1 °C (98.8 °F) 36.6 °C (97.8 °F) 36.6 °C (97.9 °F) 36.9 °C (98.4 °F)   TempSrc: Temporal Temporal Temporal Temporal   SpO2: 90% 93% 94% 94%   Weight:       Height:           Intake/Output Summary (Last 24 hours) at 4/22/2023 0810  Last data filed at 4/22/2023 0100  Gross per 24 hour   Intake 120 ml   Output --   Net 120 ml       PHYSICAL EXAM:  General: Mild to moderate acute distress, afebrile, resting comfortably, conversational   HEENT: NC/AT. EOMI.   Cardiovascular: RRR without murmurs, rubs, heaves. Normal capillary refill   Respiratory: CTAB, no tachypnea or retractions   Abdomen: normal bowel sounds, soft, nontender, nondistended, no masses, no organomegaly   EXT:  GIL, left upper extremity 1+ pitting edema from elbow extending to shoulder, left lower extremity 1+ pitting edema from toes to knee.    Skin: No erythema/lesions   Neuro: Non-focal, alert and orientated     LABS:  Recent Labs     04/24/23  0502 04/24/23  1255  04/25/23  0035   WBC 4.4* 4.1* 3.9*   RBC 2.54* 3.31* 3.03*   HEMOGLOBIN 6.7* 8.6* 8.0*   HEMATOCRIT 22.4* 28.5* 25.5*   MCV 88.2 86.1 84.2   MCH 26.4* 26.0* 26.4*   RDW 50.3* 54.8* 54.4*   PLATELETCT 142* 138* 142*   MPV 10.0 9.9 9.9   NEUTSPOLYS 73.10*  --   --    LYMPHOCYTES 14.90*  --   --    MONOCYTES 8.70  --   --    EOSINOPHILS 2.10  --   --    BASOPHILS 0.50  --   --    RBCMORPHOLO Present  --   --      Recent Labs     04/24/23  0421   SODIUM 136   POTASSIUM 3.8   CHLORIDE 107   CO2 21   BUN 8   CREATININE 0.46*   CALCIUM 7.7*     Estimated GFR/CRCL = Estimated Creatinine Clearance: 147.4 mL/min (A) (by C-G formula based on SCr of 0.46 mg/dL (L)).  Recent Labs     04/24/23  0421   GLUCOSE 87                   No results for input(s): INR, APTT, FIBRINOGEN in the last 72 hours.    Invalid input(s): DIMER      MICROBIOLOGY:   Blood Culture   Date Value Ref Range Status   01/02/2019 No growth after 5 days of incubation.  Final        IMAGING:   EC-ECHOCARDIOGRAM COMPLETE W/ CONT   Final Result      CT-RENAL COLIC EVALUATION(A/P W/O)   Final Result      1.  Nonobstructing left nephrolithiasis. No left hydronephrosis.   2.  Residual left perinephric stranding, nonspecific.   3.  Hepatic steatosis.   4.  Cholecystectomy and gastric bypass.      CT-RENAL COLIC EVALUATION(A/P W/O)   Final Result         1.  Mild left hydronephrosis and hydroureter with associated hazy left perinephric fat stranding. Dense contrast within the bilateral urinary outflow tract is seen presenting evaluation for ureteral and renal calculi.   2.  Small hiatal hernia   3.  Diverticulosis   4.  Fat-containing umbilical hernia   5.  Hepatomegaly      CT-CTA CHEST PULMONARY ARTERY W/ RECONS   Final Result         1.  Bilateral pulmonary emboli as described. No radiographic evidence of right heart strain.   2.  Left nephrolithiasis with hydronephrosis and hydroureter and obstructive appearance of the left kidney.   3.  Small to moderate size  "hiatal hernia      These findings were discussed with the patient's clinician, Mao Paz, on 4/21/2023 9:25 PM.      DX-CHEST-PORTABLE (1 VIEW)   Final Result         1.  Interstitial edema and/or infiltrates.   2.  Cardiomegaly          CULTURES:   Results       Procedure Component Value Units Date/Time    BLOOD CULTURE [[id number]] Collected: 04/24/23 0723    Order Status: Sent Specimen: Blood from Peripheral Updated: 04/24/23 0830    Narrative:      Per Hospital Policy: Only change Specimen Src: to \"Line\" if  specified by physician order.    BLOOD CULTURE [[id number]] Collected: 04/24/23 0723    Order Status: Sent Specimen: Blood from Peripheral Updated: 04/24/23 0830    Narrative:      Per Hospital Policy: Only change Specimen Src: to \"Line\" if  specified by physician order.    Blood Culture [[id number]]  (Abnormal)  (Susceptibility) Collected: 04/21/23 2023    Order Status: Completed Specimen: Blood from Peripheral Updated: 04/24/23 0820     Significant Indicator POS     Source BLD     Site PERIPHERAL     Culture Result Growth detected by Bactec instrument. 04/22/2023  13:42      Escherichia coli    Narrative:      CALL  [place]  171 tel. [phone],  CALLED  171 tel. [phone] 04/22/2023, 13:47, RB PERF. RESULTS CALLED TO:  [id number] RN  Per Hospital Policy: Only change Specimen Src: to \"Line\" if  specified by physician order.  Right Hand    Susceptibility       Escherichia coli (1)       Antibiotic Interpretation Method Status    Ampicillin Sensitive LEON Final    Ceftriaxone Sensitive LEON Final    Cefazolin Sensitive LEON Final    Ciprofloxacin Sensitive LEON Final    Cefepime Sensitive LEON Final    Cefuroxime Sensitive LEON Final    Ampicillin/sulbactam Sensitive LEON Final    Ertapenem Sensitive LEON Final    Tobramycin Sensitive LEON Final    Gentamicin Sensitive LEON Final    Minocycline Sensitive LEON Final    Moxifloxacin Sensitive LEON Final    Pip/Tazobactam Sensitive LEON Final    Trimeth/Sulfa Sensitive LEON Final    " "Tigecycline Sensitive LEON Final                       Urine Culture (New) [[id number]]  (Abnormal)  (Susceptibility) Collected: 04/21/23 2118    Order Status: Completed Specimen: Urine Updated: 04/23/23 1003     Significant Indicator POS     Source UR     Site -     Culture Result Usual urogenital shawn 10-50,000 cfu/mL      Escherichia coli  >100,000 cfu/mL      Narrative:      Indication for culture:->Evaluation for sepsis without a  clear source of infection  Indication for culture:->Evaluation for sepsis without a    Susceptibility       Escherichia coli (1)       Antibiotic Interpretation Method Status    Ampicillin Sensitive LEON Final    Ceftriaxone Sensitive LEON Final    Cefazolin Sensitive LEON Final     Breakpoints when Cefazolin is used for therapy of infections  other than uncomplicated UTIs due to Enterobacterales are as  follows:  LEON and Interpretation:  <=2 S  4 I  >=8 R        Ciprofloxacin Sensitive LEON Final    Cefepime Sensitive LEON Final    Cefuroxime Sensitive LEON Final    Ampicillin/sulbactam Sensitive LEON Final    Tobramycin Sensitive LEON Final    Nitrofurantoin Sensitive LEON Final    Gentamicin Sensitive LEON Final    Levofloxacin Sensitive LEON Final    Minocycline Sensitive LEON Final    Pip/Tazobactam Sensitive LEON Final    Trimeth/Sulfa Sensitive LEON Final    Tigecycline Sensitive LEON Final                       Blood Culture [[id number]] Collected: 04/21/23 1958    Order Status: Completed Specimen: Blood from Peripheral Updated: 04/22/23 0740     Significant Indicator NEG     Source BLD     Site PERIPHERAL     Culture Result No Growth  Note: Blood cultures are incubated for 5 days and  are monitored continuously.Positive blood cultures  are called to the RN and reported as soon as  they are identified.      Narrative:      Per Hospital Policy: Only change Specimen Src: to \"Line\" if  specified by physician order.  No site indicated    CoV-2, Flu A/B, And RSV by PCR (CepVastechid) [[id number]] " Collected: 04/21/23 2118    Order Status: Completed Specimen: Respirate Updated: 04/21/23 2309     Influenza virus A RNA Negative     Influenza virus B, PCR Negative     RSV, PCR Negative     SARS-CoV-2 by PCR NotDetected     Comment: [facility] providers: PLEASE REFER TO DE-ESCALATION AND RETESTING PROTOCOL  on [email or web address]    **The Lancope GeneXpert Xpress SARS-CoV-2 RT-PCR Test has been made  available for use under the Emergency Use Authorization (EUA) only.          SARS-CoV-2 Source NP Swab    Urinalysis [[id number]]  (Abnormal) Collected: 04/21/23 2118    Order Status: Completed Specimen: Urine Updated: 04/21/23 2230     Color Yellow     Character Clear     Specific Gravity 1.020     Ph 6.0     Glucose Negative mg/dL      Ketones Negative mg/dL      Protein 30 mg/dL      Bilirubin Negative     Urobilinogen, Urine 1.0     Nitrite Positive     Leukocyte Esterase Moderate     Occult Blood Moderate     Micro Urine Req Microscopic    Narrative:      Indication for culture:->Evaluation for sepsis without a  clear source of infection            MEDS:  Current Facility-Administered Medications   Medication Last Admin    levoFLOXacin (LEVAQUIN) tablet 750 mg 750 mg at 04/25/23 0549    acetaminophen (TYLENOL) tablet 1,000 mg 1,000 mg at 04/22/23 2156    ondansetron (ZOFRAN) syringe/vial injection 4 mg      ondansetron (ZOFRAN ODT) dispertab 4 mg      promethazine (PHENERGAN) tablet 12.5-25 mg      promethazine (PHENERGAN) suppository 12.5-25 mg      prochlorperazine (COMPAZINE) injection 5-10 mg      heparin infusion 25,000 units in 500 mL 0.45% NACL 34 Units/kg/hr at 04/25/23 0255    heparin injection 2,700 Units 2,700 Units at 04/23/23 2141    oxyCODONE immediate release (ROXICODONE) tablet 10 mg 10 mg at 04/25/23 0300       ASSESSMENT/PLAN: 58 y.o. female admitted for bilateral pulmonary embolism requiring heparin drip and non obstructing left nephrolithiasis. Urology consulted and no stent indicated. Continue BID  rocephin.    * Pulmonary embolism on right (HCC)- (present on admission)  Assessment & Plan  - Room air for greater than 24 hours with adequate oxygen saturation   - DC heparin drip, start apixaban     Osteogenesis imperfecta  Assessment & Plan  - Increased risk of fractures    Urinary tract infection with hematuria  Assessment & Plan  - Urine cultures revealed e. Coli and blood cultures revealed gram negative rods with susceptibilities  - Continue C3 2g BID and start levofloxacin 750 mg daily for 7 days  - DC IVF    Positive Blood Culture   1/2 blood culture taken on 4/2021 positive for E. coli.  Urology suspects contamination and requested repeat blood culture.  -f/u blood culture taken 4/24 x2 NGTD    Hydronephrosis with urinary obstruction due to renal calculus  Assessment & Plan  - Urology consulted. Repeat CT renal colic revealed no obstructing stones. Urology recommended continued antibiotics.   - Continue abx    Low hemoglobin  Assessment & Plan  4/24 hemoglobin 6.7, patient received 1U pRBCs. Repeat hemoglobin 8.6.  Patient asymptomatic with no identifiable source of bleed  - We will continue to monitor hemoglobin  - If hemoglobin falls below 7 will transfuse  - Consider GI consult    Closed fracture of shaft of fibula- (present on admission)  Assessment & Plan  - PT/OT     Fracture of distal end of tibia- (present on admission)  Assessment & Plan  - PT/OT    Displaced fracture of proximal end of humerus- (present on admission)  Assessment & Plan  - PT/OT    Core Measures:   Fluids: none  Lines: IVF  Abx: Levofloxacin  DVT prophylaxis: Apixaban  Code Status: Full    Disposition: Inpatient for anticoagulation for bilateral PE, pending SNF placement    Kisha Fairbanks MD   PGY-1 Family Medicine Resident   [facility]

## 2023-04-25 NOTE — CARE PLAN
Pt had no acute events today. Very pleasant/cooperative. A&Ox4. VSS on RA. C/o aching pain in LUE and LLE, no pain meds requested. Frequent repositioning and elevated extremities for comfort. Tolerating regular diet. Incontinence care provided. Purewick in place. Heparin gtt continued at 34Units/kg/hr. Anti Xa therapeutic. 1U PRBCs given. Pt tolerated. Will monitor for any s/s of bleeding. PT/OT to evaluate tomorrow (walking boot at bedside). Will continue to monitor closely.     Problem: Pain - Standard  Goal: Alleviation of pain or a reduction in pain to the patient’s comfort goal  Outcome: Progressing     Problem: Knowledge Deficit - Standard  Goal: Patient and family/care givers will demonstrate understanding of plan of care, disease process/condition, diagnostic tests and medications  Outcome: Progressing     Problem: Skin Integrity  Goal: Skin integrity is maintained or improved  Outcome: Progressing     Problem: Fall Risk  Goal: Patient will remain free from falls  Outcome: Progressing

## 2023-04-25 NOTE — THERAPY
Physical Therapy   Initial Evaluation     Patient Name: Addie Orellana  Age:  58 y.o., Sex:  female  Medical Record #: [id number]  Today's Date: 4/25/2023     Precautions  Precautions: Fall Risk;Weight Bearing As Tolerated Left Lower Extremity;CAM Boot;Non Weight Bearing Left Upper Extremity  Comments: Osteogenesis Imperfecta, PE    Assessment  58 y.o. female admitted for bilateral pulmonary embolism requiring heparin drip and non obstructing left nephrolithiasis. Pt with recent fall and fx of L humerus and L tib/fib (underwent surgical intervention and was discharged to SNF). Pt also with hx of OI.     Upon evaluation, pt demonstrated fair compliance with WB restrictions, and was able to mobilize OOB to standing with aMyco. Notable difficulties with static and dynamic standing due to weakness, balance deficits, and vertical height discrepancy with CAM boot. Pt unable to initiate stepping this date due to mentioned deficits. Recommend continued PT to progress functional mobility, and recommend returning to SNF/post-acute rehab for continuum of care.     Plan    Physical Therapy Initial Treatment Plan   Treatment Plan : Bed Mobility, Gait Training, Neuro Re-Education / Balance, Self Care / Home Evaluation, Therapeutic Activities, Therapeutic Exercise  Treatment Frequency: 3 Times per Week  Duration: Until Therapy Goals Met    DC Equipment Recommendations: Unable to determine at this time  Discharge Recommendations: Recommend post-acute placement for additional physical therapy services prior to discharge home       Subjective    Pt reported feeling better after moving around. Pt reported temporary dizziness while sitting EOB.      Objective       04/25/23 0836   Initial Contact Note    Initial Contact Note Order Received and Verified, Physical Therapy Evaluation in Progress with Full Report to Follow.   Precautions   Precautions Fall Risk;Weight Bearing As Tolerated Left Lower Extremity;CAM Boot;Non Weight Bearing  Left Upper Extremity   Comments Osteogenesis Imperfecta, PE   Vitals   Pulse 78   Pulse Oximetry 96 %   O2 Delivery Device None - Room Air   Pain   Pain Scales 0 to 10 Scale    Pain 0 - 10 Group   Location Knee   Location Orientation Right;Left   Pain Rating Scale (NPRS) 4   Prior Living Situation   Prior Services Home-Independent;Other (Comments)  (Was at [facility] in between admissions; however, was originally residing at home prior to initial fall/fractures)   Housing / Facility 2 Story House  (plan to stay on [facility])   Steps Into Home 2   Equipment Owned None   Lives with - Patient's Self Care Capacity Other (Comments)  (Mother, 3 sons (16, 18, and 24 yrs old), 1 daughter (17 yrs old), and 3 foster children (3 month old, 11 y/o, and 13 y/o))   Prior Level of Functional Mobility   Bed Mobility Independent   Transfer Status Independent   Ambulation Independent   Comments IND prior to fall/fractures. Reports while at [facility], pt was able to stand and transfer to a wheelchair, but unable to initiate walking.   History of Falls   History of Falls Yes   Cognition    Comments alert and appropriate   Active ROM Upper Body   Active ROM Upper Body  X   Comments WFL except, L shoulder AROM ~15 deg   Strength Upper Body   Upper Body Strength  X   Comments RUE grossly 4/5, L shoulder flexion 2-/5, and L elbow/wrist grossly >3/5 (did not provide resistance due to NWB precuations)   Sensation Upper Body   Upper Extremity Sensation  WDL   Active ROM Lower Body    Active ROM Lower Body  WDL   Comments except L ankle DF/PF lacking ~50%   Strength Lower Body   Lower Body Strength  X   Comments RLE grossly 4/5, except 3-/5 R hip flexion. L hip flexion 3-/5, L knee 4-/5, and deferred L ankle MMT   Sensation Lower Body   Lower Extremity Sensation   WDL   Balance Assessment   Sitting Balance (Static) Good   Standing Balance (Static) Fair -   Bed Mobility    Supine to Sit Contact Guard Assist  (HOB elevated, use of rails)    Gait Analysis   Gait Level Of Assist Unable to Participate  (weakness)   Functional Mobility   Sit to Stand Minimal Assist  (several attempts to successfully complete due to impaired strength/balance. used R hand on FWW for additional stability compared to cane)   Bed, Chair, Wheelchair Transfer Unable to Participate   Mobility Grey provided and pt completed 30 sec lateral weight shifts in standing and attempted marching in place. Unable to clear feet from floor due to insufficient stance phase stability   Comments total assist to lalo L CAM boot while EOB   How much difficulty does the patient currently have...   Turning over in bed (including adjusting bedclothes, sheets and blankets)? 1   Sitting down on and standing up from a chair with arms (e.g., wheelchair, bedside commode, etc.) 1   Moving from lying on back to sitting on the side of the bed? 1   How much help from another person does the patient currently need...   Moving to and from a bed to a chair (including a wheelchair)? 1   Need to walk in a hospital room? 1   Climbing 3-5 steps with a railing? 1   6 clicks Mobility Score 6   Short Term Goals    Short Term Goal # 1 Pt will complete supine<>sit supervision with HOB elevated as needed in 6 visits to improve bed mobility.   Short Term Goal # 2 Pt will transfer bed<>chair CGA with LRAD in 6 visits to improve OOB transfer abilities.   Short Term Goal # 3 Pt will ambulate 10' CGA w/LRAD in 6 visits to improve ambulatory abilities.   Education Group   Education Provided Role of Physical Therapist;Weight Bearing Status;Transfer Status   Additional Comments Discussed importance of ROM to prevent stiffness, and appropriate elevation of extremeties to manage pain/swelling. Also discussed graded activity given recent fxs and hx of OI. Also discussed wearing shoes on R and potential need for even up to reduce vertical height discrepancy while wearing CAM boot   Physical Therapy Initial Treatment Plan     Treatment Plan  Bed Mobility;Gait Training;Neuro Re-Education / Balance;Self Care / Home Evaluation;Therapeutic Activities;Therapeutic Exercise   Treatment Frequency 3 Times per Week   Duration Until Therapy Goals Met   Problem List    Problems Impaired Bed Mobility;Impaired Transfers;Impaired Ambulation;Functional ROM Deficit;Functional Strength Deficit;Impaired Balance;Decreased Activity Tolerance   Anticipated Discharge Equipment and Recommendations   DC Equipment Recommendations Unable to determine at this time   Discharge Recommendations Recommend post-acute placement for additional physical therapy services prior to discharge home   Interdisciplinary Plan of Care Collaboration   IDT Collaboration with  Nursing   Patient Position at End of Therapy Seated;Edge of Bed;Bed Alarm On;Tray Table within Reach;Call Light within Reach;Phone within Reach   Session Information   Date / Session Number  4/25- 1(1/3, 5/1)

## 2023-04-26 ENCOUNTER — PATIENT OUTREACH (OUTPATIENT)
Dept: SCHEDULING | Facility: IMAGING CENTER | Age: 58
End: 2023-04-26
Payer: MEDICARE

## 2023-04-26 VITALS
HEIGHT: 60 IN | WEIGHT: 242.06 LBS | OXYGEN SATURATION: 93 % | SYSTOLIC BLOOD PRESSURE: 117 MMHG | HEART RATE: 84 BPM | BODY MASS INDEX: 47.52 KG/M2 | RESPIRATION RATE: 17 BRPM | DIASTOLIC BLOOD PRESSURE: 80 MMHG | TEMPERATURE: 97.4 F

## 2023-04-26 LAB
BACTERIA BLD CULT: NORMAL
SIGNIFICANT IND 70042: NORMAL
SITE SITE: NORMAL
SOURCE SOURCE: NORMAL

## 2023-04-26 PROCEDURE — 700102 HCHG RX REV CODE 250 W/ 637 OVERRIDE(OP)

## 2023-04-26 PROCEDURE — A9270 NON-COVERED ITEM OR SERVICE: HCPCS

## 2023-04-26 PROCEDURE — 99238 HOSP IP/OBS DSCHRG MGMT 30/<: CPT | Mod: GC | Performed by: FAMILY MEDICINE

## 2023-04-26 RX ORDER — LEVOFLOXACIN 750 MG/1
750 TABLET, FILM COATED ORAL DAILY
Qty: 10 TABLET | Refills: 0 | Status: ACTIVE | DISCHARGE
Start: 2023-04-27

## 2023-04-26 RX ADMIN — OXYCODONE HYDROCHLORIDE 10 MG: 10 TABLET ORAL at 05:09

## 2023-04-26 RX ADMIN — APIXABAN 10 MG: 5 TABLET, FILM COATED ORAL at 05:09

## 2023-04-26 RX ADMIN — LEVOFLOXACIN 750 MG: 750 TABLET, FILM COATED ORAL at 05:09

## 2023-04-26 NOTE — DISCHARGE SUMMARY
[facility] DISCHARGE SUMMARY     PATIENT ID:  Name:             Addie Orellana   YOB: 1965  Age:                 58 y.o.  female   MRN:               [id number]  Address:         [address] [place] [address]  Phone:            [phone] (home)    ADMISSION DATE:   4/21/2023    DISCHARGE DATE:   4/26/2023    DISCHARGE DIAGNOSES:   Problem Noted   Positive Blood Culture 4/24/2023   Pulmonary Embolism On Right (Hcc) 4/21/2023   Hydronephrosis With Urinary Obstruction Due to Renal Calculus 4/21/2023   Urinary Tract Infection With Hematuria 4/21/2023   Low Hemoglobin 4/11/2023   Displaced Fracture of Proximal End of Humerus 4/7/2023   Fracture of Distal End of Tibia 4/7/2023   Osteoporosis With Current Pathological Fracture 4/7/2023   Closed Fracture of Shaft of Fibula 4/7/2023       ATTENDING PHYSICIAN:   Yared Akins MD    RESIDENT:   Kisha Fairbanks MD     CONSULTANTS:    Urology    PROCEDURES:    none    IMAGING:   EC-ECHOCARDIOGRAM COMPLETE W/ CONT   Final Result      CT-RENAL COLIC EVALUATION(A/P W/O)   Final Result      1.  Nonobstructing left nephrolithiasis. No left hydronephrosis.   2.  Residual left perinephric stranding, nonspecific.   3.  Hepatic steatosis.   4.  Cholecystectomy and gastric bypass.      CT-RENAL COLIC EVALUATION(A/P W/O)   Final Result         1.  Mild left hydronephrosis and hydroureter with associated hazy left perinephric fat stranding. Dense contrast within the bilateral urinary outflow tract is seen presenting evaluation for ureteral and renal calculi.   2.  Small hiatal hernia   3.  Diverticulosis   4.  Fat-containing umbilical hernia   5.  Hepatomegaly      CT-CTA CHEST PULMONARY ARTERY W/ RECONS   Final Result         1.  Bilateral pulmonary emboli as described. No radiographic evidence of right heart strain.   2.  Left nephrolithiasis with hydronephrosis and hydroureter and obstructive appearance of the left kidney.   3.  Small to  "moderate size hiatal hernia      These findings were discussed with the patient's clinician, Mao Paz, on 4/21/2023 9:25 PM.      DX-CHEST-PORTABLE (1 VIEW)   Final Result         1.  Interstitial edema and/or infiltrates.   2.  Cardiomegaly        OVERNIGHT EVENTS:  No acute overnight events, patient resting comfortably in bed in no acute distress.  Patient denies any shortness of breath, pain with inspiration, chest pain or worsening edema of left upper and lower extremity.  Patient is not requiring supplemental oxygen.  Tolerating p.o. without any nausea or vomiting.  Voiding and stooling normally.    PHYSICAL EXAM:   General: No acute distress, afebrile, resting comfortably  HEENT: NC/AT. EOMI.   Cardiovascular: RRR without murmurs. Normal capillary refill   Respiratory: CTAB, no tachypnea or retractions  Abdomen: soft, nontender, nondistended, no masses  EXT:   left upper extremity 1+ pitting edema from elbow extending to shoulder, left lower extremity 1+ pitting edema from toes to knee.    Skin: No erythema/lesions   Neuro: Non-focal    LABS:  Recent Labs     04/24/23  0502 04/24/23  1255 04/25/23  0035   WBC 4.4* 4.1* 3.9*   RBC 2.54* 3.31* 3.03*   HEMOGLOBIN 6.7* 8.6* 8.0*   HEMATOCRIT 22.4* 28.5* 25.5*   MCV 88.2 86.1 84.2   MCH 26.4* 26.0* 26.4*   RDW 50.3* 54.8* 54.4*   PLATELETCT 142* 138* 142*   MPV 10.0 9.9 9.9   NEUTSPOLYS 73.10*  --   --    LYMPHOCYTES 14.90*  --   --    MONOCYTES 8.70  --   --    EOSINOPHILS 2.10  --   --    BASOPHILS 0.50  --   --    RBCMORPHOLO Present  --   --      Recent Labs     04/24/23  0421   SODIUM 136   POTASSIUM 3.8   CHLORIDE 107   CO2 21   GLUCOSE 87   BUN 8     Lab Results   Component Value Date/Time    CHOLSTRLTOT 148 06/11/2020 07:28 AM    LDL 76 06/11/2020 07:28 AM    HDL 47 06/11/2020 07:28 AM    TRIGLYCERIDE 123 06/11/2020 07:28 AM       No results found for: TROPONINI, CKMB  No results found for: TROPONINI, CKMB         HOSPITAL COURSE:   Per HPI, \"Addie Orellana " "is a 58 y.o. female with a history of osteogenesis imperfecta and a recent hospitalization for multiple fractures in her left arm and left leg now status post surgical repair who presented with a fever and fast heart rate from [facility].  The patient reports that today she initiated fever.  She was given multiple antipyretics to try and bring the fever down when that did not work the staff brought her into the emergency department.  The patient denies any increased pain in her bilateral arms bilateral legs.  She denies any shortness of breath, chest pain, pain radiating into her arms or legs, diaphoreses, dysuria, flank pain, blood in her urine, or any real acute change from her baseline.  She does report chronic low back pain from her scoliosis and chronic pain in her left arm and left leg since the surgery.  She does report this has been getting better.      ER Course:  A CTA of the chest was performed which demonstrated clots in the right distal pulmonary artery as well as subsegmental clots in the left lower lobe.  Heparin drip was started.  Incidentally the patient's left kidney was noted to have hydronephrosis as well as calculi.  A CT renal colic was ordered to further evaluate.  This is still pending.\"    Floor Course:   Patient transferred to floor in stable condition.  Continued on supplemental oxygen, heparin drip and started on ceftriaxone for UTI.  Urology consulted and recommended continuing antibiotics.  Patient was slowly weaned off supplemental oxygen to room air.  Patient transition from heparin drip to apixaban for PE treatment.  Patient transition from ceftriaxone to levofloxacin for UTI.  Patient medically cleared to be discharged back to [facility] to continue rehabilitation.  Patient scheduled for transportation at 330 this afternoon.    DISCHARGE CONDITION:    Stable    DISPOSITION:   [facility]    DISCHARGE MEDICATIONS:      Medication List        START taking these medications  "       Instructions   apixaban 5mg Tabs  Start taking on: April 26, 2023  Commonly known as: ELIQUIS   Take 2 Tablets by mouth 2 times a day for 5 days, THEN 1 Tablet 2 times a day for 83 days. Patient to take apixaban as prescribed over three month period  Indications: DVT/PE     levoFLOXacin 750 MG tablet  Start taking on: April 27, 2023  Commonly known as: LEVAQUIN   Take 1 Tablet by mouth every day.  Dose: 750 mg            CONTINUE taking these medications        Instructions   acetaminophen 325 MG Tabs  Commonly known as: Tylenol   Take 650 mg by mouth every 6 hours as needed for Mild Pain or Fever. 2 tablets = 650 mg.  Dose: 650 mg     Calcium 600+D3 600-5 MG-MCG Tabs  Generic drug: Calcium Carb-Cholecalciferol   Take 1 Tablet by mouth every evening.  Dose: 1 Tablet     therapeutic multivitamin-minerals Tabs   Take 1 Tablet by mouth every evening.  Dose: 1 Tablet            STOP taking these medications      oxyCODONE immediate release 10 MG immediate release tablet  Commonly known as: ROXICODONE             ACTIVITY:   Normal Activity as Tolerated.    DIET:   Healthy    DISCHARGE INSTRUCTIONS AND FOLLOW UP:  Patient is medically stable for discharge and will be discharged to [facility]    Follow Up:   PCP within 1 week after discharge for hospital follow-up    Discharge Instructions:   Patient was instructed to return the ER in the event of worsening symptoms including but not limited to fever, chills, lightheadedness, dizziness, shortness of breath, pain with exertion, chest pain, blood in stool or any other major concerns. Patient understands that failure to do so may indicate worsening of her medical condition(s) and result in adverse clinical outcomes including fatality. We have counseled the patient on the importance of compliance and the patient has agreed to proceed with all medical recommendations and follow up plan indicated above. The patient understands that the failure to do so may result in  result in adverse clinical outcomes including fatality.       CC:   Iliana Barbosa M.D.

## 2023-04-26 NOTE — DISCHARGE PLANNING
0823  Agency/Facility Name: [facility]   Spoke To: Ana   Outcome: Ana called [facility] to notify that insurance auth has been obtained. DPA to call Ana with [facility] updates.     0827  Agency/Facility Name: [facility]   Outcome: DPA left VM to notify that Pt is [facility] and what time they would like transport to be set up.     0848  Agency/Facility Name: [facility]   Outcome: Ana left  to request transport time of 1530.

## 2023-04-26 NOTE — DISCHARGE PLANNING
DC Transport Scheduled    Received request at: 4/26/2023 at 0918    Transport Company Scheduled:  [employer]  MTM- no Medicaid benefits.     Scheduled Date: 4/26/2023  Scheduled Time: 1530    Destination: [facility] at [address] [place]    Notified care team of scheduled transport via Voalte.     If there are any changes needed to the DC transportation scheduled, please contact [facility] at ext. [phone] between the hours of 3171-6376 Mon-Fri. If outside those hours, contact the ED Case Manager at ext. [phone].

## 2023-04-26 NOTE — PROGRESS NOTES
Patient discharged to SNF at 1600 with [facility]. PIV x2 and tele box removed. Sent with all belongings, confirmed by pt. Report given to Lee Jamison RN.

## 2023-04-26 NOTE — DOCUMENTATION QUERY
[facility]                                                                       Query Response Note      PATIENT:               SISSY JUNE  ACCT #:                  [id number]  MRN:                     [id number]  :                      1965  ADMIT DATE:       2023 7:37 PM  DISCH DATE:          RESPONDING  PROVIDER #:        [id number]           QUERY TEXT:    Patient admitted for right PE and UTI with hematuria. PRBC's x 1 Unit transfused on . Based on the above findings and clinical indicators provided, can a diagnosis be provided to support the need for blood transfusion?    The patient's clinical indicators include:  Findings:  --Patient admitted for right PE, UTI with hematuria and hydronephrosis with urinary obstruction d/t renal      calculus  --Per PN , ''low hemoglobin, patient received 1U prbc's'' stated  --H&H on admission :  9.0/29.3, H&H on :  8.9/29.2, H&H on :  6.7/22.4 then 8.6/28.5,      H&H on :  8.0/25.5    Treatment:  --Serial H&H  --Type and crossmatch  --PRBC x 1 unit transfused    Risk Factors:  --Right PE  --UTI with hematuria  --Hydronephrosis with urinary obstruction    Thank you,  Mallorie Flood RN, BSN  Clinical   Connect via [employer]  Options provided:   -- Patient has anemia, please specify type   -- Other explanation, please specify   -- Unable to determine      Query created by: Mallorie Flood on 2023 9:04 AM    RESPONSE TEXT:    patient has anemia- iron deficiency          Electronically signed by:  RITA CURTIS MD 2023 6:16 PM

## 2023-06-08 ENCOUNTER — HOME HEALTH ADMISSION (OUTPATIENT)
Dept: HOME HEALTH SERVICES | Facility: HOME HEALTHCARE | Age: 58
End: 2023-06-08
Payer: MEDICARE

## 2023-06-15 ENCOUNTER — APPOINTMENT (OUTPATIENT)
Dept: MEDICAL GROUP | Facility: CLINIC | Age: 58
End: 2023-06-15
Payer: MEDICARE

## 2023-06-30 NOTE — PROGRESS NOTES
Bedside report received. Assumed care of patient this PM. Assessment completed      Patient is A&O x 4, pt calls for assistance appropriately  Reports 8 /10 pain, prn medication administered.  Pt is on 0.5L oxygen, baseline is r/a.   Mobility max assist, pt EOB with PT/OT during day shift. Pt refused to mobilize this PM due to pain.    Bed alarm in on.  Voiding + purewick  Flatus +            Plan of care reviewed with the patient. Bed is locked and in the lowest position. Call light is within reach. Patient encouraged to voice needs and concerns, all needs met at this time. Hourly rounding in place.      [Parent] : parent [FreeTextEntry2] : New Patient; RT [place]

## 2024-03-03 NOTE — ANESTHESIA PREPROCEDURE EVALUATION
Relevant Problems   No relevant active problems       Physical Exam    Airway   Mallampati: II  TM distance: >3 FB  Neck ROM: full       Cardiovascular - normal exam  Rhythm: regular  Rate: normal  (-) murmur     Dental - normal exam         Pulmonary - normal exam  Breath sounds clear to auscultation     Abdominal   (+) obese     Neurological - normal exam                 Anesthesia Plan  
    Relevant Problems   No relevant active problems       Physical Exam    Anesthesia Plan  
    Relevant Problems   No relevant active problems       Physical Exam    Anesthesia Plan    ASA 3   ASA physical status 3 criteria: morbid obesity - BMI greater than or equal to 40    Plan - general             Induction: intravenous    Postoperative Plan: Postoperative administration of opioids is intended.    Pertinent diagnostic labs and testing reviewed    Informed Consent:    Anesthetic plan and risks discussed with patient.    Use of blood products discussed with: patient whom.         
None

## 2025-03-31 NOTE — DIETARY
NUTRITION SERVICES: BMI - Pt with BMI >40 (=Body mass index is 46.68 kg/m².), Class III obesity. Weight loss counseling not appropriate in acute care setting. RECOMMEND - If appropriate at DC please refer to outpatient nutrition services for weight management.      Treatment Goal Met?: no Treatment Goal Explanation (Does Not Render In The Note): Stable for the purposes of categorizing medical decision making is defined by the specific treatment goals for an individual patient. A patient that is not at their treatment goal is not stable, even if the condition has not changed and there is no short- term threat to life or function.

## (undated) DEVICE — CANISTER SUCTION 3000ML MECHANICAL FILTER AUTO SHUTOFF MEDI-VAC NONSTERILE LF DISP  (40EA/CA)

## (undated) DEVICE — DRAPE LARGE 3 QUARTER - (20/CA)

## (undated) DEVICE — SET EXTENSION WITH 2 PORTS (48EA/CA) ***PART #2C8610 IS A SUBSTITUTE*****

## (undated) DEVICE — CLIP MED INTNL HRZN TI ESCP - (25/BX)

## (undated) DEVICE — SUTURE GENERAL

## (undated) DEVICE — SPONGE GAUZESTER 4 X 4 4PLY - (128PK/CA)

## (undated) DEVICE — SUTURE 0 VICRYL PLUS CT-1 - 36 INCH (36/BX)

## (undated) DEVICE — ELECTRODE DUAL RETURN W/ CORD - (50/PK)

## (undated) DEVICE — FREEHAND DRILL 4.2X130MM

## (undated) DEVICE — BANDAGE ELASTIC STERILE MATRIX 6 X 10 (20EA/CA)

## (undated) DEVICE — SENSOR OXIMETER ADULT SPO2 RD SET (20EA/BX)

## (undated) DEVICE — CLIP MED LG INTNL HRZN TI ESCP - (20/BX)

## (undated) DEVICE — SLEEVE HOLDING T2 OD8-11 MM NAIL ELASTIC INSERTION STERILE

## (undated) DEVICE — SET LEADWIRE 5 LEAD BEDSIDE DISPOSABLE ECG (1SET OF 5/EA)

## (undated) DEVICE — SYRINGE 10 ML CONTROL LL (25EA/BX 4BX/CA)

## (undated) DEVICE — NEPTUNE 4 PORT MANIFOLD - (20/PK)

## (undated) DEVICE — CHLORAPREP 26 ML APPLICATOR - ORANGE TINT(25/CA)

## (undated) DEVICE — BOVIE BLADE COATED &INSULATED - 25/PK

## (undated) DEVICE — PROTECTOR ULNA NERVE - (36PR/CA)

## (undated) DEVICE — TUBING CLEARLINK DUO-VENT - C-FLO (48EA/CA)

## (undated) DEVICE — LACTATED RINGERS INJ 1000 ML - (14EA/CA 60CA/PF)

## (undated) DEVICE — KIT ROOM DECONTAMINATION

## (undated) DEVICE — HANDPIECE 10FT INTPLS SCT PLS IRRIGATION HAND CONTROL SET (6/PK)

## (undated) DEVICE — Device

## (undated) DEVICE — DRESSING LEUKOMED STERILE 11.75X4IN - (50/CA)

## (undated) DEVICE — GLOVE BIOGEL INDICATOR SZ 6.5 SURGICAL PF LTX - (50PR/BX 4BX/CA)

## (undated) DEVICE — BIT DRILL DIA2.5MM FOR AXSOS 3 TITANIUM LOCKING PLATE SYSTEM

## (undated) DEVICE — TUBE E-T HI-LO CUFF 7.5MM (10EA/PK)

## (undated) DEVICE — DRAPE CHEST/BREAST - (12EA/CA)

## (undated) DEVICE — DRESSING ABDOMINAL PAD STERILE 8 X 10" (360EA/CA)"

## (undated) DEVICE — NEEDLE NON SAFETY HYPO 22 GA X 1 1/2 IN (100/BX)

## (undated) DEVICE — GOWN WARMING STANDARD FLEX - (30/CA)

## (undated) DEVICE — ELECTRODE 850 FOAM ADHESIVE - HYDROGEL RADIOTRNSPRNT (50/PK)

## (undated) DEVICE — SUTURE 4-0 MONOCRYL PLUS PS-2 - 27 INCH (36/BX)

## (undated) DEVICE — SUCTION INSTRUMENT YANKAUER BULBOUS TIP W/O VENT (50EA/CA)

## (undated) DEVICE — SUTURE 3-0 VICRYL PLUS SH - 8X 18 INCH (12/BX)

## (undated) DEVICE — PADDING CAST 6 IN STERILE - 6 X 4 YDS (24/CA)

## (undated) DEVICE — WRAP COBAN SELF-ADHERENT 6 IN X  5YDS STERILE TAN (12/CA)

## (undated) DEVICE — BLADE SURGICAL #15 - (50/BX 3BX/CA)

## (undated) DEVICE — COVER PROBE STERILE CONE (12EA/CA)

## (undated) DEVICE — DRESSING TRANSPARENT FILM TEGADERM 4 X 4.75" (50EA/BX)"

## (undated) DEVICE — PACK MAJOR ORTHO - (2EA/CA)

## (undated) DEVICE — STAPLER SKIN DISP - (6/BX 10BX/CA) VISISTAT

## (undated) DEVICE — DRAPE IOBAN II INCISE 23X17 - (10EA/BX 4BX/CA)

## (undated) DEVICE — PENCIL ELECTSURG 10FT BTN SWH - (50/CA)

## (undated) DEVICE — GLOVE BIOGEL SZ 6.5 SURGICAL PF LTX (50PR/BX 4BX/CA)

## (undated) DEVICE — DRAPE C ARMOR (12EA/CA)

## (undated) DEVICE — GOWN SURGEONS X-LARGE - DISP. (30/CA)

## (undated) DEVICE — GLOVE BIOGEL PI ORTHO SZ 7.5 PF LF (40PR/BX)

## (undated) DEVICE — MASK ANESTHESIA ADULT  - (100/CA)

## (undated) DEVICE — BANDAGEESMARK BLUE 6X9' LF - 20/CS"

## (undated) DEVICE — COVER LIGHT HANDLE ALC PLUS DISP (18EA/BX)

## (undated) DEVICE — SPONGE GAUZESTER. 2X2 4-PL - (2/PK 50PK/BX 30BX/CS)

## (undated) DEVICE — RESERVOIR SUCTION 100 CC - SILICONE (20EA/CA)

## (undated) DEVICE — SLEEVE, VASO, THIGH, MED

## (undated) DEVICE — STERI STRIP COMPOUND BENZOIN - TINCTURE 0.6ML WITH APPLICATOR (40EA/BX)

## (undated) DEVICE — LOCKING DRILL 4.2X360MM

## (undated) DEVICE — TUBE CONNECT SUCTION CLEAR 120 X 1/4" (50EA/CA)"

## (undated) DEVICE — MAT PATIENT POSITIONING PREVALON (10EA/CA)

## (undated) DEVICE — CLOSURE SKIN STRIP 1/2 X 4 IN - (STERI STRIP) (50/BX 4BX/CA)

## (undated) DEVICE — DRESSING AQUACEL AG ADVANTAGE 3.5 X 10" (10EA/BX)"

## (undated) DEVICE — RETRACTOR LIGHTED RADIALUX

## (undated) DEVICE — STOCKINET TUBULAR 6IN STERILE - 6 X 48YDS (25/CA)

## (undated) DEVICE — SUCTION INSTRUMENT YANKAUER OPEN TIP W/O VENT (50EA/CA)

## (undated) DEVICE — BOVIE  BLADE 6 EXTENDED - (50/PK)

## (undated) DEVICE — DRAPE SURG STERI-DRAPE 7X11OD - (40EA/CA)

## (undated) DEVICE — SHEET TRANSVERSE LAP - (12EA/CA)

## (undated) DEVICE — PAD LAP STERILE 18 X 18 - (5/PK 40PK/CA)

## (undated) DEVICE — GLOVE BIOGEL SZ 8 SURGICAL PF LTX - (50PR/BX 4BX/CA)

## (undated) DEVICE — GUIDE WIRE BALL TIP 3.0X800MM STERILE

## (undated) DEVICE — WATER IRRIGATION STERILE 1000ML (12EA/CA)

## (undated) DEVICE — DETERGENT RENUZYME PLUS 10 OZ PACKET (50/BX)

## (undated) DEVICE — REAMER SHAFT  MODIFIED TRINKLE  8X510MM

## (undated) DEVICE — TIP INTPLS HFLO ML ORFC BTRY - (12/CS)  FOR SURGILAV

## (undated) DEVICE — BOVIE BLADE COATED - (50/PK)

## (undated) DEVICE — PACK MAJOR BASIN - (2EA/CA)

## (undated) DEVICE — TOWELS CLOTH SURGICAL - (4/PK 20PK/CA)

## (undated) DEVICE — SUTURE 3-0 ETHILON FS-1 - (36/BX) 30 INCH

## (undated) DEVICE — SODIUM CHL IRRIGATION 0.9% 1000ML (12EA/CA)

## (undated) DEVICE — TOWEL STOP TIMEOUT SAFETY FLAG (40EA/CA)

## (undated) DEVICE — KIT ANESTHESIA W/CIRCUIT & 3/LT BAG W/FILTER (20EA/CA)

## (undated) DEVICE — DRILL BIT LOCKING SHORT 3.1X216MM

## (undated) DEVICE — BAG SPONGE COUNT 10.25 X 32 - BLUE (250/CA)

## (undated) DEVICE — CLIP LG INTNL HRZN TI ESCP LGT - (20/BX)

## (undated) DEVICE — HEAD HOLDER JUNIOR/ADULT

## (undated) DEVICE — SENSOR SPO2 NEO LNCS ADHESIVE (20/BX) SEE USER NOTES

## (undated) DEVICE — DRAPE 36X28IN RAD CARM BND BG - (25/CA) O

## (undated) DEVICE — SYRINGE 30 ML LL (56/BX)

## (undated) DEVICE — PACK MINOR BASIN - (2EA/CA)

## (undated) DEVICE — SUTURE 2-0 VICRYL PLUS CT-1 36 (36PK/BX)"

## (undated) DEVICE — GLOVE BIOGEL INDICATOR SZ 7.5 SURGICAL PF LTX - (50PR/BX 4BX/CA)